# Patient Record
Sex: MALE | Race: OTHER | NOT HISPANIC OR LATINO | Employment: OTHER | URBAN - METROPOLITAN AREA
[De-identification: names, ages, dates, MRNs, and addresses within clinical notes are randomized per-mention and may not be internally consistent; named-entity substitution may affect disease eponyms.]

---

## 2017-02-02 ENCOUNTER — TRANSCRIBE ORDERS (OUTPATIENT)
Dept: ADMINISTRATIVE | Facility: HOSPITAL | Age: 72
End: 2017-02-02

## 2017-02-02 ENCOUNTER — ALLSCRIPTS OFFICE VISIT (OUTPATIENT)
Dept: OTHER | Facility: OTHER | Age: 72
End: 2017-02-02

## 2017-02-02 DIAGNOSIS — R19.34: Primary | ICD-10-CM

## 2017-02-02 DIAGNOSIS — R10.814 ABDOMINAL TENDERNESS OF LEFT LOWER QUADRANT, REBOUND TENDERNESS PRESENCE NOT SPECIFIED: ICD-10-CM

## 2017-02-02 DIAGNOSIS — R19.34: ICD-10-CM

## 2017-02-02 DIAGNOSIS — R10.814 LEFT LOWER QUADRANT ABDOMINAL TENDERNESS: ICD-10-CM

## 2017-02-03 ENCOUNTER — GENERIC CONVERSION - ENCOUNTER (OUTPATIENT)
Dept: OTHER | Facility: OTHER | Age: 72
End: 2017-02-03

## 2017-02-03 LAB
A/G RATIO (HISTORICAL): 1.8 (CALC) (ref 1–2.5)
ALBUMIN SERPL BCP-MCNC: 4.2 G/DL (ref 3.6–5.1)
ALP SERPL-CCNC: 58 U/L (ref 40–115)
ALT SERPL W P-5'-P-CCNC: 15 U/L (ref 9–46)
AST SERPL W P-5'-P-CCNC: 19 U/L (ref 10–35)
BASOPHILS # BLD AUTO: 0.4 %
BASOPHILS # BLD AUTO: 30 CELLS/UL (ref 0–200)
BILIRUB SERPL-MCNC: 1.2 MG/DL (ref 0.2–1.2)
BILIRUB UR QL STRIP: NEGATIVE
BUN SERPL-MCNC: 13 MG/DL (ref 7–25)
BUN/CREA RATIO (HISTORICAL): ABNORMAL (CALC) (ref 6–22)
CALCIUM SERPL-MCNC: 9.7 MG/DL (ref 8.6–10.3)
CHLORIDE SERPL-SCNC: 106 MMOL/L (ref 98–110)
CO2 SERPL-SCNC: 31 MMOL/L (ref 20–31)
COLOR UR: YELLOW
COMMENT (HISTORICAL): CLEAR
CREAT SERPL-MCNC: 1.16 MG/DL (ref 0.7–1.18)
DEPRECATED RDW RBC AUTO: 12.6 % (ref 11–15)
EOSINOPHIL # BLD AUTO: 10.9 %
EOSINOPHIL # BLD AUTO: 828 CELLS/UL (ref 15–500)
FECAL OCCULT BLOOD DIAGNOSTIC (HISTORICAL): NEGATIVE
GAMMA GLOBULIN (HISTORICAL): 2.3 G/DL (CALC) (ref 1.9–3.7)
GLUCOSE (HISTORICAL): 110 MG/DL (ref 65–99)
GLUCOSE (HISTORICAL): NEGATIVE
HCT VFR BLD AUTO: 46.6 % (ref 38.5–50)
HGB BLD-MCNC: 15.7 G/DL (ref 13.2–17.1)
KETONES UR STRIP-MCNC: NEGATIVE MG/DL
LEUKOCYTE ESTERASE UR QL STRIP: NEGATIVE
LYMPHOCYTES # BLD AUTO: 2044 CELLS/UL (ref 850–3900)
LYMPHOCYTES # BLD AUTO: 26.9 %
MCH RBC QN AUTO: 30 PG (ref 27–33)
MCHC RBC AUTO-ENTMCNC: 33.7 G/DL (ref 32–36)
MCV RBC AUTO: 89 FL (ref 80–100)
MONOCYTES # BLD AUTO: 608 CELLS/UL (ref 200–950)
MONOCYTES (HISTORICAL): 8 %
NEUTROPHILS # BLD AUTO: 4089 CELLS/UL (ref 1500–7800)
NEUTROPHILS # BLD AUTO: 53.8 %
NITRITE UR QL STRIP: NEGATIVE
PH UR STRIP.AUTO: 5.5 [PH] (ref 5–8)
PLATELET # BLD AUTO: 186 THOUSAND/UL (ref 140–400)
PMV BLD AUTO: 9.8 FL (ref 7.5–12.5)
POTASSIUM SERPL-SCNC: 4.6 MMOL/L (ref 3.5–5.3)
PROT UR STRIP-MCNC: NEGATIVE MG/DL
RBC # BLD AUTO: 5.24 MILLION/UL (ref 4.2–5.8)
SODIUM SERPL-SCNC: 144 MMOL/L (ref 135–146)
SP GR UR STRIP.AUTO: 1.02 (ref 1–1.03)
TOTAL PROTEIN (HISTORICAL): 6.5 G/DL (ref 6.1–8.1)
TSH SERPL DL<=0.05 MIU/L-ACNC: 1.17 MIU/L (ref 0.4–4.5)
WBC # BLD AUTO: 7.6 THOUSAND/UL (ref 3.8–10.8)

## 2017-02-08 ENCOUNTER — HOSPITAL ENCOUNTER (OUTPATIENT)
Dept: CT IMAGING | Facility: HOSPITAL | Age: 72
Discharge: HOME | End: 2017-02-08
Payer: COMMERCIAL

## 2017-02-08 DIAGNOSIS — R19.34: ICD-10-CM

## 2017-02-08 DIAGNOSIS — R10.814 LEFT LOWER QUADRANT ABDOMINAL TENDERNESS: ICD-10-CM

## 2017-02-08 PROCEDURE — 74176 CT ABD & PELVIS W/O CONTRAST: CPT

## 2017-02-09 ENCOUNTER — GENERIC CONVERSION - ENCOUNTER (OUTPATIENT)
Dept: OTHER | Facility: OTHER | Age: 72
End: 2017-02-09

## 2017-02-14 ENCOUNTER — GENERIC CONVERSION - ENCOUNTER (OUTPATIENT)
Dept: OTHER | Facility: OTHER | Age: 72
End: 2017-02-14

## 2017-02-20 ENCOUNTER — ALLSCRIPTS OFFICE VISIT (OUTPATIENT)
Dept: OTHER | Facility: OTHER | Age: 72
End: 2017-02-20

## 2017-02-27 ENCOUNTER — GENERIC CONVERSION - ENCOUNTER (OUTPATIENT)
Dept: OTHER | Facility: OTHER | Age: 72
End: 2017-02-27

## 2017-04-06 ENCOUNTER — GENERIC CONVERSION - ENCOUNTER (OUTPATIENT)
Dept: OTHER | Facility: OTHER | Age: 72
End: 2017-04-06

## 2017-04-28 ENCOUNTER — GENERIC CONVERSION - ENCOUNTER (OUTPATIENT)
Dept: OTHER | Facility: OTHER | Age: 72
End: 2017-04-28

## 2017-05-19 ENCOUNTER — GENERIC CONVERSION - ENCOUNTER (OUTPATIENT)
Dept: OTHER | Facility: OTHER | Age: 72
End: 2017-05-19

## 2017-06-15 ENCOUNTER — ALLSCRIPTS OFFICE VISIT (OUTPATIENT)
Dept: OTHER | Facility: OTHER | Age: 72
End: 2017-06-15

## 2017-06-16 ENCOUNTER — LAB CONVERSION - ENCOUNTER (OUTPATIENT)
Dept: OTHER | Facility: OTHER | Age: 72
End: 2017-06-16

## 2017-06-16 ENCOUNTER — GENERIC CONVERSION - ENCOUNTER (OUTPATIENT)
Dept: OTHER | Facility: OTHER | Age: 72
End: 2017-06-16

## 2017-06-16 LAB
C-REACTIVE PROTEIN (HISTORICAL): 0.34 MG/DL
ERYTHROCYTE SEDIMENTATION RATE (HISTORICAL): 2 MM/H

## 2017-06-23 ENCOUNTER — GENERIC CONVERSION - ENCOUNTER (OUTPATIENT)
Dept: OTHER | Facility: OTHER | Age: 72
End: 2017-06-23

## 2017-07-03 ENCOUNTER — GENERIC CONVERSION - ENCOUNTER (OUTPATIENT)
Dept: OTHER | Facility: OTHER | Age: 72
End: 2017-07-03

## 2017-07-14 ENCOUNTER — ALLSCRIPTS OFFICE VISIT (OUTPATIENT)
Dept: OTHER | Facility: OTHER | Age: 72
End: 2017-07-14

## 2017-07-27 ENCOUNTER — APPOINTMENT (EMERGENCY)
Dept: CT IMAGING | Facility: HOSPITAL | Age: 72
End: 2017-07-27
Payer: COMMERCIAL

## 2017-07-27 ENCOUNTER — HOSPITAL ENCOUNTER (EMERGENCY)
Facility: HOSPITAL | Age: 72
Discharge: HOME | End: 2017-07-27
Attending: EMERGENCY MEDICINE
Payer: COMMERCIAL

## 2017-07-27 VITALS
WEIGHT: 187 LBS | OXYGEN SATURATION: 95 % | BODY MASS INDEX: 31.92 KG/M2 | DIASTOLIC BLOOD PRESSURE: 100 MMHG | HEIGHT: 64 IN | HEART RATE: 60 BPM | RESPIRATION RATE: 16 BRPM | TEMPERATURE: 98.1 F | SYSTOLIC BLOOD PRESSURE: 214 MMHG

## 2017-07-27 DIAGNOSIS — R10.9 FLANK PAIN: ICD-10-CM

## 2017-07-27 DIAGNOSIS — R11.0 NAUSEA: ICD-10-CM

## 2017-07-27 DIAGNOSIS — R10.9 ABDOMINAL PAIN: Primary | ICD-10-CM

## 2017-07-27 LAB
ALBUMIN SERPL BCP-MCNC: 3.9 G/DL (ref 3.5–5)
ALP SERPL-CCNC: 59 U/L (ref 46–116)
ALT SERPL W P-5'-P-CCNC: 28 U/L (ref 12–78)
ANION GAP SERPL CALCULATED.3IONS-SCNC: 5 MMOL/L (ref 4–13)
AST SERPL W P-5'-P-CCNC: 23 U/L (ref 5–45)
BASOPHILS # BLD AUTO: 0.03 THOUSANDS/ΜL (ref 0–0.1)
BASOPHILS NFR BLD AUTO: 0 % (ref 0–1)
BILIRUB SERPL-MCNC: 0.71 MG/DL (ref 0.2–1)
BILIRUB UR QL STRIP: NEGATIVE
BUN SERPL-MCNC: 9 MG/DL (ref 5–25)
CALCIUM SERPL-MCNC: 8.7 MG/DL (ref 8.3–10.1)
CHLORIDE SERPL-SCNC: 101 MMOL/L (ref 100–108)
CLARITY UR: CLEAR
CO2 SERPL-SCNC: 33 MMOL/L (ref 21–32)
COLOR UR: YELLOW
CREAT SERPL-MCNC: 1.22 MG/DL (ref 0.6–1.3)
EOSINOPHIL # BLD AUTO: 0.8 THOUSAND/ΜL (ref 0–0.61)
EOSINOPHIL NFR BLD AUTO: 11 % (ref 0–6)
ERYTHROCYTE [DISTWIDTH] IN BLOOD BY AUTOMATED COUNT: 13.8 % (ref 11.6–15.1)
GFR SERPL CREATININE-BSD FRML MDRD: 58 ML/MIN/1.73SQ M
GLUCOSE SERPL-MCNC: 96 MG/DL (ref 65–140)
GLUCOSE UR STRIP-MCNC: NEGATIVE MG/DL
HCT VFR BLD AUTO: 42.1 % (ref 36.5–49.3)
HGB BLD-MCNC: 14.8 G/DL (ref 12–17)
HGB UR QL STRIP.AUTO: NEGATIVE
KETONES UR STRIP-MCNC: NEGATIVE MG/DL
LEUKOCYTE ESTERASE UR QL STRIP: NEGATIVE
LYMPHOCYTES # BLD AUTO: 2.65 THOUSANDS/ΜL (ref 0.6–4.47)
LYMPHOCYTES NFR BLD AUTO: 36 % (ref 14–44)
MCH RBC QN AUTO: 30.1 PG (ref 26.8–34.3)
MCHC RBC AUTO-ENTMCNC: 35.2 G/DL (ref 31.4–37.4)
MCV RBC AUTO: 86 FL (ref 82–98)
MONOCYTES # BLD AUTO: 0.54 THOUSAND/ΜL (ref 0.17–1.22)
MONOCYTES NFR BLD AUTO: 7 % (ref 4–12)
NEUTROPHILS # BLD AUTO: 3.27 THOUSANDS/ΜL (ref 1.85–7.62)
NEUTS SEG NFR BLD AUTO: 46 % (ref 43–75)
NITRITE UR QL STRIP: NEGATIVE
NRBC BLD AUTO-RTO: 0 /100 WBCS
PH UR STRIP.AUTO: 5.5 [PH] (ref 4.5–8)
PLATELET # BLD AUTO: 152 THOUSANDS/UL (ref 149–390)
PMV BLD AUTO: 11.1 FL (ref 8.9–12.7)
POTASSIUM SERPL-SCNC: 4.4 MMOL/L (ref 3.5–5.3)
PROT SERPL-MCNC: 6.9 G/DL (ref 6.4–8.2)
PROT UR STRIP-MCNC: NEGATIVE MG/DL
RBC # BLD AUTO: 4.92 MILLION/UL (ref 3.88–5.62)
SODIUM SERPL-SCNC: 139 MMOL/L (ref 136–145)
SP GR UR STRIP.AUTO: 1.01 (ref 1–1.03)
UROBILINOGEN UR QL STRIP.AUTO: 0.2 E.U./DL
WBC # BLD AUTO: 7.29 THOUSAND/UL (ref 4.31–10.16)

## 2017-07-27 PROCEDURE — 93005 ELECTROCARDIOGRAM TRACING: CPT | Performed by: EMERGENCY MEDICINE

## 2017-07-27 PROCEDURE — 80053 COMPREHEN METABOLIC PANEL: CPT | Performed by: EMERGENCY MEDICINE

## 2017-07-27 PROCEDURE — 81003 URINALYSIS AUTO W/O SCOPE: CPT

## 2017-07-27 PROCEDURE — 99284 EMERGENCY DEPT VISIT MOD MDM: CPT

## 2017-07-27 PROCEDURE — 96361 HYDRATE IV INFUSION ADD-ON: CPT

## 2017-07-27 PROCEDURE — 74177 CT ABD & PELVIS W/CONTRAST: CPT

## 2017-07-27 PROCEDURE — 85025 COMPLETE CBC W/AUTO DIFF WBC: CPT | Performed by: EMERGENCY MEDICINE

## 2017-07-27 PROCEDURE — 36415 COLL VENOUS BLD VENIPUNCTURE: CPT | Performed by: EMERGENCY MEDICINE

## 2017-07-27 PROCEDURE — 96375 TX/PRO/DX INJ NEW DRUG ADDON: CPT

## 2017-07-27 PROCEDURE — 74176 CT ABD & PELVIS W/O CONTRAST: CPT

## 2017-07-27 PROCEDURE — 81002 URINALYSIS NONAUTO W/O SCOPE: CPT | Performed by: EMERGENCY MEDICINE

## 2017-07-27 PROCEDURE — 96374 THER/PROPH/DIAG INJ IV PUSH: CPT

## 2017-07-27 RX ORDER — ACETAMINOPHEN 325 MG/1
TABLET ORAL
COMMUNITY
Start: 2016-04-12 | End: 2018-06-12 | Stop reason: SDUPTHER

## 2017-07-27 RX ORDER — CALCITRIOL 3 UG/G
OINTMENT TOPICAL
COMMUNITY
Start: 2011-10-13 | End: 2018-07-17

## 2017-07-27 RX ORDER — METOPROLOL SUCCINATE 25 MG/1
12.5 TABLET, EXTENDED RELEASE ORAL
COMMUNITY
End: 2019-09-09 | Stop reason: SDUPTHER

## 2017-07-27 RX ORDER — ALBUTEROL SULFATE 90 UG/1
AEROSOL, METERED RESPIRATORY (INHALATION)
COMMUNITY
Start: 2016-02-17 | End: 2018-06-12 | Stop reason: SDUPTHER

## 2017-07-27 RX ORDER — OMEPRAZOLE 20 MG/1
20 CAPSULE, DELAYED RELEASE ORAL DAILY
COMMUNITY
Start: 2015-07-08 | End: 2018-07-17 | Stop reason: SDUPTHER

## 2017-07-27 RX ORDER — GABAPENTIN 300 MG/1
600 CAPSULE ORAL
COMMUNITY
Start: 2016-09-20 | End: 2018-04-05 | Stop reason: SDUPTHER

## 2017-07-27 RX ORDER — ONDANSETRON 4 MG/1
4 TABLET, FILM COATED ORAL EVERY 6 HOURS
Qty: 12 TABLET | Refills: 0 | Status: SHIPPED | OUTPATIENT
Start: 2017-07-27 | End: 2018-06-12 | Stop reason: ALTCHOICE

## 2017-07-27 RX ORDER — VALSARTAN 160 MG/1
160 TABLET ORAL DAILY
COMMUNITY
Start: 2017-06-15 | End: 2018-08-16 | Stop reason: ALTCHOICE

## 2017-07-27 RX ORDER — KETOROLAC TROMETHAMINE 30 MG/ML
15 INJECTION, SOLUTION INTRAMUSCULAR; INTRAVENOUS ONCE
Status: COMPLETED | OUTPATIENT
Start: 2017-07-27 | End: 2017-07-27

## 2017-07-27 RX ORDER — ONDANSETRON 2 MG/ML
4 INJECTION INTRAMUSCULAR; INTRAVENOUS ONCE
Status: COMPLETED | OUTPATIENT
Start: 2017-07-27 | End: 2017-07-27

## 2017-07-27 RX ORDER — FLUTICASONE PROPIONATE 50 MCG
2 SPRAY, SUSPENSION (ML) NASAL DAILY
COMMUNITY
Start: 2016-10-27 | End: 2018-06-12 | Stop reason: SDUPTHER

## 2017-07-27 RX ORDER — CITALOPRAM 20 MG/1
TABLET ORAL
COMMUNITY
Start: 2011-11-09 | End: 2018-07-17 | Stop reason: SDUPTHER

## 2017-07-27 RX ORDER — CLOBETASOL PROPIONATE 0.46 MG/ML
SOLUTION TOPICAL
COMMUNITY
Start: 2015-11-12 | End: 2019-10-04 | Stop reason: ALTCHOICE

## 2017-07-27 RX ORDER — ACETAMINOPHEN 325 MG/1
650 TABLET ORAL EVERY 6 HOURS PRN
Qty: 30 TABLET | Refills: 0 | Status: SHIPPED | OUTPATIENT
Start: 2017-07-27 | End: 2019-01-09 | Stop reason: SDUPTHER

## 2017-07-27 RX ORDER — ALPRAZOLAM 0.5 MG/1
TABLET ORAL
COMMUNITY
End: 2018-08-16 | Stop reason: SDUPTHER

## 2017-07-27 RX ORDER — DIPHENHYDRAMINE HYDROCHLORIDE 50 MG/ML
50 INJECTION INTRAMUSCULAR; INTRAVENOUS ONCE
Status: COMPLETED | OUTPATIENT
Start: 2017-07-27 | End: 2017-07-27

## 2017-07-27 RX ORDER — ACETAMINOPHEN 325 MG/1
975 TABLET ORAL ONCE
Status: COMPLETED | OUTPATIENT
Start: 2017-07-27 | End: 2017-07-27

## 2017-07-27 RX ORDER — HYDROXYZINE HYDROCHLORIDE 10 MG/1
TABLET, FILM COATED ORAL
COMMUNITY
End: 2019-10-04 | Stop reason: ALTCHOICE

## 2017-07-27 RX ORDER — IBUPROFEN 400 MG/1
400 TABLET ORAL EVERY 6 HOURS PRN
Qty: 30 TABLET | Refills: 0 | Status: SHIPPED | OUTPATIENT
Start: 2017-07-27 | End: 2018-08-16 | Stop reason: SINTOL

## 2017-07-27 RX ADMIN — ACETAMINOPHEN 975 MG: 325 TABLET, FILM COATED ORAL at 23:32

## 2017-07-27 RX ADMIN — IOHEXOL 50 ML: 240 INJECTION, SOLUTION INTRATHECAL; INTRAVASCULAR; INTRAVENOUS; ORAL at 22:15

## 2017-07-27 RX ADMIN — IOHEXOL 100 ML: 350 INJECTION, SOLUTION INTRAVENOUS at 22:15

## 2017-07-27 RX ADMIN — KETOROLAC TROMETHAMINE 15 MG: 30 INJECTION, SOLUTION INTRAMUSCULAR at 17:25

## 2017-07-27 RX ADMIN — ONDANSETRON 4 MG: 2 INJECTION INTRAMUSCULAR; INTRAVENOUS at 17:25

## 2017-07-27 RX ADMIN — HYDROCORTISONE SODIUM SUCCINATE 200 MG: 250 INJECTION, POWDER, FOR SOLUTION INTRAMUSCULAR; INTRAVENOUS at 18:48

## 2017-07-27 RX ADMIN — DIPHENHYDRAMINE HYDROCHLORIDE 50 MG: 50 INJECTION, SOLUTION INTRAMUSCULAR; INTRAVENOUS at 21:01

## 2017-07-27 RX ADMIN — SODIUM CHLORIDE 1000 ML: 0.9 INJECTION, SOLUTION INTRAVENOUS at 17:25

## 2017-07-28 LAB
ATRIAL RATE: 56 BPM
P AXIS: 62 DEGREES
PR INTERVAL: 194 MS
QRS AXIS: 8 DEGREES
QRSD INTERVAL: 94 MS
QT INTERVAL: 474 MS
QTC INTERVAL: 457 MS
T WAVE AXIS: 29 DEGREES
VENTRICULAR RATE: 56 BPM

## 2017-07-31 ENCOUNTER — ALLSCRIPTS OFFICE VISIT (OUTPATIENT)
Dept: OTHER | Facility: OTHER | Age: 72
End: 2017-07-31

## 2017-08-08 ENCOUNTER — ALLSCRIPTS OFFICE VISIT (OUTPATIENT)
Dept: OTHER | Facility: OTHER | Age: 72
End: 2017-08-08

## 2017-08-08 LAB
BILIRUB UR QL STRIP: NEGATIVE
CLARITY UR: NORMAL
COLOR UR: YELLOW
GLUCOSE (HISTORICAL): NEGATIVE
HGB UR QL STRIP.AUTO: NEGATIVE
KETONES UR STRIP-MCNC: NORMAL MG/DL
LEUKOCYTE ESTERASE UR QL STRIP: NEGATIVE
NITRITE UR QL STRIP: NEGATIVE
PH UR STRIP.AUTO: 5 [PH]
PROT UR STRIP-MCNC: NORMAL MG/DL
SP GR UR STRIP.AUTO: 1.01
UROBILINOGEN UR QL STRIP.AUTO: 0.2

## 2017-08-11 ENCOUNTER — GENERIC CONVERSION - ENCOUNTER (OUTPATIENT)
Dept: OTHER | Facility: OTHER | Age: 72
End: 2017-08-11

## 2017-08-12 ENCOUNTER — LAB CONVERSION - ENCOUNTER (OUTPATIENT)
Dept: OTHER | Facility: OTHER | Age: 72
End: 2017-08-12

## 2017-08-12 LAB — CULTURE RESULT (HISTORICAL): NORMAL

## 2017-08-14 ENCOUNTER — GENERIC CONVERSION - ENCOUNTER (OUTPATIENT)
Dept: OTHER | Facility: OTHER | Age: 72
End: 2017-08-14

## 2017-10-02 ENCOUNTER — ALLSCRIPTS OFFICE VISIT (OUTPATIENT)
Dept: OTHER | Facility: OTHER | Age: 72
End: 2017-10-02

## 2017-10-02 LAB
BILIRUB UR QL STRIP: NORMAL
CLARITY UR: NORMAL
COLOR UR: YELLOW
GLUCOSE (HISTORICAL): NORMAL
HGB UR QL STRIP.AUTO: NORMAL
KETONES UR STRIP-MCNC: NORMAL MG/DL
LEUKOCYTE ESTERASE UR QL STRIP: NORMAL
NITRITE UR QL STRIP: NORMAL
PH UR STRIP.AUTO: 5 [PH]
PROT UR STRIP-MCNC: NORMAL MG/DL
SP GR UR STRIP.AUTO: 1.03

## 2017-10-27 NOTE — CONSULTS
Assessment  1  Organic impotence (607 84) (N52 9)   2  Incomplete emptying of bladder (788 21) (R33 9)    Plan  Incomplete emptying of bladder    · Uroflow w/ Post Void Residual - POC; Status:Active - Perform Order; Requested  PUK:[id number];    Perform: In Office; 20 444 83 42; Ordered; For:Incomplete emptying of bladder; Ordered By:Keo Willams;   · Follow-up visit in 2 months Evaluation and Treatment  Follow-up  Status: Hold For -  Scheduling  Requested for: 75VRS4270   Ordered; For: Incomplete emptying of bladder; Ordered By: Ruchi Cid Performed:  Due: 84UZZ2240  Organic impotence    · Sildenafil Citrate 20 MG Oral Tablet; Take up to 5 tablets one hour prior to  intercourse as needed   Rx By: Ruchi Cid; Dispense: 90 Days ; #:30 Tablet; Refill: 3;For: Organic impotence; SEFERINO = N; Print Rx  Testicular hypogonadism    · Urine Dip Non-Automated- POC; Status:Complete - Retrospective By Protocol  Authorization;   Done: 29FHB9404 10:04AM   Performed: In Office; 26 444 83 42; Last Updated By:Bennett Marr; 10/2/2017 10:09:24 AM;Ordered; For:Testicular hypogonadism; Ordered By:Keo Willams; Discussion/Summary  Discussion Summary:   My impression is mild BPH, incomplete bladder emptying him erectile dysfunction  patient was provided with a prescription for generic sildenafil  He was instructed to take up to 5 tablets one hour prior to intercourse as needed  The patient is relatively mild lower urinary tract symptoms at this time other than occasional incomplete bladder emptying  I will obtain a post void residual assessment and uroflow evaluation to better assess his lower urinary tract symptoms  Although the patient has a history of a low testosterone, this was greater than 5 years ago  At 74years of age the patient does not appear to be interested in pursuing treatment for testicular hypogonadism at this time  Follow-up in the next 2 months with a post void residual and uroflow evaluation        Chief Complaint  Chief Complaint Free Text Note Form: New patient consult for testicular hypogonadism      History of Present Illness  HPI: Guerita De Santiago is a 66-year-old male referred for mild BPH with incomplete bladder emptying  There is a question of testicular hypogonadism, however, does not appear to be an issue at this time  He did have a testosterone level drawn in 2012 which was quite low at 79  He does have mild erectile dysfunction and has requested PDE 5 inhibitors  There is a recent report of prostatitis treated with antibiotics  He states he is virtually no lower urinary tract symptoms at this time other than occasional incomplete bladder emptying  medical and surgical history is remarkable for asthma, GERD, hypertension, hyperlipidemia  Medical, surgical, family, and social histories were reviewed in [employer]  Review of Systems  Complete-Male Urology:   Constitutional: No fever or chills, feels well, no tiredness, no recent weight gain or weight loss  Respiratory: No complaints of shortness of breath, no wheezing, no cough, no SOB on exertion, no orthopnea or PND  Cardiovascular: No complaints of slow heart rate, no fast heart rate, no chest pain, no palpitations, no leg claudication, no lower extremity  Gastrointestinal: No complaints of abdominal pain, no constipation, no nausea or vomiting, no diarrhea or bloody stools  Genitourinary: feelings of urinary urgency-- and-- stream quality fair, but-- as noted in HPI,-- no dysuria,-- no hematuria,-- no empty sensation-- and-- no incontinence--    The patient presents with complaints of occasional episodes of urinary hesitancy  The patient presents with complaints of 4 episodes of nocturia  Musculoskeletal: No complaints of arthralgia, no myalgias, no joint swelling or stiffness, no limb pain or swelling  Integumentary: No complaints of skin rash or skin lesions, no itching, no skin wound, no dry skin     Hematologic/Lymphatic: No complaints of swollen glands, no swollen glands in the neck, does not bleed easily, no easy bruising  Neurological: No compliants of headache, no confusion, no convulsions, no numbness or tingling, no dizziness or fainting, no limb weakness, no difficulty walking  ROS Reviewed:   ROS reviewed  Active Problems  1  Acid reflux (530 81) (K21 9)   2  Acute prostatitis (601 0) (N41 0)   3  Asthma (493 90) (J45 909)   4  Bilateral Carotid Bruits (785 9)   5  Bilateral hearing loss (389 9) (H91 93)   6  Cervical spinal stenosis (723 0) (M48 02)   7  Chronic pain syndrome (338 4) (G89 4)   8  Chronic sinusitis (473 9) (J32 9)   9  Depression with anxiety (300 4) (F41 8)   10  Hip pain, left (719 45) (M25 552)   11  History of allergy (V15 09) (Z88 9)   12  History of vertigo (V12 49) (Z87 898)   13  Hyperglycemia (790 29) (R73 9)   14  Hyperlipidemia (272 4) (E78 5)   15  Hypertension (401 9) (I10)   16  Itching (698 9) (L29 9)   17  Lumbar radiculopathy (724 4) (M54 16)   18  Lumbar spinal stenosis (724 02) (M48 061)   19  Medicare annual wellness visit, initial (V70 0) (Z00 00)   20  Migraine (346 90) (G43 909)   21  Peripheral neuropathy (356 9) (G62 9)   22  Psoriasis (696 1) (L40 9)   23  Rotator cuff tendinitis, unspecified laterality (726 10) (M75 80)   24  Seborrheic dermatitis (690 10) (L21 9)   25  TB lung, latent (795 51) (R76 11)   26  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)   27  Thoracic degenerative disc disease (722 51) (M51 34)   28  Tinnitus, unspecified laterality (388 30) (H93 19)   29  History of Vasovagal syncope (780 2) (R55)   30  Venous insufficiency (459 81) (I87 2)    Past Medical History  1  History of Abdominal left lower quadrant tenderness (789 64) (R10 814)   2  History of Acute kidney injury (584 9) (N17 9)   3  Acute otitis media (382 9) (H66 90)   4  History of Anxiety and depression (300 00,311) (F41 8)   5  History of Aseptic Meningitis (047 9)   6   History of Atypical chest pain (786 59) (R07 89)   7  History of Back pain, chronic (724 5,338 29) (M54 9,G89 29)   8  History of Elevated liver function tests (790 6) (R79 89)   9  History of Erectile dysfunction of non-organic origin (302 72) (F52 21)   10  History of arthritis (V13 4) (Z87 39)   11  History of bacterial meningitis (V12 42) (Z86 61)   12  History of Bell's palsy (V12 49) (Z86 69)   13  History of cataract (V12 49) (Z86 69)   14  History of duodenal ulcer (V12 79) (Z87 19)   15  History of gastrointestinal hemorrhage (V12 79) (Z87 19)   16  History of headache (V13 89) (Z87 898)   17  History of insect bite (V15 59) (Z87 828)   18  History of osteoporosis (V13 59) (Z87 39)   19  History of seasonal allergies (V15 09) (Z88 9)   20  History of toe fracture (V15 51) (Z87 81)   21  History of vertigo (V12 49) (Z87 898)   22  History of viral meningitis (V12 42) (Z86 61)   23  History of viral warts (V12 09) (Z86 19)   24  History of Neuropathy (355 9) (G62 9)   25  History of Panic disorder (300 01) (F41 0)   26  History of Screening for colorectal cancer (V76 51) (Z12 11,Z12 12)   27  History of Sinus pain (478 19) (J34 89)   28  History of Tenosynovitis Of The Left Ring Finger (727 05)   29  History of Trochanteric bursitis (726 5) (M70 60)   30  History of Trochanteric bursitis, unspecified laterality (726 5) (M70 60)   31  History of Vasovagal syncope (780 2) (R55)   32  History of Vertigo (780 4) (R42)  Active Problems And Past Medical History Reviewed: The active problems and past medical history were reviewed and updated today  Surgical History  1  History of Cataract Surgery   2  History of Complete Colonoscopy   3  History of Diagnostic Esophagogastroduodenoscopy   4  History of Ear Surgery   5  History of Foot Surgery   6  History Of Prior Surgery   7  History of Neuroplasty Decompression Median Nerve At Carpal Tunnel   8  History of Spinal Anesthesia Epidural   9  History of Tympanoplasty  Surgical History Reviewed:    The surgical history was reviewed and updated today  Family History  Mother    1  Family history of    2  Family history of cataracts (V19 19) (Z83 518)   3  Family history of hyperlipidemia (V18 19) (Z83 49)  Father    4  Family history of   Maternal Grandmother    5  Family history of   Paternal Grandmother    10  Family history of   Maternal Grandfather    7  Family history of   Paternal Grandfather    6  Family history of   Family History Reviewed: The family history was reviewed and updated today  Social History   · Former cigar smoker (F46 38) (Q47 446)   · Former cigarette smoker (V1 82) (Z87 891)   · Former pipe smoker (L72 98) (I92 669)   · Former smoker () (Z87 891)   · Functioning activity level   · High school or GED   · Lives independently   ·    · No drug use   · Occupation   · Past history of chewing tobacco use () (Z87 891)   · Stopped Drinking Alcohol   · Three children  Social History Reviewed: The social history was reviewed and updated today  The social history was reviewed and is unchanged  Current Meds   1  ALPRAZolam 0 5 MG Oral Tablet; TAKE 1 tab twice a day only as needed for anxiety; Therapy: 44SUP0615 to (Evaluate:2017)  Requested for: 80QPO1610; Last   Rx:93Udp7094 Ordered   2  AmLODIPine Besylate 10 MG Oral Tablet; TAKE 1 TABLET DAILY; Therapy: 49NYO5832 to (21 [phone])  Requested for: 43Pfr3718; Last   Rx:62Qmu8198 Ordered   3  Citalopram Hydrobromide 20 MG Oral Tablet; Take 1 tablet daily; Therapy: 89CHX2271 to (Evaluate:2018)  Requested for: 67RSH7119; Last   Rx:2017 Ordered   4  Clobetasol Propionate 0 05 % External Solution; APPLY AND GENTLY MASSAGE INTO   AFFECTED AREA SCALP TWICE DAILY x 2 week course; Therapy: 64LQM8475 to (Evaluate:18Iji9295)  Requested for: 67PVT3491; Last   Rx:2015 Ordered   5   Fluticasone Propionate 50 MCG/ACT Nasal Suspension; USE 2 SPRAYS IN EACH   NOSTRIL ONCE DAILY; Therapy: 25EXO0512 to (Last Marrianne Douse)  Requested for: 08Iux7698 Ordered   6  Gabapentin 300 MG Oral Capsule; TAKE 2 CAPSULE at night; Therapy: 07POV9634 to (Evaluate:14Mar2018)  Requested for: 44Ovu8557; Last   Rx:51Fsr9283; Status: ACTIVE - Transmit to Pharmacy - Awaiting Verification Ordered   7  Humira Pen KIT; USE AS DIRECTED via Derm; Therapy: (Recorded:15Jun2017) to Recorded   8  HydrOXYzine HCl - 10 MG Oral Tablet; uses 2 at bedtime for itching  Requested for:   85TYU4207; Last Rx:15Jun2017 Ordered   9  Meloxicam 15 MG Oral Tablet; TAKE 1 TABLET Daily PRN pain; Therapy: 27ESE3252 to (Evaluate:14Nov2017)  Requested for: 66Efa9835; Last   Rx:23Sqv7635; Status: ACTIVE - Transmit to [facility] Verification Ordered   10  Metoprolol Succinate ER 25 MG Oral Tablet Extended Release 24 Hour; TAKE 1 TABLET    TWICE DAILY; Therapy: (Recorded:64Mbd7090) to  Requested for: 44TDM2478 Recorded   11  Omeprazole 20 MG Oral Capsule Delayed Release; TAKE 1 CAPSULE DAILY AS    NEEDED FOR HEARTBURN;    Therapy: 82XSB0291 to (Evaluate:28Jan2018)  Requested for: 79CZF7897; Last    Rx:31Klv3008 Ordered   12  Tylenol 325 MG Oral Tablet; TAKE 1 TO 2 TABLETS EVERY 6 HOURS AS NEEDED; Therapy: 12Apr2016 to Recorded   13  Valsartan 160 MG Oral Tablet; Take 1 tablet daily; Therapy: 51SYF1640 to (Evaluate:10Jun2018); Last Rx:15Jun2017 Ordered   14  Vectical 3 MCG/GM External Ointment; Therapy: 91OEW8673 to (Last Rx:13Oct2011)  Requested for: 13Oct2011 Ordered   15  Ventolin  (90 Base) MCG/ACT Inhalation Aerosol Solution; INHALE 1 TO 2 PUFFS    EVERY 4 TO 6 HOURS AS NEEDED; Therapy: 13BIH1550 to (Leonora Amaya)  Requested for: 88UMV5100; Last    Rx:38Wsh7616 Ordered    Allergies  1  Amoxicillin-Pot Clavulanate ER TB12   2  Bextra TABS   3  Codeine Derivatives   4  Lisinopril TABS   5  Compazine TABS   6   Latex Gloves MISC  7  IV Dye    Vitals  Vital Signs    Recorded: 95EGX4958 09:59AM   Heart Rate 80   Systolic 439   Diastolic 80   Height 5 ft 4 in   Weight 186 lb    BMI Calculated 31 93   BSA Calculated 1 9     Physical Exam    Additional Exam:  On examination he is in no acute distress  His abdomen is soft nontender nondistended   examination reveals normal phallus, scrotum and scrotal contents  Digital rectal examination reveals a 40-45 g prostate which is smooth, firm, without nodularity  The prostate is nontender  Extremities are warm  Without edema  Neurologic is grossly intact and nonfocal  Gait normal  Affect normal       Results/Data  AUA Symptom Score 02Oct2017 10:11AM User, s     Test Name Result Flag Reference   AUA Symptom Score (for prostate disease) 13     Incomplete emptying: Less than half the time (2)  Frequency: Less than 1 time in 5 (1)  Intermittency: Not at all (0)  Urgency: Less than 1 time in 5 (1)  Weak-stream: More than half the time (4)  Straining: Less than 1 time in 5 (1)  Nocturia: More than half the time (4)   AUA Symptom Score (for prostate disease) - Quality of Life Due to Urinary Symptoms Terrible     AUA Symptom Score (for prostate disease) - Score Category Moderate       Urine Dip Non-Automated- POC 02Oct2017 10:04AM Rolando Harrison     Test Name Result Flag Reference   Color Yellow     Clarity Transparent     Leukocytes -     Nitrite -     Blood -     Bilirubin -     Protein -     Ph 5     Specific Gravity 1 030     Ketone -     Glucose -       (1) PSA (SCREEN) (Dx V76 44 Screen for Prostate Cancer) 15Oct2016 09:05AM Sonia Taco     Test Name Result Flag Reference   PSA, TOTAL 2 2 ng/mL  < OR = 4 0   This test was performed using the Siemens  chemiluminescent method  Values obtained from  different assay methods cannot be used  interchangeably  PSA levels, regardless of  value, should not be interpreted as absolute  evidence of the presence or absence of disease       Signatures   Electronically signed by : Yuly Hameed MD; Oct  2 2017 10:54AM EST                       (Author)

## 2017-11-16 ENCOUNTER — GENERIC CONVERSION - ENCOUNTER (OUTPATIENT)
Dept: FAMILY MEDICINE CLINIC | Facility: CLINIC | Age: 72
End: 2017-11-16

## 2017-11-16 ENCOUNTER — GENERIC CONVERSION - ENCOUNTER (OUTPATIENT)
Dept: OTHER | Facility: OTHER | Age: 72
End: 2017-11-16

## 2017-11-28 ENCOUNTER — ALLSCRIPTS OFFICE VISIT (OUTPATIENT)
Dept: OTHER | Facility: OTHER | Age: 72
End: 2017-11-28

## 2017-11-28 LAB
BILIRUB UR QL STRIP: NORMAL
CLARITY UR: NORMAL
COLOR UR: YELLOW
GLUCOSE (HISTORICAL): NORMAL
HGB UR QL STRIP.AUTO: NORMAL
KETONES UR STRIP-MCNC: NORMAL MG/DL
LEUKOCYTE ESTERASE UR QL STRIP: NORMAL
NITRITE UR QL STRIP: NORMAL
PH UR STRIP.AUTO: 5 [PH]
PROT UR STRIP-MCNC: NORMAL MG/DL
SP GR UR STRIP.AUTO: 1.02
UROBILINOGEN UR QL STRIP.AUTO: NORMAL

## 2017-11-29 NOTE — PROGRESS NOTES
Assessment  1  BPH associated with nocturia (600 01,788 43) (N40 1,R35 1)   2  Incomplete emptying of bladder (788 21) (R33 9)    Plan  BPH associated with nocturia    · Tamsulosin HCl - 0 4 MG Oral Capsule; TAKE 1 CAPSULE BY MOUTH BEDTIME   Rx By: Delroy Zheng; Dispense: 0 Days ; #:30 Capsule; Refill: 1;BPH associated with nocturia; SEFERINO = N; Rx auto-faxed to [facility]; Last Updated By: System, [employer]; 11/28/2017 10:54:22 AM  Incomplete emptying of bladder, Organic impotence    · Urine Dip Non-Automated- POC; Status:Complete - Retrospective By ProtocolAuthorization;   Done: 28YNA1971 10:42AM   Performed: In Office; [phone]; Last Updated [facility]; 11/28/2017 10:42:43 AM;Ordered;emptying of bladder, Organic impotence; Ordered By:Gaby Greene;    Discussion/Summary  Discussion Summary:   49-year-old male managed by Dr Suresh Baer  Mild BPH with nocturia and incomplete bladder emptyingHistory of prostatitis in the setting of boggy DREErectile dysfunction  patient has bothersome nocturia 4-5 times per night as well as a fair stream and incomplete bladder emptying (PVR today was 86 38mL)  Because of this we will start him on tamsulosin nightly  Discussed that he should not take this in conjunction with PDE 5 inhibitor  Unfortunately, his sildenafil 20mg 5 tablets did not work for him  He comes asking about a medication called biogenic XR available in the [facility]  Discussed with the patient that I do not believe that is a common medication [place] in the [place]  but I will look into it  He will follow up in 6-8 weeks to see if he is doing better on the tamsulosin  I will notify him about the medication he inquires about at that time  The patient understands and agrees to this treatment plan  All questions and concerns have been addressed and answered    biogenic XR is a male enhancement supplement containing L-Arginine Hydrochloride, Nora Root, [place], and Yohimbe Extract none of which are proven to improve male ED and therefore I do not recommend  Goals and Barriers: The patient has the current Goals: To obtain satisfactory erections and decrease nocturia  The patent has the current Barriers: None  Patient's Capacity to Self-Care: Patient is able to Self-Care  Chief Complaint  Chief Complaint Free Text Note Form: Pt is here for teresita of ED and incomplete emptying of bladder  He states meds for ED are not working      History of Present Illness  HPI: Yolanda Vasquez is a 59-year-old male here for follow-up evaluation of mild BPH with nocturia and incomplete bladder emptying, a history of prostatitis, and erectile dysfunction  He presents today with complaints of nocturia 4-5 times per night, a fair stream quality, and the sensation he does not was empty his bladder to completion  Unfortunately uroflowmetry was unable to be obtained as he did not have a full bladder  Postvoid residual after a small voided volume was 86 38 mL  His urine in the office today reveals no leukocytes, nitrites, or blood  He states that he tried the sildenafil 20 mg 5 tablets but this did not work to obtain satisfactory erections  Review of Systems  Complete-Male Urology:  Constitutional: No fever or chills, feels well, no tiredness, no recent weight gain or weight loss  Respiratory: No complaints of shortness of breath, no wheezing, no cough, no SOB on exertion, no orthopnea or PND  Cardiovascular: No complaints of slow heart rate, no fast heart rate, no chest pain, no palpitations, no leg claudication, no lower extremity  Gastrointestinal: No complaints of abdominal pain, no constipation, no nausea or vomiting, no diarrhea or bloody stools  Genitourinary: stream quality fair, but-- no dysuria,-- no urinary hesitancy,-- no hematuria,-- no empty sensation,-- no incontinence-- and-- no feelings of urinary urgency--   The patient presents with complaints of nocturia (4-5 x's)    Musculoskeletal: No complaints of arthralgia, no myalgias, no joint swelling or stiffness, no limb pain or swelling  Integumentary: No complaints of skin rash or skin lesions, no itching, no skin wound, no dry skin  Hematologic/Lymphatic: No complaints of swollen glands, no swollen glands in the neck, does not bleed easily, no easy bruising  Neurological: No compliants of headache, no confusion, no convulsions, no numbness or tingling, no dizziness or fainting, no limb weakness, no difficulty walking  ROS Reviewed:   ROS reviewed  Active Problems  1  Acid reflux (530 81) (K21 9)   2  Acute prostatitis (601 0) (N41 0)   3  Asthma (493 90) (J45 909)   4  Bilateral Carotid Bruits (785 9)   5  Bilateral hearing loss (389 9) (H91 93)   6  Cervical spinal stenosis (723 0) (M48 02)   7  Chronic pain syndrome (338 4) (G89 4)   8  Chronic sinusitis (473 9) (J32 9)   9  Depression with anxiety (300 4) (F41 8)   10  Hip pain, left (719 45) (M25 552)   11  History of allergy (V15 09) (Z88 9)   12  History of vertigo (V12 49) (Z87 898)   13  Hyperglycemia (790 29) (R73 9)   14  Hyperlipidemia (272 4) (E78 5)   15  Hypertension (401 9) (I10)   16  Incomplete emptying of bladder (788 21) (R33 9)   17  Itching (698 9) (L29 9)   18  Lumbar radiculopathy (724 4) (M54 16)   19  Lumbar spinal stenosis (724 02) (M48 061)   20  Medicare annual wellness visit, initial (V70 0) (Z00 00)   21  Migraine (346 90) (G43 909)   22  Need for influenza vaccination (V04 81) (Z23)   23  Organic impotence (607 84) (N52 9)   24  Peripheral neuropathy (356 9) (G62 9)   25  Psoriasis (696 1) (L40 9)   26  Rotator cuff tendinitis, unspecified laterality (726 10) (M75 80)   27  Seborrheic dermatitis (690 10) (L21 9)   28  TB lung, latent (795 51) (R76 11)   29  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)   30  Thoracic degenerative disc disease (722 51) (M51 34)   31  Tinnitus, unspecified laterality (388 30) (H93 19)   32  History of Vasovagal syncope (780 2) (R55)   33   Venous insufficiency (459 81) (I87 2)    Past Medical History  1  History of Abdominal left lower quadrant tenderness (789 64) (R10 814)   2  History of Acute kidney injury (584 9) (N17 9)   3  Acute otitis media (382 9) (H66 90)   4  History of Anxiety and depression (300 00,311) (F41 8)   5  History of Aseptic Meningitis (047 9)   6  History of Atypical chest pain (786 59) (R07 89)   7  History of Back pain, chronic (724 5,338 29) (M54 9,G89 29)   8  History of Elevated liver function tests (790 6) (R79 89)   9  History of Erectile dysfunction of non-organic origin (302 72) (F52 21)   10  History of arthritis (V13 4) (Z87 39)   11  History of bacterial meningitis (V12 42) (Z86 61)   12  History of Bell's palsy (V12 49) (Z86 69)   13  History of cataract (V12 49) (Z86 69)   14  History of duodenal ulcer (V12 79) (Z87 19)   15  History of gastrointestinal hemorrhage (V12 79) (Z87 19)   16  History of headache (V13 89) (Z87 898)   17  History of insect bite (V15 59) (Z87 828)   18  History of osteoporosis (V13 59) (Z87 39)   19  History of seasonal allergies (V15 09) (Z88 9)   20  History of toe fracture (V15 51) (Z87 81)   21  History of vertigo (V12 49) (Z87 898)   22  History of viral meningitis (V12 42) (Z86 61)   23  History of viral warts (V12 09) (Z86 19)   24  History of Neuropathy (355 9) (G62 9)   25  History of Panic disorder (300 01) (F41 0)   26  History of Screening for colorectal cancer (V76 51) (Z12 11,Z12 12)   27  History of Sinus pain (478 19) (J34 89)   28  History of Tenosynovitis Of The Left Ring Finger (727 05)   29  History of Trochanteric bursitis (726 5) (M70 60)   30  History of Trochanteric bursitis, unspecified laterality (726 5) (M70 60)   31  History of Vasovagal syncope (780 2) (R55)   32  History of Vertigo (780 4) (R42)  Active Problems And Past Medical History Reviewed: The active problems and past medical history were reviewed and updated today  Surgical History  1   History of Cataract Surgery   2  History of Complete Colonoscopy   3  History of Diagnostic Esophagogastroduodenoscopy   4  History of Ear Surgery   5  History of Foot Surgery   6  History Of Prior Surgery   7  History of Neuroplasty Decompression Median Nerve At Carpal Tunnel   8  History of Spinal Anesthesia Epidural   9  History of Tympanoplasty  Surgical History Reviewed: The surgical history was reviewed and updated today  Family History  Mother    1  Family history of    2  Family history of cataracts (V19 19) (Z83 518)   3  Family history of hyperlipidemia (V18 19) (Z83 49)  Father    4  Family history of   Maternal Grandmother    5  Family history of   Paternal Grandmother    10  Family history of   Maternal Grandfather    7  Family history of   Paternal Grandfather    6  Family history of   Family History Reviewed: The family history was reviewed and updated today  Social History     · Former cigar smoker (Q08 00) (Z56 603)   · Former cigarette smoker (V1 82) (Z87 891)   · Former pipe smoker (E27 64) (Q59 475)   · Former smoker () (Z87 891)   · Functioning activity level   · High school or GED   · Lives independently   ·    · No drug use   · Occupation   · Past history of chewing tobacco use () (Z87 891)   · Stopped Drinking Alcohol   · Three children  Social History Reviewed: The social history was reviewed and updated today  The social history was reviewed and is unchanged  Current Meds   1  ALPRAZolam 0 5 MG Oral Tablet; TAKE 1 tab twice a day only as needed for anxiety; Therapy: 20RHM1322 to (Evaluate:2017)  Requested for: 76HUG0260; Last Rx:49Xxz3722 Ordered   2  AmLODIPine Besylate 10 MG Oral Tablet; TAKE 1 TABLET DAILY; Therapy: 90SLT2517 to ([phone])  Requested for: 82Eoh4167; Last Rx:11Zoy2789 Ordered   3  Citalopram Hydrobromide 20 MG Oral Tablet; Take 1 tablet daily;  Therapy: 41YOI9309 to (Evaluate:10Jun2018)  Requested for: 32HKW4641; Last Rx:15Jun2017 Ordered   4  Clobetasol Propionate 0 05 % External Solution; APPLY AND GENTLY MASSAGE INTO AFFECTED AREA SCALP TWICE DAILY x 2 week course; Therapy: 67XXP4939 to (Evaluate:98Ivg1071)  Requested for: 55OGQ5855; Last Rx:12Nov2015 Ordered   5  Fluticasone Propionate 50 MCG/ACT Nasal Suspension; USE 2 SPRAYS IN EACH NOSTRIL ONCE DAILY; Therapy: 06BSK1379 to (Last Alicia Killings)  Requested for: 27Oct2016 Ordered   6  Gabapentin 300 MG Oral Capsule; TAKE 2 CAPSULE at night; Therapy: 24PKV6630 to (Evaluate:14Mar2018)  Requested for: 75Pqt4429; Last Rx:62Pln9446; Status: ACTIVE - Transmit to Pharmacy - Awaiting Verification Ordered   7  Humira Pen KIT; USE AS DIRECTED via Derm; Therapy: (Recorded:15Jun2017) to Recorded   8  HydrOXYzine HCl - 10 MG Oral Tablet; uses 2 at bedtime for itching  Requested for: 70SGV4519; Last Rx:15Jun2017 Ordered   9  Meloxicam 15 MG Oral Tablet; TAKE 1 TABLET Daily PRN pain; Therapy: 04GLR8236 to (Evaluate:14Nov2017)  Requested for: 54Hpd2001; Last Rx:35Qxe1188; Status: ACTIVE - Transmit to [facility] Verification Ordered   10  Metoprolol Succinate ER 25 MG Oral Tablet Extended Release 24 Hour; TAKE 1 TABLET  TWICE DAILY; Therapy: (Recorded:88Ibq3067) to  Requested for: 97FQP6015 Recorded   11  Omeprazole 20 MG Oral Capsule Delayed Release; TAKE 1 CAPSULE DAILY AS  NEEDED FOR HEARTBURN;  Therapy: 36FLV7594 to (Evaluate:28Jan2018)  Requested for: 26JIX8084; Last  Rx:12Iex7856 Ordered   12  Sildenafil Citrate 20 MG Oral Tablet; Take up to 5 tablets one hour prior to intercourse as  needed; Therapy: 29YYD3651 to (Evaluate:38Wbd0919); Last Rx:02Oct2017 Ordered   13  Tylenol 325 MG Oral Tablet; TAKE 1 TO 2 TABLETS EVERY 6 HOURS AS NEEDED; Therapy: 82Mmq7783 to Recorded   14  Valsartan 160 MG Oral Tablet; Take 1 tablet daily; Therapy: 92KXV2142 to (Evaluate:10Jun2018); Last Rx:15Jun2017 Ordered   15   Vectical 3 MCG/GM External Ointment; Therapy: 38DTN9092 to (Last Rx:31Lko7110)  Requested for: 13Oct2011 Ordered   16  Ventolin  (90 Base) MCG/ACT Inhalation Aerosol Solution; INHALE 1 TO 2 PUFFS  EVERY 4 TO 6 HOURS AS NEEDED; Therapy: 45YTB9148 to (Mahesh Job)  Requested for: 45SPZ5720; Last  Rx:75Kvr0169 Ordered  Medication List Reviewed: The medication list was reviewed and updated today  Allergies  1  Amoxicillin-Pot Clavulanate ER TB12   2  Bextra TABS   3  Codeine Derivatives   4  Lisinopril TABS   5  Compazine TABS   6  Latex Gloves MISC  7  IV Dye    Vitals  Vital Signs    Recorded: 02NFD0160 10:41AM   Heart Rate 76   Systolic 954   Diastolic 80   Weight 365 lb    BMI Calculated 32 96   BSA Calculated 1 92       Physical Exam   Constitutional  General appearance: No acute distress, well appearing and well nourished  Eyes  Conjunctiva and lids: No erythema, swelling or discharge  Ears, Nose, Mouth, and Throat  Hearing: Normal    Pulmonary  Respiratory effort: No increased work of breathing or signs of respiratory distress  Abdomen  Abdomen: Non-tender, no masses  Musculoskeletal  Gait and station: Normal    Psychiatric  Mood and affect: Normal        Results/Data  Urine Dip Non-Automated- POC 11MYB4512 10:42AM Priya Mckay     Test Name Result Flag Reference   Color Yellow       Clarity Transparent     Leukocytes -     Nitrite -     Blood -     Bilirubin -     Urobilinogen -     Protein -     Ph 5 0     Specific Gravity 1 020     Ketone -     Glucose -           Procedure    Procedure:   Equipment And Procedure: The patient unknown  U/S Findings: 86 38 ml with US        Future Appointments    Date/Time Provider Specialty Site   01/17/2018 11:30 AM Alfred Bhakta, [address]  [facility]  [facility]       Signatures   Electronically signed by : Chrissy Petersen, ; Nov 28 2017 11:05AM EST                       (Author)    Electronically signed by : Chrissy Petersen, ; Nov 28 2017 11:12AM EST                       (Author)    Electronically signed by : Angle Zacarias MD; Nov 28 2017  4:24PM EST

## 2018-01-10 NOTE — RESULT NOTES
Verified Results  (1) COMPREHENSIVE METABOLIC PANEL 88IZO2530 79:24DC Jamila Deutsch     Test Name Result Flag Reference   GLUCOSE 98 mg/dL  65-99   Fasting reference interval   UREA NITROGEN (BUN) 12 mg/dL  7-25   CREATININE 1 01 mg/dL  0 70-1 18   For patients >52years of age, the reference limit  for Creatinine is approximately 13% higher for people  identified as -American  eGFR NON-AFR  AMERICAN 73 mL/min/1 73m2  > OR = 60   eGFR AFRICAN AMERICAN 85 mL/min/1 73m2  > OR = 60   BUN/CREATININE RATIO   4-19   NOT APPLICABLE (calc)   SODIUM 135 mmol/L  135-146   POTASSIUM 4 0 mmol/L  3 5-5 3   CHLORIDE 98 mmol/L     CARBON DIOXIDE 28 mmol/L  20-31   CALCIUM 9 0 mg/dL  8 6-10 3   PROTEIN, TOTAL 5 6 g/dL L 6 1-8 1   ALBUMIN 3 5 g/dL L 3 6-5 1   GLOBULIN 2 1 g/dL (calc)  1 9-3 7   ALBUMIN/GLOBULIN RATIO 1 7 (calc)  1 0-2 5   BILIRUBIN, TOTAL 6 7 mg/dL H 0 2-1 2   ALKALINE PHOSPHATASE 300 U/L H     U/L H 10-35   Verified by repeat analysis   U/L H 9-46   Verified by repeat analysis       (1) CBC/PLT/DIFF 92AUI2143 09:05AM Jamila Deutsch     Test Name Result Flag Reference   WHITE BLOOD CELL COUNT 5 0 Thousand/uL  3 8-10 8   RED BLOOD CELL COUNT 4 86 Million/uL  4 20-5 80   HEMOGLOBIN 13 9 g/dL  13 2-17 1   HEMATOCRIT 41 5 %  38 5-50 0   MCV 85 4 fL  80 0-100 0   MCH 28 6 pg  27 0-33 0   MCHC 33 4 g/dL  32 0-36 0   RDW 17 2 % H 11 0-15 0   PLATELET COUNT 279 Thousand/uL  140-400   MPV 10 1 fL  7 5-11 5   ABSOLUTE NEUTROPHILS 2250 cells/uL  6176-5033   ABSOLUTE LYMPHOCYTES 2050 cells/uL  850-3900   ABSOLUTE MONOCYTES 400 cells/uL  200-950   ABSOLUTE EOSINOPHILS 250 cells/uL     ABSOLUTE BASOPHILS 50 cells/uL  0-200   NEUTROPHILS 45 %     LYMPHOCYTES 41 %     MONOCYTES 8 %     EOSINOPHILS 5 %     BASOPHILS 1 %     CBC MORPHOLOGY   NORMAL   Red cell morphology appears unremarkable    See Below     Although an "automated CBC" was ordered, our  instrumentation detected an abnormality on  your patient's specimen requiring us to perform  a manual review  (1) PSA (SCREEN) (Dx V76 44 Screen for Prostate Cancer) [id number] 09:05AM Adriano Cuello     Test Name Result Flag Reference   PSA, TOTAL 2 2 ng/mL  < OR = 4 0   This test was performed using the Siemens  chemiluminescent method  Values obtained from  different assay methods cannot be used  interchangeably  PSA levels, regardless of  value, should not be interpreted as absolute  evidence of the presence or absence of disease  (Q) LIPID PANEL WITH REFLEX TO DIRECT LDL 47FUJ2675 09:05AM Adriano Cuello     Test Name Result Flag Reference   CHOLESTEROL, TOTAL 165 mg/dL  125-200   Results slightly decreased due to icteric nature of the specimen  HDL CHOLESTEROL 5 mg/dL L > OR = 40   Verified by repeat analysis  TRIGLICERIDES 120 mg/dL H <150   LDL-CHOLESTEROL 126 mg/dL (calc)  <130   Desirable range <100 mg/dL for patients with CHD or  diabetes and <70 mg/dL for diabetic patients with  known heart disease  CHOL/HDLC RATIO 33 0 (calc) H < OR = 5 0   NON HDL CHOLESTEROL 160 mg/dL (calc) H    Target for non-HDL cholesterol is 30 mg/dL higher than   LDL cholesterol target  (Q) TSH, 3RD GENERATION W/REFLEX TO FT4 15Oct2016 09:05AM Adriano Cuello     Test Name Result Flag Reference   TSH W/REFLEX TO FT4 1 29 mIU/L  0 40-4 50     (Q) HEMOGLOBIN A1c 15Oct2016 09:05AM Wynasratrang Cuello   REPORT COMMENT:  FASTING:YES     Test Name Result Flag Reference   HEMOGLOBIN A1c 5 9 % of total Hgb H <5 7   According to ADA guidelines, hemoglobin A1c <7 0%  represents optimal control in non-pregnant diabetic  patients  Different metrics may apply to specific  patient populations  Standards of Medical Care in  [phone]  Diabetes Care   2013;36:s11-s66     For the purpose of screening for the presence of  diabetes  <5 7%       Consistent with the absence of diabetes  5 7-6 4%    Consistent with increased risk for diabetes (prediabetes)  >or=6 5%    Consistent with diabetes     This assay result is consistent with an increased risk  of diabetes  Currently, no consensus exists for use of hemoglobin  A1c for diagnosis of diabetes for children

## 2018-01-11 NOTE — MISCELLANEOUS
Message  phone call from [facility], bp was 192/131 on arrival After sitting bp was 160/110      The patient was instructed to come by our office today or tomorrow for repeat blood pressure check  Active Problems    1  Acute bronchitis (466 0) (J20 9)   2  Asthma (493 90) (J45 909)   3  Bilateral Carotid Bruits (785 9)   4  Bilateral hearing loss (389 9) (H91 93)   5  Bulging lumbar disc (722 10) (M51 26)   6  Cataract (366 9) (H26 9)   7  Cervical spinal stenosis (723 0) (M48 02)   8  Cervicalgia (723 1) (M54 2)   9  Chronic low back pain (724 2,338 29) (M54 5,G89 29)   10  Chronic pain syndrome (338 4) (G89 4)   11  Cough (786 2) (R05)   12  Depression with anxiety (300 4) (F41 8)   13  Erectile dysfunction of non-organic origin (302 72) (F52 21)   14  Fatigue (780 79) (R53 83)   15  GERD (gastroesophageal reflux disease) (530 81) (K21 9)   16  History of allergy (V15 09) (Z88 9)   17  History of vertigo (V12 49) (Z87 898)   18  Hyperglycemia (790 29) (R73 9)   19  Hyperlipidemia (272 4) (E78 5)   20  Hypertension (401 9) (I10)   21  Left leg weakness (729 89) (M62 81)   22  Lumbar radiculopathy (724 4) (M54 16)   23  Lumbar spinal stenosis (724 02) (M48 06)   24  Lumbar spondylosis (721 3) (M47 816)   25  Myalgia And Myositis (729 1)   26  Other intervertebral disc degeneration, lumbar region (722 52) (M51 36)   27  Peripheral neuropathy (356 9) (G62 9)   28  Psoriasis (696 1) (L40 9)   29  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)   30  Rotator cuff tendinitis, unspecified laterality (726 10) (M75 80)   31  Seborrheic dermatitis (690 10) (L21 9)   32  Skin neoplasm (239 2) (D49 2)   33  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)   34  Thoracic degenerative disc disease (722 51) (M51 34)   35  Tinnitus, unspecified laterality (388 30) (H93 19)   36  History of Vasovagal syncope (780 2) (R55)   37  Venous insufficiency (459 81) (I87 2)    Current Meds   1   ALPRAZolam 0 5 MG Oral Tablet; TAKE 1 TABLET Every 8 hours; Therapy: 02KNH9463 to (Evaluate:12Tkr0789)  Requested for: 84UKL9609; Last   Rx:78Zvq9483 Ordered   2  AmLODIPine Besylate 5 MG Oral Tablet; Take 1 tablet po BID; Therapy: 49CCK1139 to (Last QJ:41CHF3630)  Requested for: 56NIP1022 Ordered   3  Atorvastatin Calcium 20 MG Oral Tablet; TAKE 1 TABLET DAILY; Therapy: 18NVL6705 to (Cheryl Kemp)  Requested for: 88AUC0992; Last   Rx:35Abe1595 Ordered   4  Citalopram Hydrobromide 20 MG Oral Tablet; TAKE 1 TABLET DAILY AS DIRECTED; Therapy: 07Bhn4830 to Recorded   5  Citalopram Hydrobromide 20 MG Oral Tablet; Take 1 tablet daily; Therapy: 11QAP0948 to (Evaluate:94Iww9979)  Requested for: 22YHK2033; Last   Rx:56Iuo1184 Ordered   6  Clobetasol Propionate 0 05 % External Solution; APPLY AND GENTLY MASSAGE INTO   AFFECTED AREA SCALP TWICE DAILY x 2 week course; Therapy: 39ZLM5739 to (Evaluate:34Gnv2456)  Requested for: 13VVG0812; Last   Rx:12Nov2015 Ordered   7  Fluticasone Propionate 50 MCG/ACT Nasal Suspension; USE 2 SPRAYS IN EACH   NOSTRIL ONCE DAILY; Therapy: 38VCZ4640 to (Evaluate:88Lqt1563)  Requested for: 54PLS1083; Last   Rx:03Mar2014 Ordered   8  Gabapentin 300 MG Oral Capsule; TAKE 1 CAPSULE THREE TIMES DAILY AS   NEEDED; Therapy: 26IAS9187 to (Tari Barnes)  Requested for: 06Muy3689; Last   Rx:60Uja9965 Ordered   9  HydrOXYzine HCl - 10 MG Oral Tablet; TAKE 1 TABLET AT BEDTIME; Therapy: (Recorded:12Dka0984) to Recorded   10  Levocetirizine Dihydrochloride 5 MG Oral Tablet; Take 1 tablet daily; Therapy: 91EGV1800 to (Evaluate:70Jfb3811)  Requested for: 66IHX3976; Last    Rx:34Erb3615 Ordered   11  Lisinopril 40 MG Oral Tablet; TAKE 1 TABLET DAILY; Therapy: 20GIQ5638 to (Evaluate:27Tfh9847)  Requested for: 00KGQ7921; Last    Rx:32Gbg1252 Ordered   12  Metoprolol Succinate ER 25 MG Oral Tablet Extended Release 24 Hour; 1/2 tab daily; Therapy: (Recorded:08Jun2015) to Recorded   13   Omeprazole 20 MG Oral Capsule Delayed Release; TAKE 1 CAPSULE DAILY AS    NEEDED FOR HEARTBURN;    Therapy: 82MVC8001 to (Evaluate:50Xaa3837)  Requested for: 62WMW8079; Last    Rx:06Ceb2049 Ordered   14  Tylenol 325 MG Oral Tablet; TAKE 1 TO 2 TABLETS EVERY 6 HOURS AS NEEDED; Therapy: 43Pzn0405 to Recorded   15  Vectical 3 MCG/GM External Ointment; Therapy: 78JJW4790 to (Last Rx:38Ibg7588)  Requested for: 13Oct2011 Ordered   16  Ventolin  (90 Base) MCG/ACT Inhalation Aerosol Solution; INHALE 1 TO 2    PUFFS EVERY 4 TO 6 HOURS AS NEEDED; Therapy: 09ULX6171 to (Evaluate:18Mar2016); Last Rx:83Mbv5858 Ordered    Allergies    1  Amoxicillin TABS   2  Amoxicillin-Pot Clavulanate ER TB12   3  Bextra TABS   4  Codeine Derivatives   5  Compazine TABS   6  Latex Gloves MISC    7  IV Dye   8   IVP Dye    Signatures   Electronically signed by : CANDACE Christianson ; Apr 27 2016 10:54PM EST                       (Author)

## 2018-01-11 NOTE — RESULT NOTES
Verified Results  (1) URINE CULTURE [id number] 01:09PM Luis Breed     Test Name Result Flag Reference   CULTURE, URINE, ROUTINE      CULTURE, URINE, ROUTINE         MICRO NUMBER:      [id number]    TEST STATUS:       FINAL    SPECIMEN SOURCE:   URINE, CLEAN CATCH    SPECIMEN QUALITY:  ADEQUATE    RESULT:            No Growth

## 2018-01-11 NOTE — MISCELLANEOUS
Message   Recorded as Task   Date: 05/12/2016 12:44 PM, Created By: Cassandra Marie   Task Name: Care Coordination   Assigned To: [id number] [address] clinical,Team   Regarding Patient: Charles Chong, Status: In Progress   Comment:    Kristin Ramirez - 12 May 2016 12:44 PM     TASK CREATED  Caller: Self; Care Coordination; [phone]  Pt lmom stating that he went to [facility] today and thet can't find anything so he wants to the office to call and cancel his appt  Called pt back for clarification from the message left  Lmom for pt to return call  Cassandra Marie - 12 May 2016 12:44 PM     TASK EDITED   Cassandra Marie - 12 May 2016 1:17 PM     TASK EDITED  Spoke to pt, he states that he saw Candice Pleasure from [address] office today and he is not having pain so he would like to discuss with Dr Askew and not proceed with SCS  Appt made for 5/20 to discuss  Skyler Mckeon - 91 May 2016 9:19 AM     TASK REPLIED TO: Previously Assigned To Skyler Mckeon  aware and agree thanks   Emiliana Camacho - 16 May 2016 9:26 AM     TASK IN PROGRESS        Active Problems    1  Asthma (493 90) (J45 909)   2  Bilateral Carotid Bruits (785 9)   3  Bilateral hearing loss (389 9) (H91 93)   4  Bulging lumbar disc (722 10) (M51 26)   5  Bulging of cervical intervertebral disc without myelopathy (722 0) (M50 20)   6  Cataract (366 9) (H26 9)   7  Cervical spinal stenosis (723 0) (M48 02)   8  Cervical spondylolysis (756 19) (M43 02)   9  Cervicalgia (723 1) (M54 2)   10  Chronic low back pain (724 2,338 29) (M54 5,G89 29)   11  Chronic pain syndrome (338 4) (G89 4)   12  Cough (786 2) (R05)   13  Depression with anxiety (300 4) (F41 8)   14  Fatigue (780 79) (R53 83)   15  GERD (gastroesophageal reflux disease) (530 81) (K21 9)   16  History of allergy (V15 09) (Z88 9)   17  History of vertigo (V12 49) (Z87 898)   18  Hyperglycemia (790 29) (R73 9)   19  Hyperlipidemia (272 4) (E78 5)   20   Hypertension (401 9) (I10)   21  Lumbar radiculopathy (724 4) (M54 16)   22  Lumbar spinal stenosis (724 02) (M48 06)   23  Other cervical disc degeneration, unspecified cervical region (722 4) (M50 30)   24  Other intervertebral disc degeneration, lumbar region (722 52) (M51 36)   25  Peripheral neuropathy (356 9) (G62 9)   26  Psoriasis (696 1) (L40 9)   27  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)   28  Rotator cuff tendinitis, unspecified laterality (726 10) (M75 80)   29  Seborrheic dermatitis (690 10) (L21 9)   30  Skin neoplasm (239 2) (D49 2)   31  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)   32  Thoracic degenerative disc disease (722 51) (M51 34)   33  Tinnitus, unspecified laterality (388 30) (H93 19)   34  History of Vasovagal syncope (780 2) (R55)   35  Venous insufficiency (459 81) (I87 2)    Current Meds   1  ALPRAZolam 0 5 MG Oral Tablet; TAKE 1 TABLET Every 8 hours; Therapy: 82HQP9045 to (Evaluate:24Pbo4366)  Requested for: 10BYD2367; Last   Rx:51Vtf6522 Ordered   2  AmLODIPine Besylate 5 MG Oral Tablet; Take 1 tablet po BID; Therapy: 73YIO2972 to (Last LF:87CQZ6275)  Requested for: 44WDH9629 Ordered   3  Atorvastatin Calcium 20 MG Oral Tablet; TAKE 1 TABLET DAILY; Therapy: 46UEM1853 to (Alyson Kuhn)  Requested for: 25USK3709; Last   Rx:10Mkb1861 Ordered   4  Citalopram Hydrobromide 20 MG Oral Tablet; Take 1 tablet daily; Therapy: 76AWV6681 to (Evaluate:49Hgj9203)  Requested for: 74CBZ2634; Last   Rx:74Jyv9429 Ordered   5  Clobetasol Propionate 0 05 % External Solution; APPLY AND GENTLY MASSAGE INTO   AFFECTED AREA SCALP TWICE DAILY x 2 week course; Therapy: 68FZU9734 to (Evaluate:16Pzb7308)  Requested for: 92ARX2231; Last   Rx:12Nov2015 Ordered   6  Fluticasone Propionate 50 MCG/ACT Nasal Suspension; USE 2 SPRAYS IN EACH   NOSTRIL ONCE DAILY; Therapy: 20COU3733 to (Evaluate:00Ppf1243)  Requested for: 40DOS7578; Last   Rx:03Mar2014 Ordered   7   HydrOXYzine HCl - 10 MG Oral Tablet; TAKE 1 TABLET AT BEDTIME; Therapy: (Recorded:23Gij4879) to Recorded   8  Levocetirizine Dihydrochloride 5 MG Oral Tablet; Take 1 tablet daily; Therapy: 26CEF5765 to (Evaluate:11Jld8184)  Requested for: 07AYQ5226; Last   Rx:95Gjd0574 Ordered   9  Lisinopril-Hydrochlorothiazide 20-12 5 MG Oral Tablet; TAKE 1 TABLET TWICE DAILY   AS DIRECTED; Therapy: 40KRP2467 to (Evaluate:45Xvb3517)  Requested for: 32WAB2381; Last   Rx:93Qdn0093 Ordered   10  MethylPREDNISolone 4 MG Oral Tablet Therapy Pack; Take as directed on pack    instructions; Therapy: 43TKJ2370 to (Last XC:98XMY6794)  Requested for: 43OHU2839 Ordered   11  Metoprolol Succinate ER 25 MG Oral Tablet Extended Release 24 Hour; Take 1 tablet    twice daily; Last Rx:24Pse6083 Ordered   12  Omeprazole 20 MG Oral Capsule Delayed Release; TAKE 1 CAPSULE DAILY AS    NEEDED FOR HEARTBURN;    Therapy: 79MOV5154 to (Evaluate:00Fts8738)  Requested for: 66BIC8384; Last    Rx:01Umi5879 Ordered   13  Tylenol 325 MG Oral Tablet; TAKE 1 TO 2 TABLETS EVERY 6 HOURS AS NEEDED; Therapy: 95Mdv5488 to Recorded   14  Vectical 3 MCG/GM External Ointment; Therapy: 06HBI6037 to (Last Rx:72Ynb2062)  Requested for: 97Xiv4741 Ordered   15  Ventolin  (90 Base) MCG/ACT Inhalation Aerosol Solution; INHALE 1 TO 2    PUFFS EVERY 4 TO 6 HOURS AS NEEDED; Therapy: 55SID6631 to (Evaluate:18Mar2016); Last Rx:36Oqu7799 Ordered    Allergies    1  Amoxicillin TABS   2  Amoxicillin-Pot Clavulanate ER TB12   3  Bextra TABS   4  Codeine Derivatives   5  Compazine TABS   6  Latex Gloves MISC    7  IV Dye   8  IVP Dye    Signatures   Electronically signed by :  Teri Navarro, ; May 16 2016  9:26AM EST                       (Author)

## 2018-01-11 NOTE — MISCELLANEOUS
Message   Recorded as Task   Date: 02/12/2016 11:40 AM, Created By: Rick Palmer   Task Name: Med Renewal Request   Assigned To: Rick Palmer   Regarding Patient: Aleshia Wesley, Status: Active   Comment:    Rick Palmer - 12 Feb 2016 11:40 AM     TASK CREATED  Caller: Self; Renew Medication; [phone] (Home)  Pt called and l/m stating that he left a message 2 weeks ago questioning if he could take otezla for his plaque psorisis  The dermatologist wants the PCP to let the pt know if this is compatible with pt's other meds before she issues med  Please advise  Also he wants 90 day rxs to take with him to [place] for 3 meds, omeprazole, alprazolam, and allergy med  Helder Jarquin - 14 Feb 2016 7:42 PM     TASK REPLIED TO: Previously Assigned To Helder Jarquin  rxs for Omeprazole and allergy med sent and Johanna Nowata was printed  (cannot be sent via computer)  See no contrindication to using the med Karen Redhead) from Dermatology -  he would need to watch for stomach upset   Catia Chacko - 16 Feb 2016 8:45 AM     TASK EDITED  Pt notified  Active Problems    1  Asthma (493 90) (J45 909)   2  Bilateral Carotid Bruits (785 9)   3  Cataract (366 9) (H26 9)   4  Depression with anxiety (300 4) (F41 8)   5  Erectile dysfunction of non-organic origin (302 72) (F52 21)   6  Fatigue (780 79) (R53 83)   7  GERD (gastroesophageal reflux disease) (530 81) (K21 9)   8  History of allergy (V15 09) (Z88 9)   9  History of vertigo (V12 49) (Z87 898)   10  Hyperglycemia (790 29) (R73 9)   11  Hyperlipidemia (272 4) (E78 5)   12  Hypertension (401 9) (I10)   13  Lower Back Pain Chronic   14  Myalgia And Myositis (729 1)   15  Peripheral neuropathy (356 9) (G62 9)   16  Psoriasis (696 1) (L40 9)   17  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)   18  Rotator cuff tendinitis, unspecified laterality (726 10) (M75 80)   19  Seborrheic dermatitis (690 10) (L21 9)   20  Skin neoplasm (239 2) (D49 2)   21   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)   22  Tinnitus, unspecified laterality (388 30) (H93 19)   23  History of Vasovagal syncope (780 2) (R55)   24  Venous insufficiency (459 81) (I87 2)    Current Meds   1  ALPRAZolam 0 5 MG Oral Tablet; TAKE 1 TABLET Every 8 hours; Therapy: 22RPJ3801 to (Evaluate:55Nme2569)  Requested for: 77XSV2206; Last   Rx:67Eyk8425 Ordered   2  AmLODIPine Besylate 5 MG Oral Tablet; Take 1 tablet po BID; Therapy: 46QCI4679 to (Last WS:39TPS0441)  Requested for: 10LGS7270 Ordered   3  Atorvastatin Calcium 20 MG Oral Tablet; TAKE 1 TABLET DAILY; Therapy: 57UJS6049 to (Kathya Yu)  Requested for: 85QEK6687; Last   Rx:03Feb2015 Ordered   4  Citalopram Hydrobromide 20 MG Oral Tablet; Take 1 tablet daily; Therapy: 62TVB7215 to (Dom Last)  Requested for: 47OSE1118; Last   Rx:30Nov2015 Ordered   5  Clobetasol Propionate 0 05 % External Solution; APPLY AND GENTLY MASSAGE INTO   AFFECTED AREA SCALP TWICE DAILY x 2 week course; Therapy: 41YZX0684 to (Evaluate:94Dbr9328)  Requested for: 97OOS6057; Last   Rx:12Nov2015 Ordered   6  Fluticasone Propionate 50 MCG/ACT Nasal Suspension; USE 2 SPRAYS IN EACH   NOSTRIL ONCE DAILY; Therapy: 15PIB4070 to (Evaluate:69Ylv9058)  Requested for: 55BVH3651; Last   Rx:03Mar2014 Ordered   7  Gabapentin 300 MG Oral Capsule; TAKE 1 CAPSULE THREE TIMES DAILY AS   NEEDED; Therapy: 60KOU2561 to (Lauren Kelley)  Requested for: 18Bsj8189; Last   Rx:23Wvg5725 Ordered   8  Levocetirizine Dihydrochloride 5 MG Oral Tablet; Take 1 tablet daily; Therapy: 01YOD6120 to (Evaluate:57Ytl1926)  Requested for: 29QRS0776; Last   Rx:12Vua8855 Ordered   9  Lisinopril 40 MG Oral Tablet; TAKE 1 TABLET DAILY; Therapy: 71CCL3013 to (Molly Acevedo)  Requested for: 88JYY4142; Last   Rx:30Nov2015 Ordered   10  Metoprolol Succinate ER 25 MG Oral Tablet Extended Release 24 Hour; 1/2 tab daily; Therapy: (Recorded:08Jun2015) to Recorded   11   Omeprazole 20 MG Oral Capsule Delayed Release; TAKE 1 CAPSULE DAILY AS    NEEDED FOR HEARTBURN;    Therapy: 30SHP6072 to (Evaluate:90Inl4183)  Requested for: 88FZF3118; Last    Rx:87Wtb5593 Ordered   12  Vectical 3 MCG/GM External Ointment; Therapy: 41VOA6218 to (Last Rx:13Oct2011)  Requested for: 06Hod1675 Ordered    Allergies    1  Amoxicillin TABS   2  Amoxicillin-Pot Clavulanate ER TB12   3  Bextra TABS   4  Codeine Derivatives   5  Compazine TABS   6  Latex Gloves MISC    7  IV Dye   8   IVP Dye    Signatures   Electronically signed by : Neda Lau, ; Feb 16 2016  8:45AM EST                       (Author)

## 2018-01-11 NOTE — RESULT NOTES
Verified Results  (Q) TSH, 3RD GENERATION W/REFLEX TO FT4 06DLW9267 09:58AM nany Grissom     Test Name Result Flag Reference   TSH W/REFLEX TO FT4 1 17 mIU/L  0 40-4 50     (Q) COMPREHENSIVE METABOLIC PANEL W/O eGFR 40TUQ6784 09:58AM  [facility]ra[facility]     Test Name Result Flag Reference   GLUCOSE 110 mg/dL H 65-99   Fasting reference interval   UREA NITROGEN (BUN) 13 mg/dL  7-25   CREATININE 1 16 mg/dL  0 70-1 18   For patients >52years of age, the reference limit  for Creatinine is approximately 13% higher for people  identified as -American     BUN/CREATININE RATIO   5-17   NOT APPLICABLE (calc)   SODIUM 144 mmol/L  135-146   POTASSIUM 4 6 mmol/L  3 5-5 3   CHLORIDE 106 mmol/L     CARBON DIOXIDE 31 mmol/L  20-31   CALCIUM 9 7 mg/dL  8 6-10 3   PROTEIN, TOTAL 6 5 g/dL  6 1-8 1   ALBUMIN 4 2 g/dL  3 6-5 1   GLOBULIN 2 3 g/dL (calc)  1 9-3 7   ALBUMIN/GLOBULIN RATIO 1 8 (calc)  1 0-2 5   BILIRUBIN, TOTAL 1 2 mg/dL  0 2-1 2   ALKALINE PHOSPHATASE 58 U/L     AST 19 U/L  10-35   ALT 15 U/L  9-46     (Q) CBC (INCLUDES DIFF/PLT) (REFL) 78JJC1561 09:58AM  [facility]     Test Name Result Flag Reference   WHITE BLOOD CELL COUNT 7 6 Thousand/uL  3 8-10 8   RED BLOOD CELL COUNT 5 24 Million/uL  4 20-5 80   HEMOGLOBIN 15 7 g/dL  13 2-17 1   HEMATOCRIT 46 6 %  38 5-50 0   MCV 89 0 fL  80 0-100 0   MCH 30 0 pg  27 0-33 0   MCHC 33 7 g/dL  32 0-36 0   RDW 12 6 %  11 0-15 0   PLATELET COUNT 761 Thousand/uL  140-400   MPV 9 8 fL  7 5-12 5   ABSOLUTE NEUTROPHILS 4089 cells/uL  1231-9250   ABSOLUTE LYMPHOCYTES 2044 cells/uL  850-3900   ABSOLUTE MONOCYTES 608 cells/uL  200-950   ABSOLUTE EOSINOPHILS 828 cells/uL H    ABSOLUTE BASOPHILS 30 cells/uL  0-200   NEUTROPHILS 53 8 %     LYMPHOCYTES 26 9 %     MONOCYTES 8 0 %     EOSINOPHILS 10 9 %     BASOPHILS 0 4 %       (Q) URINALYSIS REFLEX 78LKP5966 09:58AM nany Grissom   REPORT COMMENT:  FASTING:NO     Test Name Result Flag Reference   COLOR YELLOW  YELLOW APPEARANCE CLEAR  CLEAR   SPECIFIC GRAVITY 1 018  1 001-1 035   PH 5 5  5 0-8 0   GLUCOSE NEGATIVE  NEGATIVE   BILIRUBIN NEGATIVE  NEGATIVE   KETONES NEGATIVE  NEGATIVE   OCCULT BLOOD NEGATIVE  NEGATIVE   PROTEIN NEGATIVE  NEGATIVE   NITRITE NEGATIVE  NEGATIVE   LEUKOCYTE ESTERASE NEGATIVE  NEGATIVE

## 2018-01-12 VITALS
HEART RATE: 76 BPM | WEIGHT: 192 LBS | SYSTOLIC BLOOD PRESSURE: 120 MMHG | BODY MASS INDEX: 32.96 KG/M2 | DIASTOLIC BLOOD PRESSURE: 80 MMHG

## 2018-01-12 VITALS
HEART RATE: 72 BPM | SYSTOLIC BLOOD PRESSURE: 122 MMHG | BODY MASS INDEX: 31.19 KG/M2 | WEIGHT: 187.19 LBS | HEIGHT: 65 IN | DIASTOLIC BLOOD PRESSURE: 76 MMHG

## 2018-01-12 VITALS
SYSTOLIC BLOOD PRESSURE: 150 MMHG | DIASTOLIC BLOOD PRESSURE: 78 MMHG | WEIGHT: 187.25 LBS | BODY MASS INDEX: 31.2 KG/M2 | HEART RATE: 60 BPM | HEIGHT: 65 IN

## 2018-01-13 VITALS
DIASTOLIC BLOOD PRESSURE: 90 MMHG | RESPIRATION RATE: 16 BRPM | BODY MASS INDEX: 30.82 KG/M2 | HEART RATE: 60 BPM | SYSTOLIC BLOOD PRESSURE: 190 MMHG | HEIGHT: 65 IN | WEIGHT: 185 LBS

## 2018-01-13 VITALS
WEIGHT: 187.25 LBS | HEIGHT: 65 IN | TEMPERATURE: 97.4 F | BODY MASS INDEX: 31.2 KG/M2 | DIASTOLIC BLOOD PRESSURE: 80 MMHG | HEART RATE: 88 BPM | SYSTOLIC BLOOD PRESSURE: 144 MMHG | OXYGEN SATURATION: 95 %

## 2018-01-13 NOTE — MISCELLANEOUS
Message   Recorded as Task   Date: 04/18/2016 03:34 PM, Created By: Romina Murray   Task Name: Call Back   Assigned To: Efrain Olivares   Regarding Patient: Serene Oakes, Status: Active   CommentAvelino Fallen - 18 Apr 2016 3:34 PM     TASK CREATED  Patient contacted [place] office has questions re: SCS and referral for psysch eval  Would like a call back [phone]   María Elena Benson - 19 Apr 2016 8:25 AM     TASK EDITED  Spoke with pt who is requesting script for pscyh eval be sent to [facility]  Adivsed pt I would fax info as requested  Clinical info and script have been faxed  Active Problems    1  Acute bronchitis (466 0) (J20 9)   2  Asthma (493 90) (J45 909)   3  Bilateral Carotid Bruits (785 9)   4  Bilateral hearing loss (389 9) (H91 93)   5  Bulging lumbar disc (722 10) (M51 26)   6  Cataract (366 9) (H26 9)   7  Chronic low back pain (724 2,338 29) (M54 5,G89 29)   8  Chronic pain syndrome (338 4) (G89 4)   9  Cough (786 2) (R05)   10  Depression with anxiety (300 4) (F41 8)   11  Erectile dysfunction of non-organic origin (302 72) (F52 21)   12  Fatigue (780 79) (R53 83)   13  GERD (gastroesophageal reflux disease) (530 81) (K21 9)   14  History of allergy (V15 09) (Z88 9)   15  History of vertigo (V12 49) (Z87 898)   16  Hyperglycemia (790 29) (R73 9)   17  Hyperlipidemia (272 4) (E78 5)   18  Hypertension (401 9) (I10)   19  Left leg weakness (729 89) (M62 81)   20  Lumbar radiculopathy (724 4) (M54 16)   21  Lumbar spondylosis (721 3) (M47 816)   22  Myalgia And Myositis (729 1)   23  Peripheral neuropathy (356 9) (G62 9)   24  Psoriasis (696 1) (L40 9)   25  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)   26  Rotator cuff tendinitis, unspecified laterality (726 10) (M75 80)   27  Seborrheic dermatitis (690 10) (L21 9)   28  Skin neoplasm (239 2) (D49 2)   29  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)   30  Tinnitus, unspecified laterality (388 30) (H93 19)   31   History of Vasovagal syncope (780  2) (R55)   32  Venous insufficiency (459 81) (I87 2)    Current Meds   1  ALPRAZolam 0 5 MG Oral Tablet; TAKE 1 TABLET Every 8 hours; Therapy: 57ESA5354 to (Evaluate:02Ufy5727)  Requested for: 13UUH5866; Last   Rx:83Ows4118 Ordered   2  AmLODIPine Besylate 5 MG Oral Tablet; Take 1 tablet po BID; Therapy: 93MWQ0788 to (Last JH:31ILN5343)  Requested for: 67BDI1883 Ordered   3  Atorvastatin Calcium 20 MG Oral Tablet; TAKE 1 TABLET DAILY; Therapy: 37OTQ5346 to (Keyona Mercedes)  Requested for: 33RUR4954; Last   Rx:11Ubg3891 Ordered   4  Citalopram Hydrobromide 20 MG Oral Tablet; TAKE 1 TABLET DAILY AS DIRECTED; Therapy: 74Pav5254 to Recorded   5  Citalopram Hydrobromide 20 MG Oral Tablet; Take 1 tablet daily; Therapy: 57AOZ4862 to (Evaluate:22Lej4709)  Requested for: 23ZDX6809; Last   Rx:27Ofc7364 Ordered   6  Clobetasol Propionate 0 05 % External Solution; APPLY AND GENTLY MASSAGE INTO   AFFECTED AREA SCALP TWICE DAILY x 2 week course; Therapy: 28JKH6992 to (Evaluate:64Cwj0718)  Requested for: 08VQF4574; Last   Rx:12Nov2015 Ordered   7  Fluticasone Propionate 50 MCG/ACT Nasal Suspension; USE 2 SPRAYS IN EACH   NOSTRIL ONCE DAILY; Therapy: 48SGA5553 to (Evaluate:31Hkm3035)  Requested for: 35UKP8047; Last   Rx:03Mar2014 Ordered   8  Gabapentin 300 MG Oral Capsule; TAKE 1 CAPSULE THREE TIMES DAILY AS   NEEDED; Therapy: 31ZUP3720 to (Ross Powell)  Requested for: 03Ahm6030; Last   Rx:13Rml0768 Ordered   9  HydrOXYzine HCl - 10 MG Oral Tablet; TAKE 1 TABLET AT BEDTIME; Therapy: (Recorded:14Jqw4494) to Recorded   10  Levocetirizine Dihydrochloride 5 MG Oral Tablet; Take 1 tablet daily; Therapy: 38NGE0639 to (Evaluate:85Ymf9858)  Requested for: 61DZZ7031; Last    Rx:81Bkf0042 Ordered   11  Lisinopril 40 MG Oral Tablet; TAKE 1 TABLET DAILY; Therapy: 52XUY0551 to (Evaluate:37Dze6550)  Requested for: 28UQI1153; Last    Rx:84Ysl1862 Ordered   12   Metoprolol Succinate ER 25 MG Oral Tablet Extended Release 24 Hour; 1/2 tab daily; Therapy: (Recorded:08Jun2015) to Recorded   13  Omeprazole 20 MG Oral Capsule Delayed Release; TAKE 1 CAPSULE DAILY AS    NEEDED FOR HEARTBURN;    Therapy: 12YMS7437 to (Evaluate:10Jtm8823)  Requested for: 05NRW4387; Last    Rx:43Bwo4615 Ordered   14  Tylenol 325 MG Oral Tablet; TAKE 1 TO 2 TABLETS EVERY 6 HOURS AS NEEDED; Therapy: 70Hkv7749 to Recorded   15  Vectical 3 MCG/GM External Ointment; Therapy: 73AXS0091 to (Last Rx:13Oct2011)  Requested for: 13Oct2011 Ordered   16  Ventolin  (90 Base) MCG/ACT Inhalation Aerosol Solution; INHALE 1 TO 2    PUFFS EVERY 4 TO 6 HOURS AS NEEDED; Therapy: 63HLG2325 to (Evaluate:18Mar2016); Last Rx:06Ljl4662 Ordered    Allergies    1  Amoxicillin TABS   2  Amoxicillin-Pot Clavulanate ER TB12   3  Bextra TABS   4  Codeine Derivatives   5  Compazine TABS   6  Latex Gloves MISC    7  IV Dye   8   IVP Dye    Signatures   Electronically signed by : Jordan Norman, ; Apr 19 2016  8:25AM EST                       (Author)

## 2018-01-13 NOTE — RESULT NOTES
Verified Results  * CT ABDOMEN PELVIS WO CONTRAST 17LRB8652 06:44AM Dipti Greene Order Number: [id number]   Performing Comments: has 1 week pain lower abd raeann LLQ    do CT -    has Allergy to IV dye   - Patient Instructions: To schedule this appointment, please contact Central Scheduling at [phone]  Test Name Result Flag Reference   CT ABDOMEN PELVIS WO CONTRAST (Report)     CT ABDOMEN AND PELVIS WITHOUT IV CONTRAST     INDICATION: R19 34: Left lower quadrant abdominal rigidity   R10 814: Left lower quadrant abdominal tenderness  History taken directly from the electronic ordering system  COMPARISON: CT abdomen/pelvis dated May 8, 2015  TECHNIQUE: CT examination of the abdomen and pelvis was performed without intravenous contrast  This examination, like all CT scans performed in the [facility], was performed utilizing techniques to minimize radiation dose exposure,    including the use of iterative reconstruction and automated exposure control  Axial, sagittal, and coronal reformatted images were submitted for interpretation  Intravenous contrast was not administered as part of this examination  Enteric contrast    was administered  FINDINGS:     ABDOMEN     LOWER CHEST: No significant abnormalities identified in the lower chest      LIVER/BILIARY TREE: There is a stable subcentimeter low-density focus within the left hepatic lobe compatible with a cyst      GALLBLADDER: There is a punctate calcified calculus within the dependent portion of the gallbladder  No pericholecystic inflammatory change  SPLEEN: Unremarkable  PANCREAS: Unremarkable  ADRENAL GLANDS: Unremarkable  KIDNEYS/URETERS: Unremarkable  No hydronephrosis  STOMACH AND BOWEL: Unremarkable  APPENDIX: No findings to suggest appendicitis  ABDOMINOPELVIC CAVITY: No ascites or free intraperitoneal air  No lymphadenopathy   There is a stable focal calcification within the mesenteric root fat (series 2, image 47)  VESSELS: Unremarkable for patient's age  PELVIS     REPRODUCTIVE ORGANS: The prostate gland is mildly enlarged measuring 4 8 x 4 6 cm  URINARY BLADDER: Unremarkable  ABDOMINAL WALL/INGUINAL REGIONS: Unremarkable  OSSEOUS STRUCTURES: There is a stable 8 mm lucency at the left aspect of the L5 vertebral body  This lytic focus demonstrated T1 hyperintensity on a prior MRI of the lumbar spine dated October 31, 2011 and is compatible with a hemangioma  IMPRESSION:     No evidence of acute intra-abdominal abnormality  No free air or free fluid         Workstation performed: [id number]     Signed by:   Margo Colmenares MD   2/9/17

## 2018-01-14 VITALS
RESPIRATION RATE: 16 BRPM | DIASTOLIC BLOOD PRESSURE: 70 MMHG | HEIGHT: 65 IN | HEART RATE: 63 BPM | SYSTOLIC BLOOD PRESSURE: 130 MMHG

## 2018-01-14 NOTE — PROGRESS NOTES
Chief Complaint  pt came in for BP check  Was 136/100  Retake 10 minutes later resulting 140/100      Active Problems    1  Asthma (493 90) (J45 909)   2  Bilateral Carotid Bruits (785 9)   3  Bilateral hearing loss (389 9) (H91 93)   4  Bulging lumbar disc (722 10) (M51 26)   5  Cataract (366 9) (H26 9)   6  Cervical spinal stenosis (723 0) (M48 02)   7  Cervicalgia (723 1) (M54 2)   8  Chronic low back pain (724 2,338 29) (M54 5,G89 29)   9  Chronic pain syndrome (338 4) (G89 4)   10  Cough (786 2) (R05)   11  Depression with anxiety (300 4) (F41 8)   12  Fatigue (780 79) (R53 83)   13  GERD (gastroesophageal reflux disease) (530 81) (K21 9)   14  History of allergy (V15 09) (Z88 9)   15  History of vertigo (V12 49) (Z87 898)   16  Hyperglycemia (790 29) (R73 9)   17  Hyperlipidemia (272 4) (E78 5)   18  Hypertension (401 9) (I10)   19  Lumbar radiculopathy (724 4) (M54 16)   20  Lumbar spinal stenosis (724 02) (M48 06)   21  Other intervertebral disc degeneration, lumbar region (722 52) (M51 36)   22  Peripheral neuropathy (356 9) (G62 9)   23  Psoriasis (696 1) (L40 9)   24  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)   25  Rotator cuff tendinitis, unspecified laterality (726 10) (M75 80)   26  Seborrheic dermatitis (690 10) (L21 9)   27  Skin neoplasm (239 2) (D49 2)   28  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)   29  Thoracic degenerative disc disease (722 51) (M51 34)   30  Tinnitus, unspecified laterality (388 30) (H93 19)   31  History of Vasovagal syncope (780 2) (R55)   32  Venous insufficiency (459 81) (I87 2)    Current Meds   1  ALPRAZolam 0 5 MG Oral Tablet; TAKE 1 TABLET Every 8 hours; Therapy: 66NXN7642 to (Evaluate:69Dnr6987)  Requested for: 67JNR0317; Last   Rx:13Gdy5465 Ordered   2  AmLODIPine Besylate 5 MG Oral Tablet; Take 1 tablet po BID; Therapy: 42SGG3195 to (Last TP:60KMK2195)  Requested for: 89UNZ9728 Ordered   3  Atorvastatin Calcium 20 MG Oral Tablet; TAKE 1 TABLET DAILY;    Therapy: 78FKZ4962 to ([facility])  Requested for: 05JWI7941; Last   Rx:03Feb2015 Ordered   4  Citalopram Hydrobromide 20 MG Oral Tablet; Take 1 tablet daily; Therapy: 43LMW3874 to (Evaluate:56Jdg4740)  Requested for: 60PXY9060; Last   Rx:86Hcp3448 Ordered   5  Clobetasol Propionate 0 05 % External Solution; APPLY AND GENTLY MASSAGE INTO   AFFECTED AREA SCALP TWICE DAILY x 2 week course; Therapy: 99VLF0113 to (Evaluate:82Vus7582)  Requested for: 78OFN7593; Last   Rx:12Nov2015 Ordered   6  Fluticasone Propionate 50 MCG/ACT Nasal Suspension; USE 2 SPRAYS IN EACH   NOSTRIL ONCE DAILY; Therapy: 38FEV3477 to (Evaluate:26Pqa8821)  Requested for: 37GDB7603; Last   Rx:03Mar2014 Ordered   7  Gabapentin 300 MG Oral Capsule; TAKE 1 CAPSULE THREE TIMES DAILY AS   NEEDED; Therapy: 52AMK6279 to (Gracie Prince)  Requested for: 05Jxq1615; Last   Rx:36Evk3785 Ordered   8  HydrOXYzine HCl - 10 MG Oral Tablet; TAKE 1 TABLET AT BEDTIME; Therapy: (Recorded:04Apr2016) to Recorded   9  Levocetirizine Dihydrochloride 5 MG Oral Tablet; Take 1 tablet daily; Therapy: 88ADF0299 to (Evaluate:90Oxm0272)  Requested for: 56XYR6834; Last   Rx:32Pvj3346 Ordered   10  Lisinopril 40 MG Oral Tablet; TAKE 1 TABLET DAILY; Therapy: 66KXM4237 to (Evaluate:42Hrb7205)  Requested for: 28LUP2175; Last    Rx:55Pif3231 Ordered   11  Metoprolol Succinate ER 25 MG Oral Tablet Extended Release 24 Hour; 1/2 tab daily; Therapy: (Recorded:08Jun2015) to Recorded   12  Omeprazole 20 MG Oral Capsule Delayed Release; TAKE 1 CAPSULE DAILY AS    NEEDED FOR HEARTBURN;    Therapy: 23OKW4973 to (Evaluate:26Vcc9170)  Requested for: 12DMQ7353; Last    Rx:28Wlc5276 Ordered   13  Tylenol 325 MG Oral Tablet; TAKE 1 TO 2 TABLETS EVERY 6 HOURS AS NEEDED; Therapy: 59Hnw4797 to Recorded   14  Vectical 3 MCG/GM External Ointment; Therapy: 60MMS3349 to (Last Rx:13Oct2011)  Requested for: 13Oct2011 Ordered   15   Ventolin  (90 Base) MCG/ACT Inhalation Aerosol Solution; INHALE 1 TO 2    PUFFS EVERY 4 TO 6 HOURS AS NEEDED; Therapy: 37BPQ6178 to (Evaluate:18Mar2016); Last Rx:36Xtk3497 Ordered    Allergies    1  Amoxicillin TABS   2  Amoxicillin-Pot Clavulanate ER TB12   3  Bextra TABS   4  Codeine Derivatives   5  Compazine TABS   6  Latex Gloves MISC    7  IV Dye   8  IVP Dye    Vitals  Signs [Data Includes: Current Encounter]    Systolic: 620  Diastolic: 691    Plan  Hypertension    · From  Metoprolol Succinate ER 25 MG Oral Tablet Extended Release 24 Hour  1/2 tab daily To Metoprolol Succinate ER 25 MG Oral Tablet Extended Release 24 Hour  Take 1 tablet twice daily    Discussion/Summary    BP running much too high past month- increase Metoprolol to 25 mg one whole pill twice a day and visit here 5 days  Use other meds as is  Future Appointments    Date/Time Provider Specialty Site   05/12/2016 10:00 AM Bhargav Rees, [facility] Neurosurgery [facility] [place] [facility]"" NEUROSURGICAL   05/03/2016 05:00 PM CANDACE Valentine  [address] [facility]     Signatures   Electronically signed by : Luz Cordoba DO;  Apr 28 2016  3:20PM EST                       (Author)

## 2018-01-14 NOTE — RESULT NOTES
Verified Results  * XR CHEST PA & LATERAL [id number] 10:42AM Nicholas Lion Order Number: [id number]     Test Name Result Flag Reference   XR CHEST PA & LATERAL (Report)     CHEST      INDICATION: Cough     COMPARISON: 05/03/2015     VIEWS: Frontal and lateral projections; 2 images     FINDINGS:        Cardiomediastinal silhouette appears unremarkable  No infiltrates  Minimal scarring at the left lung base  No evidence of heart failure  Visualized osseous structures appear within normal limits for the patient's age  IMPRESSION:     No active pulmonary disease         Workstation performed: [id number]     Signed by:   Malini Elena MD   3/27/16

## 2018-01-15 VITALS
SYSTOLIC BLOOD PRESSURE: 128 MMHG | BODY MASS INDEX: 30.22 KG/M2 | HEART RATE: 96 BPM | HEIGHT: 65 IN | DIASTOLIC BLOOD PRESSURE: 70 MMHG | TEMPERATURE: 98.5 F | WEIGHT: 181.38 LBS

## 2018-01-15 VITALS
SYSTOLIC BLOOD PRESSURE: 142 MMHG | WEIGHT: 186 LBS | HEART RATE: 80 BPM | HEIGHT: 64 IN | BODY MASS INDEX: 31.76 KG/M2 | DIASTOLIC BLOOD PRESSURE: 80 MMHG

## 2018-01-15 NOTE — MISCELLANEOUS
Message   Recorded as Task   Date: 11/11/2016 02:14 PM, Created By: Ana Nicole   Task Name: Medical Complaint Callback   Assigned To: Alan Torres   Regarding Patient: Sammy Correia, Status: Active   CommentHaylee Johnson - 11 Nov 2016 2:14 PM     TASK CREATED  Caller: Self; Medical Complaint; [phone] (Home)  Patient called and said that the pain after that was okay for a couple hours and now he hurts 3 times worse from toe to hip  What should he do now? Kathy Mercado - 11 Nov 2016 4:27 PM     TASK REPLIED TO: [facility] [address] [facility] [facility]                      He may be having a flare reaction  Try Medrol Dosepak  Darius,Palak - 14 Nov 2016 3:19 PM     TASK REASSIGNED: Previously Assigned To [address] - 14 Nov 2016 3:26 PM     TASK EDITED                 pt notified, rx called in  Active Problems    1  Asthma (493 90) (J45 909)   2  Bilateral Carotid Bruits (785 9)   3  Bilateral hearing loss (389 9) (H91 93)   4  Bulging lumbar disc (722 10) (M51 26)   5  Bulging of cervical intervertebral disc without myelopathy (722 0) (M50 20)   6  Cataract (366 9) (H26 9)   7  Cervical spinal stenosis (723 0) (M48 02)   8  Cervical spondylolysis (756 19) (M43 02)   9  Cervicalgia (723 1) (M54 2)   10  Chronic low back pain (724 2,338 29) (M54 5,G89 29)   11  Chronic pain syndrome (338 4) (G89 4)   12  Depression with anxiety (300 4) (F41 8)   13  Elevated liver function tests (790 6) (R79 89)   14  GERD (gastroesophageal reflux disease) (530 81) (K21 9)   15  Hip pain, left (719 45) (M25 552)   16  History of allergy (V15 09) (Z88 9)   17  History of vertigo (V12 49) (Z87 898)   18  Hyperglycemia (790 29) (R73 9)   19  Hyperlipidemia (272 4) (E78 5)   20  Hypertension (401 9) (I10)   21  Intractable headache (784 0) (R51)   22  Lumbar radiculopathy (724 4) (M54 16)   23  Lumbar spinal stenosis (724 02) (M48 06)   24  Migraine (346 90) (G43 909)   25  Other cervical disc degeneration, unspecified cervical region (722 4) (M50 30)   26  Peripheral neuropathy (356 9) (G62 9)   27  Psoriasis (696 1) (L40 9)   28  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)   29  Rotator cuff tendinitis, unspecified laterality (726 10) (M75 80)   30  Seborrheic dermatitis (690 10) (L21 9)   31  Skin neoplasm (239 2) (D49 2)   32  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)   33  Thoracic degenerative disc disease (722 51) (M51 34)   34  Tinnitus, unspecified laterality (388 30) (H93 19)   35  Trochanteric bursitis (726 5) (M70 60)   36  History of Vasovagal syncope (780 2) (R55)   37  Venous insufficiency (459 81) (I87 2)    Current Meds   1  ALPRAZolam 0 5 MG Oral Tablet; TAKE 1 TABLET Every 8 hours; Therapy: 20HWA1751 to (Evaluate:99Tbw9455)  Requested for: 11OZP5487; Last   Rx:69Uhn1452 Ordered   2  AmLODIPine Besylate 5 MG Oral Tablet; Take 1 tablet po BID; Therapy: 62UVO1463 to (Last VU:94COY2915)  Requested for: 01FCG4964 Ordered   3  Citalopram Hydrobromide 20 MG Oral Tablet; Take 1 tablet daily; Therapy: 96XQQ0867 to (Evaluate:11Vsm9227)  Requested for: 40WXL7554; Last   Rx:65Gfp3583 Ordered   4  Clobetasol Propionate 0 05 % External Solution; APPLY AND GENTLY MASSAGE INTO   AFFECTED AREA SCALP TWICE DAILY x 2 week course; Therapy: 74KVL7185 to (Evaluate:48Ahw3325)  Requested for: 12HNT7301; Last   Rx:02Hfq5694 Ordered   5  Fluticasone Propionate 50 MCG/ACT Nasal Suspension; USE 2 SPRAYS IN EACH   NOSTRIL ONCE DAILY; Therapy: 98TKM6607 to (Last Lawence Haider)  Requested for: 47Vka1932 Ordered   6  Gabapentin 300 MG Oral Capsule; TAKE 1 CAPSULE 3 TIMES DAILY; Therapy: 19IXH0724 to (Evaluate:19Mar2017); Last Rx:92Vsv3504 Ordered   7  HydrOXYzine HCl - 10 MG Oral Tablet; TAKE 1 TABLET AT BEDTIME; Last   Rx:09Nov2016 Ordered   8  Lisinopril-Hydrochlorothiazide 20-12 5 MG Oral Tablet; TAKE 1 TABLET TWICE DAILY   AS DIRECTED;    Therapy: 06CIU3485 to (Evaluate:64Xts9473)  Requested for: 26NXW9039; Last   Rx:06Bne7487 Ordered   9  Meloxicam 7 5 MG Oral Tablet; TAKE 1 TABLET TWICE DAILY; Therapy: 27AXA0492 to (Evaluate:08Bry7491)  Requested for: 07WPI0162; Last   Rx:08Nov2016 Ordered   10  Omeprazole 20 MG Oral Capsule Delayed Release; TAKE 1 CAPSULE DAILY AS    NEEDED FOR HEARTBURN;    Therapy: 88AUG3409 to (Evaluate:58Ner1506)  Requested for: 75Zwf3707; Last    Rx:56Iyc3717 Ordered   11  Tylenol 325 MG Oral Tablet; TAKE 1 TO 2 TABLETS EVERY 6 HOURS AS NEEDED; Therapy: 11Pms1508 to Recorded   12  Vectical 3 MCG/GM External Ointment; Therapy: 33GWD1650 to (Last Rx:13Oct2011)  Requested for: 13Oct2011 Ordered   13  Ventolin  (90 Base) MCG/ACT Inhalation Aerosol Solution; INHALE 1 TO 2    PUFFS EVERY 4 TO 6 HOURS AS NEEDED; Therapy: 10FWN8201 to (Evaluate:26Nov2016)  Requested for: 27Oct2016; Last    Rx:27Oct2016 Ordered    Allergies    1  Amoxicillin TABS   2  Amoxicillin-Pot Clavulanate ER TB12   3  Bextra TABS   4  Codeine Derivatives   5  Compazine TABS   6  Latex Gloves MISC    7  IV Dye   8   IVP Dye    Plan  Chronic low back pain    · MethylPREDNISolone 4 MG Oral Tablet Therapy Pack; per package instructions    Signatures   Electronically signed by : Ulysses Hotter, ; Nov 14 2016  3:26PM EST                       (Author)

## 2018-01-17 NOTE — RESULT NOTES
Verified Results  (1) C-REACTIVE PROTEIN [id number] 10:41AM [facility]   REPORT COMMENT:  FASTING:YES     Test Name Result Flag Reference   C-REACTIVE PROTEIN 0 34 mg/dL  <0 80   Please be advised that patients taking Carboxypenicillins  may exhibit falsely decreased C-Reactive Protein levels  due to an analytical interference in this assay       (Q) SED RATE BY TYSON Gimenez [id number] 10:41AM [facility]     Test Name Result Flag Reference   SED RATE BY MODIFIEDPHYLLIS 2 mm/h  < OR = 20

## 2018-01-19 ENCOUNTER — ALLSCRIPTS OFFICE VISIT (OUTPATIENT)
Dept: OTHER | Facility: OTHER | Age: 73
End: 2018-01-19

## 2018-01-20 NOTE — PROGRESS NOTES
Assessment   1  Influenza (487 1) (J11 1)    Plan   Depression with anxiety    · ALPRAZolam 0 5 MG Oral Tablet; TAKE 1 tab twice a day only as needed for    anxiety  Influenza    · Benzonatate 200 MG Oral Capsule; TAKE 1 CAPSULE 3 TIMES DAILY AS    NEEDED    Discussion/Summary      The patient likely has the flu based on his story  It is too late to treat him with antiviral medications as he is on day 5  I placed him on Tessalon Perles  He may utilize Tylenol as needed for the fever and myalgias  He has typically avoid ibuprofen, but I do not see any reason that he could not take it as needed for his current symptoms  Contact me if he is not improving  He does have some chronic anxiety  I refilled his alprazolam  I encouraged him to take this just as needed, as he has been taking a pretty typically twice daily  Chief Complaint   c/o sinus congestion, cough, body aches, fatigue x4days with Flu shot      History of Present Illness   HPI: Patient presents today with 5 day history of severe cough, diffuse aches as well as intermittent fevers and chills  He has also had some drenching night sweats  several of his grandchildren have similar symptoms  He is not having any excessive shortness of breath  He does have a bit of a sore throat and hoarseness from all the coughing  has a history of some anxiety and requests a refill on his Xanax  He does note some chronic intermittent anxiety  Review of Systems        Constitutional: fever,-- feeling poorly-- and-- feeling tired  Respiratory: shortness of breath,-- cough-- and-- shortness of breath during exertion  Active Problems   1  Acid reflux (530 81) (K21 9)   2  Asthma (493 90) (J45 909)   3  Bilateral Carotid Bruits (785 9)   4  Bilateral hearing loss (389 9) (H91 93)   5  BPH associated with nocturia (600 01,788 43) (N40 1,R35 1)   6  Cervical spinal stenosis (723 0) (M48 02)   7  Chronic pain syndrome (338 4) (G89 4)   8   Depression with anxiety (300 4) (F41 8)   9  Hip pain, left (719 45) (M25 552)   10  History of allergy (V15 09) (Z88 9)   11  History of vertigo (V12 49) (Z87 898)   12  Hyperglycemia (790 29) (R73 9)   13  Hyperlipidemia (272 4) (E78 5)   14  Hypertension (401 9) (I10)   15  Incomplete emptying of bladder (788 21) (R33 9)   16  Itching (698 9) (L29 9)   17  Lumbar radiculopathy (724 4) (M54 16)   18  Lumbar spinal stenosis (724 02) (M48 061)   19  Medicare annual wellness visit, initial (V70 0) (Z00 00)   20  Migraine (346 90) (G43 909)   21  Need for influenza vaccination (V04 81) (Z23)   22  Organic impotence (607 84) (N52 9)   23  Peripheral neuropathy (356 9) (G62 9)   24  Psoriasis (696 1) (L40 9)   25  Rotator cuff tendinitis, unspecified laterality (726 10) (M75 80)   26  Seborrheic dermatitis (690 10) (L21 9)   27  TB lung, latent (795 51) (R76 11)   28  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)   29  Thoracic degenerative disc disease (722 51) (M51 34)   30  Tinnitus, unspecified laterality (388 30) (H93 19)   31  History of Vasovagal syncope (780 2) (R55)   32  Venous insufficiency (459 81) (I87 2)    Past Medical History   1  History of Abdominal left lower quadrant tenderness (789 64) (R10 814)   2  History of Acute kidney injury (584 9) (N17 9)   3  Acute otitis media (382 9) (H66 90)   4  History of Anxiety and depression (300 00,311) (F41 8)   5  History of Aseptic Meningitis (047 9)   6  History of Atypical chest pain (786 59) (R07 89)   7  History of Back pain, chronic (724 5,338 29) (M54 9,G89 29)   8  History of Elevated liver function tests (790 6) (R79 89)   9  History of Erectile dysfunction of non-organic origin (302 72) (F52 21)   10  History of arthritis (V13 4) (Z87 39)   11  History of bacterial meningitis (V12 42) (Z86 61)   12  History of Bell's palsy (V12 49) (Z86 69)   13  History of cataract (V12 49) (Z86 69)   14  History of duodenal ulcer (V12 79) (Z87 19)   15   History of gastrointestinal hemorrhage (V12 79) (Z87 19)   16  History of headache (V13 89) (Z87 898)   17  History of insect bite (V15 59) (Z87 828)   18  History of osteoporosis (V13 59) (Z87 39)   19  History of seasonal allergies (V15 09) (Z88 9)   20  History of toe fracture (V15 51) (Z87 81)   21  History of vertigo (V12 49) (Z87 898)   22  History of viral meningitis (V12 42) (Z86 61)   23  History of viral warts (V12 09) (Z86 19)   24  History of Neuropathy (355 9) (G62 9)   25  History of Panic disorder (300 01) (F41 0)   26  History of Screening for colorectal cancer (V76 51) (Z12 11,Z12 12)   27  History of Sinus pain (478 19) (J34 89)   28  History of Tenosynovitis Of The Left Ring Finger (727 05)   29  History of Trochanteric bursitis (726 5) (M70 60)   30  History of Trochanteric bursitis, unspecified laterality (726 5) (M70 60)   31  History of Vasovagal syncope (780 2) (R55)   32  History of Vertigo (780 4) (R42)    Family History   Mother    1  Family history of    2  Family history of cataracts (V19 19) (Z83 518)   3  Family history of hyperlipidemia (V18 19) (Z83 49)  Father    4  Family history of   Maternal Grandmother    5  Family history of   Paternal Grandmother    10  Family history of   Maternal Grandfather    7  Family history of   Paternal Grandfather    6  Family history of     Social History    · Former cigar smoker (D91 52) (C00 097)   · Former cigarette smoker ( 82) (Q67 221)   · Former pipe smoker (T97 23) (W13 160)   · Former smoker (V1 82) (B53 284)   · Functioning activity level   · participates in light activities both inside and outside of the home (gardening, walking,        cycling, cooking)   · High school or GED   · Lives independently   ·    · No drug use   · Occupation   ·    · Past history of chewing tobacco use (V1 82) (Z87 891)   · Stopped Drinking Alcohol   · Three children    Surgical History   1  History of Cataract Surgery   2   History of Complete Colonoscopy   3  History of Diagnostic Esophagogastroduodenoscopy   4  History of Ear Surgery   5  History of Foot Surgery   6  History Of Prior Surgery   7  History of Neuroplasty Decompression Median Nerve At Carpal Tunnel   8  History of Spinal Anesthesia Epidural   9  History of Tympanoplasty    Current Meds    1  ALPRAZolam 0 5 MG Oral Tablet; TAKE 1 tab twice a day only as needed for anxiety; Therapy: 11VPD1569 to (Evaluate:12Oct2017)  Requested for: 80HPC9748; Last     Rx:17Pat4787 Ordered   2  AmLODIPine Besylate 10 MG Oral Tablet; TAKE 1 TABLET DAILY; Therapy: 86BLH6784 to ([phone])  Requested for: 10Dok5082; Last     Rx:86Fus2656 Ordered   3  Citalopram Hydrobromide 20 MG Oral Tablet; Take 1 tablet daily; Therapy: 59BAE1512 to (Evaluate:10Jun2018)  Requested for: 99PJG0376; Last     Rx:15Jun2017 Ordered   4  Clobetasol Propionate 0 05 % External Solution; APPLY AND GENTLY MASSAGE INTO     AFFECTED AREA SCALP TWICE DAILY x 2 week course; Therapy: 24EHP9033 to (Evaluate:63Pzj3712)  Requested for: 26LWR0810; Last     Rx:12Nov2015 Ordered   5  Fluticasone Propionate 50 MCG/ACT Nasal Suspension; USE 2 SPRAYS IN EACH     NOSTRIL ONCE DAILY; Therapy: 62COQ3590 to (Last Yair Sanabria)  Requested for: 27Oct2016 Ordered   6  Gabapentin 300 MG Oral Capsule; TAKE 2 CAPSULE at night; Therapy: 52DYF6896 to (Evaluate:14Mar2018)  Requested for: 75The0377; Last     Rx:72Tkk4784; Status: ACTIVE - Transmit to Pharmacy - Awaiting Verification Ordered   7  Humira Pen KIT; USE AS DIRECTED via Derm; Therapy: (Recorded:15Jun2017) to Recorded   8  HydrOXYzine HCl - 10 MG Oral Tablet; uses 2 at bedtime for itching  Requested for:     12EMI5574; Last Rx:15Jun2017 Ordered   9  Meloxicam 15 MG Oral Tablet; TAKE 1 TABLET Daily PRN pain; Therapy: 20TCL8256 to (Evaluate:75Khl1503)  Requested for: 07TDI5680; Last     Rx:57Urf5880 Ordered   10   Metoprolol Succinate ER 25 MG Oral Tablet Extended Release 24 Hour; TAKE 1 TABLET      TWICE DAILY; Therapy: (Recorded:56Pms6328) to  Requested for: 57GWN1248 Recorded   11  Omeprazole 20 MG Oral Capsule Delayed Release; TAKE 1 CAPSULE DAILY AS      NEEDED FOR HEARTBURN;      Therapy: 36BIN8202 to (Evaluate:28Jan2018)  Requested for: 74UCS1851; Last      Rx:14Fvb6962 Ordered   12  Sildenafil Citrate 20 MG Oral Tablet; Take up to 5 tablets one hour prior to intercourse as      needed; Therapy: 02SKJ4054 to (Evaluate:93Idq9871); Last Rx:02Oct2017 Ordered   13  Tamsulosin HCl - 0 4 MG Oral Capsule; TAKE 1 CAPSULE BY MOUTH BEDTIME; Therapy: 03EDB6474 to (Last Rx:28Nov2017)  Requested for: 42XLF4766 Ordered   14  Tylenol 325 MG Oral Tablet; TAKE 1 TO 2 TABLETS EVERY 6 HOURS AS NEEDED; Therapy: 44Tbq6605 to Recorded   15  Valsartan 160 MG Oral Tablet; Take 1 tablet daily; Therapy: 32SGU2111 to (Evaluate:10Jun2018); Last Rx:15Jun2017 Ordered   16  Vectical 3 MCG/GM External Ointment; Therapy: 98QBL3858 to (Last Rx:13Oct2011)  Requested for: 14Teh2563 Ordered   17  Ventolin  (90 Base) MCG/ACT Inhalation Aerosol Solution; INHALE 1 TO 2      PUFFS EVERY 4 TO 6 HOURS AS NEEDED; Therapy: 68YTJ0951 to (Meri Burkitt)  Requested for: 49HHZ2496; Last      Rx:03Adq4757 Ordered    Allergies   1  Amoxicillin-Pot Clavulanate ER TB12   2  Bextra TABS   3  Codeine Derivatives   4  Lisinopril TABS   5  Compazine TABS   6  Latex Gloves MISC  7  IV Dye    Vitals    Recorded: 25VPQ7441 02:14PM   Temperature 98 2 F   Heart Rate 84   Respiration 16   Systolic 464   Diastolic 80   Height 5 ft 4 in   Weight 190 lb    BMI Calculated 32 61   BSA Calculated 1 91   O2 Saturation 97, RA     Physical Exam        Constitutional      General appearance: Abnormal  -- mildly ill, nad  Pulmonary      Respiratory effort: No increased work of breathing or signs of respiratory distress         Auscultation of lungs: Clear to auscultation, equal breath sounds bilaterally, no wheezes, no rales, no rhonci  Cardiovascular      Auscultation of heart: Normal rate and rhythm, normal S1 and S2, without murmurs            Signatures    Electronically signed by : CANDACE Delgado ; Jan 19 2018  2:49PM EST                       (Author)

## 2018-01-22 VITALS
BODY MASS INDEX: 32.44 KG/M2 | WEIGHT: 190 LBS | DIASTOLIC BLOOD PRESSURE: 80 MMHG | OXYGEN SATURATION: 97 % | SYSTOLIC BLOOD PRESSURE: 130 MMHG | HEART RATE: 84 BPM | HEIGHT: 64 IN | TEMPERATURE: 98.2 F | RESPIRATION RATE: 16 BRPM

## 2018-03-05 DIAGNOSIS — I10 HYPERTENSION, UNSPECIFIED TYPE: Primary | ICD-10-CM

## 2018-03-05 RX ORDER — AMLODIPINE BESYLATE 10 MG/1
10 TABLET ORAL DAILY
Qty: 90 TABLET | Refills: 3 | Status: SHIPPED | OUTPATIENT
Start: 2018-03-05 | End: 2018-09-06 | Stop reason: SDUPTHER

## 2018-03-05 RX ORDER — AMLODIPINE BESYLATE 10 MG/1
1 TABLET ORAL DAILY
COMMUNITY
Start: 2017-07-31 | End: 2018-03-05 | Stop reason: SDUPTHER

## 2018-04-05 DIAGNOSIS — M48.02 CERVICAL SPINAL STENOSIS: Primary | ICD-10-CM

## 2018-04-05 RX ORDER — GABAPENTIN 300 MG/1
600 CAPSULE ORAL
Qty: 180 CAPSULE | Refills: 1 | Status: SHIPPED | OUTPATIENT
Start: 2018-04-05 | End: 2018-11-05 | Stop reason: SDUPTHER

## 2018-06-04 PROBLEM — N40.1 BPH ASSOCIATED WITH NOCTURIA: Status: ACTIVE | Noted: 2017-11-28

## 2018-06-04 PROBLEM — R33.9 INCOMPLETE EMPTYING OF BLADDER: Status: ACTIVE | Noted: 2017-10-02

## 2018-06-04 PROBLEM — R35.1 BPH ASSOCIATED WITH NOCTURIA: Status: ACTIVE | Noted: 2017-11-28

## 2018-06-04 PROBLEM — Z22.7 TB LUNG, LATENT: Status: ACTIVE | Noted: 2017-06-15

## 2018-06-04 PROBLEM — J32.9 CHRONIC SINUSITIS: Status: ACTIVE | Noted: 2017-02-02

## 2018-06-04 PROBLEM — N41.0 ACUTE PROSTATITIS: Status: ACTIVE | Noted: 2017-07-31

## 2018-06-04 PROBLEM — N52.9 ORGANIC IMPOTENCE: Status: ACTIVE | Noted: 2017-10-02

## 2018-06-12 ENCOUNTER — OFFICE VISIT (OUTPATIENT)
Dept: FAMILY MEDICINE CLINIC | Facility: CLINIC | Age: 73
End: 2018-06-12
Payer: COMMERCIAL

## 2018-06-12 VITALS
RESPIRATION RATE: 16 BRPM | WEIGHT: 191.2 LBS | SYSTOLIC BLOOD PRESSURE: 134 MMHG | HEART RATE: 74 BPM | HEIGHT: 64 IN | DIASTOLIC BLOOD PRESSURE: 76 MMHG | TEMPERATURE: 98.6 F | BODY MASS INDEX: 32.64 KG/M2

## 2018-06-12 DIAGNOSIS — J01.00 ACUTE MAXILLARY SINUSITIS, RECURRENCE NOT SPECIFIED: Primary | ICD-10-CM

## 2018-06-12 DIAGNOSIS — J30.9 ALLERGIC RHINITIS, UNSPECIFIED SEASONALITY, UNSPECIFIED TRIGGER: ICD-10-CM

## 2018-06-12 PROCEDURE — 99213 OFFICE O/P EST LOW 20 MIN: CPT | Performed by: FAMILY MEDICINE

## 2018-06-12 RX ORDER — LISINOPRIL AND HYDROCHLOROTHIAZIDE 20; 12.5 MG/1; MG/1
1 TABLET ORAL 2 TIMES DAILY
COMMUNITY
End: 2019-09-09 | Stop reason: SDUPTHER

## 2018-06-12 RX ORDER — FLUTICASONE PROPIONATE 50 MCG
2 SPRAY, SUSPENSION (ML) NASAL DAILY
Qty: 16 G | Refills: 2 | Status: SHIPPED | OUTPATIENT
Start: 2018-06-12 | End: 2019-01-09

## 2018-06-12 RX ORDER — DOXYCYCLINE HYCLATE 100 MG/1
100 CAPSULE ORAL EVERY 12 HOURS SCHEDULED
Qty: 20 CAPSULE | Refills: 0 | Status: SHIPPED | OUTPATIENT
Start: 2018-06-12 | End: 2018-06-22

## 2018-06-12 NOTE — PATIENT INSTRUCTIONS
Will treat with doxycycline 100 mg twice a day for a 10 day course  Recommended increased fluid and use of plain Mucinex during the day as well  May also continue Tylenol as needed for headaches and facial pressure  Should call or recheck if symptoms are not improving over the next several days

## 2018-06-12 NOTE — PROGRESS NOTES
Assessment/Plan:  Will treat with doxycycline 100 mg twice a day for a 10 day course  Recommended increased fluid and use of plain Mucinex during the day as well  May also continue Tylenol as needed for headaches and facial pressure  Should call or recheck if symptoms are not improving over the next several days  Diagnoses and all orders for this visit:    Acute maxillary sinusitis, recurrence not specified  -     doxycycline hyclate (VIBRAMYCIN) 100 mg capsule; Take 1 capsule (100 mg total) by mouth every 12 (twelve) hours for 10 days    Other orders  -     lisinopril-hydrochlorothiazide (PRINZIDE,ZESTORETIC) 20-12 5 MG per tablet; Take 1 tablet by mouth 2 (two) times a day          Subjective:      Patient ID: Emily Lynne is a 74 y o  male  He developed a headache about 1 week ago  At 1st he thought the headache was from elevated blood pressure, but when he took the blood pressure it was normal   The headache is located behind the eyes and around the temples  He also has ringing in his ears and feeling of facial pressure  He is noted lots of allergy symptoms in the recent weeks  He has had sneezing and itchy eyes and runny nose  He denies asthma symptoms  He uses Flonase on a daily basis  He denies fevers or chills  Sometimes he has had some headache relief from Tylenol, but it usually recurs  He also notes fatigue  Migraine    Associated symptoms include hearing loss  Pertinent negatives include no coughing, dizziness, fever, nausea or vomiting  The following portions of the patient's history were reviewed and updated as appropriate: allergies, current medications, past family history, past medical history, past social history, past surgical history and problem list     Review of Systems   Constitutional: Positive for fatigue  Negative for chills and fever  HENT: Positive for congestion and hearing loss  Respiratory: Negative for cough and shortness of breath  Cardiovascular: Negative for chest pain  Gastrointestinal: Negative for diarrhea, nausea and vomiting  Genitourinary: Negative for difficulty urinating  Skin: Negative for rash  Neurological: Positive for headaches  Negative for dizziness  Psychiatric/Behavioral: The patient is not nervous/anxious  Objective:      /76 (BP Location: Left arm, Patient Position: Sitting, Cuff Size: Large)   Pulse 74   Temp 98 6 °F (37 °C) (Tympanic)   Resp 16   Ht 5' 4" (1 626 m)   Wt 86 7 kg (191 lb 3 2 oz)   BMI 32 82 kg/m²          Physical Exam   Constitutional: He is oriented to person, place, and time  He appears well-developed and well-nourished  HENT:   Head: Normocephalic and atraumatic  Right Ear: External ear normal    Left Ear: External ear normal    Nose is mildly congested  There is tenderness over the maxillary sinuses bilaterally  Neck: Normal range of motion  Neck supple  No thyromegaly present  Cardiovascular: Normal rate and regular rhythm  Pulmonary/Chest: Effort normal and breath sounds normal    Musculoskeletal: Normal range of motion  Lymphadenopathy:     He has no cervical adenopathy  Neurological: He is alert and oriented to person, place, and time  Nursing note and vitals reviewed

## 2018-06-14 ENCOUNTER — APPOINTMENT (OUTPATIENT)
Dept: RADIOLOGY | Facility: MEDICAL CENTER | Age: 73
End: 2018-06-14
Payer: COMMERCIAL

## 2018-06-14 ENCOUNTER — TRANSCRIBE ORDERS (OUTPATIENT)
Dept: ADMINISTRATIVE | Facility: HOSPITAL | Age: 73
End: 2018-06-14

## 2018-06-14 DIAGNOSIS — Z11.1 SCREENING EXAMINATION FOR PULMONARY TUBERCULOSIS: ICD-10-CM

## 2018-06-14 DIAGNOSIS — Z11.1 SCREENING EXAMINATION FOR PULMONARY TUBERCULOSIS: Primary | ICD-10-CM

## 2018-06-14 PROCEDURE — 71046 X-RAY EXAM CHEST 2 VIEWS: CPT

## 2018-07-17 ENCOUNTER — OFFICE VISIT (OUTPATIENT)
Dept: FAMILY MEDICINE CLINIC | Facility: CLINIC | Age: 73
End: 2018-07-17
Payer: COMMERCIAL

## 2018-07-17 VITALS
OXYGEN SATURATION: 96 % | SYSTOLIC BLOOD PRESSURE: 138 MMHG | HEIGHT: 64 IN | DIASTOLIC BLOOD PRESSURE: 78 MMHG | BODY MASS INDEX: 32.71 KG/M2 | HEART RATE: 86 BPM | WEIGHT: 191.6 LBS

## 2018-07-17 DIAGNOSIS — K21.9 GASTROESOPHAGEAL REFLUX DISEASE, ESOPHAGITIS PRESENCE NOT SPECIFIED: ICD-10-CM

## 2018-07-17 DIAGNOSIS — G47.00 INSOMNIA, UNSPECIFIED TYPE: ICD-10-CM

## 2018-07-17 DIAGNOSIS — I10 ESSENTIAL HYPERTENSION: ICD-10-CM

## 2018-07-17 DIAGNOSIS — F41.9 ANXIETY: ICD-10-CM

## 2018-07-17 DIAGNOSIS — G44.229 CHRONIC TENSION-TYPE HEADACHE, NOT INTRACTABLE: Primary | ICD-10-CM

## 2018-07-17 PROBLEM — M25.569 KNEE PAIN: Status: ACTIVE | Noted: 2018-07-17

## 2018-07-17 PROBLEM — M19.079 PRIMARY LOCALIZED OSTEOARTHROSIS OF ANKLE AND FOOT: Status: ACTIVE | Noted: 2018-07-17

## 2018-07-17 PROBLEM — G57.50 TARSAL TUNNEL SYNDROME: Status: ACTIVE | Noted: 2018-07-17

## 2018-07-17 PROBLEM — M54.17 LUMBOSACRAL RADICULITIS: Status: ACTIVE | Noted: 2018-07-17

## 2018-07-17 PROBLEM — L60.0 INGROWN NAIL: Status: ACTIVE | Noted: 2018-07-17

## 2018-07-17 PROBLEM — L02.619 CELLULITIS AND ABSCESS OF TOE: Status: ACTIVE | Noted: 2018-07-17

## 2018-07-17 PROBLEM — M54.17 LUMBOSACRAL NEURITIS: Status: ACTIVE | Noted: 2018-07-17

## 2018-07-17 PROBLEM — L03.039 CELLULITIS AND ABSCESS OF TOE: Status: ACTIVE | Noted: 2018-07-17

## 2018-07-17 PROBLEM — M72.2 PLANTAR FASCIAL FIBROMATOSIS: Status: ACTIVE | Noted: 2018-07-17

## 2018-07-17 PROBLEM — M47.817 LUMBOSACRAL SPONDYLOSIS WITHOUT MYELOPATHY: Status: ACTIVE | Noted: 2018-07-17

## 2018-07-17 PROBLEM — M51.26 DISPLACEMENT OF LUMBAR INTERVERTEBRAL DISC WITHOUT MYELOPATHY: Status: ACTIVE | Noted: 2018-07-17

## 2018-07-17 PROCEDURE — 99214 OFFICE O/P EST MOD 30 MIN: CPT | Performed by: FAMILY MEDICINE

## 2018-07-17 PROCEDURE — 3078F DIAST BP <80 MM HG: CPT | Performed by: FAMILY MEDICINE

## 2018-07-17 PROCEDURE — 3075F SYST BP GE 130 - 139MM HG: CPT | Performed by: FAMILY MEDICINE

## 2018-07-17 RX ORDER — OMEPRAZOLE 20 MG/1
20 CAPSULE, DELAYED RELEASE ORAL DAILY
Qty: 30 CAPSULE | Refills: 2 | Status: SHIPPED | OUTPATIENT
Start: 2018-07-17 | End: 2021-04-13 | Stop reason: SDUPTHER

## 2018-07-17 RX ORDER — CITALOPRAM 20 MG/1
TABLET ORAL
Qty: 30 TABLET | Refills: 2 | Status: SHIPPED | OUTPATIENT
Start: 2018-07-17 | End: 2019-05-07 | Stop reason: SDUPTHER

## 2018-07-17 RX ORDER — ETODOLAC 400 MG/1
TABLET, FILM COATED ORAL EVERY 12 HOURS
COMMUNITY
End: 2018-07-17 | Stop reason: ALTCHOICE

## 2018-07-17 RX ORDER — FLUTICASONE PROPIONATE 0.05 %
CREAM (GRAM) TOPICAL
COMMUNITY
Start: 2018-06-14 | End: 2019-10-04 | Stop reason: ALTCHOICE

## 2018-07-17 RX ORDER — HYDROCHLOROTHIAZIDE 12.5 MG/1
CAPSULE, GELATIN COATED ORAL
COMMUNITY
End: 2019-01-09

## 2018-07-17 RX ORDER — ESCITALOPRAM OXALATE 10 MG/1
TABLET ORAL DAILY
COMMUNITY
End: 2018-07-17 | Stop reason: ALTCHOICE

## 2018-07-17 RX ORDER — NAPROXEN 500 MG/1
TABLET ORAL
COMMUNITY
End: 2018-07-17 | Stop reason: ALTCHOICE

## 2018-07-17 RX ORDER — CLINDAMYCIN HYDROCHLORIDE 150 MG/1
CAPSULE ORAL
COMMUNITY
End: 2018-08-16 | Stop reason: ALTCHOICE

## 2018-07-17 RX ORDER — CLINDAMYCIN HYDROCHLORIDE 300 MG/1
CAPSULE ORAL
COMMUNITY
End: 2018-08-16 | Stop reason: ALTCHOICE

## 2018-07-17 RX ORDER — NAPROXEN AND ESOMEPRAZOLE MAGNESIUM 375; 20 MG/1; MG/1
TABLET, DELAYED RELEASE ORAL
COMMUNITY
End: 2018-07-17 | Stop reason: ALTCHOICE

## 2018-07-17 RX ORDER — PREDNISONE 1 MG/1
TABLET ORAL
COMMUNITY
End: 2018-08-16 | Stop reason: ALTCHOICE

## 2018-07-17 RX ORDER — MOMETASONE FUROATE 1 MG/ML
SOLUTION TOPICAL
COMMUNITY
End: 2019-10-04 | Stop reason: ALTCHOICE

## 2018-07-17 RX ORDER — ATORVASTATIN CALCIUM 10 MG/1
TABLET, FILM COATED ORAL
COMMUNITY
End: 2018-07-17

## 2018-07-17 RX ORDER — LEVOFLOXACIN 500 MG/1
TABLET, FILM COATED ORAL
COMMUNITY
End: 2018-07-17 | Stop reason: ALTCHOICE

## 2018-07-17 RX ORDER — DICLOFENAC SODIUM 75 MG/1
TABLET, DELAYED RELEASE ORAL
COMMUNITY
End: 2018-07-17 | Stop reason: ALTCHOICE

## 2018-07-17 RX ORDER — TAPENTADOL HYDROCHLORIDE 75 MG/1
TABLET, FILM COATED ORAL EVERY 6 HOURS
COMMUNITY
End: 2019-01-09

## 2018-07-17 RX ORDER — PREDNISONE 20 MG/1
TABLET ORAL
COMMUNITY
End: 2018-08-16 | Stop reason: ALTCHOICE

## 2018-07-17 NOTE — ASSESSMENT & PLAN NOTE
His blood pressure was normal today, but he is very confused about his medications  He brought all of his bottles today, and there were duplicates of his lisinopril and metoprolol  I recommended he bring them back again next visit and will ask one of our nurses to spend time with him to organize the medicines

## 2018-07-17 NOTE — ASSESSMENT & PLAN NOTE
He continues to have daily anxiety  He uses Xanax on a daily basis  He used to take citalopram but for some reason it was not renewed recently  I am going to have him restart citalopram 10 mg for the 1st week then 20 mg daily

## 2018-07-17 NOTE — PROGRESS NOTES
Assessment/Plan:    It appears that he has muscle contraction headaches which are related to fatigue and anxiety symptoms  I have given him a prescription to restart citalopram  and I recommended use of melatonin at bedtime  Anxiety    He continues to have daily anxiety  He uses Xanax on a daily basis  He used to take citalopram but for some reason it was not renewed recently  I am going to have him restart citalopram 10 mg for the 1st week then 20 mg daily  Insomnia    Recommended melatonin 3 mg about a 0 5 hr before bedtime  Hypertension  His blood pressure was normal today, but he is very confused about his medications  He brought all of his bottles today, and there were duplicates of his lisinopril and metoprolol  I recommended he bring them back again next visit and will ask one of our nurses to spend time with him to organize the medicines  Diagnoses and all orders for this visit:    Chronic tension-type headache, not intractable    Gastroesophageal reflux disease, esophagitis presence not specified  -     omeprazole (PriLOSEC) 20 mg delayed release capsule; Take 1 capsule (20 mg total) by mouth daily    Anxiety  -     citalopram (CeleXA) 20 mg tablet; Take half tablet p o  Daily for 1 week then take 1 p o  Daily  Insomnia, unspecified type    Essential hypertension    Other orders  -     Skin Protectants, Misc  (AMERIGEL BARRIER EX);  Use as directed  -     Discontinue: Naproxen-Esomeprazole (VIMOVO) 375-20 MG TBEC; Vimovo 375 mg-20 mg tablet,immediate and delay release  -     rifampin (RIFADIN) 300 mg capsule; rifampin 300 mg capsule  -     predniSONE 5 mg tablet; prednisone 5 mg tablet  -     predniSONE 20 mg tablet; prednisone 20 mg tablet  -     tapentadol (NUCYNTA) 75 mg tablet; every 6 (six) hours  -     Discontinue: naproxen (NAPROSYN) 500 mg tablet; naproxen 500 mg tablet  -     mometasone (ELOCON) 0 1 % lotion; mometasone 0 1 % topical solution  -     Discontinue: escitalopram (LEXAPRO) 10 mg tablet; Daily  -     Discontinue: levofloxacin (LEVAQUIN) 500 mg tablet; levofloxacin 500 mg tablet  -     hydrochlorothiazide (MICROZIDE) 12 5 mg capsule; hydrochlorothiazide 12 5 mg capsule  -     HYDROCODONE-ACETAMINOPHEN PO; hydrocodone 7 5 mg-acetaminophen 500 mg tablet  -     Adalimumab (HUMIRA PEN SC); Humira Pen  -     fluticasone (CUTIVATE) 0 05 % cream;   -     Discontinue: etodolac (LODINE) 400 MG tablet; Every 12 hours  -     Discontinue: atorvastatin (LIPITOR) 10 mg tablet; atorvastatin 10 mg tablet  -     clindamycin (CLEOCIN) 150 mg capsule; clindamycin HCl 150 mg capsule  -     clindamycin (CLEOCIN) 300 MG capsule; clindamycin HCl 300 mg capsule  -     Discontinue: diclofenac (VOLTAREN) 75 mg EC tablet; diclofenac sodium 75 mg tablet,delayed release          Subjective:      Patient ID: Miranda Archibald is a 74 y o  male  He had been in here June 12th with complaint of congestion and headaches  I felt he had a sinus infection so I treated him with a course of doxycycline  The symptoms improved but he continues to get headaches on a daily basis  The headaches are located across his forehead and he has been getting them for a couple hours on a daily basis  He takes Tylenol and ibuprofen and usually this works for the headache  He has chronic neck pain issues and pain down the spine  He used to go to pain management but he a gave up on that after receiving many injections  He does not sleep well at night, only about 3 hours at a time  He has lots of anxiety during day, worries about his family    He was taking omeprazole for reflux, but he cannot find the bottle of pills  He gets a lot of daily burping and reflux symptoms          The following portions of the patient's history were reviewed and updated as appropriate: allergies, current medications, past family history, past medical history, past social history, past surgical history and problem list     Review of Systems Constitutional: Positive for fatigue  Negative for activity change, chills and fever  HENT: Negative for congestion, ear pain, sinus pressure and sore throat  Eyes: Negative for pain and visual disturbance  Respiratory: Negative for cough, chest tightness, shortness of breath and wheezing  Cardiovascular: Negative for chest pain, palpitations and leg swelling  Gastrointestinal: Negative for abdominal pain, blood in stool, constipation, diarrhea, nausea and vomiting  Endocrine: Negative for polydipsia and polyuria  Genitourinary: Negative for difficulty urinating, dysuria, frequency and urgency  Musculoskeletal: Positive for back pain  Negative for arthralgias, joint swelling and myalgias  Skin: Negative for rash  Neurological: Negative for dizziness, weakness and numbness  Hematological: Negative for adenopathy  Does not bruise/bleed easily  Psychiatric/Behavioral: Negative for dysphoric mood  The patient is nervous/anxious  Objective:      /78 (BP Location: Right arm, Patient Position: Sitting, Cuff Size: Large)   Pulse 86   Ht 5' 4" (1 626 m)   Wt 86 9 kg (191 lb 9 6 oz)   SpO2 96%   BMI 32 89 kg/m²          Physical Exam   Constitutional: He appears well-developed and well-nourished  HENT:   Head: Normocephalic and atraumatic  Right Ear: External ear normal    Left Ear: External ear normal    Mouth/Throat: Oropharynx is clear and moist    Mild nasal congestion, no polyps or lesions   Neck: Normal range of motion  Neck supple  No thyromegaly present  Carotid pulses 2+ bilaterally without bruit   Cardiovascular: Normal rate, regular rhythm and normal heart sounds  Pulmonary/Chest: Effort normal and breath sounds normal    Musculoskeletal: He exhibits no edema  C-spine with mild decreased ROM  There is pain with extension and turning to the sides  There is no tenderness over the cervical spine or paraspinal muscles     Lymphadenopathy:     He has no cervical adenopathy  Skin: Skin is warm and dry  Psychiatric: He has a normal mood and affect  His behavior is normal    Nursing note and vitals reviewed

## 2018-08-16 ENCOUNTER — OFFICE VISIT (OUTPATIENT)
Dept: FAMILY MEDICINE CLINIC | Facility: CLINIC | Age: 73
End: 2018-08-16
Payer: COMMERCIAL

## 2018-08-16 VITALS
DIASTOLIC BLOOD PRESSURE: 70 MMHG | HEIGHT: 64 IN | BODY MASS INDEX: 33.12 KG/M2 | SYSTOLIC BLOOD PRESSURE: 140 MMHG | WEIGHT: 194 LBS

## 2018-08-16 DIAGNOSIS — I10 ESSENTIAL HYPERTENSION: ICD-10-CM

## 2018-08-16 DIAGNOSIS — H53.8 BLURRING OF VISUAL IMAGE OF BOTH EYES: ICD-10-CM

## 2018-08-16 DIAGNOSIS — Z00.00 MEDICARE ANNUAL WELLNESS VISIT, SUBSEQUENT: Primary | ICD-10-CM

## 2018-08-16 DIAGNOSIS — M15.9 OSTEOARTHRITIS OF MULTIPLE JOINTS, UNSPECIFIED OSTEOARTHRITIS TYPE: ICD-10-CM

## 2018-08-16 DIAGNOSIS — F41.8 DEPRESSION WITH ANXIETY: ICD-10-CM

## 2018-08-16 DIAGNOSIS — Z13.220 SCREENING FOR HYPERLIPIDEMIA: ICD-10-CM

## 2018-08-16 DIAGNOSIS — H91.93 BILATERAL HEARING LOSS, UNSPECIFIED HEARING LOSS TYPE: ICD-10-CM

## 2018-08-16 DIAGNOSIS — K21.9 GASTROESOPHAGEAL REFLUX DISEASE, ESOPHAGITIS PRESENCE NOT SPECIFIED: ICD-10-CM

## 2018-08-16 PROBLEM — M25.50 ARTHRALGIA: Status: ACTIVE | Noted: 2018-07-17

## 2018-08-16 PROBLEM — N18.30 CKD (CHRONIC KIDNEY DISEASE) STAGE 3, GFR 30-59 ML/MIN (HCC): Status: ACTIVE | Noted: 2018-08-16

## 2018-08-16 PROCEDURE — 1101F PT FALLS ASSESS-DOCD LE1/YR: CPT | Performed by: FAMILY MEDICINE

## 2018-08-16 PROCEDURE — 1170F FXNL STATUS ASSESSED: CPT | Performed by: FAMILY MEDICINE

## 2018-08-16 PROCEDURE — G0439 PPPS, SUBSEQ VISIT: HCPCS | Performed by: FAMILY MEDICINE

## 2018-08-16 PROCEDURE — 99214 OFFICE O/P EST MOD 30 MIN: CPT | Performed by: FAMILY MEDICINE

## 2018-08-16 PROCEDURE — 1125F AMNT PAIN NOTED PAIN PRSNT: CPT | Performed by: FAMILY MEDICINE

## 2018-08-16 PROCEDURE — 3008F BODY MASS INDEX DOCD: CPT | Performed by: FAMILY MEDICINE

## 2018-08-16 PROCEDURE — 3725F SCREEN DEPRESSION PERFORMED: CPT | Performed by: FAMILY MEDICINE

## 2018-08-16 RX ORDER — ALPRAZOLAM 0.5 MG/1
TABLET ORAL
Qty: 180 TABLET | Refills: 0 | Status: SHIPPED | OUTPATIENT
Start: 2018-08-16 | End: 2018-11-05 | Stop reason: SDUPTHER

## 2018-08-16 RX ORDER — ALBUTEROL SULFATE 90 UG/1
AEROSOL, METERED RESPIRATORY (INHALATION)
COMMUNITY
End: 2018-08-16

## 2018-08-16 NOTE — ASSESSMENT & PLAN NOTE
He will continue follow-up with Rheumatology  They have discontinued his anti-inflammatories because of kidney disease  He has a new prescription for Voltaren gel to apply to painful joints  He will also have x-rays and lab work ordered by Rheumatology

## 2018-08-16 NOTE — ASSESSMENT & PLAN NOTE
Symptoms appear to be stable on citalopram 20 mg daily and Xanax 0 5 mg once or twice daily   was checked and Xanax was refilled today

## 2018-08-16 NOTE — PROGRESS NOTES
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       He had good results after cataract surgery, but now he notes increased blurring  Referred to Dr Dwight Razo for eye exam          Relevant Orders    Ambulatory referral to Ophthalmology        Health Maintenance Due   Topic Date Due    ABDOMINAL AORTIC ANEURYSM (AAA) SCREEN  07/26/2008    Pneumococcal PPSV23/PCV13 65+ Years / Low and Medium Risk (2 of 2 - PPSV23) 07/24/2016         HPI:  Andria Zacarias is a 74 y o  male here for his Subsequent Wellness Visit      Patient Active Problem List   Diagnosis    Abnormal liver enzymes    Acid reflux    Acute prostatitis    Asthma    Other symptoms involving cardiovascular system    Bilateral hearing loss    BPH associated with nocturia    Cervical spinal stenosis    Chronic pain disorder    Chronic sinusitis    Depression with anxiety    Hip pain, left    Hyperglycemia    Hyperlipidemia    Hypertension    Incomplete emptying of bladder    Lumbar radiculopathy    Lumbar spinal stenosis    Low back pain    Migraine    Organic impotence    Peripheral neuropathy    Psoriasis    Rotator cuff tendinitis    Seborrheic dermatitis    TB lung, latent    Testicular hypogonadism    Thoracic degenerative disc disease    Tinnitus    Venous insufficiency    Displacement of lumbar intervertebral disc without myelopathy    Primary localized osteoarthrosis of ankle and foot    Arthralgia    Cellulitis and abscess of toe    Ingrown nail    Tarsal tunnel syndrome    Lumbosacral neuritis    Lumbosacral radiculitis    Lumbosacral spondylosis without myelopathy    Plantar fascial fibromatosis    Chronic tension-type headache, not intractable    Anxiety    Insomnia    CKD (chronic kidney disease) stage 3, GFR 30-59 ml/min    Osteoarthritis of multiple joints    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     Past Medical History:   Diagnosis Date    Anxiety and depression     Arthritis     Asthma     Back pain, chronic     Last assessed: 3/11/15    Cataract     Last assessed: 7/16/14    GERD (gastroesophageal reflux disease)     Hypertension     Neuropathy     Osteoporosis     Panic disorder     Right-sided Bell's palsy     Last assessed: 3/10/14    Seasonal allergies      Past Surgical History:   Procedure Laterality Date    CARPAL TUNNEL RELEASE Right 1999    Neuroplasty Decompression Median Nerve at Carpal Tunnel    CATARACT EXTRACTION  2015    COLONOSCOPY  02/2015    polyp, complete    EAR SURGERY      1982 and 1990, ear drum,  per [employer]    ESOPHAGOGASTRODUODENOSCOPY  02/2015    Diagnostic    FOOT SURGERY Left 1997    Toe    OTHER SURGICAL HISTORY      Spinal Anesthesia Epidural, x3 2004, per [employer]    POLYPECTOMY  2015    TYMPANOPLASTY Bilateral 2000    Dr Ashlie Shipman     Family History   Problem Relation Age of Onset    Cataracts Mother     Hyperlipidemia Mother      History   Smoking Status    Former Smoker    Types: Cigars   Smokeless Tobacco    Former User     Comment: quit in 2000, former cigar and cigarette, pipe smoker, per [employer], Past history of chewing tobacco use, active, per [employer]     History   Alcohol Use No     Comment: former drinker      History   Drug Use No       Current Outpatient Prescriptions   Medication Sig Dispense Refill    acetaminophen (TYLENOL) 325 mg tablet Take 2 tablets by mouth every 6 (six) hours as needed for mild pain or fever 30 tablet 0    Adalimumab (HUMIRA PEN SC) Humira Pen      albuterol (VENTOLIN HFA) 90 mcg/act inhaler Inhale 1-2 puffs      ALPRAZolam (XANAX) 0 5 mg tablet Take by mouth      amLODIPine (NORVASC) 10 mg tablet Take 1 tablet (10 mg total) by mouth daily 90 tablet 3    citalopram (CeleXA) 20 mg tablet Take half tablet p o  Daily for 1 week then take 1 p o  Daily  30 tablet 2    clindamycin (CLEOCIN) 150 mg capsule clindamycin HCl 150 mg capsule      clindamycin (CLEOCIN) 300 MG capsule clindamycin HCl 300 mg capsule      fluticasone (CUTIVATE) 0 05 % cream       fluticasone (FLONASE) 50 mcg/act nasal spray 2 sprays into each nostril daily 16 g 2    gabapentin (NEURONTIN) 300 mg capsule Take 2 capsules (600 mg total) by mouth daily at bedtime 180 capsule 1    hydrochlorothiazide (MICROZIDE) 12 5 mg capsule hydrochlorothiazide 12 5 mg capsule      HYDROCODONE-ACETAMINOPHEN PO hydrocodone 7 5 mg-acetaminophen 500 mg tablet      hydrocortisone valerate (WEST-VITO) 0 2 % ointment Reported on 3/9/2017       hydrOXYzine HCL (ATARAX) 10 mg tablet Take two to four tablets by mouth every night before bedtime for itching   levocetirizine (XYZAL) 5 MG tablet Take 5 mg by mouth      lisinopril-hydrochlorothiazide (PRINZIDE,ZESTORETIC) 20-12 5 MG per tablet Take 1 tablet by mouth 2 (two) times a day      metoprolol succinate (TOPROL-XL) 25 mg 24 hr tablet Take 12 5 mg by mouth daily at bedtime        mometasone (ELOCON) 0 1 % lotion mometasone 0 1 % topical solution      omeprazole (PriLOSEC) 20 mg delayed release capsule Take 1 capsule (20 mg total) by mouth daily 30 capsule 2    predniSONE 20 mg tablet prednisone 20 mg tablet      predniSONE 5 mg tablet prednisone 5 mg tablet      sildenafil (REVATIO) 20 mg tablet Take by mouth      Skin Protectants, Misc   (AMERIGEL BARRIER EX) Use as directed      tamsulosin (FLOMAX) 0 4 mg Take by mouth      tapentadol (NUCYNTA) 75 mg tablet every 6 (six) hours      valsartan (DIOVAN) 160 mg tablet Take 160 mg by mouth daily        clobetasol (TEMOVATE) 0 05 % external solution Clobetasol Propionate 0 05 % External Solution  APPLY AND GENTLY MASSAGE INTO AFFECTED AREA SCALP TWICE DAILY x 2 week course   Quantity: 1;  Refills: 2      Helder Jarquin DO ; Start 12-Nov-2015  Active  50 ML Bottle      ibuprofen (MOTRIN) 400 mg tablet Take 1 tablet by mouth every 6 (six) hours as needed for mild pain for up to 7 days 30 tablet 0    rifampin (RIFADIN) 300 mg capsule rifampin 300 mg capsule       No current facility-administered medications for this visit  Allergies   Allergen Reactions    Amoxicillin Rash    Amoxicillin-Pot Clavulanate Er  [Amoxicillin-Pot Clavulanate] Hives and Rash    Codeine Rash and Shortness Of Breath    Penicillin G Rash    Albumen, Egg     Lisinopril Other (See Comments)    Other      pork    Pork-Derived Products     Prochlorperazine Other (See Comments) and Itching     rash all over the body    Compazine  [Prochlorperazine Edisylate] Rash    Iodinated Diagnostic Agents Rash    Latex Rash and Itching    Valdecoxib Itching, Rash and Headache     Category: HEADACHES;      Immunization History   Administered Date(s) Administered    Hep A, adult 05/12/2009    Influenza Split High Dose Preservative Free IM 11/12/2012, 10/01/2015, 09/20/2016, 10/05/2017    Influenza TIV (IM) 10/05/2010    Pneumococcal Conjugate 13-Valent 07/24/2015    Pneumococcal Polysaccharide PPV23 1945    Tdap 05/12/2009       Patient Care Team:  Kelly Monreal MD as PCP - General  Nowell Crigler, MD Theotis Covert, DO Ana Benoit PA-C    Medicare Screening Tests and Risk Assessments:  Klever Yadav is here for his Subsequent Wellness visit  Health Risk Assessment:  Patient rates overall health as poor  Patient feels that their physical health rating is Slightly better  Eyesight was rated as Slightly worse  Hearing was rated as Much worse  Patient feels that their emotional and mental health rating is Same  Pain experienced by patient in the last 7 days has been A lot  Patient's pain rating has been 8/10  Patient states that he has experienced no weight loss or gain in last 6 months       Emotional/Mental Health:  Patient has been feeling nervous/anxious  PHQ-9 Depression Screening:    Frequency of the following problems over the past two weeks:      1  Little interest or pleasure in doing things: 0 - not at all      2  Feeling down, depressed, or hopeless: 0 - not at all  PHQ-2 Score: 0          Broken Bones/Falls: Fall Risk Assessment:    In the past year, patient has experienced: No history of falling in past year          Bladder/Bowel:  Patient has not leaked urine accidently in the last six months  Patient reports no loss of bowel control  Immunizations:  Patient has had a flu vaccination within the last year  Patient has received a pneumonia shot  Patient has not received a shingles shot  Patient has not received tetanus/diphtheria shot  Home Safety:  Patient does not have trouble with stairs inside or outside of their home  Patient currently reports that there are no safety hazards present in home, working smoke alarms, no working carbon monoxide detectors  Preventative Screenings:   prostate cancer screen performed, no colon cancer screen completed, no cholesterol screen completed, no glaucoma eye exam completed    Nutrition:  Current diet: Regular and Limited junk food with servings of the following:    Medications:  Patient is currently taking over-the-counter supplements  Patient is able to manage medications  Lifestyle Choices:  Patient reports no tobacco use  Patient has not smoked or used tobacco in the past   Patient reports no alcohol use  Patient drives a vehicle  Patient wears seat belt  Activities of Daily Living:  Can get out of bed by his or her self, able to dress self, able to make own meals, able to do own shopping, able to bathe self, can do own laundry/housekeeping, can manage own money, pay bills and track expenses    Previous Hospitalizations:  No hospitalization or ED visit in past 12 months        Advanced Directives:  Patient has not decided on power of     Patient has not completed advanced directive

## 2018-08-16 NOTE — ASSESSMENT & PLAN NOTE
He had good results after cataract surgery, but now he notes increased blurring   Referred to Dr Kimberli Garcia for eye exam

## 2018-08-16 NOTE — PROGRESS NOTES
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    He had good results after cataract surgery, but now he notes increased blurring  Referred to Dr Bonnie Hui for eye exam     Hypertension    Blood pressure has been stable  He will continue metoprolol and lisinopril/HCTZ  Osteoarthritis of multiple joints    He will continue follow-up with Rheumatology  They have discontinued his anti-inflammatories because of kidney disease  He has a new prescription for Voltaren gel to apply to painful joints  He will also have x-rays and lab work ordered by Rheumatology  Depression with anxiety    Symptoms appear to be stable on citalopram 20 mg daily and Xanax 0 5 mg once or twice daily   was checked and Xanax was refilled today  Acid reflux    He is getting relief with omeprazole 20 mg daily  Will continue current regimen  Diagnoses and all orders for this visit:    Medicare annual wellness visit, subsequent    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  -     Ambulatory referral to Ophthalmology; Future    Bilateral hearing loss, unspecified hearing loss type  -     Ambulatory Referral to Otolaryngology; Future    Osteoarthritis of multiple joints, unspecified osteoarthritis type    Depression with anxiety  -     ALPRAZolam (XANAX) 0 5 mg tablet; Take 1 p o  1 or 2 times daily p r n  Anxiety  Gastroesophageal reflux disease, esophagitis presence not specified    Essential hypertension  -     Comprehensive metabolic panel; Future    Screening for hyperlipidemia  -     Lipid panel; Future    Other orders  -     Discontinue: albuterol (VENTOLIN HFA) 90 mcg/act inhaler; Ventolin HFA 90 mcg/actuation aerosol inhaler          Subjective:      Patient ID: August Thakkar is a 74 y o  male      HPI    The following portions of the patient's history were reviewed and updated as appropriate: allergies, current medications, past family history, past medical history, past social history, past surgical history and problem list     Review of Systems   Constitutional: Negative for activity change, chills, fatigue and fever  HENT: Positive for hearing loss  Negative for congestion, ear pain, sinus pressure and sore throat  Eyes: Positive for visual disturbance  Negative for pain  Respiratory: Negative for cough, chest tightness, shortness of breath and wheezing  Cardiovascular: Negative for chest pain, palpitations and leg swelling  Gastrointestinal: Negative for abdominal pain, blood in stool, constipation, diarrhea, nausea and vomiting  Endocrine: Negative for polydipsia and polyuria  Genitourinary: Negative for difficulty urinating, dysuria, frequency and urgency  Musculoskeletal: Positive for arthralgias and back pain  Negative for joint swelling and myalgias  Skin: Negative for rash  Neurological: Positive for headaches  Negative for dizziness, weakness and numbness  Hematological: Negative for adenopathy  Does not bruise/bleed easily  Psychiatric/Behavioral: Negative for dysphoric mood  The patient is nervous/anxious  Objective:      /70 (BP Location: Left arm, Patient Position: Sitting, Cuff Size: Standard)   Ht 5' 4" (1 626 m)   Wt 88 kg (194 lb)   BMI 33 30 kg/m²          Physical Exam   Constitutional: He is oriented to person, place, and time  He appears well-developed and well-nourished  No distress  HENT:   Head: Normocephalic and atraumatic  Right Ear: External ear normal    Left Ear: External ear normal    Nose: Nose normal    Mouth/Throat: Oropharynx is clear and moist    Eyes: Conjunctivae and EOM are normal  Pupils are equal, round, and reactive to light  No scleral icterus  Neck: Normal range of motion  Neck supple  No thyromegaly present  Carotid pulses 2+ bilaterally without bruit   Cardiovascular: Normal rate, regular rhythm and normal heart sounds      No murmur heard   Pulmonary/Chest: Effort normal and breath sounds normal  He has no wheezes  He has no rales  Abdominal: Soft  Bowel sounds are normal  He exhibits no mass  There is no tenderness  obese   Musculoskeletal: He exhibits edema  He exhibits no tenderness or deformity  There is decreased range of motion of the hands  Right side is worse than left  There is triggering of the ring finger of the right hand  Lymphadenopathy:     He has no cervical adenopathy  Neurological: He is alert and oriented to person, place, and time  He has normal reflexes  No cranial nerve deficit  Skin: Skin is warm and dry  No rash noted  No erythema  Psychiatric: He has a normal mood and affect  His behavior is normal    Nursing note and vitals reviewed

## 2018-08-17 LAB
ALBUMIN SERPL-MCNC: 4.6 G/DL (ref 3.6–5.1)
ALBUMIN/GLOB SERPL: 1.7 (CALC) (ref 1–2.5)
ALP SERPL-CCNC: 72 U/L (ref 40–115)
ALT SERPL-CCNC: 23 U/L (ref 9–46)
AST SERPL-CCNC: 22 U/L (ref 10–35)
BILIRUB SERPL-MCNC: 1 MG/DL (ref 0.2–1.2)
BUN SERPL-MCNC: 14 MG/DL (ref 7–25)
BUN/CREAT SERPL: NORMAL (CALC) (ref 6–22)
CALCIUM SERPL-MCNC: 10.1 MG/DL (ref 8.6–10.3)
CCP IGG SERPL-ACNC: <16 UNITS
CHLORIDE SERPL-SCNC: 103 MMOL/L (ref 98–110)
CO2 SERPL-SCNC: 30 MMOL/L (ref 20–32)
CREAT SERPL-MCNC: 1.18 MG/DL (ref 0.7–1.18)
CRP SERPL-MCNC: 2.5 MG/L
ERYTHROCYTE [SEDIMENTATION RATE] IN BLOOD BY WESTERGREN METHOD: 2 MM/H
GLOBULIN SER CALC-MCNC: 2.7 G/DL (CALC) (ref 1.9–3.7)
GLUCOSE SERPL-MCNC: 98 MG/DL (ref 65–99)
POTASSIUM SERPL-SCNC: 5.1 MMOL/L (ref 3.5–5.3)
PROT SERPL-MCNC: 7.3 G/DL (ref 6.1–8.1)
RHEUMATOID FACT SERPL-ACNC: <14 IU/ML
SL AMB EGFR AFRICAN AMERICAN: 70 ML/MIN/1.73M2
SL AMB EGFR NON AFRICAN AMERICAN: 60 ML/MIN/1.73M2
SODIUM SERPL-SCNC: 141 MMOL/L (ref 135–146)

## 2018-08-20 ENCOUNTER — TRANSCRIBE ORDERS (OUTPATIENT)
Dept: ADMINISTRATIVE | Facility: HOSPITAL | Age: 73
End: 2018-08-20

## 2018-08-20 ENCOUNTER — APPOINTMENT (OUTPATIENT)
Dept: RADIOLOGY | Facility: MEDICAL CENTER | Age: 73
End: 2018-08-20
Payer: COMMERCIAL

## 2018-08-20 DIAGNOSIS — M25.562 LEFT KNEE PAIN, UNSPECIFIED CHRONICITY: ICD-10-CM

## 2018-08-20 DIAGNOSIS — M25.561 RIGHT KNEE PAIN, UNSPECIFIED CHRONICITY: Primary | ICD-10-CM

## 2018-08-20 DIAGNOSIS — M25.561 RIGHT KNEE PAIN, UNSPECIFIED CHRONICITY: ICD-10-CM

## 2018-08-20 DIAGNOSIS — M15.9 PRIMARY OSTEOARTHRITIS INVOLVING MULTIPLE JOINTS: ICD-10-CM

## 2018-08-20 LAB
ALBUMIN SERPL-MCNC: 4.3 G/DL (ref 3.6–5.1)
ALBUMIN/GLOB SERPL: 1.8 (CALC) (ref 1–2.5)
ALP SERPL-CCNC: 69 U/L (ref 40–115)
ALT SERPL-CCNC: 24 U/L (ref 9–46)
AST SERPL-CCNC: 24 U/L (ref 10–35)
BILIRUB SERPL-MCNC: 1.7 MG/DL (ref 0.2–1.2)
BUN SERPL-MCNC: 11 MG/DL (ref 7–25)
BUN/CREAT SERPL: 9 (CALC) (ref 6–22)
CALCIUM SERPL-MCNC: 9.2 MG/DL (ref 8.6–10.3)
CHLORIDE SERPL-SCNC: 101 MMOL/L (ref 98–110)
CHOLEST SERPL-MCNC: 213 MG/DL
CHOLEST/HDLC SERPL: 4.7 (CALC)
CO2 SERPL-SCNC: 29 MMOL/L (ref 20–32)
CREAT SERPL-MCNC: 1.19 MG/DL (ref 0.7–1.18)
GLOBULIN SER CALC-MCNC: 2.4 G/DL (CALC) (ref 1.9–3.7)
GLUCOSE SERPL-MCNC: 102 MG/DL (ref 65–99)
HDLC SERPL-MCNC: 45 MG/DL
LDLC SERPL CALC-MCNC: 147 MG/DL (CALC)
NONHDLC SERPL-MCNC: 168 MG/DL (CALC)
POTASSIUM SERPL-SCNC: 4.3 MMOL/L (ref 3.5–5.3)
PROT SERPL-MCNC: 6.7 G/DL (ref 6.1–8.1)
SL AMB EGFR AFRICAN AMERICAN: 69 ML/MIN/1.73M2
SL AMB EGFR NON AFRICAN AMERICAN: 59 ML/MIN/1.73M2
SODIUM SERPL-SCNC: 139 MMOL/L (ref 135–146)
TRIGL SERPL-MCNC: 100 MG/DL

## 2018-08-20 PROCEDURE — 73562 X-RAY EXAM OF KNEE 3: CPT

## 2018-08-20 PROCEDURE — 73630 X-RAY EXAM OF FOOT: CPT

## 2018-08-20 PROCEDURE — 73130 X-RAY EXAM OF HAND: CPT

## 2018-08-22 DIAGNOSIS — E78.5 HYPERLIPIDEMIA, UNSPECIFIED HYPERLIPIDEMIA TYPE: Primary | ICD-10-CM

## 2018-08-22 RX ORDER — ATORVASTATIN CALCIUM 10 MG/1
10 TABLET, FILM COATED ORAL DAILY
Qty: 30 TABLET | Refills: 5 | Status: SHIPPED | OUTPATIENT
Start: 2018-08-22 | End: 2018-09-27 | Stop reason: SDUPTHER

## 2018-08-22 NOTE — TELEPHONE ENCOUNTER
----- Message from Randy Posada MD sent at 8/21/2018  7:10 PM EDT -----  Please tell patient his cholesterol is elevated and the rest of the blood work is okay  I recommend starting cholesterol medication and if this is okay with him would give him a script for Lipitor 10 mg daily

## 2018-09-06 DIAGNOSIS — I10 HYPERTENSION, UNSPECIFIED TYPE: ICD-10-CM

## 2018-09-08 RX ORDER — AMLODIPINE BESYLATE 10 MG/1
10 TABLET ORAL DAILY
Qty: 90 TABLET | Refills: 0 | Status: SHIPPED | OUTPATIENT
Start: 2018-09-08 | End: 2018-12-27 | Stop reason: SDUPTHER

## 2018-09-27 DIAGNOSIS — E78.5 HYPERLIPIDEMIA, UNSPECIFIED HYPERLIPIDEMIA TYPE: ICD-10-CM

## 2018-09-27 RX ORDER — ATORVASTATIN CALCIUM 10 MG/1
10 TABLET, FILM COATED ORAL DAILY
Qty: 90 TABLET | Refills: 3 | Status: SHIPPED | OUTPATIENT
Start: 2018-09-27 | End: 2019-03-19 | Stop reason: SDUPTHER

## 2018-11-05 ENCOUNTER — OFFICE VISIT (OUTPATIENT)
Dept: FAMILY MEDICINE CLINIC | Facility: CLINIC | Age: 73
End: 2018-11-05
Payer: COMMERCIAL

## 2018-11-05 VITALS
TEMPERATURE: 98.5 F | DIASTOLIC BLOOD PRESSURE: 74 MMHG | WEIGHT: 198.4 LBS | BODY MASS INDEX: 33.87 KG/M2 | HEART RATE: 70 BPM | RESPIRATION RATE: 20 BRPM | HEIGHT: 64 IN | SYSTOLIC BLOOD PRESSURE: 136 MMHG

## 2018-11-05 DIAGNOSIS — J20.8 ACUTE BACTERIAL BRONCHITIS: Primary | ICD-10-CM

## 2018-11-05 DIAGNOSIS — B96.89 ACUTE BACTERIAL BRONCHITIS: Primary | ICD-10-CM

## 2018-11-05 DIAGNOSIS — F41.8 DEPRESSION WITH ANXIETY: ICD-10-CM

## 2018-11-05 DIAGNOSIS — M48.02 CERVICAL SPINAL STENOSIS: ICD-10-CM

## 2018-11-05 PROCEDURE — 99213 OFFICE O/P EST LOW 20 MIN: CPT | Performed by: INTERNAL MEDICINE

## 2018-11-05 PROCEDURE — 1036F TOBACCO NON-USER: CPT | Performed by: INTERNAL MEDICINE

## 2018-11-05 PROCEDURE — 1160F RVW MEDS BY RX/DR IN RCRD: CPT | Performed by: INTERNAL MEDICINE

## 2018-11-05 PROCEDURE — 3008F BODY MASS INDEX DOCD: CPT | Performed by: INTERNAL MEDICINE

## 2018-11-05 RX ORDER — ALBUTEROL SULFATE 90 UG/1
1 AEROSOL, METERED RESPIRATORY (INHALATION) EVERY 6 HOURS PRN
COMMUNITY
End: 2019-01-09 | Stop reason: SDUPTHER

## 2018-11-05 RX ORDER — AZITHROMYCIN 250 MG/1
TABLET, FILM COATED ORAL
Qty: 6 TABLET | Refills: 0 | Status: SHIPPED | OUTPATIENT
Start: 2018-11-05 | End: 2018-11-09

## 2018-11-05 RX ORDER — BENZONATATE 100 MG/1
100 CAPSULE ORAL 3 TIMES DAILY PRN
Qty: 20 CAPSULE | Refills: 0 | Status: SHIPPED | OUTPATIENT
Start: 2018-11-05 | End: 2019-01-09

## 2018-11-05 RX ORDER — GABAPENTIN 300 MG/1
600 CAPSULE ORAL
Qty: 180 CAPSULE | Refills: 0 | Status: SHIPPED | OUTPATIENT
Start: 2018-11-05 | End: 2019-03-27 | Stop reason: SDUPTHER

## 2018-11-05 RX ORDER — ALPRAZOLAM 0.5 MG/1
TABLET ORAL
Qty: 60 TABLET | Refills: 0 | Status: SHIPPED | OUTPATIENT
Start: 2018-11-05 | End: 2019-03-27 | Stop reason: SDUPTHER

## 2018-11-05 NOTE — PROGRESS NOTES
Assessment/Plan:     Diagnoses and all orders for this visit:    Acute bacterial bronchitis  -     azithromycin (ZITHROMAX) 250 mg tablet; Take 2 tablets today then 1 tablet daily x 4 days  -     benzonatate (TESSALON PERLES) 100 mg capsule; Take 1 capsule (100 mg total) by mouth 3 (three) times a day as needed for cough    Cervical spinal stenosis  -     gabapentin (NEURONTIN) 300 mg capsule; Take 2 capsules (600 mg total) by mouth daily at bedtime    Other orders  -     albuterol (PROVENTIL HFA,VENTOLIN HFA) 90 mcg/act inhaler; Inhale 1 puff every 6 (six) hours as needed      Matty was seen and examined in the office today  We discussed symptoms may be secondary to bronchitis and given his contacts at home, he was started on treatment  He was also given his refill on Gabapentin  He also requested a refill on Xanax, stating he uses this 2-3 times a day  However, I looked at the PMD after and he had the script filled in September  I will not fill this as of yet  Subjective:      Patient ID: Frank Crane is a 74 y o  male  Bernadine Terrell is here today for a sick visit  He reports symptoms started last Friday with cough/congestion  He reports sick contacts at home including his wife who was seen in the office  See below  URI    This is a new problem  The current episode started in the past 7 days  The problem has been gradually worsening  There has been no fever (Felt feverish )  Associated symptoms include congestion, coughing, headaches, a plugged ear sensation, sinus pain, a sore throat and swollen glands  Pertinent negatives include no abdominal pain, nausea, rhinorrhea or vomiting  Associated symptoms comments: Laryngitis  He has tried acetaminophen and antihistamine for the symptoms  The treatment provided no relief         The following portions of the patient's history were reviewed and updated as appropriate: allergies, current medications, past family history, past medical history, past social history, past surgical history and problem list     Review of Systems   HENT: Positive for congestion, sinus pain and sore throat  Negative for rhinorrhea  Respiratory: Positive for cough  Gastrointestinal: Negative for abdominal pain, nausea and vomiting  Neurological: Positive for headaches  Objective:      /74   Pulse 70   Temp 98 5 °F (36 9 °C)   Resp 20   Ht 5' 4" (1 626 m)   Wt 90 kg (198 lb 6 4 oz)   BMI 34 06 kg/m²          Physical Exam   Constitutional: He is oriented to person, place, and time  He appears well-developed and well-nourished  No distress  HENT:   Head: Normocephalic and atraumatic  Right Ear: External ear normal    Left Ear: External ear normal    Mouth/Throat: Oropharynx is clear and moist  No oropharyngeal exudate  Eyes: Conjunctivae and EOM are normal  Right eye exhibits no discharge  Left eye exhibits no discharge  No scleral icterus  Neck: Normal range of motion  Cardiovascular: Normal rate, regular rhythm and normal heart sounds  No murmur heard  Pulmonary/Chest: Effort normal and breath sounds normal    Mildly coarse breath sounds heard   Musculoskeletal: Normal range of motion  He exhibits no edema  Lymphadenopathy:     He has cervical adenopathy  Neurological: He is alert and oriented to person, place, and time  Skin: Skin is warm and dry  He is not diaphoretic  No erythema  Psychiatric: He has a normal mood and affect  His speech is normal and behavior is normal  Judgment and thought content normal    Vitals reviewed

## 2018-11-27 ENCOUNTER — IMMUNIZATION (OUTPATIENT)
Dept: FAMILY MEDICINE CLINIC | Facility: CLINIC | Age: 73
End: 2018-11-27
Payer: COMMERCIAL

## 2018-11-27 DIAGNOSIS — Z23 NEED FOR INFLUENZA VACCINATION: Primary | ICD-10-CM

## 2018-11-27 PROCEDURE — G0008 ADMIN INFLUENZA VIRUS VAC: HCPCS

## 2018-11-27 PROCEDURE — 4040F PNEUMOC VAC/ADMIN/RCVD: CPT

## 2018-11-27 PROCEDURE — 90662 IIV NO PRSV INCREASED AG IM: CPT

## 2018-12-27 DIAGNOSIS — I10 HYPERTENSION, UNSPECIFIED TYPE: ICD-10-CM

## 2018-12-27 RX ORDER — AMLODIPINE BESYLATE 10 MG/1
10 TABLET ORAL DAILY
Qty: 90 TABLET | Refills: 1 | Status: SHIPPED | OUTPATIENT
Start: 2018-12-27 | End: 2019-06-26 | Stop reason: SDUPTHER

## 2019-01-09 ENCOUNTER — OFFICE VISIT (OUTPATIENT)
Dept: FAMILY MEDICINE CLINIC | Facility: CLINIC | Age: 74
End: 2019-01-09
Payer: COMMERCIAL

## 2019-01-09 VITALS
TEMPERATURE: 98.1 F | SYSTOLIC BLOOD PRESSURE: 150 MMHG | OXYGEN SATURATION: 95 % | WEIGHT: 193 LBS | HEIGHT: 64 IN | BODY MASS INDEX: 32.95 KG/M2 | HEART RATE: 92 BPM | RESPIRATION RATE: 26 BRPM | DIASTOLIC BLOOD PRESSURE: 88 MMHG

## 2019-01-09 DIAGNOSIS — J45.909 ASTHMA, UNSPECIFIED ASTHMA SEVERITY, UNSPECIFIED WHETHER COMPLICATED, UNSPECIFIED WHETHER PERSISTENT: ICD-10-CM

## 2019-01-09 DIAGNOSIS — G43.909 MIGRAINE WITHOUT STATUS MIGRAINOSUS, NOT INTRACTABLE, UNSPECIFIED MIGRAINE TYPE: ICD-10-CM

## 2019-01-09 DIAGNOSIS — J06.9 VIRAL UPPER RESPIRATORY TRACT INFECTION: Primary | ICD-10-CM

## 2019-01-09 PROCEDURE — 1160F RVW MEDS BY RX/DR IN RCRD: CPT | Performed by: FAMILY MEDICINE

## 2019-01-09 PROCEDURE — 99213 OFFICE O/P EST LOW 20 MIN: CPT | Performed by: FAMILY MEDICINE

## 2019-01-09 PROCEDURE — 1036F TOBACCO NON-USER: CPT | Performed by: FAMILY MEDICINE

## 2019-01-09 RX ORDER — ALBUTEROL SULFATE 90 UG/1
1 AEROSOL, METERED RESPIRATORY (INHALATION) EVERY 6 HOURS PRN
Qty: 1 INHALER | Refills: 5 | Status: SHIPPED | OUTPATIENT
Start: 2019-01-09 | End: 2020-11-30 | Stop reason: SDUPTHER

## 2019-01-09 RX ORDER — ACETAMINOPHEN 325 MG/1
650 TABLET ORAL EVERY 6 HOURS PRN
Qty: 30 TABLET | Refills: 3 | Status: SHIPPED | OUTPATIENT
Start: 2019-01-09

## 2019-01-09 RX ORDER — BENZONATATE 200 MG/1
200 CAPSULE ORAL 3 TIMES DAILY PRN
Qty: 20 CAPSULE | Refills: 0 | Status: SHIPPED | OUTPATIENT
Start: 2019-01-09 | End: 2019-03-27

## 2019-01-09 NOTE — PROGRESS NOTES
Assessment/Plan:    He has viral upper respiratory infection  Will treat symptomatically with increased hydration with water along with use of Tylenol and I gave him a prescription for Tessalon Perles to take up to 3 times per day as needed for coughing  He should contact us if symptoms are not improving over the next several days  Diagnoses and all orders for this visit:    Viral upper respiratory tract infection  -     benzonatate (TESSALON) 200 MG capsule; Take 1 capsule (200 mg total) by mouth 3 (three) times a day as needed for cough    Asthma, unspecified asthma severity, unspecified whether complicated, unspecified whether persistent  -     albuterol (PROVENTIL HFA,VENTOLIN HFA) 90 mcg/act inhaler; Inhale 1 puff every 6 (six) hours as needed (Q6H PRN)    Migraine without status migrainosus, not intractable, unspecified migraine type  -     acetaminophen (TYLENOL) 325 mg tablet; Take 2 tablets (650 mg total) by mouth every 6 (six) hours as needed for mild pain or fever          Subjective:      Patient ID: Tiburcio Murillo is a 74 y o  male  He developed 3 days ago  He has runny nose, headache, coughing and fever and chills  He also has body aches  He has not been able to sleep because of coughing fits at night  He has been using Tylenol and drinking tea with lemon and honey to try to soothe his symptoms, but he is not getting relief  The following portions of the patient's history were reviewed and updated as appropriate: allergies, current medications, past family history, past medical history, past social history, past surgical history and problem list     Review of Systems   Constitutional: Positive for chills, fatigue and fever  HENT: Positive for congestion, rhinorrhea and sore throat  Respiratory: Positive for cough  Cardiovascular: Positive for chest pain  Gastrointestinal: Negative for abdominal pain, diarrhea and nausea     Neurological: Positive for dizziness and headaches  Objective:      /88   Pulse 92   Temp 98 1 °F (36 7 °C) (Tympanic)   Resp (!) 26   Ht 5' 4" (1 626 m)   Wt 87 5 kg (193 lb)   SpO2 95%   BMI 33 13 kg/m²          Physical Exam   Constitutional: He is oriented to person, place, and time  He appears well-developed and well-nourished  HENT:   Head: Normocephalic and atraumatic  Right Ear: External ear normal    Left Ear: External ear normal    Mouth/Throat: Oropharynx is clear and moist    Nose with mucosal edema and clear rhinorrhea  Eyes: Pupils are equal, round, and reactive to light  Conjunctival injection bilaterally   Neck: Normal range of motion  Neck supple  No thyromegaly present  Cardiovascular: Normal rate, regular rhythm and normal heart sounds  Pulmonary/Chest: Effort normal and breath sounds normal    Musculoskeletal: Normal range of motion  Lymphadenopathy:     He has no cervical adenopathy  Neurological: He is alert and oriented to person, place, and time  Skin: Skin is warm and dry  Nursing note and vitals reviewed

## 2019-02-25 ENCOUNTER — TELEPHONE (OUTPATIENT)
Dept: FAMILY MEDICINE CLINIC | Facility: CLINIC | Age: 74
End: 2019-02-25

## 2019-03-19 DIAGNOSIS — E78.5 HYPERLIPIDEMIA, UNSPECIFIED HYPERLIPIDEMIA TYPE: ICD-10-CM

## 2019-03-19 RX ORDER — ATORVASTATIN CALCIUM 10 MG/1
10 TABLET, FILM COATED ORAL DAILY
Qty: 90 TABLET | Refills: 3 | Status: SHIPPED | OUTPATIENT
Start: 2019-03-19 | End: 2020-06-16 | Stop reason: SDUPTHER

## 2019-03-27 ENCOUNTER — OFFICE VISIT (OUTPATIENT)
Dept: FAMILY MEDICINE CLINIC | Facility: CLINIC | Age: 74
End: 2019-03-27
Payer: COMMERCIAL

## 2019-03-27 VITALS
HEART RATE: 98 BPM | HEIGHT: 64 IN | OXYGEN SATURATION: 97 % | RESPIRATION RATE: 18 BRPM | BODY MASS INDEX: 32.78 KG/M2 | WEIGHT: 192 LBS | DIASTOLIC BLOOD PRESSURE: 90 MMHG | SYSTOLIC BLOOD PRESSURE: 144 MMHG | TEMPERATURE: 98 F

## 2019-03-27 DIAGNOSIS — M19.072 PRIMARY LOCALIZED OSTEOARTHROSIS OF LEFT ANKLE AND FOOT: Primary | ICD-10-CM

## 2019-03-27 DIAGNOSIS — F41.8 DEPRESSION WITH ANXIETY: ICD-10-CM

## 2019-03-27 DIAGNOSIS — M48.02 CERVICAL SPINAL STENOSIS: ICD-10-CM

## 2019-03-27 PROCEDURE — 99213 OFFICE O/P EST LOW 20 MIN: CPT | Performed by: INTERNAL MEDICINE

## 2019-03-27 PROCEDURE — 1036F TOBACCO NON-USER: CPT | Performed by: INTERNAL MEDICINE

## 2019-03-27 PROCEDURE — 1160F RVW MEDS BY RX/DR IN RCRD: CPT | Performed by: INTERNAL MEDICINE

## 2019-03-27 RX ORDER — PREDNISONE 20 MG/1
20 TABLET ORAL 2 TIMES DAILY WITH MEALS
Qty: 10 TABLET | Refills: 0 | Status: SHIPPED | OUTPATIENT
Start: 2019-03-27 | End: 2019-09-09

## 2019-03-27 RX ORDER — GABAPENTIN 300 MG/1
600 CAPSULE ORAL
Qty: 180 CAPSULE | Refills: 0 | Status: SHIPPED | OUTPATIENT
Start: 2019-03-27 | End: 2019-09-09 | Stop reason: SDUPTHER

## 2019-03-27 RX ORDER — ALPRAZOLAM 0.5 MG/1
TABLET ORAL
Qty: 60 TABLET | Refills: 0 | Status: SHIPPED | OUTPATIENT
Start: 2019-03-27 | End: 2019-09-09 | Stop reason: SDUPTHER

## 2019-03-27 NOTE — PROGRESS NOTES
Assessment/Plan:       Diagnoses and all orders for this visit:    Primary localized osteoarthrosis of left ankle and foot  -     XR foot 3+ vw left; Future  -     predniSONE 20 mg tablet; Take 1 tablet (20 mg total) by mouth 2 (two) times a day with meals    Cervical spinal stenosis  -     gabapentin (NEURONTIN) 300 mg capsule; Take 2 capsules (600 mg total) by mouth daily at bedtime      Matty was seen and examined in the office today  HE has some tenderness on palpation over the achilles tendon region with discomfort on eversion of the foot  I do not see damian redness  Mild swelling noted  Previous xray imaging noted heel spurs  I am going to try a course of prednisone and see if this helps  Requested refills for medications for chronic use  Advised to try and limit the Xanax  Subjective:      Patient ID: Kleber Mcdowell is a 74 y o  male  Matty is here today for evaluation of the left foot  He reports that he was walking at the mall on Sunday  When he got home, he started noticing pain in the back of the ankle/achilles region  Denies any known injury  No previous similar events  Denies known history of gout  The following portions of the patient's history were reviewed and updated as appropriate: allergies, current medications, past family history, past medical history, past social history, past surgical history and problem list     Review of Systems   Constitutional: Negative for chills, fatigue and fever  Cardiovascular: Negative for leg swelling  Musculoskeletal: Positive for gait problem, joint swelling and neck pain  Skin: Positive for rash  Neurological: Negative for dizziness, light-headedness and headaches  Objective:      /90   Pulse 98   Temp 98 °F (36 7 °C)   Resp 18   Ht 5' 4" (1 626 m)   Wt 87 1 kg (192 lb)   SpO2 97%   BMI 32 96 kg/m²          Physical Exam   Constitutional: He is oriented to person, place, and time   He appears well-developed and well-nourished  No distress  HENT:   Head: Normocephalic and atraumatic  Eyes: Conjunctivae and EOM are normal  Right eye exhibits no discharge  Left eye exhibits no discharge  No scleral icterus  Cardiovascular: Normal rate  Pulmonary/Chest: Effort normal  No respiratory distress  Musculoskeletal: He exhibits tenderness  Left ankle: He exhibits swelling  Achilles tendon exhibits pain  Neurological: He is alert and oriented to person, place, and time  Skin: Skin is warm and dry  He is not diaphoretic  Psychiatric: He has a normal mood and affect  His speech is normal and behavior is normal  Judgment and thought content normal    Vitals reviewed

## 2019-05-07 DIAGNOSIS — F41.9 ANXIETY: ICD-10-CM

## 2019-05-07 RX ORDER — CITALOPRAM 20 MG/1
TABLET ORAL
Qty: 30 TABLET | Refills: 5 | Status: SHIPPED | OUTPATIENT
Start: 2019-05-07 | End: 2019-06-26 | Stop reason: SDUPTHER

## 2019-06-26 DIAGNOSIS — I10 HYPERTENSION, UNSPECIFIED TYPE: ICD-10-CM

## 2019-06-26 DIAGNOSIS — F41.9 ANXIETY: ICD-10-CM

## 2019-06-26 RX ORDER — AMLODIPINE BESYLATE 10 MG/1
10 TABLET ORAL DAILY
Qty: 90 TABLET | Refills: 3 | Status: SHIPPED | OUTPATIENT
Start: 2019-06-26 | End: 2020-06-16 | Stop reason: SDUPTHER

## 2019-06-26 RX ORDER — CITALOPRAM 20 MG/1
TABLET ORAL
Qty: 90 TABLET | Refills: 3 | Status: SHIPPED | OUTPATIENT
Start: 2019-06-26 | End: 2020-01-27 | Stop reason: SDUPTHER

## 2019-09-09 ENCOUNTER — OFFICE VISIT (OUTPATIENT)
Dept: FAMILY MEDICINE CLINIC | Facility: CLINIC | Age: 74
End: 2019-09-09
Payer: COMMERCIAL

## 2019-09-09 VITALS
DIASTOLIC BLOOD PRESSURE: 78 MMHG | HEIGHT: 64 IN | BODY MASS INDEX: 32.44 KG/M2 | OXYGEN SATURATION: 95 % | SYSTOLIC BLOOD PRESSURE: 122 MMHG | HEART RATE: 81 BPM | TEMPERATURE: 98.5 F | WEIGHT: 190 LBS

## 2019-09-09 DIAGNOSIS — I10 ESSENTIAL HYPERTENSION: Primary | ICD-10-CM

## 2019-09-09 DIAGNOSIS — M48.02 CERVICAL SPINAL STENOSIS: ICD-10-CM

## 2019-09-09 DIAGNOSIS — J06.9 ACUTE URI: Primary | ICD-10-CM

## 2019-09-09 DIAGNOSIS — F41.8 DEPRESSION WITH ANXIETY: ICD-10-CM

## 2019-09-09 DIAGNOSIS — I10 HYPERTENSION, UNSPECIFIED TYPE: ICD-10-CM

## 2019-09-09 PROCEDURE — 1101F PT FALLS ASSESS-DOCD LE1/YR: CPT | Performed by: NURSE PRACTITIONER

## 2019-09-09 PROCEDURE — 99213 OFFICE O/P EST LOW 20 MIN: CPT | Performed by: NURSE PRACTITIONER

## 2019-09-09 RX ORDER — PREDNISONE 20 MG/1
40 TABLET ORAL DAILY
Qty: 6 TABLET | Refills: 0 | Status: SHIPPED | OUTPATIENT
Start: 2019-09-09 | End: 2019-09-12

## 2019-09-09 RX ORDER — BENZONATATE 100 MG/1
100 CAPSULE ORAL 3 TIMES DAILY PRN
Qty: 20 CAPSULE | Refills: 0 | Status: SHIPPED | OUTPATIENT
Start: 2019-09-09 | End: 2019-10-04 | Stop reason: ALTCHOICE

## 2019-09-09 RX ORDER — METOPROLOL SUCCINATE 25 MG/1
12.5 TABLET, EXTENDED RELEASE ORAL
Qty: 45 TABLET | Refills: 0 | Status: SHIPPED | OUTPATIENT
Start: 2019-09-09 | End: 2021-11-22 | Stop reason: ALTCHOICE

## 2019-09-09 RX ORDER — METOPROLOL SUCCINATE 25 MG/1
12.5 TABLET, EXTENDED RELEASE ORAL
Qty: 90 TABLET | Refills: 0 | Status: SHIPPED | OUTPATIENT
Start: 2019-09-09 | End: 2019-09-09 | Stop reason: SDUPTHER

## 2019-09-09 RX ORDER — DOXYCYCLINE 100 MG/1
100 TABLET ORAL 2 TIMES DAILY
Qty: 14 TABLET | Refills: 0 | Status: SHIPPED | OUTPATIENT
Start: 2019-09-09 | End: 2019-09-16

## 2019-09-09 NOTE — PATIENT INSTRUCTIONS
Start cough medication, this is the Tessalon perles  One pill every 8 hours as needed for cough  Start prednisone, this is the steroid  40mg daily for 3 days  Take with food  It's better to take it earlier in the day than later as it could keep you up at night  Do not mix with NSAIDs such as aleve, ibuprofen, advil, motrin  If no improvement or any worsening symptoms over next few days then start antibiotic  Start antibiotic, this is doxycycline 100 mg twice daily for 7 days  Take antibiotic with food  If you take any vitamin supplements, try to separate dose by 4-6 hours as this may impair absorption of the antibiotic  Drink lots of water  Please call the office if you are experiencing any worsening of symptoms or no symptom improvement

## 2019-09-09 NOTE — TELEPHONE ENCOUNTER
Pt was here today for an appt, states he needs some refills  Please authorize       PMED:    Xanax: 03/27/2019 #60, 30 day

## 2019-09-09 NOTE — PROGRESS NOTES
Assessment/Plan:    Acute URI  Patient educated that this is still likely viral  Start with Start cough medication, this is the Tessalon perles  One pill every 8 hours as needed for cough  Start prednisone, this is the steroid  40mg daily for 3 days  Take with food  It's better to take it earlier in the day than later as it could keep you up at night  Do not mix with NSAIDs such as aleve, ibuprofen, advil, motrin  Advised to increase fluids  If any worsening symptoms or no improvement over next few days Start antibiotic, this is doxycycline 100 mg twice daily for 7 days  Take antibiotic with food  If you take any vitamin supplements, try to separate dose by 4-6 hours as this may impair absorption of the antibiotic  Please call the office if you are experiencing any worsening of symptoms or no symptom improvement  Diagnoses and all orders for this visit:    Acute URI  -     doxycycline (ADOXA) 100 MG tablet; Take 1 tablet (100 mg total) by mouth 2 (two) times a day for 7 days  -     predniSONE 20 mg tablet; Take 2 tablets (40 mg total) by mouth daily for 3 days  -     benzonatate (TESSALON PERLES) 100 mg capsule; Take 1 capsule (100 mg total) by mouth 3 (three) times a day as needed for cough    Hypertension, unspecified type  -     Discontinue: metoprolol succinate (TOPROL-XL) 25 mg 24 hr tablet; Take 0 5 tablets (12 5 mg total) by mouth daily at bedtime for 90 days  -     metoprolol succinate (TOPROL-XL) 25 mg 24 hr tablet; Take 0 5 tablets (12 5 mg total) by mouth daily at bedtime for 90 days      Patient verbalizes understand and agrees with treatment plan  Subjective:        Patient ID: Sole Wasserman is a 66 y o  male  Chief Complaint   Patient presents with    Cough     with ST, HA, sinus pressure, chills since Saturday  no f/n/v/d indicated  taking Tylenol and Motrin PRN       Patient presents to office today for evaluation of cold symptoms  They started on Friday    He has complaints of productive cough sore throat headache sinus pressure and chills  Patient denies any fevers  Patient denies any nausea vomiting diarrhea  He has been taking Tylenol Motrin as needed without much help  Coughing up sputum, denies seeing any blood in sputum  The following portions of the patient's history were reviewed and updated as appropriate: allergies, current medications, past family history, past social history and problem list     Review of Systems   Constitutional: Positive for chills  Negative for fever  HENT: Positive for congestion, ear pain, postnasal drip, rhinorrhea, sinus pressure, sinus pain and sore throat  Eyes: Negative for pain and visual disturbance  Respiratory: Positive for cough (productive) and wheezing  Negative for shortness of breath  Cardiovascular: Negative for chest pain, palpitations and leg swelling  Gastrointestinal: Negative for abdominal pain, diarrhea, nausea and vomiting  Genitourinary: Negative for difficulty urinating and dysuria  Musculoskeletal: Negative for arthralgias and myalgias  Skin: Negative for color change and rash  Neurological: Negative for dizziness, syncope, numbness and headaches  Hematological: Negative for adenopathy  Psychiatric/Behavioral: Negative for agitation and behavioral problems  The patient is not nervous/anxious  Objective:  /78 (BP Location: Left arm, Patient Position: Sitting, Cuff Size: Standard)   Pulse 81   Temp 98 5 °F (36 9 °C) (Temporal)   Ht 5' 4" (1 626 m)   Wt 86 2 kg (190 lb)   SpO2 95%   BMI 32 61 kg/m²      Physical Exam   Constitutional: He is oriented to person, place, and time  He appears well-developed  No distress  HENT:   Head: Normocephalic and atraumatic  Right Ear: External ear normal  No lacerations  No foreign bodies  Tympanic membrane is retracted  Tympanic membrane is not perforated and not erythematous  A middle ear effusion is present     Left Ear: External ear normal  No lacerations  No foreign bodies  Tympanic membrane is retracted  Tympanic membrane is not perforated and not erythematous  A middle ear effusion is present  Nose: Mucosal edema and rhinorrhea present  Right sinus exhibits frontal sinus tenderness  Right sinus exhibits no maxillary sinus tenderness  Left sinus exhibits frontal sinus tenderness  Left sinus exhibits no maxillary sinus tenderness  Mouth/Throat: Uvula is midline and mucous membranes are normal  Posterior oropharyngeal erythema present  No oropharyngeal exudate or posterior oropharyngeal edema  Eyes: Conjunctivae and lids are normal  Right eye exhibits no discharge  Left eye exhibits no discharge  Neck: Normal range of motion  Neck supple  No tracheal deviation present  Cardiovascular: Normal rate and regular rhythm  No murmur heard  Pulmonary/Chest: Effort normal and breath sounds normal  No respiratory distress  He has no wheezes  Abdominal: Soft  Bowel sounds are normal  He exhibits no distension  There is no tenderness  There is no guarding  Musculoskeletal: Normal range of motion  He exhibits no edema or deformity  Lymphadenopathy:     He has no cervical adenopathy  Neurological: He is alert and oriented to person, place, and time  Coordination normal    Skin: Skin is warm and dry  No rash noted  He is not diaphoretic  No erythema  Psychiatric: He has a normal mood and affect  His speech is normal and behavior is normal  Judgment and thought content normal  Cognition and memory are normal    Nursing note and vitals reviewed

## 2019-09-10 RX ORDER — GABAPENTIN 300 MG/1
600 CAPSULE ORAL
Qty: 180 CAPSULE | Refills: 3 | Status: SHIPPED | OUTPATIENT
Start: 2019-09-10 | End: 2019-10-04 | Stop reason: DRUGHIGH

## 2019-09-10 RX ORDER — LISINOPRIL AND HYDROCHLOROTHIAZIDE 20; 12.5 MG/1; MG/1
1 TABLET ORAL 2 TIMES DAILY
Qty: 180 TABLET | Refills: 3 | Status: SHIPPED | OUTPATIENT
Start: 2019-09-10 | End: 2019-10-04 | Stop reason: ALTCHOICE

## 2019-09-10 RX ORDER — ALPRAZOLAM 0.5 MG/1
TABLET ORAL
Qty: 60 TABLET | Refills: 1 | Status: SHIPPED | OUTPATIENT
Start: 2019-09-10 | End: 2020-03-16 | Stop reason: SDUPTHER

## 2019-10-04 ENCOUNTER — OFFICE VISIT (OUTPATIENT)
Dept: FAMILY MEDICINE CLINIC | Facility: CLINIC | Age: 74
End: 2019-10-04
Payer: COMMERCIAL

## 2019-10-04 VITALS
RESPIRATION RATE: 16 BRPM | OXYGEN SATURATION: 96 % | WEIGHT: 188 LBS | HEART RATE: 68 BPM | HEIGHT: 64 IN | SYSTOLIC BLOOD PRESSURE: 112 MMHG | DIASTOLIC BLOOD PRESSURE: 70 MMHG | BODY MASS INDEX: 32.1 KG/M2

## 2019-10-04 DIAGNOSIS — Z23 NEED FOR TDAP VACCINATION: ICD-10-CM

## 2019-10-04 DIAGNOSIS — N18.30 CKD (CHRONIC KIDNEY DISEASE) STAGE 3, GFR 30-59 ML/MIN (HCC): ICD-10-CM

## 2019-10-04 DIAGNOSIS — E29.1 TESTICULAR HYPOGONADISM: ICD-10-CM

## 2019-10-04 DIAGNOSIS — Z22.7 TB LUNG, LATENT: ICD-10-CM

## 2019-10-04 DIAGNOSIS — Z11.59 NEED FOR HEPATITIS C SCREENING TEST: ICD-10-CM

## 2019-10-04 DIAGNOSIS — E66.9 OBESITY, CLASS I, BMI 30-34.9: ICD-10-CM

## 2019-10-04 DIAGNOSIS — Z11.59 ENCOUNTER FOR HEPATITIS C SCREENING TEST FOR LOW RISK PATIENT: ICD-10-CM

## 2019-10-04 DIAGNOSIS — E78.2 MIXED HYPERLIPIDEMIA: ICD-10-CM

## 2019-10-04 DIAGNOSIS — I10 ESSENTIAL HYPERTENSION: ICD-10-CM

## 2019-10-04 DIAGNOSIS — J45.20 MILD INTERMITTENT ASTHMA WITHOUT COMPLICATION: ICD-10-CM

## 2019-10-04 DIAGNOSIS — F32.A MILD DEPRESSION: ICD-10-CM

## 2019-10-04 DIAGNOSIS — M79.10 MYALGIA: ICD-10-CM

## 2019-10-04 DIAGNOSIS — R73.9 HYPERGLYCEMIA: ICD-10-CM

## 2019-10-04 DIAGNOSIS — M54.17 LUMBOSACRAL RADICULITIS: ICD-10-CM

## 2019-10-04 DIAGNOSIS — Z00.00 MEDICARE ANNUAL WELLNESS VISIT, SUBSEQUENT: Primary | ICD-10-CM

## 2019-10-04 DIAGNOSIS — Z51.89 WOUND CHECK, ABSCESS: ICD-10-CM

## 2019-10-04 DIAGNOSIS — Z23 FLU VACCINE NEED: ICD-10-CM

## 2019-10-04 DIAGNOSIS — G44.229 CHRONIC TENSION-TYPE HEADACHE, NOT INTRACTABLE: ICD-10-CM

## 2019-10-04 DIAGNOSIS — G43.009 MIGRAINE WITHOUT AURA AND WITHOUT STATUS MIGRAINOSUS, NOT INTRACTABLE: ICD-10-CM

## 2019-10-04 DIAGNOSIS — K21.9 GASTROESOPHAGEAL REFLUX DISEASE WITHOUT ESOPHAGITIS: ICD-10-CM

## 2019-10-04 PROBLEM — J20.8 ACUTE BACTERIAL BRONCHITIS: Status: RESOLVED | Noted: 2018-11-05 | Resolved: 2019-10-04

## 2019-10-04 PROBLEM — L03.039 CELLULITIS AND ABSCESS OF TOE: Status: RESOLVED | Noted: 2018-07-17 | Resolved: 2019-10-04

## 2019-10-04 PROBLEM — L60.0 INGROWN NAIL: Status: RESOLVED | Noted: 2018-07-17 | Resolved: 2019-10-04

## 2019-10-04 PROBLEM — B96.89 ACUTE BACTERIAL BRONCHITIS: Status: RESOLVED | Noted: 2018-11-05 | Resolved: 2019-10-04

## 2019-10-04 PROBLEM — L02.619 CELLULITIS AND ABSCESS OF TOE: Status: RESOLVED | Noted: 2018-07-17 | Resolved: 2019-10-04

## 2019-10-04 PROBLEM — N41.0 ACUTE PROSTATITIS: Status: RESOLVED | Noted: 2017-07-31 | Resolved: 2019-10-04

## 2019-10-04 PROCEDURE — 90715 TDAP VACCINE 7 YRS/> IM: CPT | Performed by: FAMILY MEDICINE

## 2019-10-04 PROCEDURE — G0439 PPPS, SUBSEQ VISIT: HCPCS | Performed by: FAMILY MEDICINE

## 2019-10-04 PROCEDURE — 90471 IMMUNIZATION ADMIN: CPT | Performed by: FAMILY MEDICINE

## 2019-10-04 PROCEDURE — 1170F FXNL STATUS ASSESSED: CPT | Performed by: FAMILY MEDICINE

## 2019-10-04 PROCEDURE — G0008 ADMIN INFLUENZA VIRUS VAC: HCPCS | Performed by: FAMILY MEDICINE

## 2019-10-04 PROCEDURE — 90662 IIV NO PRSV INCREASED AG IM: CPT | Performed by: FAMILY MEDICINE

## 2019-10-04 PROCEDURE — 1125F AMNT PAIN NOTED PAIN PRSNT: CPT | Performed by: FAMILY MEDICINE

## 2019-10-04 PROCEDURE — 3074F SYST BP LT 130 MM HG: CPT | Performed by: FAMILY MEDICINE

## 2019-10-04 PROCEDURE — 3078F DIAST BP <80 MM HG: CPT | Performed by: FAMILY MEDICINE

## 2019-10-04 PROCEDURE — 99214 OFFICE O/P EST MOD 30 MIN: CPT | Performed by: FAMILY MEDICINE

## 2019-10-04 RX ORDER — LANOLIN ALCOHOL/MO/W.PET/CERES
1000 CREAM (GRAM) TOPICAL DAILY
COMMUNITY

## 2019-10-04 RX ORDER — GABAPENTIN 300 MG/1
300 CAPSULE ORAL 3 TIMES DAILY
Qty: 270 CAPSULE | Refills: 3 | Status: SHIPPED | OUTPATIENT
Start: 2019-10-04 | End: 2020-11-18

## 2019-10-04 RX ORDER — IBUPROFEN 200 MG
TABLET ORAL EVERY 6 HOURS PRN
COMMUNITY
End: 2021-06-02 | Stop reason: ALTCHOICE

## 2019-10-04 RX ORDER — PROPRANOLOL/HYDROCHLOROTHIAZID 40 MG-25MG
TABLET ORAL DAILY
COMMUNITY
End: 2022-01-07 | Stop reason: ALTCHOICE

## 2019-10-04 RX ORDER — GABAPENTIN 100 MG/1
100 CAPSULE ORAL 2 TIMES DAILY
COMMUNITY
End: 2019-10-04 | Stop reason: SDUPTHER

## 2019-10-04 RX ORDER — CHOLECALCIFEROL (VITAMIN D3) 125 MCG
500 CAPSULE ORAL DAILY
COMMUNITY
End: 2021-08-16 | Stop reason: ALTCHOICE

## 2019-10-04 NOTE — PATIENT INSTRUCTIONS
Acid reflux  Stable with low-dose omeprazole  Testicular hypogonadism  Check testosterone  Asthma  Currently stable  He has not used albuterol in over a year  TB lung, latent  He was treated with rifampin  He has no clinical signs concerning for recurrent tuberculosis  Essential hypertension  Blood pressure is well controlled at this time  Continue current regimen  Migraine  He does have a chronic headache syndrome  I am bumping up his gabapentin for his chronic low back pain which will hopefully help with his chronic headaches  Chronic tension-type headache, not intractable  Trial of bumping up his gabapentin  Lumbar radiculopathy  Increase gabapentin to 300 mg up to 3 times daily  Monitor closely for fatigue  CKD (chronic kidney disease) stage 3, GFR 30-59 ml/min  Check labs  Abnormal liver enzymes  Repeat CMP  Check hep C for screening purposes  Anxiety  He still takes alprazolam up to twice daily  Hyperglycemia  Check A1c    Hyperlipidemia  Check fasting lipid panel    Insomnia  Hopefully, this will improve with increasing his gabapentin  Mild depression (HCC)  Continue on citalopram at 20 mg daily  Continue routine follow-up  Check TSH with depressed mood and fatigue

## 2019-10-04 NOTE — PROGRESS NOTES
Assessment and Plan:     Problem List Items Addressed This Visit     None      Visit Diagnoses     Medicare annual wellness visit, subsequent    -  Primary    Obesity, Class I, BMI 30-34 9               Preventive health issues were discussed with patient, and age appropriate screening tests were ordered as noted in patient's After Visit Summary  Personalized health advice and appropriate referrals for health education or preventive services given if needed, as noted in patient's After Visit Summary       History of Present Illness:     Patient presents for Medicare Annual Wellness visit    Patient Care Team:  Mikie Galindo MD as PCP - General  MD Ga Yip DO Johnna Bowman, PA-C     Problem List:     Patient Active Problem List   Diagnosis    Abnormal liver enzymes    Acid reflux    Asthma    Bilateral hearing loss    BPH associated with nocturia    Cervical spinal stenosis    Chronic pain disorder    Chronic sinusitis    Depression with anxiety    Hyperglycemia    Hyperlipidemia    Hypertension    Incomplete emptying of bladder    Lumbar radiculopathy    Lumbar spinal stenosis    Low back pain    Migraine    Organic impotence    Peripheral neuropathy    Psoriasis    Rotator cuff tendinitis    Seborrheic dermatitis    TB lung, latent    Testicular hypogonadism    Thoracic degenerative disc disease    Tinnitus    Venous insufficiency    Displacement of lumbar intervertebral disc without myelopathy    Primary localized osteoarthrosis of ankle and foot    Arthralgia    Ingrown nail    Tarsal tunnel syndrome    Lumbosacral neuritis    Lumbosacral radiculitis    Lumbosacral spondylosis without myelopathy    Plantar fascial fibromatosis    Chronic tension-type headache, not intractable    Anxiety    Insomnia    CKD (chronic kidney disease) stage 3, GFR 30-59 ml/min ([facility])    Osteoarthritis of multiple joints    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      Past Medical and Surgical History:     Past Medical History:   Diagnosis Date    Anxiety and depression     Arthritis     Asthma     Back pain, chronic     Last assessed: 3/11/15    BPH (benign prostatic hyperplasia)     Cataract     Last assessed: 7/16/14    GERD (gastroesophageal reflux disease)     Hyperlipidemia     Hypertension     Neuropathy     Osteoporosis     Panic disorder     Right-sided Bell's palsy     Last assessed: 3/10/14    Seasonal allergies      Past Surgical History:   Procedure Laterality Date    CARPAL TUNNEL RELEASE Right 1999    Neuroplasty Decompression Median Nerve at Carpal Tunnel    CATARACT EXTRACTION  2015    COLONOSCOPY  02/2015    polyp, complete    EAR SURGERY      1982 and 1990, ear drum,  per [employer]    ESOPHAGOGASTRODUODENOSCOPY  02/2015    Diagnostic    FOOT SURGERY Left 1997    Toe    MASTOIDECTOMY Left     OTHER SURGICAL HISTORY      Spinal Anesthesia Epidural, x3 2004, per [employer]    POLYPECTOMY  2015    TYMPANOPLASTY Bilateral 2000    Dr Kelsey French      Family History:     Family History   Problem Relation Age of Onset    Cataracts Mother     Hyperlipidemia Mother       Social History:     Social History     Socioeconomic History    Marital status: /Civil Union     Spouse name: None    Number of children: 3    Years of education: None    Highest education level: None   Occupational History    Occupation:    Social Needs    Financial resource strain: None    Food insecurity:     Worry: None     Inability: None    Transportation needs:     Medical: None     Non-medical: None   Tobacco Use    Smoking status: Former Smoker     Types: Cigars    Smokeless tobacco: Former User    Tobacco comment: quit in 2000, former cigar and cigarette, pipe smoker, per [employer], Past history of chewing tobacco use, active, per [employer]   Substance and Sexual Activity    Alcohol use: No     Comment: former drinker    Drug use:  No  Sexual activity: None   Lifestyle    Physical activity:     Days per week: None     Minutes per session: None    Stress: None   Relationships    Social connections:     Talks on phone: None     Gets together: None     Attends Christianity service: None     Active member of club or organization: None     Attends meetings of clubs or organizations: None     Relationship status: None    Intimate partner violence:     Fear of current or ex partner: None     Emotionally abused: None     Physically abused: None     Forced sexual activity: None   Other Topics Concern    None   Social History Narrative    Functioning activity level, participates in light activities both inside and outside of the home (gardening, walking,  cycling, cooking)    High school or GED    Lives independently       Medications and Allergies:     Current Outpatient Medications   Medication Sig Dispense Refill    acetaminophen (TYLENOL) 325 mg tablet Take 2 tablets (650 mg total) by mouth every 6 (six) hours as needed for mild pain or fever (Patient taking differently: Take 325 mg by mouth every 6 (six) hours as needed for mild pain or fever ) 30 tablet 3    albuterol (PROVENTIL HFA,VENTOLIN HFA) 90 mcg/act inhaler Inhale 1 puff every 6 (six) hours as needed (Q6H PRN) 1 Inhaler 5    ALPRAZolam (XANAX) 0 5 mg tablet Take 1 p o  1 or 2 times daily p r n  Anxiety  60 tablet 1    amLODIPine (NORVASC) 10 mg tablet Take 1 tablet (10 mg total) by mouth daily 90 tablet 3    atorvastatin (LIPITOR) 10 mg tablet Take 1 tablet (10 mg total) by mouth daily 90 tablet 3    citalopram (CeleXA) 20 mg tablet Take half tablet p o  Daily for 1 week then take 1 p o  Daily   90 tablet 3    cyanocobalamin (VITAMIN B-12) 500 mcg tablet Take 500 mcg by mouth daily      gabapentin (NEURONTIN) 100 mg capsule Take 100 mg by mouth 2 (two) times a day      hydrocortisone valerate (WEST-VITO) 0 2 % ointment Reported on 3/9/2017       ibuprofen (MOTRIN) 200 mg tablet Take by mouth every 6 (six) hours as needed for mild pain      metoprolol succinate (TOPROL-XL) 25 mg 24 hr tablet Take 0 5 tablets (12 5 mg total) by mouth daily at bedtime for 90 days 45 tablet 0    omeprazole (PriLOSEC) 20 mg delayed release capsule Take 1 capsule (20 mg total) by mouth daily 30 capsule 2    Turmeric 500 MG CAPS Take by mouth daily      vitamin B-12 (CYANOCOBALAMIN) 250 MCG tablet Take 250 mcg by mouth daily       No current facility-administered medications for this visit        Allergies   Allergen Reactions    Amoxicillin Rash    Amoxicillin-Pot Clavulanate Er  [Amoxicillin-Pot Clavulanate] Hives and Rash    Codeine Rash and Shortness Of Breath    Penicillin G Rash    Albumen, Egg     Lisinopril Other (See Comments)    Other      pork    Pork-Derived Products     Prochlorperazine Other (See Comments) and Itching     rash all over the body    Compazine  [Prochlorperazine Edisylate] Rash    Iodinated Diagnostic Agents Rash    Latex Rash and Itching    Valdecoxib Itching, Rash and Headache     Category: HEADACHES;       Immunizations:     Immunization History   Administered Date(s) Administered    Hep A, adult 05/12/2009    INFLUENZA 10/01/2015, 09/20/2016    Influenza Split High Dose Preservative Free IM 11/12/2012, 10/01/2015, 09/20/2016, 10/05/2017    Influenza TIV (IM) 10/05/2010    Influenza, high dose seasonal 0 5 mL 11/27/2018    Pneumococcal Conjugate 13-Valent 07/24/2015    Pneumococcal Polysaccharide PPV23 1945    Tdap 05/12/2009      Health Maintenance:         Topic Date Due    DXA SCAN  1945    CRC Screening: Colonoscopy  02/19/2020         Topic Date Due    Pneumococcal Vaccine: 65+ Years (2 of 2 - PPSV23) 07/24/2016    INFLUENZA VACCINE  07/01/2019      Medicare Health Risk Assessment:     /70   Pulse 68   Resp 16   Ht 5' 4" (1 626 m)   Wt 85 3 kg (188 lb)   SpO2 96%   BMI 32 27 kg/m²          Health Risk Assessment:   Patient rates overall health as poor  Patient feels that their physical health rating is slightly worse  Eyesight was rated as same  Hearing was rated as slightly worse  Patient feels that their emotional and mental health rating is same  Pain experienced in the last 7 days has been a lot  Patient's pain rating has been 6/10  Feels fatigued    Depression Screening:   PHQ-2 Score: 0      Fall Risk Screening: In the past year, patient has experienced: no history of falling in past year      Home Safety:  Patient has trouble with stairs inside or outside of their home  Patient has working smoke alarms and has working carbon monoxide detector  Home safety hazards include: none  Nutrition:   Current diet is Regular  Medications:   Patient is currently taking over-the-counter supplements  OTC medications include: see medication list      Activities of Daily Living (ADLs)/Instrumental Activities of Daily Living (IADLs):   Walk and transfer into and out of bed and chair?: Yes  Dress and groom yourself?: Yes    Bathe or shower yourself?: Yes    Feed yourself?  Yes  Do your laundry/housekeeping?: Yes  Manage your money, pay your bills and track your expenses?: Yes  Make your own meals?: Yes    Do your own shopping?: Yes    Previous Hospitalizations:   Any hospitalizations or ED visits within the last 12 months?: No      Advance Care Planning:   Living will: No    Durable POA for healthcare: No    Advanced directive: No    Five wishes given: Yes    End of Life Decisions reviewed with patient: Yes      Cognitive Screening:   Provider or family/friend/caregiver concerned regarding cognition?: No    PREVENTIVE SCREENINGS      Cardiovascular Screening:    General: Screening Not Indicated and History Lipid Disorder      Diabetes Screening:     General: Screening Not Indicated      Colorectal Cancer Screening:     General: Screening Current      Prostate Cancer Screening:    General: Screening Not Indicated      Osteoporosis Screening:    General: Screening Not Indicated      Abdominal Aortic Aneurysm (AAA) Screening:    Risk factors include: tobacco use        General: Screening Current      Lung Cancer Screening:     General: Screening Not Indicated      Hepatitis C Screening:    General: Risks and Benefits Discussed    Hep C Screening Accepted: Yes        Preventive Screening Comments: He had an abdominal CT in 2017  Other Counseling Topics:   Patient presents today for Medicare wellness visit  I did give of 5 wishes as a living will  I asked him to complete this and get back to us when he has chance  He is up-to-date with pneumonia vaccines  He is due for Shingrix vaccine which I explained is available at the pharmacy  He will have a Boostrix vaccine today in light of the laceration on his left lower leg  He also have his flu shot today  Unfortunately, he continues to deal significant issues with chronic pain  He is intermittently following with pain management but not having much relief and is apparently a surgical candidate but he does not want to go through surgery if at all possible  He is up-to-date on colonoscopy screening  He no longer requires prostate cancer screening since he is above the age of 74               Morelia Delgado MD

## 2019-10-04 NOTE — ASSESSMENT & PLAN NOTE
Continue on citalopram at 20 mg daily  Continue routine follow-up  Check TSH with depressed mood and fatigue

## 2019-10-04 NOTE — PROGRESS NOTES
Assessment/Plan:       Problem List Items Addressed This Visit        Digestive    Acid reflux     Stable with low-dose omeprazole  Endocrine    Testicular hypogonadism     Check testosterone  Respiratory    Asthma     Currently stable  He has not used albuterol in over a year  Relevant Orders    Testosterone    TB lung, latent     He was treated with rifampin  He has no clinical signs concerning for recurrent tuberculosis  Relevant Orders    CBC and differential    Comprehensive metabolic panel    Lipid Panel with Direct LDL reflex       Cardiovascular and Mediastinum    Essential hypertension     Blood pressure is well controlled at this time  Continue current regimen  Migraine     He does have a chronic headache syndrome  I am bumping up his gabapentin for his chronic low back pain which will hopefully help with his chronic headaches  Relevant Medications    ibuprofen (MOTRIN) 200 mg tablet    gabapentin (NEURONTIN) 300 mg capsule       Nervous and Auditory    Lumbosacral radiculitis    Relevant Medications    gabapentin (NEURONTIN) 300 mg capsule    Chronic tension-type headache, not intractable     Trial of bumping up his gabapentin  Relevant Medications    ibuprofen (MOTRIN) 200 mg tablet    gabapentin (NEURONTIN) 300 mg capsule       Genitourinary    CKD (chronic kidney disease) stage 3, GFR 30-59 ml/min ([facility])     Check labs  Other    Mild depression ([address] )     Continue on citalopram at 20 mg daily  Continue routine follow-up  Check TSH with depressed mood and fatigue           Relevant Orders    CBC and differential    TSH, 3rd generation with Free T4 reflex    Hyperglycemia     Check A1c         Relevant Orders    Hemoglobin A1C    Hyperlipidemia     Check fasting lipid panel           Other Visit Diagnoses     Medicare annual wellness visit, subsequent    -  Primary    Obesity, Class I, BMI 30-34 9        Need for hepatitis C screening test        Relevant Orders    Hepatitis C antibody    Myalgia        Relevant Orders    Vitamin D 25 hydroxy    Encounter for hepatitis C screening test for low risk patient        Flu vaccine need        Relevant Orders    influenza vaccine, 0301-6333, high-dose, PF 0 5 mL (FLUZONE HIGH-DOSE) (Completed)    Need for Tdap vaccination        Relevant Orders    TDAP VACCINE GREATER THAN OR EQUAL TO 8YO IM (Completed)    Wound check, abscess        Relevant Orders    TDAP VACCINE GREATER THAN OR EQUAL TO 8YO IM (Completed)          BMI Counseling: Body mass index is 32 27 kg/m²  The BMI is above normal  Nutrition recommendations include reducing portion sizes and decreasing overall calorie intake  Exercise recommendations include moderate aerobic physical activity for 150 minutes/week  Subjective:      Patient ID: Lalo Mtz is a 66 y o  male  HPI   Patient presents today for follow-up for chronic health issues in addition to wellness visit  Overall, he feels he is doing relatively well  He is bothered by persistent chronic headaches but these do remained stable  He has been diagnosed in the past with intermittent migraines with tension-type headaches  He has chronic low back pain but avoids narcotic pain medication  He has been taking gabapentin with some relief  He has hyperlipidemia tolerate statin therapy without significant myalgias  Asthma remains pretty well controlled as he denies any problems with wheezing or coughing currently  He does get short of breath only with significant exertion  He has history of depression and remains on citalopram which has been helpful      The following portions of the patient's history were reviewed and updated as appropriate: allergies, current medications, past family history, past medical history, past social history, past surgical history and problem list       Current Outpatient Medications:     acetaminophen (TYLENOL) 325 mg tablet, Take 2 tablets (650 mg total) by mouth every 6 (six) hours as needed for mild pain or fever (Patient taking differently: Take 325 mg by mouth every 6 (six) hours as needed for mild pain or fever ), Disp: 30 tablet, Rfl: 3    albuterol (PROVENTIL HFA,VENTOLIN HFA) 90 mcg/act inhaler, Inhale 1 puff every 6 (six) hours as needed (Q6H PRN), Disp: 1 Inhaler, Rfl: 5    ALPRAZolam (XANAX) 0 5 mg tablet, Take 1 p o  1 or 2 times daily p r n  Anxiety  , Disp: 60 tablet, Rfl: 1    amLODIPine (NORVASC) 10 mg tablet, Take 1 tablet (10 mg total) by mouth daily, Disp: 90 tablet, Rfl: 3    atorvastatin (LIPITOR) 10 mg tablet, Take 1 tablet (10 mg total) by mouth daily, Disp: 90 tablet, Rfl: 3    citalopram (CeleXA) 20 mg tablet, Take half tablet p o  Daily for 1 week then take 1 p o  Daily  , Disp: 90 tablet, Rfl: 3    cyanocobalamin (VITAMIN B-12) 500 mcg tablet, Take 500 mcg by mouth daily, Disp: , Rfl:     gabapentin (NEURONTIN) 300 mg capsule, Take 1 capsule (300 mg total) by mouth 3 (three) times a day, Disp: 270 capsule, Rfl: 3    hydrocortisone valerate (WEST-VITO) 0 2 % ointment, Reported on 3/9/2017 , Disp: , Rfl:     ibuprofen (MOTRIN) 200 mg tablet, Take by mouth every 6 (six) hours as needed for mild pain, Disp: , Rfl:     metoprolol succinate (TOPROL-XL) 25 mg 24 hr tablet, Take 0 5 tablets (12 5 mg total) by mouth daily at bedtime for 90 days, Disp: 45 tablet, Rfl: 0    omeprazole (PriLOSEC) 20 mg delayed release capsule, Take 1 capsule (20 mg total) by mouth daily, Disp: 30 capsule, Rfl: 2    Turmeric 500 MG CAPS, Take by mouth daily, Disp: , Rfl:     vitamin B-12 (CYANOCOBALAMIN) 250 MCG tablet, Take 250 mcg by mouth daily, Disp: , Rfl:      Review of Systems   Constitutional: Positive for fatigue  Negative for appetite change, chills, fever and unexpected weight change  HENT: Negative for trouble swallowing  Eyes: Negative for visual disturbance     Respiratory: Negative for cough, chest tightness, shortness of breath and wheezing  Cardiovascular: Negative for chest pain  Gastrointestinal: Negative for abdominal distention, abdominal pain, blood in stool, constipation and diarrhea  Endocrine: Negative for polyuria  Genitourinary: Negative for difficulty urinating and flank pain  Musculoskeletal: Negative for arthralgias and myalgias  Skin: Negative for rash  Neurological: Negative for dizziness and light-headedness  Hematological: Negative for adenopathy  Does not bruise/bleed easily  Psychiatric/Behavioral: Negative for sleep disturbance  Objective:      /70   Pulse 68   Resp 16   Ht 5' 4" (1 626 m)   Wt 85 3 kg (188 lb)   SpO2 96%   BMI 32 27 kg/m²          Physical Exam   Constitutional: He is oriented to person, place, and time  He appears well-developed and well-nourished  No distress  HENT:   Head: Normocephalic  Eyes: Pupils are equal, round, and reactive to light  Right eye exhibits no discharge  Left eye exhibits no discharge  Neck: No tracheal deviation present  No thyromegaly present  Cardiovascular: Normal rate, regular rhythm and normal heart sounds  No murmur heard  Pulmonary/Chest: Effort normal  No respiratory distress  He has no wheezes  He has no rales  Abdominal: Soft  He exhibits no distension  There is no tenderness  Musculoskeletal: Normal range of motion  He exhibits no edema  Lymphadenopathy:     He has no cervical adenopathy  Neurological: He is alert and oriented to person, place, and time  No cranial nerve deficit  Skin: Skin is warm  He is not diaphoretic  No erythema  Psychiatric: He has a normal mood and affect   Judgment and thought content normal          Jamison Gama MD

## 2019-10-04 NOTE — ASSESSMENT & PLAN NOTE
He does have a chronic headache syndrome  I am bumping up his gabapentin for his chronic low back pain which will hopefully help with his chronic headaches

## 2019-10-09 LAB
25(OH)D3 SERPL-MCNC: 16 NG/ML (ref 30–100)
ALBUMIN SERPL-MCNC: 4.2 G/DL (ref 3.6–5.1)
ALBUMIN/GLOB SERPL: 1.8 (CALC) (ref 1–2.5)
ALP SERPL-CCNC: 78 U/L (ref 40–115)
ALT SERPL-CCNC: 17 U/L (ref 9–46)
AST SERPL-CCNC: 16 U/L (ref 10–35)
BASOPHILS # BLD AUTO: 37 CELLS/UL (ref 0–200)
BASOPHILS NFR BLD AUTO: 0.6 %
BILIRUB SERPL-MCNC: 1 MG/DL (ref 0.2–1.2)
BUN SERPL-MCNC: 12 MG/DL (ref 7–25)
BUN/CREAT SERPL: ABNORMAL (CALC) (ref 6–22)
CALCIUM SERPL-MCNC: 9.6 MG/DL (ref 8.6–10.3)
CHLORIDE SERPL-SCNC: 103 MMOL/L (ref 98–110)
CHOLEST SERPL-MCNC: 173 MG/DL
CHOLEST/HDLC SERPL: 4.2 (CALC)
CO2 SERPL-SCNC: 31 MMOL/L (ref 20–32)
CREAT SERPL-MCNC: 1.11 MG/DL (ref 0.7–1.18)
EOSINOPHIL # BLD AUTO: 1420 CELLS/UL (ref 15–500)
EOSINOPHIL NFR BLD AUTO: 22.9 %
ERYTHROCYTE [DISTWIDTH] IN BLOOD BY AUTOMATED COUNT: 13.3 % (ref 11–15)
GLOBULIN SER CALC-MCNC: 2.4 G/DL (CALC) (ref 1.9–3.7)
GLUCOSE SERPL-MCNC: 113 MG/DL (ref 65–99)
HBA1C MFR BLD: 6 % OF TOTAL HGB
HCT VFR BLD AUTO: 44.7 % (ref 38.5–50)
HCV AB S/CO SERPL IA: 0.01
HCV AB SERPL QL IA: NORMAL
HDLC SERPL-MCNC: 41 MG/DL
HGB BLD-MCNC: 14.8 G/DL (ref 13.2–17.1)
LDLC SERPL CALC-MCNC: 115 MG/DL (CALC)
LYMPHOCYTES # BLD AUTO: 1562 CELLS/UL (ref 850–3900)
LYMPHOCYTES NFR BLD AUTO: 25.2 %
MCH RBC QN AUTO: 28.4 PG (ref 27–33)
MCHC RBC AUTO-ENTMCNC: 33.1 G/DL (ref 32–36)
MCV RBC AUTO: 85.8 FL (ref 80–100)
MONOCYTES # BLD AUTO: 465 CELLS/UL (ref 200–950)
MONOCYTES NFR BLD AUTO: 7.5 %
NEUTROPHILS # BLD AUTO: 2716 CELLS/UL (ref 1500–7800)
NEUTROPHILS NFR BLD AUTO: 43.8 %
NONHDLC SERPL-MCNC: 132 MG/DL (CALC)
PLATELET # BLD AUTO: 220 THOUSAND/UL (ref 140–400)
PMV BLD REES-ECKER: 11 FL (ref 7.5–12.5)
POTASSIUM SERPL-SCNC: 4 MMOL/L (ref 3.5–5.3)
PROT SERPL-MCNC: 6.6 G/DL (ref 6.1–8.1)
RBC # BLD AUTO: 5.21 MILLION/UL (ref 4.2–5.8)
SL AMB EGFR AFRICAN AMERICAN: 74 ML/MIN/1.73M2
SL AMB EGFR NON AFRICAN AMERICAN: 64 ML/MIN/1.73M2
SODIUM SERPL-SCNC: 143 MMOL/L (ref 135–146)
TESTOST SERPL-MCNC: 316 NG/DL (ref 250–827)
TRIGL SERPL-MCNC: 78 MG/DL
TSH SERPL-ACNC: 1.49 MIU/L (ref 0.4–4.5)
WBC # BLD AUTO: 6.2 THOUSAND/UL (ref 3.8–10.8)

## 2019-10-23 DIAGNOSIS — E55.9 VITAMIN D DEFICIENCY: Primary | ICD-10-CM

## 2019-10-23 RX ORDER — ERGOCALCIFEROL (VITAMIN D2) 1250 MCG
50000 CAPSULE ORAL WEEKLY
Qty: 12 CAPSULE | Refills: 0 | Status: SHIPPED | OUTPATIENT
Start: 2019-10-23 | End: 2020-01-27

## 2019-10-23 NOTE — TELEPHONE ENCOUNTER
Notes recorded by Hever Romero MA on 10/11/2019 at 4:49 PM EDT  Called and l/m to callback Re: Labs to initiated Vitamin d prescription as noted  ------    Notes recorded by Mikie Galindo MD on 10/10/2019 at 4:26 PM EDT  Call patient regarding test   Labs mostly okay except vitamin-D is very low which may be contributing to some of his pain   Please initiate Drisdol 43056 units weekly times 12 doses and then make sure he stays on 2000 International Units of vitamin D3 daily

## 2019-12-06 ENCOUNTER — OFFICE VISIT (OUTPATIENT)
Dept: FAMILY MEDICINE CLINIC | Facility: CLINIC | Age: 74
End: 2019-12-06
Payer: COMMERCIAL

## 2019-12-06 VITALS
WEIGHT: 192.2 LBS | BODY MASS INDEX: 32.81 KG/M2 | DIASTOLIC BLOOD PRESSURE: 72 MMHG | TEMPERATURE: 98.5 F | HEART RATE: 80 BPM | SYSTOLIC BLOOD PRESSURE: 120 MMHG | RESPIRATION RATE: 18 BRPM | HEIGHT: 64 IN

## 2019-12-06 DIAGNOSIS — F32.A MILD DEPRESSION: Primary | ICD-10-CM

## 2019-12-06 DIAGNOSIS — I10 ESSENTIAL HYPERTENSION: ICD-10-CM

## 2019-12-06 DIAGNOSIS — M70.62 TROCHANTERIC BURSITIS OF LEFT HIP: ICD-10-CM

## 2019-12-06 DIAGNOSIS — R79.89 LOW VITAMIN D LEVEL: ICD-10-CM

## 2019-12-06 PROCEDURE — 3074F SYST BP LT 130 MM HG: CPT | Performed by: FAMILY MEDICINE

## 2019-12-06 PROCEDURE — 99213 OFFICE O/P EST LOW 20 MIN: CPT | Performed by: FAMILY MEDICINE

## 2019-12-06 PROCEDURE — 1160F RVW MEDS BY RX/DR IN RCRD: CPT | Performed by: FAMILY MEDICINE

## 2019-12-06 PROCEDURE — 3078F DIAST BP <80 MM HG: CPT | Performed by: FAMILY MEDICINE

## 2019-12-06 RX ORDER — LIDOCAINE HYDROCHLORIDE 10 MG/ML
1 INJECTION, SOLUTION INFILTRATION; PERINEURAL
Status: COMPLETED | OUTPATIENT
Start: 2019-12-06 | End: 2019-12-06

## 2019-12-06 RX ORDER — TRIAMCINOLONE ACETONIDE 40 MG/ML
80 INJECTION, SUSPENSION INTRA-ARTICULAR; INTRAMUSCULAR
Status: COMPLETED | OUTPATIENT
Start: 2019-12-06 | End: 2019-12-06

## 2019-12-06 RX ADMIN — LIDOCAINE HYDROCHLORIDE 1 ML: 10 INJECTION, SOLUTION INFILTRATION; PERINEURAL at 13:20

## 2019-12-06 RX ADMIN — TRIAMCINOLONE ACETONIDE 80 MG: 40 INJECTION, SUSPENSION INTRA-ARTICULAR; INTRAMUSCULAR at 13:20

## 2019-12-06 NOTE — PROGRESS NOTES
Assessment/Plan:       Problem List Items Addressed This Visit        Cardiovascular and Mediastinum    Essential hypertension       The patient notes that he had not been taking lisinopril hydrochlorothiazide  He has been taking amlodipine 10 mg daily as well as metoprolol 25 mg daily  He will continue with amlodipine and metoprolol for now  He has a follow-up visit with us in 1 month and will check blood pressure readings off of lisinopril/hydrochlorothiazide and we can decide at that time if he requires lisinopril/hydrochlorothiazide  Musculoskeletal and Integument    Trochanteric bursitis of left hip       This was injected today without incident  Other    Mild depression ([facility] [address] ) - Primary      Currently stable  Continue to monitor  Low vitamin D level      Vitamin-D was  16 on lab work in October 2019  Placed on Drisdol for 12 doses  Subjective:      Patient ID: Frederic Solano is a 66 y o  male  HPI the patient presents today with chief complaint of significant pain in his left lateral hip  This has been present for 2-3 weeks  I had given him an injection in the past in his trochanteric bursa any had significant relief  He would like that again today if possible  He has some confusion regarding his blood pressure medications  He notes he has not been taking his lisinopril hydrochlorothiazide for several months and blood pressure has remained excellent  He was told recent that he should be on this and took 1 dose yesterday  He denies any current problems with lightheadedness, headache, chest pain or shortness of breath  He has history of some mild depressed mood but notes his mood has been relatively stable  Large joint arthrocentesis  Date/Time: 12/6/2019 1:20 PM  Consent given by: patient  Supporting Documentation  Indications: pain   Procedure Details  Needle size: 22 G  Ultrasound guidance: no  Approach: lateral  Medications administered: 80 mg triamcinolone acetonide 40 mg/mL; 1 mL lidocaine 1 %              The following portions of the patient's history were reviewed and updated as appropriate: allergies, current medications, past family history, past medical history, past social history, past surgical history and problem list       Current Outpatient Medications:     acetaminophen (TYLENOL) 325 mg tablet, Take 2 tablets (650 mg total) by mouth every 6 (six) hours as needed for mild pain or fever (Patient taking differently: Take 325 mg by mouth every 6 (six) hours as needed for mild pain or fever ), Disp: 30 tablet, Rfl: 3    ALPRAZolam (XANAX) 0 5 mg tablet, Take 1 p o  1 or 2 times daily p r n  Anxiety  , Disp: 60 tablet, Rfl: 1    amLODIPine (NORVASC) 10 mg tablet, Take 1 tablet (10 mg total) by mouth daily, Disp: 90 tablet, Rfl: 3    atorvastatin (LIPITOR) 10 mg tablet, Take 1 tablet (10 mg total) by mouth daily, Disp: 90 tablet, Rfl: 3    citalopram (CeleXA) 20 mg tablet, Take half tablet p o  Daily for 1 week then take 1 p o  Daily   (Patient taking differently: 20 mg daily ), Disp: 90 tablet, Rfl: 3    cyanocobalamin (VITAMIN B-12) 500 mcg tablet, Take 500 mcg by mouth daily, Disp: , Rfl:     ergocalciferol (ERGOCALCIFEROL) 94844 units capsule, Take 1 capsule (50,000 Units total) by mouth once a week, Disp: 12 capsule, Rfl: 0    gabapentin (NEURONTIN) 300 mg capsule, Take 1 capsule (300 mg total) by mouth 3 (three) times a day, Disp: 270 capsule, Rfl: 3    hydrocortisone valerate (WEST-VITO) 0 2 % ointment, Reported on 3/9/2017 , Disp: , Rfl:     ibuprofen (MOTRIN) 200 mg tablet, Take by mouth every 6 (six) hours as needed for mild pain, Disp: , Rfl:     metoprolol succinate (TOPROL-XL) 25 mg 24 hr tablet, Take 0 5 tablets (12 5 mg total) by mouth daily at bedtime for 90 days, Disp: 45 tablet, Rfl: 0    omeprazole (PriLOSEC) 20 mg delayed release capsule, Take 1 capsule (20 mg total) by mouth daily, Disp: 30 capsule, Rfl: 2   Turmeric 500 MG CAPS, Take by mouth daily, Disp: , Rfl:     vitamin B-12 (CYANOCOBALAMIN) 250 MCG tablet, Take 250 mcg by mouth daily, Disp: , Rfl:     albuterol (PROVENTIL HFA,VENTOLIN HFA) 90 mcg/act inhaler, Inhale 1 puff every 6 (six) hours as needed (Q6H PRN) (Patient not taking: Reported on 12/6/2019), Disp: 1 Inhaler, Rfl: 5     Review of Systems      Objective:      /72   Pulse 80   Temp 98 5 °F (36 9 °C)   Resp 18   Ht 5' 4" (1 626 m)   Wt 87 2 kg (192 lb 3 2 oz)   BMI 32 99 kg/m²          Physical Exam   Constitutional: He is oriented to person, place, and time  He appears well-developed and well-nourished  No distress  HENT:   Head: Normocephalic  Eyes: Pupils are equal, round, and reactive to light  Right eye exhibits no discharge  Left eye exhibits no discharge  Neck: No tracheal deviation present  No thyromegaly present  Cardiovascular: Normal rate, regular rhythm and normal heart sounds  No murmur heard  Pulmonary/Chest: Effort normal  No respiratory distress  He has no wheezes  He has no rales  Abdominal: Soft  He exhibits no distension  There is no tenderness  Musculoskeletal: Normal range of motion  He exhibits tenderness (  He has significant tenderness over the left trochanteric bursa  )  He exhibits no edema  Lymphadenopathy:     He has no cervical adenopathy  Neurological: He is alert and oriented to person, place, and time  No cranial nerve deficit  Skin: Skin is warm  He is not diaphoretic  No erythema  Psychiatric: He has a normal mood and affect   Judgment and thought content normal          Wanda Jones MD

## 2019-12-06 NOTE — ASSESSMENT & PLAN NOTE
Currently stable  Continue to monitor 
Stable on recent labs 
The patient notes that he had not been taking lisinopril hydrochlorothiazide  He has been taking amlodipine 10 mg daily as well as metoprolol 25 mg daily  He will continue with amlodipine and metoprolol for now  He has a follow-up visit with us in 1 month and will check blood pressure readings off of lisinopril/hydrochlorothiazide and we can decide at that time if he requires lisinopril/hydrochlorothiazide 
This was injected today without incident 
Vitamin-D was  16 on lab work in October 2019  Placed on Drisdol for 12 doses 
Abdomen soft, non-tender, no guarding.

## 2020-01-10 ENCOUNTER — TELEPHONE (OUTPATIENT)
Dept: FAMILY MEDICINE CLINIC | Facility: CLINIC | Age: 75
End: 2020-01-10

## 2020-01-10 DIAGNOSIS — Z20.828 EXPOSURE TO THE FLU: Primary | ICD-10-CM

## 2020-01-10 RX ORDER — OSELTAMIVIR PHOSPHATE 75 MG/1
75 CAPSULE ORAL DAILY
Qty: 10 CAPSULE | Refills: 0 | Status: SHIPPED | OUTPATIENT
Start: 2020-01-10 | End: 2020-01-20

## 2020-01-10 NOTE — TELEPHONE ENCOUNTER
Pt called today both of his grandchildren were diagnosed with influenza B and he was around them and is starting to get sick and is asking to get Tamiflu sent to his local pharmacy asap

## 2020-01-27 ENCOUNTER — OFFICE VISIT (OUTPATIENT)
Dept: FAMILY MEDICINE CLINIC | Facility: CLINIC | Age: 75
End: 2020-01-27
Payer: COMMERCIAL

## 2020-01-27 VITALS
WEIGHT: 194 LBS | DIASTOLIC BLOOD PRESSURE: 76 MMHG | HEART RATE: 84 BPM | BODY MASS INDEX: 33.12 KG/M2 | HEIGHT: 64 IN | OXYGEN SATURATION: 95 % | TEMPERATURE: 98 F | SYSTOLIC BLOOD PRESSURE: 128 MMHG

## 2020-01-27 DIAGNOSIS — Z23 NEED FOR PNEUMOCOCCAL VACCINE: Primary | ICD-10-CM

## 2020-01-27 DIAGNOSIS — Z22.7 TB LUNG, LATENT: ICD-10-CM

## 2020-01-27 DIAGNOSIS — G44.229 CHRONIC TENSION-TYPE HEADACHE, NOT INTRACTABLE: ICD-10-CM

## 2020-01-27 DIAGNOSIS — F32.A MILD DEPRESSION: ICD-10-CM

## 2020-01-27 DIAGNOSIS — N18.30 CKD (CHRONIC KIDNEY DISEASE) STAGE 3, GFR 30-59 ML/MIN (HCC): ICD-10-CM

## 2020-01-27 DIAGNOSIS — L40.9 PSORIASIS: ICD-10-CM

## 2020-01-27 DIAGNOSIS — F41.9 ANXIETY: ICD-10-CM

## 2020-01-27 DIAGNOSIS — R07.89 CHEST WALL PAIN: ICD-10-CM

## 2020-01-27 DIAGNOSIS — R73.9 HYPERGLYCEMIA: ICD-10-CM

## 2020-01-27 DIAGNOSIS — E78.2 MIXED HYPERLIPIDEMIA: ICD-10-CM

## 2020-01-27 DIAGNOSIS — E29.1 TESTICULAR HYPOGONADISM: ICD-10-CM

## 2020-01-27 PROBLEM — H53.8 BLURRING OF VISUAL IMAGE OF BOTH EYES: Status: RESOLVED | Noted: 2018-08-16 | Resolved: 2020-01-27

## 2020-01-27 PROBLEM — Z20.828 EXPOSURE TO THE FLU: Status: RESOLVED | Noted: 2020-01-10 | Resolved: 2020-01-27

## 2020-01-27 PROCEDURE — G0009 ADMIN PNEUMOCOCCAL VACCINE: HCPCS

## 2020-01-27 PROCEDURE — 1036F TOBACCO NON-USER: CPT | Performed by: FAMILY MEDICINE

## 2020-01-27 PROCEDURE — 1160F RVW MEDS BY RX/DR IN RCRD: CPT | Performed by: FAMILY MEDICINE

## 2020-01-27 PROCEDURE — 99214 OFFICE O/P EST MOD 30 MIN: CPT | Performed by: FAMILY MEDICINE

## 2020-01-27 PROCEDURE — 90732 PPSV23 VACC 2 YRS+ SUBQ/IM: CPT

## 2020-01-27 PROCEDURE — 4040F PNEUMOC VAC/ADMIN/RCVD: CPT

## 2020-01-27 RX ORDER — LISINOPRIL AND HYDROCHLOROTHIAZIDE 20; 12.5 MG/1; MG/1
0.5 TABLET ORAL
COMMUNITY
End: 2021-08-16 | Stop reason: SDUPTHER

## 2020-01-27 RX ORDER — HYDROCORTISONE VALERATE 2 MG/G
OINTMENT TOPICAL DAILY
Qty: 45 G | Refills: 3 | Status: SHIPPED | OUTPATIENT
Start: 2020-01-27 | End: 2020-04-29 | Stop reason: SDUPTHER

## 2020-01-27 RX ORDER — CITALOPRAM 20 MG/1
20 TABLET ORAL DAILY
Qty: 90 TABLET | Refills: 3
Start: 2020-01-27 | End: 2020-06-16 | Stop reason: SDUPTHER

## 2020-01-27 NOTE — ASSESSMENT & PLAN NOTE
He is status post isoniazid treatment  Lungs are clear today    Check chest x-ray in light of chest wall pain

## 2020-01-27 NOTE — ASSESSMENT & PLAN NOTE
Last lab work showed significant improvement of his renal function  Continue to monitor closely  He did have some acute renal failure while on isoniazid  He takes a rare ibuprofen still so we should continue to monitor renal function closely

## 2020-01-27 NOTE — PROGRESS NOTES
Assessment/Plan:       Problem List Items Addressed This Visit        Endocrine    Testicular hypogonadism       Stable on last labs  Relevant Orders    CBC and differential    TSH, 3rd generation with Free T4 reflex       Respiratory    TB lung, latent       He is status post isoniazid treatment  Lungs are clear today  Check chest x-ray in light of chest wall pain         Relevant Medications    Fluticasone Furoate 50 MCG/ACT AEPB    Other Relevant Orders    XR ribs right w pa chest min 3 views       Nervous and Auditory    Chronic tension-type headache, not intractable - Primary       Currently stable  Relevant Medications    citalopram (CeleXA) 20 mg tablet       Musculoskeletal and Integument    Psoriasis    Relevant Medications    Liniments (ANALGESIC BALM EX)    hydrocortisone valerate (WEST-VITO) 0 2 % ointment       Genitourinary    CKD (chronic kidney disease) stage 3, GFR 30-59 ml/min ([facility])       Last lab work showed significant improvement of his renal function  Continue to monitor closely  He did have some acute renal failure while on isoniazid  He takes a rare ibuprofen still so we should continue to monitor renal function closely  Relevant Orders    Comprehensive metabolic panel       Other    Mild depression (HCC)      Stable with citalopram          Relevant Medications    citalopram (CeleXA) 20 mg tablet    Hyperglycemia    Relevant Orders    Hemoglobin A1C    Hyperlipidemia    Relevant Orders    Comprehensive metabolic panel    Anxiety    Relevant Medications    citalopram (CeleXA) 20 mg tablet    Other Relevant Orders    CBC and differential    TSH, 3rd generation with Free T4 reflex      Other Visit Diagnoses     Chest wall pain         check x-ray in light of previous history of tuberculosis  I believe he probably has costochondritis  Relevant Orders    XR ribs right w pa chest min 3 views            Subjective:      Patient ID: Michaelle Villa is a 66 y o  male     HPI patient presents today for follow-up for chronic health issues  He does continue with some chronic fatigue  He notes his headaches have been quite stable recently  His depression seems to be quite stable he denies excessive depressed mood  He denies severe anxiety  He has been spending a lot of time with his grandchildren, which she has found very satisfying  He has a history of hypertension and denies chest pain, shortness of breath, palpitations or lightheadedness  He notes some occasional minimal swelling around his ankles  He denies orthopnea  He has history of hyperglycemia and denies polyuria or polydipsia  He has hyperlipidemia and tolerates statin therapy without myalgias  He has chronic low back pain  He notes this is present but stable  He does remain on gabapentin  The following portions of the patient's history were reviewed and updated as appropriate: allergies, current medications, past family history, past medical history, past social history, past surgical history and problem list       Current Outpatient Medications:     acetaminophen (TYLENOL) 325 mg tablet, Take 2 tablets (650 mg total) by mouth every 6 (six) hours as needed for mild pain or fever (Patient taking differently: Take 325 mg by mouth every 6 (six) hours as needed for mild pain or fever ), Disp: 30 tablet, Rfl: 3    albuterol (PROVENTIL HFA,VENTOLIN HFA) 90 mcg/act inhaler, Inhale 1 puff every 6 (six) hours as needed (Q6H PRN), Disp: 1 Inhaler, Rfl: 5    ALPRAZolam (XANAX) 0 5 mg tablet, Take 1 p o  1 or 2 times daily p r n  Anxiety  , Disp: 60 tablet, Rfl: 1    amLODIPine (NORVASC) 10 mg tablet, Take 1 tablet (10 mg total) by mouth daily, Disp: 90 tablet, Rfl: 3    atorvastatin (LIPITOR) 10 mg tablet, Take 1 tablet (10 mg total) by mouth daily, Disp: 90 tablet, Rfl: 3    citalopram (CeleXA) 20 mg tablet, Take 1 tablet (20 mg total) by mouth daily, Disp: 90 tablet, Rfl: 3    cyanocobalamin (VITAMIN B-12) 500 mcg tablet, Take 500 mcg by mouth daily, Disp: , Rfl:     Fluticasone Furoate 50 MCG/ACT AEPB, Inhale 2 sprays daily, Disp: , Rfl:     gabapentin (NEURONTIN) 300 mg capsule, Take 1 capsule (300 mg total) by mouth 3 (three) times a day (Patient taking differently: Take 300 mg by mouth 2 (two) times a day ), Disp: 270 capsule, Rfl: 3    hydrocortisone valerate (WEST-VITO) 0 2 % ointment, Apply topically daily, Disp: 45 g, Rfl: 3    ibuprofen (MOTRIN) 200 mg tablet, Take by mouth every 6 (six) hours as needed for mild pain, Disp: , Rfl:     Liniments (ANALGESIC BALM EX), Apply topically, Disp: , Rfl:     lisinopril-hydrochlorothiazide (PRINZIDE,ZESTORETIC) 20-12 5 MG per tablet, Take 0 5 tablets by mouth daily at bedtime, Disp: , Rfl:     metoprolol succinate (TOPROL-XL) 25 mg 24 hr tablet, Take 0 5 tablets (12 5 mg total) by mouth daily at bedtime for 90 days, Disp: 45 tablet, Rfl: 0    omeprazole (PriLOSEC) 20 mg delayed release capsule, Take 1 capsule (20 mg total) by mouth daily, Disp: 30 capsule, Rfl: 2    Turmeric 500 MG CAPS, Take by mouth daily, Disp: , Rfl:     vitamin B-12 (CYANOCOBALAMIN) 250 MCG tablet, Take 250 mcg by mouth daily, Disp: , Rfl:     VITAMIN D, ERGOCALCIFEROL, PO, Take 2,000 Units by mouth, Disp: , Rfl:      Review of Systems   Constitutional: Negative for appetite change, chills, fatigue, fever and unexpected weight change  HENT: Negative for trouble swallowing  Eyes: Negative for visual disturbance  Respiratory: Negative for cough, chest tightness, shortness of breath and wheezing  Cardiovascular: Positive for chest pain  Negative for palpitations and leg swelling  Gastrointestinal: Negative for abdominal distention, abdominal pain, blood in stool, constipation and diarrhea  Endocrine: Negative for polyuria  Genitourinary: Negative for difficulty urinating and flank pain  Musculoskeletal: Negative for arthralgias and myalgias     Skin: Negative for rash    Neurological: Negative for dizziness and light-headedness  Hematological: Negative for adenopathy  Does not bruise/bleed easily  Psychiatric/Behavioral: Negative for sleep disturbance  Objective:      /76 (BP Location: Left arm, Patient Position: Sitting, Cuff Size: Standard)   Pulse 84   Temp 98 °F (36 7 °C) (Tympanic)   Ht 5' 4" (1 626 m)   Wt 88 kg (194 lb)   SpO2 95%   BMI 33 30 kg/m²          Physical Exam   Constitutional: He is oriented to person, place, and time  He appears well-developed and well-nourished  No distress  HENT:   Head: Normocephalic  Eyes: Pupils are equal, round, and reactive to light  Right eye exhibits no discharge  Left eye exhibits no discharge  Neck: No tracheal deviation present  No thyromegaly present  Cardiovascular: Normal rate, regular rhythm and normal heart sounds  No murmur heard  Pulmonary/Chest: Effort normal  No respiratory distress  He has no wheezes  He has no rales  Abdominal: Soft  He exhibits no distension  There is no tenderness  Musculoskeletal: Normal range of motion  He exhibits tenderness (  Significant pain to palpation over the right lower and midline ribcage  )  He exhibits no edema  Lymphadenopathy:     He has no cervical adenopathy  Neurological: He is alert and oriented to person, place, and time  No cranial nerve deficit  Skin: Skin is warm  He is not diaphoretic  No erythema  Psychiatric: He has a normal mood and affect   Judgment and thought content normal          Colonel Louie MD

## 2020-01-30 LAB
ALBUMIN SERPL-MCNC: 4.5 G/DL (ref 3.6–5.1)
ALBUMIN/GLOB SERPL: 2 (CALC) (ref 1–2.5)
ALP SERPL-CCNC: 66 U/L (ref 40–115)
ALT SERPL-CCNC: 18 U/L (ref 9–46)
AST SERPL-CCNC: 16 U/L (ref 10–35)
BASOPHILS # BLD AUTO: 63 CELLS/UL (ref 0–200)
BASOPHILS NFR BLD AUTO: 0.8 %
BILIRUB SERPL-MCNC: 1.7 MG/DL (ref 0.2–1.2)
BUN SERPL-MCNC: 14 MG/DL (ref 7–25)
BUN/CREAT SERPL: ABNORMAL (CALC) (ref 6–22)
CALCIUM SERPL-MCNC: 9.8 MG/DL (ref 8.6–10.3)
CHLORIDE SERPL-SCNC: 102 MMOL/L (ref 98–110)
CO2 SERPL-SCNC: 32 MMOL/L (ref 20–32)
CREAT SERPL-MCNC: 1.15 MG/DL (ref 0.7–1.18)
EOSINOPHIL # BLD AUTO: 893 CELLS/UL (ref 15–500)
EOSINOPHIL NFR BLD AUTO: 11.3 %
ERYTHROCYTE [DISTWIDTH] IN BLOOD BY AUTOMATED COUNT: 13.3 % (ref 11–15)
GLOBULIN SER CALC-MCNC: 2.3 G/DL (CALC) (ref 1.9–3.7)
GLUCOSE SERPL-MCNC: 104 MG/DL (ref 65–99)
HBA1C MFR BLD: 6.1 % OF TOTAL HGB
HCT VFR BLD AUTO: 46.3 % (ref 38.5–50)
HGB BLD-MCNC: 15.6 G/DL (ref 13.2–17.1)
LYMPHOCYTES # BLD AUTO: 1904 CELLS/UL (ref 850–3900)
LYMPHOCYTES NFR BLD AUTO: 24.1 %
MCH RBC QN AUTO: 28.7 PG (ref 27–33)
MCHC RBC AUTO-ENTMCNC: 33.7 G/DL (ref 32–36)
MCV RBC AUTO: 85.3 FL (ref 80–100)
MONOCYTES # BLD AUTO: 806 CELLS/UL (ref 200–950)
MONOCYTES NFR BLD AUTO: 10.2 %
NEUTROPHILS # BLD AUTO: 4234 CELLS/UL (ref 1500–7800)
NEUTROPHILS NFR BLD AUTO: 53.6 %
PLATELET # BLD AUTO: 209 THOUSAND/UL (ref 140–400)
PMV BLD REES-ECKER: 10.7 FL (ref 7.5–12.5)
POTASSIUM SERPL-SCNC: 4.9 MMOL/L (ref 3.5–5.3)
PROT SERPL-MCNC: 6.8 G/DL (ref 6.1–8.1)
RBC # BLD AUTO: 5.43 MILLION/UL (ref 4.2–5.8)
SL AMB EGFR AFRICAN AMERICAN: 71 ML/MIN/1.73M2
SL AMB EGFR NON AFRICAN AMERICAN: 61 ML/MIN/1.73M2
SODIUM SERPL-SCNC: 141 MMOL/L (ref 135–146)
TSH SERPL-ACNC: 1.41 MIU/L (ref 0.4–4.5)
WBC # BLD AUTO: 7.9 THOUSAND/UL (ref 3.8–10.8)

## 2020-02-05 ENCOUNTER — TRANSCRIBE ORDERS (OUTPATIENT)
Dept: ADMINISTRATIVE | Facility: HOSPITAL | Age: 75
End: 2020-02-05

## 2020-02-05 ENCOUNTER — APPOINTMENT (OUTPATIENT)
Dept: RADIOLOGY | Facility: MEDICAL CENTER | Age: 75
End: 2020-02-05
Payer: COMMERCIAL

## 2020-02-05 DIAGNOSIS — R07.89 CHEST WALL PAIN: ICD-10-CM

## 2020-02-05 DIAGNOSIS — Z22.7 TB LUNG, LATENT: ICD-10-CM

## 2020-02-05 PROCEDURE — 71101 X-RAY EXAM UNILAT RIBS/CHEST: CPT

## 2020-03-16 DIAGNOSIS — F41.8 DEPRESSION WITH ANXIETY: ICD-10-CM

## 2020-03-18 DIAGNOSIS — F41.8 DEPRESSION WITH ANXIETY: ICD-10-CM

## 2020-03-18 RX ORDER — ALPRAZOLAM 0.5 MG/1
TABLET ORAL
Qty: 60 TABLET | Refills: 0 | Status: SHIPPED | OUTPATIENT
Start: 2020-03-18 | End: 2020-05-18 | Stop reason: SDUPTHER

## 2020-03-18 RX ORDER — ALPRAZOLAM 0.5 MG/1
TABLET ORAL
Qty: 60 TABLET | Refills: 0 | OUTPATIENT
Start: 2020-03-18

## 2020-04-24 ENCOUNTER — TELEPHONE (OUTPATIENT)
Dept: FAMILY MEDICINE CLINIC | Facility: CLINIC | Age: 75
End: 2020-04-24

## 2020-04-24 DIAGNOSIS — L40.9 PSORIASIS: ICD-10-CM

## 2020-04-29 RX ORDER — HYDROCORTISONE VALERATE 2 MG/G
OINTMENT TOPICAL DAILY
Qty: 45 G | Refills: 1 | Status: SHIPPED | OUTPATIENT
Start: 2020-04-29 | End: 2021-07-20

## 2020-05-18 DIAGNOSIS — F41.8 DEPRESSION WITH ANXIETY: ICD-10-CM

## 2020-05-19 RX ORDER — ALPRAZOLAM 0.5 MG/1
TABLET ORAL
Qty: 60 TABLET | Refills: 0 | Status: SHIPPED | OUTPATIENT
Start: 2020-05-19 | End: 2020-06-23 | Stop reason: SDUPTHER

## 2020-06-16 DIAGNOSIS — E78.5 HYPERLIPIDEMIA, UNSPECIFIED HYPERLIPIDEMIA TYPE: ICD-10-CM

## 2020-06-16 DIAGNOSIS — F41.9 ANXIETY: ICD-10-CM

## 2020-06-16 DIAGNOSIS — I10 HYPERTENSION, UNSPECIFIED TYPE: ICD-10-CM

## 2020-06-16 RX ORDER — CITALOPRAM 20 MG/1
20 TABLET ORAL DAILY
Qty: 90 TABLET | Refills: 3 | Status: SHIPPED | OUTPATIENT
Start: 2020-06-16 | End: 2021-08-10

## 2020-06-16 RX ORDER — ATORVASTATIN CALCIUM 10 MG/1
10 TABLET, FILM COATED ORAL DAILY
Qty: 90 TABLET | Refills: 3 | Status: SHIPPED | OUTPATIENT
Start: 2020-06-16 | End: 2020-11-30 | Stop reason: SDUPTHER

## 2020-06-16 RX ORDER — AMLODIPINE BESYLATE 10 MG/1
10 TABLET ORAL DAILY
Qty: 90 TABLET | Refills: 3 | Status: SHIPPED | OUTPATIENT
Start: 2020-06-16 | End: 2021-08-16 | Stop reason: SDUPTHER

## 2020-06-22 PROBLEM — N52.9 ORGANIC IMPOTENCE: Status: RESOLVED | Noted: 2017-10-02 | Resolved: 2020-06-22

## 2020-06-22 PROBLEM — M54.17 LUMBOSACRAL NEURITIS: Status: RESOLVED | Noted: 2018-07-17 | Resolved: 2020-06-22

## 2020-06-22 PROBLEM — M51.26 DISPLACEMENT OF LUMBAR INTERVERTEBRAL DISC WITHOUT MYELOPATHY: Status: RESOLVED | Noted: 2018-07-17 | Resolved: 2020-06-22

## 2020-06-22 PROBLEM — M25.50 ARTHRALGIA: Status: RESOLVED | Noted: 2018-07-17 | Resolved: 2020-06-22

## 2020-06-23 ENCOUNTER — OFFICE VISIT (OUTPATIENT)
Dept: FAMILY MEDICINE CLINIC | Facility: CLINIC | Age: 75
End: 2020-06-23
Payer: COMMERCIAL

## 2020-06-23 VITALS
OXYGEN SATURATION: 99 % | HEART RATE: 81 BPM | HEIGHT: 64 IN | BODY MASS INDEX: 32.27 KG/M2 | SYSTOLIC BLOOD PRESSURE: 130 MMHG | WEIGHT: 189 LBS | TEMPERATURE: 98.2 F | DIASTOLIC BLOOD PRESSURE: 70 MMHG

## 2020-06-23 DIAGNOSIS — H93.13 TINNITUS OF BOTH EARS: ICD-10-CM

## 2020-06-23 DIAGNOSIS — I10 ESSENTIAL HYPERTENSION: ICD-10-CM

## 2020-06-23 DIAGNOSIS — F41.9 ANXIETY: ICD-10-CM

## 2020-06-23 DIAGNOSIS — H72.91 PERFORATION OF RIGHT TYMPANIC MEMBRANE: ICD-10-CM

## 2020-06-23 DIAGNOSIS — R42 VERTIGO: Primary | ICD-10-CM

## 2020-06-23 DIAGNOSIS — H91.93 BILATERAL HEARING LOSS, UNSPECIFIED HEARING LOSS TYPE: ICD-10-CM

## 2020-06-23 DIAGNOSIS — E55.9 VITAMIN D DEFICIENCY: ICD-10-CM

## 2020-06-23 PROCEDURE — 1036F TOBACCO NON-USER: CPT | Performed by: FAMILY MEDICINE

## 2020-06-23 PROCEDURE — 4040F PNEUMOC VAC/ADMIN/RCVD: CPT | Performed by: FAMILY MEDICINE

## 2020-06-23 PROCEDURE — 3075F SYST BP GE 130 - 139MM HG: CPT | Performed by: FAMILY MEDICINE

## 2020-06-23 PROCEDURE — 1160F RVW MEDS BY RX/DR IN RCRD: CPT | Performed by: FAMILY MEDICINE

## 2020-06-23 PROCEDURE — 3008F BODY MASS INDEX DOCD: CPT | Performed by: FAMILY MEDICINE

## 2020-06-23 PROCEDURE — 3078F DIAST BP <80 MM HG: CPT | Performed by: FAMILY MEDICINE

## 2020-06-23 PROCEDURE — 99214 OFFICE O/P EST MOD 30 MIN: CPT | Performed by: FAMILY MEDICINE

## 2020-06-23 RX ORDER — ALPRAZOLAM 0.5 MG/1
TABLET ORAL
Qty: 60 TABLET | Refills: 2 | Status: SHIPPED | OUTPATIENT
Start: 2020-06-23 | End: 2020-07-29 | Stop reason: SDUPTHER

## 2020-06-23 RX ORDER — ERGOCALCIFEROL (VITAMIN D2) 1250 MCG
50000 CAPSULE ORAL WEEKLY
Qty: 6 CAPSULE | Refills: 0 | Status: SHIPPED | OUTPATIENT
Start: 2020-06-23 | End: 2020-11-30 | Stop reason: ALTCHOICE

## 2020-07-09 ENCOUNTER — OFFICE VISIT (OUTPATIENT)
Dept: FAMILY MEDICINE CLINIC | Facility: CLINIC | Age: 75
End: 2020-07-09
Payer: COMMERCIAL

## 2020-07-09 VITALS
OXYGEN SATURATION: 97 % | BODY MASS INDEX: 32.44 KG/M2 | TEMPERATURE: 98.2 F | WEIGHT: 189 LBS | HEART RATE: 70 BPM | SYSTOLIC BLOOD PRESSURE: 130 MMHG | DIASTOLIC BLOOD PRESSURE: 72 MMHG

## 2020-07-09 DIAGNOSIS — R73.9 HYPERGLYCEMIA: ICD-10-CM

## 2020-07-09 DIAGNOSIS — K59.00 CONSTIPATION, UNSPECIFIED CONSTIPATION TYPE: ICD-10-CM

## 2020-07-09 DIAGNOSIS — M15.9 OSTEOARTHRITIS OF MULTIPLE JOINTS, UNSPECIFIED OSTEOARTHRITIS TYPE: ICD-10-CM

## 2020-07-09 DIAGNOSIS — Z22.7 TB LUNG, LATENT: ICD-10-CM

## 2020-07-09 DIAGNOSIS — M54.16 LUMBAR RADICULOPATHY: ICD-10-CM

## 2020-07-09 DIAGNOSIS — M79.7 FIBROMYALGIA: ICD-10-CM

## 2020-07-09 DIAGNOSIS — K63.5 POLYP OF COLON, UNSPECIFIED PART OF COLON, UNSPECIFIED TYPE: ICD-10-CM

## 2020-07-09 DIAGNOSIS — L40.9 PSORIASIS: ICD-10-CM

## 2020-07-09 DIAGNOSIS — M19.90 INFLAMMATORY ARTHROPATHY: Primary | ICD-10-CM

## 2020-07-09 PROBLEM — R79.89 LOW VITAMIN D LEVEL: Status: RESOLVED | Noted: 2019-12-06 | Resolved: 2020-07-09

## 2020-07-09 PROBLEM — M54.17 LUMBOSACRAL RADICULITIS: Status: RESOLVED | Noted: 2018-07-17 | Resolved: 2020-07-09

## 2020-07-09 PROCEDURE — 1160F RVW MEDS BY RX/DR IN RCRD: CPT | Performed by: FAMILY MEDICINE

## 2020-07-09 PROCEDURE — 3075F SYST BP GE 130 - 139MM HG: CPT | Performed by: FAMILY MEDICINE

## 2020-07-09 PROCEDURE — 1036F TOBACCO NON-USER: CPT | Performed by: FAMILY MEDICINE

## 2020-07-09 PROCEDURE — 4040F PNEUMOC VAC/ADMIN/RCVD: CPT | Performed by: FAMILY MEDICINE

## 2020-07-09 PROCEDURE — 3078F DIAST BP <80 MM HG: CPT | Performed by: FAMILY MEDICINE

## 2020-07-09 PROCEDURE — 99214 OFFICE O/P EST MOD 30 MIN: CPT | Performed by: FAMILY MEDICINE

## 2020-07-09 RX ORDER — PREDNISONE 10 MG/1
TABLET ORAL
Qty: 30 TABLET | Refills: 0 | Status: SHIPPED | OUTPATIENT
Start: 2020-07-09 | End: 2020-11-09 | Stop reason: ALTCHOICE

## 2020-07-09 NOTE — PATIENT INSTRUCTIONS
His dizziness is much improved, he does have appointment with ENT this morning to discuss perforated TM/tinnitus  Has history inflammatory arthritis with negative rheumatoid factor, history psoriasis, history osteoarthritis, history lumbar radiculitis  Had seen Rheumatology in the past, also had used Humira via Dermatology in the past    Referred back to [facility]  Can use course of Prednisone  Stay w Gabapentin  Try to avoid Ibuprofen if can  History latent TB with treatment Rifampin  Hx hyperglycemia, A1c back in January 6 1    He is using an additional 6 weeks high dose vitamin-D  He did have a colonoscopy in 2015 with Dr Dawson Rodriguez, he does plan to redo colonoscopy, has some recent abdominal bloating with constipation, should use fiber in diet, can use docusate/Colace 100 mg daily, make sure to keep hydrated  Last blood work in January showed CBC, CMP, TSH to be unremarkable  He does have a November visit with Dr Jose Branch

## 2020-07-09 NOTE — PROGRESS NOTES
FAMILY PRACTICE OFFICE VISIT  [facility]  [address]  [address], [address]      NAME: Virgilio Hopkins  AGE: 66 y o  SEX: male  : 1945   MRN: [id number]    DATE: 2020  TIME: 8:30 AM    Assessment and Plan     Problem List Items Addressed This Visit        Respiratory    TB lung, latent       Nervous and Auditory    Lumbar radiculopathy       Musculoskeletal and Integument    Psoriasis    Relevant Medications    predniSONE 10 mg tablet    Osteoarthritis of multiple joints    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 -  does have Psoriasis - Primary    Relevant Medications    predniSONE 10 mg tablet    Other Relevant Orders    Ambulatory referral to Rheumatology       Other    Fibromyalgia    Hyperglycemia      Other Visit Diagnoses     Constipation, unspecified constipation type        Relevant Orders    Ambulatory referral to Gastroenterology    Polyp of colon, unspecified part of colon, unspecified type        Relevant Orders    Ambulatory referral to Gastroenterology          Patient Instructions   His dizziness is much improved, he does have appointment with ENT this morning to discuss perforated TM/tinnitus  Has history inflammatory arthritis with negative rheumatoid factor, history psoriasis, history osteoarthritis, history lumbar radiculitis  Had seen Rheumatology in the past, also had used Humira via Dermatology in the past    Referred back to [facility]  Can use course of Prednisone  Stay w Gabapentin  Try to avoid Ibuprofen if can  History latent TB with treatment Rifampin  Hx hyperglycemia, A1c back in     He is using an additional 6 weeks high dose vitamin-D  He did have a colonoscopy in  with Dr Finesse Herrera, he does plan to redo colonoscopy, has some recent abdominal bloating with constipation, should use fiber in diet, can use docusate/Colace 100 mg daily, make sure to keep hydrated  Last blood work in January showed CBC, CMP, TSH to be unremarkable  He does have a November visit with Dr Wally Ann  Chief Complaint     Chief Complaint   Patient presents with    Pain     Left wrist pain (10), right wrist pain, bilateral foot pain , left hip pain  for 2 weeks    Abdominal Pain     starts in groin area go to abdominal pain for 1 week    Bloated     X 1 week       History of Present Illness   Matty Angeles is a 66y o -year-old male who is in to discuss his pain, has bilateral wrist pain especially on the left, bilateral ankle pain especially on the left, left-sided low back/hip pain  Was very active years ago, ex-marine, played soccer  Does have fibromyalgia, osteoarthritis along with inflammatory arthritis, had seen Rheumatology in the past     Has some abdominal bloating with constipation, had plan to redo colonoscopy earlier this year, was delayed due to coronavirus  No blood in stool, has not been using stool softener  Pain is nonlocalized  None  current    His dizziness is much improved, I had seen him June 23rd, he does have appointment with ENT this morning to discuss perforated TM/tenderness  He did get started on his high dose vitamin-D, I did add extra 6 weeks in recently  History psoriasis, remote use Humira, minimal skin issues recently  Review of Systems   Review of Systems   Constitutional: Positive for fatigue  Negative for appetite change, fever and unexpected weight change  HENT: Negative for sore throat and trouble swallowing  Respiratory: Negative for cough, chest tightness and shortness of breath  Cardiovascular: Negative for chest pain, palpitations and leg swelling  Gastrointestinal: Positive for abdominal distention and constipation  Negative for abdominal pain, blood in stool, nausea and vomiting  No acid reflux     No change in bowel   Genitourinary: Negative for dysuria and hematuria     Musculoskeletal: Positive for arthralgias, back pain and joint swelling  Neurological: Positive for dizziness (Much improved)  Negative for syncope, light-headedness and headaches  Psychiatric/Behavioral: Negative for behavioral problems and confusion  The patient is nervous/anxious  Active Problem List     Patient Active Problem List   Diagnosis    Acid reflux    Asthma    Bilateral hearing loss    BPH associated with nocturia    Cervical spinal stenosis    Fibromyalgia    Chronic sinusitis    Mild depression ([address] )    Hyperglycemia    Hyperlipidemia    Essential hypertension    Incomplete emptying of bladder    Lumbar radiculopathy    Lumbar spinal stenosis    Migraine    Peripheral neuropathy    Psoriasis    Rotator cuff tendinitis    Seborrheic dermatitis    TB lung, latent    Testicular hypogonadism    Thoracic degenerative disc disease    Tinnitus    Venous insufficiency    Primary localized osteoarthrosis of ankle and foot    Tarsal tunnel syndrome    Lumbosacral spondylosis without myelopathy    Plantar fascial fibromatosis    Chronic tension-type headache, not intractable    Anxiety    Insomnia    CKD (chronic kidney disease) stage 3, GFR 30-59 ml/min ([facility])    Osteoarthritis of multiple joints    Trochanteric bursitis of left hip    Perforation of right tympanic membrane w remote surgery both ears   Vitamin D deficiency    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 -  does have Psoriasis       Past Medical History:  Reviewed    Past Surgical History:  Reviewed    Family History:  Reviewed    Social History:  Reviewed    Objective     Vitals:    07/09/20 0742   BP: 130/72   BP Location: Left arm   Patient Position: Sitting   Cuff Size: Large   Pulse: 70   Temp: 98 2 °F (36 8 °C)   SpO2: 97%   Weight: 85 7 kg (189 lb)     Body mass index is 32 44 kg/m²      BP Readings from Last 3 Encounters:   07/09/20 130/72   06/23/20 130/70   01/27/20 128/76       Wt Readings from Last 3 Encounters:   07/09/20 85 7 kg (189 lb)   06/23/20 85 7 kg (189 lb)   01/27/20 88 kg (194 lb)       Physical Exam   Constitutional: He is oriented to person, place, and time  Pleasant 66year-old seated on table complaining of joint pains, otherwise looks well  Eyes: No scleral icterus  Cardiovascular: Normal rate, regular rhythm and normal heart sounds  Pulmonary/Chest: Effort normal and breath sounds normal  No respiratory distress  He has no wheezes  Abdominal: Soft  He exhibits no distension  There is no tenderness  There is no guarding  Musculoskeletal: He exhibits no edema  Multiple tender spots including left sacroiliac, left hip, left greater than right wrist, left greater than right ankle  Very minimal skin findings regarding psoriasis today   Neurological: He is alert and oriented to person, place, and time  Psychiatric: He has a normal mood and affect  ALLERGIES:  Allergies   Allergen Reactions    Amoxicillin Rash    Amoxicillin-Pot Clavulanate Er  [Amoxicillin-Pot Clavulanate] Hives and Rash    Codeine Rash and Shortness Of Breath    Penicillin G Rash    Albumen, Egg     Lisinopril Other (See Comments)    Other      pork    Pork-Derived Products     Prochlorperazine Other (See Comments) and Itching     rash all over the body    Compazine  [Prochlorperazine Edisylate] Rash    Iodinated Diagnostic Agents Rash    Latex Rash and Itching    Valdecoxib Itching, Rash and Headache     Category: HEADACHES;        Current Medications     Current Outpatient Medications   Medication Sig Dispense Refill    acetaminophen (TYLENOL) 325 mg tablet Take 2 tablets (650 mg total) by mouth every 6 (six) hours as needed for mild pain or fever 30 tablet 3    albuterol (PROVENTIL HFA,VENTOLIN HFA) 90 mcg/act inhaler Inhale 1 puff every 6 (six) hours as needed (Q6H PRN) 1 Inhaler 5    ALPRAZolam (XANAX) 0 5 mg tablet Take 1 p o  1 or 2 times daily p r n  Anxiety   60 tablet 2    amLODIPine (NORVASC) 10 mg tablet Take 1 tablet (10 mg total) by mouth daily 90 tablet 3    atorvastatin (LIPITOR) 10 mg tablet Take 1 tablet (10 mg total) by mouth daily 90 tablet 3    citalopram (CeleXA) 20 mg tablet Take 1 tablet (20 mg total) by mouth daily 90 tablet 3    cyanocobalamin (VITAMIN B-12) 500 mcg tablet Take 500 mcg by mouth daily      diclofenac sodium (VOLTAREN) 1 % Apply 2 g topically 2 (two) times a day      ergocalciferol (ERGOCALCIFEROL) 1 25 MG (45522 UT) capsule Take 1 capsule (50,000 Units total) by mouth once a week for 6 doses 6 capsule 0    Fluticasone Furoate 50 MCG/ACT AEPB Inhale 2 sprays daily      gabapentin (NEURONTIN) 300 mg capsule Take 1 capsule (300 mg total) by mouth 3 (three) times a day (Patient taking differently: Take 600 mg by mouth daily ) 270 capsule 3    hydrocortisone valerate (WEST-VITO) 0 2 % ointment Apply topically daily 45 g 1    Liniments (ANALGESIC BALM EX) Apply topically      lisinopril-hydrochlorothiazide (PRINZIDE,ZESTORETIC) 20-12 5 MG per tablet Take 0 5 tablets by mouth daily at bedtime      metoprolol succinate (TOPROL-XL) 25 mg 24 hr tablet Take 0 5 tablets (12 5 mg total) by mouth daily at bedtime for 90 days 45 tablet 0    omeprazole (PriLOSEC) 20 mg delayed release capsule Take 1 capsule (20 mg total) by mouth daily 30 capsule 2    Turmeric 500 MG CAPS Take by mouth daily      vitamin B-12 (CYANOCOBALAMIN) 250 MCG tablet Take 250 mcg by mouth daily      VITAMIN D, ERGOCALCIFEROL, PO Take 2,000 Units by mouth      ibuprofen (MOTRIN) 200 mg tablet Take by mouth every 6 (six) hours as needed for mild pain      predniSONE 10 mg tablet Use 40mg x 3 days, 30 mg x 3 days, 20 mg x 3 days, 10 mg x 3 days then stop 30 tablet 0     No current facility-administered medications for this visit               Orders Placed This Encounter   Procedures    Ambulatory referral to Rheumatology    Ambulatory referral to Gastroenterology         Emily Pandya DO

## 2020-07-29 DIAGNOSIS — F41.9 ANXIETY: ICD-10-CM

## 2020-07-29 RX ORDER — ALPRAZOLAM 0.5 MG/1
TABLET ORAL
Qty: 60 TABLET | Refills: 2 | Status: SHIPPED | OUTPATIENT
Start: 2020-07-29 | End: 2021-02-24

## 2020-08-05 ENCOUNTER — TELEPHONE (OUTPATIENT)
Dept: FAMILY MEDICINE CLINIC | Facility: CLINIC | Age: 75
End: 2020-08-05

## 2020-08-05 NOTE — TELEPHONE ENCOUNTER
Patient call today regarding his blood results ordered by orthopedic Dr Aaliyah Mulligan on 7/24/20  Patient have questions about some elevated CBC results  I call Orthopedic office to fax blood test results and informed that patient have questions regarding this results    I also encourage patient to call Dr Aaliyah Mulligan office to disclose test results  Once we will receive and revue results , we will call patient back if necessary

## 2020-10-07 ENCOUNTER — CLINICAL SUPPORT (OUTPATIENT)
Dept: FAMILY MEDICINE CLINIC | Facility: CLINIC | Age: 75
End: 2020-10-07
Payer: COMMERCIAL

## 2020-10-07 VITALS — TEMPERATURE: 98.2 F

## 2020-10-07 DIAGNOSIS — Z23 NEED FOR INFLUENZA VACCINATION: Primary | ICD-10-CM

## 2020-10-07 PROCEDURE — G0008 ADMIN INFLUENZA VIRUS VAC: HCPCS

## 2020-10-07 PROCEDURE — 90662 IIV NO PRSV INCREASED AG IM: CPT

## 2020-11-09 ENCOUNTER — OFFICE VISIT (OUTPATIENT)
Dept: FAMILY MEDICINE CLINIC | Facility: CLINIC | Age: 75
End: 2020-11-09
Payer: COMMERCIAL

## 2020-11-09 VITALS
BODY MASS INDEX: 33.46 KG/M2 | RESPIRATION RATE: 18 BRPM | HEART RATE: 96 BPM | WEIGHT: 196 LBS | TEMPERATURE: 98 F | OXYGEN SATURATION: 95 % | SYSTOLIC BLOOD PRESSURE: 130 MMHG | DIASTOLIC BLOOD PRESSURE: 76 MMHG | HEIGHT: 64 IN

## 2020-11-09 DIAGNOSIS — R05.9 COUGH: ICD-10-CM

## 2020-11-09 DIAGNOSIS — Z11.59 ENCOUNTER FOR SCREENING FOR OTHER VIRAL DISEASES: ICD-10-CM

## 2020-11-09 DIAGNOSIS — R73.9 HYPERGLYCEMIA: ICD-10-CM

## 2020-11-09 DIAGNOSIS — N18.31 STAGE 3A CHRONIC KIDNEY DISEASE (HCC): ICD-10-CM

## 2020-11-09 DIAGNOSIS — K21.9 GASTROESOPHAGEAL REFLUX DISEASE WITHOUT ESOPHAGITIS: ICD-10-CM

## 2020-11-09 DIAGNOSIS — E78.2 MIXED HYPERLIPIDEMIA: ICD-10-CM

## 2020-11-09 DIAGNOSIS — R35.1 BPH ASSOCIATED WITH NOCTURIA: ICD-10-CM

## 2020-11-09 DIAGNOSIS — I10 ESSENTIAL HYPERTENSION: ICD-10-CM

## 2020-11-09 DIAGNOSIS — M79.7 FIBROMYALGIA: ICD-10-CM

## 2020-11-09 DIAGNOSIS — M19.90 INFLAMMATORY ARTHROPATHY: Primary | ICD-10-CM

## 2020-11-09 DIAGNOSIS — H69.81 DYSFUNCTION OF RIGHT EUSTACHIAN TUBE: ICD-10-CM

## 2020-11-09 DIAGNOSIS — E29.1 TESTICULAR HYPOGONADISM: ICD-10-CM

## 2020-11-09 DIAGNOSIS — E55.9 VITAMIN D DEFICIENCY: ICD-10-CM

## 2020-11-09 DIAGNOSIS — G44.229 CHRONIC TENSION-TYPE HEADACHE, NOT INTRACTABLE: ICD-10-CM

## 2020-11-09 DIAGNOSIS — M48.02 CERVICAL SPINAL STENOSIS: ICD-10-CM

## 2020-11-09 DIAGNOSIS — Z12.5 PROSTATE CANCER SCREENING: ICD-10-CM

## 2020-11-09 DIAGNOSIS — N40.1 BPH ASSOCIATED WITH NOCTURIA: ICD-10-CM

## 2020-11-09 PROCEDURE — T1015 CLINIC SERVICE: HCPCS | Performed by: FAMILY MEDICINE

## 2020-11-09 PROCEDURE — U0003 INFECTIOUS AGENT DETECTION BY NUCLEIC ACID (DNA OR RNA); SEVERE ACUTE RESPIRATORY SYNDROME CORONAVIRUS 2 (SARS-COV-2) (CORONAVIRUS DISEASE [COVID-19]), AMPLIFIED PROBE TECHNIQUE, MAKING USE OF HIGH THROUGHPUT TECHNOLOGIES AS DESCRIBED BY CMS-2020-01-R: HCPCS | Performed by: FAMILY MEDICINE

## 2020-11-09 PROCEDURE — 1036F TOBACCO NON-USER: CPT | Performed by: FAMILY MEDICINE

## 2020-11-09 PROCEDURE — 1160F RVW MEDS BY RX/DR IN RCRD: CPT | Performed by: FAMILY MEDICINE

## 2020-11-09 PROCEDURE — 99214 OFFICE O/P EST MOD 30 MIN: CPT | Performed by: FAMILY MEDICINE

## 2020-11-09 RX ORDER — MELOXICAM 15 MG/1
15 TABLET ORAL DAILY
COMMUNITY
Start: 2020-08-26 | End: 2020-11-30 | Stop reason: ALTCHOICE

## 2020-11-09 RX ORDER — METHYLPREDNISOLONE 4 MG/1
TABLET ORAL
Qty: 21 TABLET | Refills: 0 | Status: SHIPPED | OUTPATIENT
Start: 2020-11-09 | End: 2020-11-30 | Stop reason: ALTCHOICE

## 2020-11-10 LAB — SARS-COV-2 RNA SPEC QL NAA+PROBE: NOT DETECTED

## 2020-11-13 LAB
25(OH)D3 SERPL-MCNC: 33 NG/ML (ref 30–100)
ALBUMIN SERPL-MCNC: 4.4 G/DL (ref 3.6–5.1)
ALBUMIN/GLOB SERPL: 1.6 (CALC) (ref 1–2.5)
ALP SERPL-CCNC: 47 U/L (ref 35–144)
ALT SERPL-CCNC: 25 U/L (ref 9–46)
AST SERPL-CCNC: 18 U/L (ref 10–35)
BILIRUB SERPL-MCNC: 1.1 MG/DL (ref 0.2–1.2)
BUN SERPL-MCNC: 18 MG/DL (ref 7–25)
BUN/CREAT SERPL: ABNORMAL (CALC) (ref 6–22)
CALCIUM SERPL-MCNC: 10.2 MG/DL (ref 8.6–10.3)
CHLORIDE SERPL-SCNC: 97 MMOL/L (ref 98–110)
CHOLEST SERPL-MCNC: 217 MG/DL
CHOLEST/HDLC SERPL: 3.7 (CALC)
CO2 SERPL-SCNC: 36 MMOL/L (ref 20–32)
CREAT SERPL-MCNC: 1.02 MG/DL (ref 0.7–1.18)
GLOBULIN SER CALC-MCNC: 2.7 G/DL (CALC) (ref 1.9–3.7)
GLUCOSE SERPL-MCNC: 94 MG/DL (ref 65–99)
HBA1C MFR BLD: 6 % OF TOTAL HGB
HDLC SERPL-MCNC: 58 MG/DL
LDLC SERPL CALC-MCNC: 139 MG/DL (CALC)
NONHDLC SERPL-MCNC: 159 MG/DL (CALC)
POTASSIUM SERPL-SCNC: 4 MMOL/L (ref 3.5–5.3)
PROT SERPL-MCNC: 7.1 G/DL (ref 6.1–8.1)
PSA SERPL-MCNC: 1 NG/ML
SL AMB EGFR AFRICAN AMERICAN: 82 ML/MIN/1.73M2
SL AMB EGFR NON AFRICAN AMERICAN: 71 ML/MIN/1.73M2
SODIUM SERPL-SCNC: 141 MMOL/L (ref 135–146)
TRIGL SERPL-MCNC: 97 MG/DL

## 2020-11-16 ENCOUNTER — APPOINTMENT (OUTPATIENT)
Dept: RADIOLOGY | Facility: MEDICAL CENTER | Age: 75
End: 2020-11-16
Payer: COMMERCIAL

## 2020-11-16 DIAGNOSIS — R05.9 COUGH: ICD-10-CM

## 2020-11-16 PROCEDURE — 71046 X-RAY EXAM CHEST 2 VIEWS: CPT

## 2020-11-18 DIAGNOSIS — M54.17 LUMBOSACRAL RADICULITIS: ICD-10-CM

## 2020-11-18 RX ORDER — GABAPENTIN 300 MG/1
600 CAPSULE ORAL DAILY
Qty: 270 CAPSULE | Refills: 3 | Status: SHIPPED | OUTPATIENT
Start: 2020-11-18 | End: 2021-08-16 | Stop reason: SDUPTHER

## 2020-11-30 ENCOUNTER — OFFICE VISIT (OUTPATIENT)
Dept: FAMILY MEDICINE CLINIC | Facility: CLINIC | Age: 75
End: 2020-11-30
Payer: COMMERCIAL

## 2020-11-30 VITALS
HEIGHT: 64 IN | SYSTOLIC BLOOD PRESSURE: 122 MMHG | OXYGEN SATURATION: 95 % | BODY MASS INDEX: 33.46 KG/M2 | DIASTOLIC BLOOD PRESSURE: 70 MMHG | HEART RATE: 82 BPM | WEIGHT: 196 LBS | TEMPERATURE: 97.6 F

## 2020-11-30 DIAGNOSIS — R73.9 HYPERGLYCEMIA: ICD-10-CM

## 2020-11-30 DIAGNOSIS — R05.3 CHRONIC COUGH: ICD-10-CM

## 2020-11-30 DIAGNOSIS — J45.909 ASTHMA, UNSPECIFIED ASTHMA SEVERITY, UNSPECIFIED WHETHER COMPLICATED, UNSPECIFIED WHETHER PERSISTENT: ICD-10-CM

## 2020-11-30 DIAGNOSIS — F32.A MILD DEPRESSION: ICD-10-CM

## 2020-11-30 DIAGNOSIS — I10 ESSENTIAL HYPERTENSION: Primary | ICD-10-CM

## 2020-11-30 DIAGNOSIS — M94.0 COSTOCHONDRITIS: ICD-10-CM

## 2020-11-30 DIAGNOSIS — E78.5 HYPERLIPIDEMIA, UNSPECIFIED HYPERLIPIDEMIA TYPE: ICD-10-CM

## 2020-11-30 DIAGNOSIS — M54.16 LUMBAR RADICULOPATHY: ICD-10-CM

## 2020-11-30 PROCEDURE — 99215 OFFICE O/P EST HI 40 MIN: CPT | Performed by: FAMILY MEDICINE

## 2020-11-30 RX ORDER — ATORVASTATIN CALCIUM 20 MG/1
20 TABLET, FILM COATED ORAL DAILY
Qty: 90 TABLET | Refills: 3 | Status: SHIPPED | OUTPATIENT
Start: 2020-11-30 | End: 2021-08-16 | Stop reason: SDUPTHER

## 2020-11-30 RX ORDER — ALBUTEROL SULFATE 90 UG/1
1 AEROSOL, METERED RESPIRATORY (INHALATION) EVERY 6 HOURS PRN
Qty: 1 INHALER | Refills: 5 | Status: SHIPPED | OUTPATIENT
Start: 2020-11-30 | End: 2021-12-20 | Stop reason: SDUPTHER

## 2020-12-02 ENCOUNTER — TELEPHONE (OUTPATIENT)
Dept: FAMILY MEDICINE CLINIC | Facility: CLINIC | Age: 75
End: 2020-12-02

## 2020-12-09 ENCOUNTER — TELEPHONE (OUTPATIENT)
Dept: FAMILY MEDICINE CLINIC | Facility: CLINIC | Age: 75
End: 2020-12-09

## 2020-12-09 NOTE — TELEPHONE ENCOUNTER
PT CALLED ABOUT HIS INHALER THAT DR Dwayne Vance PERSCRIBED THAT HE CANNOT AFFORD  EXPLAINED TO HIM THAT   [address] TO CALL HIS INSURANCE COMPANY AND FIND OUT THE NAMES OF INHALERS THAT THEY WOULD COVER AND LET US KNOW SO THE   CAN SEND IN A NEW RX FOR HIM

## 2021-02-24 DIAGNOSIS — F41.9 ANXIETY: ICD-10-CM

## 2021-02-24 RX ORDER — ALPRAZOLAM 0.5 MG/1
TABLET ORAL
Qty: 60 TABLET | Refills: 0 | Status: SHIPPED | OUTPATIENT
Start: 2021-02-24 | End: 2021-10-13

## 2021-03-04 ENCOUNTER — APPOINTMENT (OUTPATIENT)
Dept: RADIOLOGY | Facility: MEDICAL CENTER | Age: 76
End: 2021-03-04
Payer: COMMERCIAL

## 2021-03-04 ENCOUNTER — TRANSCRIBE ORDERS (OUTPATIENT)
Dept: ADMINISTRATIVE | Facility: HOSPITAL | Age: 76
End: 2021-03-04

## 2021-03-04 DIAGNOSIS — L40.0 PSORIASIS VULGARIS: ICD-10-CM

## 2021-03-04 DIAGNOSIS — L40.0 PSORIASIS VULGARIS: Primary | ICD-10-CM

## 2021-03-04 PROCEDURE — 71046 X-RAY EXAM CHEST 2 VIEWS: CPT

## 2021-04-13 ENCOUNTER — OFFICE VISIT (OUTPATIENT)
Dept: FAMILY MEDICINE CLINIC | Facility: CLINIC | Age: 76
End: 2021-04-13
Payer: COMMERCIAL

## 2021-04-13 VITALS
BODY MASS INDEX: 33.8 KG/M2 | OXYGEN SATURATION: 95 % | SYSTOLIC BLOOD PRESSURE: 130 MMHG | HEART RATE: 77 BPM | DIASTOLIC BLOOD PRESSURE: 70 MMHG | WEIGHT: 198 LBS | TEMPERATURE: 97.9 F | HEIGHT: 64 IN

## 2021-04-13 DIAGNOSIS — E78.2 MIXED HYPERLIPIDEMIA: ICD-10-CM

## 2021-04-13 DIAGNOSIS — R73.9 HYPERGLYCEMIA: ICD-10-CM

## 2021-04-13 DIAGNOSIS — M19.90 INFLAMMATORY ARTHROPATHY: ICD-10-CM

## 2021-04-13 DIAGNOSIS — G43.009 MIGRAINE WITHOUT AURA AND WITHOUT STATUS MIGRAINOSUS, NOT INTRACTABLE: ICD-10-CM

## 2021-04-13 DIAGNOSIS — Z00.00 MEDICARE ANNUAL WELLNESS VISIT, SUBSEQUENT: Primary | ICD-10-CM

## 2021-04-13 DIAGNOSIS — M79.7 FIBROMYALGIA: ICD-10-CM

## 2021-04-13 DIAGNOSIS — I10 ESSENTIAL HYPERTENSION: ICD-10-CM

## 2021-04-13 DIAGNOSIS — G47.00 INSOMNIA, UNSPECIFIED TYPE: ICD-10-CM

## 2021-04-13 DIAGNOSIS — D17.1 LIPOMA OF TORSO: ICD-10-CM

## 2021-04-13 DIAGNOSIS — K21.9 GASTROESOPHAGEAL REFLUX DISEASE WITHOUT ESOPHAGITIS: ICD-10-CM

## 2021-04-13 DIAGNOSIS — F32.A MILD DEPRESSION: ICD-10-CM

## 2021-04-13 DIAGNOSIS — N18.31 STAGE 3A CHRONIC KIDNEY DISEASE (HCC): ICD-10-CM

## 2021-04-13 DIAGNOSIS — G62.9 PERIPHERAL POLYNEUROPATHY: ICD-10-CM

## 2021-04-13 DIAGNOSIS — K21.9 GASTROESOPHAGEAL REFLUX DISEASE: ICD-10-CM

## 2021-04-13 PROBLEM — G44.229 CHRONIC TENSION-TYPE HEADACHE, NOT INTRACTABLE: Status: RESOLVED | Noted: 2018-07-17 | Resolved: 2021-04-13

## 2021-04-13 PROBLEM — M47.817 LUMBOSACRAL SPONDYLOSIS WITHOUT MYELOPATHY: Status: RESOLVED | Noted: 2018-07-17 | Resolved: 2021-04-13

## 2021-04-13 PROBLEM — M70.62 TROCHANTERIC BURSITIS OF LEFT HIP: Status: RESOLVED | Noted: 2019-12-06 | Resolved: 2021-04-13

## 2021-04-13 PROCEDURE — 1170F FXNL STATUS ASSESSED: CPT | Performed by: FAMILY MEDICINE

## 2021-04-13 PROCEDURE — G0439 PPPS, SUBSEQ VISIT: HCPCS | Performed by: FAMILY MEDICINE

## 2021-04-13 PROCEDURE — 1036F TOBACCO NON-USER: CPT | Performed by: FAMILY MEDICINE

## 2021-04-13 PROCEDURE — 1125F AMNT PAIN NOTED PAIN PRSNT: CPT | Performed by: FAMILY MEDICINE

## 2021-04-13 PROCEDURE — 1160F RVW MEDS BY RX/DR IN RCRD: CPT | Performed by: FAMILY MEDICINE

## 2021-04-13 PROCEDURE — 3078F DIAST BP <80 MM HG: CPT | Performed by: FAMILY MEDICINE

## 2021-04-13 PROCEDURE — 3288F FALL RISK ASSESSMENT DOCD: CPT | Performed by: FAMILY MEDICINE

## 2021-04-13 PROCEDURE — 3075F SYST BP GE 130 - 139MM HG: CPT | Performed by: FAMILY MEDICINE

## 2021-04-13 PROCEDURE — 99214 OFFICE O/P EST MOD 30 MIN: CPT | Performed by: FAMILY MEDICINE

## 2021-04-13 PROCEDURE — 3725F SCREEN DEPRESSION PERFORMED: CPT | Performed by: FAMILY MEDICINE

## 2021-04-13 RX ORDER — ADALIMUMAB 40MG/0.4ML
KIT SUBCUTANEOUS
COMMUNITY
Start: 2021-03-30

## 2021-04-13 RX ORDER — OMEPRAZOLE 20 MG/1
20 CAPSULE, DELAYED RELEASE ORAL DAILY PRN
Qty: 90 CAPSULE | Refills: 1 | Status: SHIPPED | OUTPATIENT
Start: 2021-04-13

## 2021-04-13 NOTE — PROGRESS NOTES
Assessment and Plan:     Problem List Items Addressed This Visit        Digestive    Acid reflux    Relevant Medications    omeprazole (PriLOSEC) 20 mg delayed release capsule       Cardiovascular and Mediastinum    Essential hypertension    Migraine    Relevant Medications    Humira Pen 40 MG/0 4ML PNKT       Nervous and Auditory    Peripheral neuropathy       Musculoskeletal and Integument    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 -  does have Psoriasis       Genitourinary    CKD (chronic kidney disease) stage 3, GFR 30-59 ml/min       Other    Fibromyalgia    Mild depression (HCC)    Hyperglycemia    Hyperlipidemia    Insomnia    Lipoma of torso/right lateral flank      Other Visit Diagnoses     Medicare annual wellness visit, subsequent    -  Primary    Gastroesophageal reflux disease        Relevant Medications    omeprazole (PriLOSEC) 20 mg delayed release capsule           Preventive health issues were discussed with patient, and age appropriate screening tests were ordered as noted in patient's After Visit Summary  Personalized health advice and appropriate referrals for health education or preventive services given if needed, as noted in patient's After Visit Summary      See other note today regarding provider information       History of Present Illness:     Patient presents for Medicare Annual Wellness visit    Patient Care Team:  Daphne Winters MD as PCP - General  Daphne Winters MD as PCP - PCP-[facility]  MD Rosalie Paulino DO Malcolm Sarks, PA-C     Problem List:     Patient Active Problem List   Diagnosis    Acid reflux    Asthma    Bilateral hearing loss    BPH associated with nocturia    Cervical spinal stenosis    Fibromyalgia    Chronic sinusitis    Mild depression ([address] )    Hyperglycemia    Hyperlipidemia    Essential hypertension    Incomplete emptying of bladder    Lumbar radiculopathy    Lumbar spinal stenosis    Migraine    Peripheral neuropathy    Psoriasis    Rotator cuff tendinitis    Seborrheic dermatitis    TB lung, latent    Testicular hypogonadism    Thoracic degenerative disc disease    Tinnitus    Venous insufficiency    Primary localized osteoarthrosis of ankle and foot    Tarsal tunnel syndrome    Plantar fascial fibromatosis    Anxiety    Insomnia    CKD (chronic kidney disease) stage 3, GFR 30-59 ml/min    Osteoarthritis of multiple joints    Perforation of right tympanic membrane w remote surgery both ears      Vitamin D deficiency    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 -  does have Psoriasis    Costochondritis    Chronic cough    Lipoma of torso/right lateral flank      Past Medical and Surgical History:     Past Medical History:   Diagnosis Date    Anxiety and depression     Arthritis     Asthma     Back pain, chronic     Last assessed: 3/11/15    BPH (benign prostatic hyperplasia)     Cataract     Last assessed: 7/16/14    GERD (gastroesophageal reflux disease)     Hyperlipidemia     Hypertension     Neuropathy     Osteoporosis     Panic disorder     Right-sided Bell's palsy     Last assessed: 3/10/14    Seasonal allergies      Past Surgical History:   Procedure Laterality Date    CARPAL TUNNEL RELEASE Right 1999    Neuroplasty Decompression Median Nerve at Carpal Tunnel    CATARACT EXTRACTION  2015    COLONOSCOPY  02/2015    polyp, complete    EAR SURGERY      1982 and 1990, ear drum,  per [employer]    ESOPHAGOGASTRODUODENOSCOPY  02/2015    Diagnostic    FOOT SURGERY Left 1997    Toe    MASTOIDECTOMY Left     OTHER SURGICAL HISTORY      Spinal Anesthesia Epidural, x3 2004, per [employer]    POLYPECTOMY  2015    TYMPANOPLASTY Bilateral 2000    Dr Michelle Prakash      Family History:     Family History   Problem Relation Age of Onset    Cataracts Mother     Hyperlipidemia Mother       Social History:        Social History     Socioeconomic History    Marital status: /Civil [employer]     Spouse name: None    Number of children: 3    Years of education: None    Highest education level: None   Occupational History    Occupation:    Social Needs    Financial resource strain: None    Food insecurity     Worry: None     Inability: None    Transportation needs     Medical: None     Non-medical: None   Tobacco Use    Smoking status: Former Smoker     Types: Cigars     Quit date:      Years since quittin 2    Smokeless tobacco: Former User    Tobacco comment: quit in , former cigar and cigarette, pipe smoker, per [employer]script, Past history of chewing tobacco use, active, per [employer]   Substance and Sexual Activity    Alcohol use: No     Comment: former drinker    Drug use: No    Sexual activity: Not Currently   Lifestyle    Physical activity     Days per week: None     Minutes per session: None    Stress: None   Relationships    Social connections     Talks on phone: None     Gets together: None     Attends Islam service: None     Active member of club or organization: None     Attends meetings of clubs or organizations: None     Relationship status: None    Intimate partner violence     Fear of current or ex partner: None     Emotionally abused: None     Physically abused: None     Forced sexual activity: None   Other Topics Concern    None   Social History Narrative    Functioning activity level, participates in light activities both inside and outside of the home (gardening, walking,  cycling, cooking)    High school or GED    Lives independently      Medications and Allergies:     Current Outpatient Medications   Medication Sig Dispense Refill    acetaminophen (TYLENOL) 325 mg tablet Take 2 tablets (650 mg total) by mouth every 6 (six) hours as needed for mild pain or fever 30 tablet 3    ALPRAZolam (XANAX) 0 5 mg tablet TAKE 1 TABLET BY MOUTH 1 OR 2 TIMES DAILY AS NEEDED FOR ANXIETY 60 tablet 0    amLODIPine (NORVASC) 10 mg tablet Take 1 tablet (10 mg total) by mouth daily 90 tablet 3    atorvastatin (LIPITOR) 20 mg tablet Take 1 tablet (20 mg total) by mouth daily 90 tablet 3    azelastine (ASTELIN) 0 1 % nasal spray 2 sprays into each nostril daily 2 Bottle 6    citalopram (CeleXA) 20 mg tablet Take 1 tablet (20 mg total) by mouth daily 90 tablet 3    diclofenac sodium (VOLTAREN) 1 % Apply 2 g topically 2 (two) times a day      Fluticasone Furoate 50 MCG/ACT AEPB Inhale 2 sprays daily      fluticasone-salmeterol (Advair Diskus) 250-50 mcg/dose inhaler Inhale 1 puff 2 (two) times a day Rinse mouth after use   3 Inhaler 3    gabapentin (NEURONTIN) 300 mg capsule Take 2 capsules (600 mg total) by mouth daily 270 capsule 3    hydrocortisone valerate (WEST-VITO) 0 2 % ointment Apply topically daily 45 g 1    Liniments (ANALGESIC BALM EX) Apply topically      lisinopril-hydrochlorothiazide (PRINZIDE,ZESTORETIC) 20-12 5 MG per tablet Take 0 5 tablets by mouth daily at bedtime      omeprazole (PriLOSEC) 20 mg delayed release capsule Take 1 capsule (20 mg total) by mouth daily as needed (heartburn) 90 capsule 1    Turmeric 500 MG CAPS Take by mouth daily      VITAMIN D, ERGOCALCIFEROL, PO Take 2,000 Units by mouth      Vitamins-Lipotropics (LIPO FLAVONOID PLUS PO) Take by mouth      albuterol (PROVENTIL HFA,VENTOLIN HFA) 90 mcg/act inhaler Inhale 1 puff every 6 (six) hours as needed (Q6H PRN) 1 Inhaler 5    cyanocobalamin (VITAMIN B-12) 500 mcg tablet Take 500 mcg by mouth daily      fluticasone (FLONASE) 50 mcg/act nasal spray 2 sprays into each nostril daily (Patient not taking: Reported on 4/13/2021) 1 Bottle 11    Humira Pen 40 MG/0 4ML PNKT As directed by Dermatology      ibuprofen (MOTRIN) 200 mg tablet Take by mouth every 6 (six) hours as needed for mild pain      metoprolol succinate (TOPROL-XL) 25 mg 24 hr tablet Take 0 5 tablets (12 5 mg total) by mouth daily at bedtime for 90 days 45 tablet 0    vitamin B-12 (CYANOCOBALAMIN) 250 MCG tablet Take 250 mcg by mouth daily       No current facility-administered medications for this visit  Allergies   Allergen Reactions    Amoxicillin Rash    Amoxicillin-Pot Clavulanate Er  [Amoxicillin-Pot Clavulanate] Hives and Rash    Codeine Rash and Shortness Of Breath    Penicillin G Rash    Albumen, Egg - Food Allergy     Lisinopril Other (See Comments)    Other      pork    Pork-Derived Products - Food Allergy     Prochlorperazine Other (See Comments) and Itching     rash all over the body    Compazine  [Prochlorperazine Edisylate] Rash    Iodinated Diagnostic Agents Rash    Latex Rash and Itching    Valdecoxib Itching, Rash and Headache     Category: HEADACHES;       Immunizations:     Immunization History   Administered Date(s) Administered    Hep A, adult 05/12/2009    INFLUENZA 10/01/2015, 09/20/2016    Influenza Split High Dose Preservative Free IM 11/12/2012, 10/01/2015, 09/20/2016, 10/05/2017    Influenza, high dose seasonal 0 7 mL 11/27/2018, 10/04/2019, 10/07/2020    Influenza, seasonal, injectable 10/05/2010    Pneumococcal Conjugate 13-Valent 07/24/2015    Pneumococcal Polysaccharide PPV23 1945, 01/27/2020    SARS-CoV-2 / COVID-19 mRNA IM (Pfizer-[employer]) 02/22/2021, 03/15/2021    Tdap 05/12/2009, 10/04/2019      Health Maintenance:         Topic Date Due    DXA SCAN  Never done    Colonoscopy Surveillance  02/19/2020    Hepatitis C Screening  Completed     There are no preventive care reminders to display for this patient  Medicare Health Risk Assessment:     /70 (BP Location: Left arm, Patient Position: Sitting, Cuff Size: Standard)   Pulse 77   Temp 97 9 °F (36 6 °C)   Ht 5' 4" (1 626 m)   Wt 89 8 kg (198 lb)   SpO2 95%   BMI 33 99 kg/m²      Matyt is here for his Subsequent Wellness visit  Health Risk Assessment:   Patient rates overall health as fair  Patient feels that their physical health rating is same  Patient is satisfied with their life  Eyesight was rated as same  Hearing was rated as much worse  Patient feels that their emotional and mental health rating is same  Patients states they are often angry  Patient states they are often unusually tired/fatigued  Pain experienced in the last 7 days has been some  Patient's pain rating has been 8/10  Patient states that he has experienced no weight loss or gain in last 6 months  Depression Screening:   PHQ-2 Score: 5  PHQ-9 Score: 13      Fall Risk Screening: In the past year, patient has experienced: no history of falling in past year      Home Safety:  Patient has trouble with stairs inside or outside of their home  Patient has working smoke alarms and has working carbon monoxide detector  Home safety hazards include: none  Nutrition:   Current diet is Other (please comment) and Limited junk food  Medications:   Patient is currently taking over-the-counter supplements  OTC medications include: see medication list  Patient is able to manage medications  Activities of Daily Living (ADLs)/Instrumental Activities of Daily Living (IADLs):   Walk and transfer into and out of bed and chair?: Yes  Dress and groom yourself?: Yes    Bathe or shower yourself?: Yes    Feed yourself?  Yes  Do your laundry/housekeeping?: Yes  Manage your money, pay your bills and track your expenses?: Yes  Make your own meals?: Yes    Do your own shopping?: Yes    Previous Hospitalizations:   Any hospitalizations or ED visits within the last 12 months?: No      Advance Care Planning:   Living will: No    Durable POA for healthcare: No    Advanced directive: No    Five wishes given: Yes      PREVENTIVE SCREENINGS      Cardiovascular Screening:    General: Screening Not Indicated and History Lipid Disorder      Diabetes Screening:     General: Screening Current      Prostate Cancer Screening:    General: Screening Not Indicated      Abdominal Aortic Aneurysm (AAA) Screening:    Risk factors include: tobacco use        Lung Cancer Screening:     General: Screening Not Indicated      Hepatitis C Screening:    General: Screening Current    Screening, Brief Intervention, and Referral to Treatment (SBIRT)    Screening  Typical number of drinks in a day: 0  Typical number of drinks in a week: 0  Interpretation: Low risk drinking behavior      Single Item Drug Screening:  How often have you used an illegal drug (including marijuana) or a prescription medication for non-medical reasons in the past year? never    Single Item Drug Screen Score: 0  Interpretation: Negative screen for possible drug use disorder      Yovany Aguero, DO

## 2021-04-13 NOTE — PROGRESS NOTES
BMI Counseling: Body mass index is 33 99 kg/m²  The BMI is above normal  Nutrition recommendations include decreasing portion sizes, encouraging healthy choices of fruits and vegetables and moderation in carbohydrate intake  Depression Screening and Follow-up Plan: Patient's depression screening was positive with a PHQ-2 score of 5  Their PHQ-9 score was 13  Patient advised to follow-up with PCP for further management  [facility]  [address], [place], [address] MEDICARE SUBSEQUENT VISIT NOTE ( part 1 )      NAME: Matty Mcintyre  AGE: 66 y o  SEX: male  : 1945   MRN: [id number]    DATE: 2021  TIME: 12:11 PM    Assessment and Plan     Problem List Items Addressed This Visit        Digestive    Acid reflux    Relevant Medications    omeprazole (PriLOSEC) 20 mg delayed release capsule       Cardiovascular and Mediastinum    Essential hypertension    Migraine    Relevant Medications    Humira Pen 40 MG/0 4ML PNKT       Nervous and Auditory    Peripheral neuropathy       Musculoskeletal and Integument    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 -  does have Psoriasis       Genitourinary    CKD (chronic kidney disease) stage 3, GFR 30-59 ml/min       Other    Fibromyalgia    Mild depression (HCC)    Hyperglycemia    Hyperlipidemia    Insomnia    Lipoma of torso/right lateral flank      Other Visit Diagnoses     Medicare annual wellness visit, subsequent    -  Primary    Gastroesophageal reflux disease        Relevant Medications    omeprazole (PriLOSEC) 20 mg delayed release capsule          Medicare Wellness Counseling/ Discussion    Patient Instructions     Reviewed health history along with medications  Overall medically stable  We did review previous blood work, he does have a slip from Dr Kristie Murray to redo CBC, CMP, lipids along with A1c  Back in November A1c 6 0    Continue to try to work at healthy diet, limit portions, limit carbohydrates  Glucose back in November 94  Await redo lipids, continue atorvastatin 10 mg daily, consider increasing dose if tolerated/not at goal   TSH 1 year ago 1 4  Last vitamin-D level in November 2033, he has used high dose vitamin-D at times, continue vitamin-D as is  Blood pressure today is fine, he does have stage 3 chronic kidney disease, continue to monitor renal function, stay on amlodipine 10 mg daily, lisinopril HCT 20/12 5 1/2 tablet daily  He does not believe he is on metoprolol succinate 25 mg 1/2 tablet daily any longer, he should let us know  ( still on med list)    Regarding asthma, he can use rescue inhaler as needed ( about once a week)  Call us if increased shortness of breath or other issues  Does have remote history latent TB, previously treated with Rifampin  Recent chest x-ray was clear  Does have chronic pain, back pain, left hip pain, should continue to see orthopedist, he plans to get another opinion w/ Dr Charla Avendano Grp -   has seen Rheumatologist in the past ( [facility] ) -   Tried injections, has wanted to hold off on surgery  Can continue Gabapentin 600 mg daily regarding neuropathy  Try to avoid ibuprofen if can, uses Tylenol  He also should continue to see Dermatology, history Psoriasis  They have discussed possibly re-starting Humira with him    Regarding depressive symptoms he should continue citalopram 20 mg daily, also has used alprazolam 0 5 mg twice daily long term ( previously more often)      Immunization History   Administered Date(s) Administered    Hep A, adult 05/12/2009    INFLUENZA 10/01/2015, 09/20/2016    Influenza Split High Dose Preservative Free IM 11/12/2012, 10/01/2015, 09/20/2016, 10/05/2017    Influenza, high dose seasonal 0 7 mL 11/27/2018, 10/04/2019, 10/07/2020    Influenza, seasonal, injectable 10/05/2010    Pneumococcal Conjugate 13-Valent 07/24/2015    Pneumococcal Polysaccharide PPV23 1945, 01/27/2020    SARS-CoV-2 / COVID-19 mRNA IM (Pf[employer]) 02/22/2021, 03/15/2021    Tdap 05/12/2009, 10/04/2019     Discussed Vaccines,    Can  look into coverage for new shingles shot, Shingrix  Can do that at pharmacy  Is a former smoker, quit 25 years ago  ( smoked for 30 yrs )     Regarding Colon Cancer screening, he had a colonoscopy in 2015 with Dr Tasia Sun, he did have re-evaluation to do another, was cancelled - he should follow-up with this  His last EGD was back in 2015, call if increased acid reflux, stay on Omeprazole as needed  He does have right lateral flank lipoma, we discussed this is benign     Discussed pros and cons regarding Prostate Cancer screening, routine screening usually not performed after age 79, he did have PSA back in November, 1 0    We discussed end of life planning, does not have a  "LIVING WILL"   "FIVE WISHES" handout was discussed and given to fill out at home    Glaucoma screening is up-to-date, he should continue to see his eye doctor  Had seen ENT back in November  Discussed importance of routine exercise as arthritis allows  We will see him again in 4 months, sooner as needed  Chief Complaint     Chief Complaint   Patient presents with    Medicare Wellness Visit       History of Present Illness     Renetta Sahni is a 66y o -year-old male who is in today for a routine follow-up/AWV -  I had seen him back in July, he did see Dr Kaya Brown back in November  His main complaint continues to be pain in his left hip, he is planning on getting another opinion, has wanted to hold off on surgery  He does continue to see Dermatology, appears he will be restarting Humira, has psoriasis  He has no chest pain or increased shortness of breath, he uses rescue inhaler roughly once a week  Chest x-ray was clear back in March      Denies increased depressive symptoms, does use long-term Xanax along with citalopram   Has had no increased dizziness  Is in the process of setting up another colonoscopy  Did receive COVID vaccines  Review of Systems     Review of Systems   Constitutional: Positive for fatigue (Chronic baseline)  Negative for appetite change, fever and unexpected weight change  HENT: Positive for hearing loss (He saw ENT, hearing aids were too expensive)  Negative for sore throat and trouble swallowing  Eyes: Negative for visual disturbance  Respiratory: Negative for cough, chest tightness and shortness of breath  Cardiovascular: Positive for leg swelling (Chronic mild)  Negative for chest pain and palpitations  Chronic tenderness lower sternal superficially, no other chest discomfort   Gastrointestinal: Negative for abdominal pain, blood in stool, nausea and vomiting  No increased acid reflux, uses omeprazole as needed     No change in bowel   Genitourinary: Negative for dysuria and hematuria  Musculoskeletal:        He relates he is currently off NSAIDs   Skin:        See HPI   Neurological: Positive for dizziness (History of)  Negative for syncope, light-headedness and headaches (None recent)  Hematological: Does not bruise/bleed easily  Psychiatric/Behavioral: Negative for behavioral problems and confusion  The patient is nervous/anxious (Same)          Active Problem List     Patient Active Problem List   Diagnosis    Acid reflux    Asthma    Bilateral hearing loss    BPH associated with nocturia    Cervical spinal stenosis    Fibromyalgia    Chronic sinusitis    Mild depression ([address] )    Hyperglycemia    Hyperlipidemia    Essential hypertension    Incomplete emptying of bladder    Lumbar radiculopathy    Lumbar spinal stenosis    Migraine    Peripheral neuropathy    Psoriasis    Rotator cuff tendinitis    Seborrheic dermatitis    TB lung, latent    Testicular hypogonadism    Thoracic degenerative disc disease    Tinnitus    Venous insufficiency    Primary localized osteoarthrosis of ankle and foot    Tarsal tunnel syndrome    Plantar fascial fibromatosis    Anxiety    Insomnia    CKD (chronic kidney disease) stage 3, GFR 30-59 ml/min    Osteoarthritis of multiple joints    Perforation of right tympanic membrane w remote surgery both ears      Vitamin D deficiency    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 -  does have Psoriasis    Costochondritis    Chronic cough    Lipoma of torso/right lateral flank       Past Medical History:  Past Medical History:   Diagnosis Date    Anxiety and depression     Arthritis     Asthma     Back pain, chronic     Last assessed: 3/11/15    BPH (benign prostatic hyperplasia)     Cataract     Last assessed: 14    GERD (gastroesophageal reflux disease)     Hyperlipidemia     Hypertension     Neuropathy     Osteoporosis     Panic disorder     Right-sided Bell's palsy     Last assessed: 3/10/14    Seasonal allergies        Past Surgical History:  Past Surgical History:   Procedure Laterality Date    CARPAL TUNNEL RELEASE Right     Neuroplasty Decompression Median Nerve at Carpal Tunnel    CATARACT EXTRACTION      COLONOSCOPY  2015    polyp, complete    EAR SURGERY       and , ear drum,  per [employer]    ESOPHAGOGASTRODUODENOSCOPY  2015    Diagnostic    FOOT SURGERY Left     Toe    MASTOIDECTOMY Left     OTHER SURGICAL HISTORY      Spinal Anesthesia Epidural, x3 , per [employer]    POLYPECTOMY  2015    TYMPANOPLASTY Bilateral     Dr Lalo Alcaraz       Family History:  Family History   Problem Relation Age of Onset    Cataracts Mother     Hyperlipidemia Mother        Social History:  Social History     Tobacco Use    Smoking status: Former Smoker     Types: Cigars     Quit date:      Years since quittin 2    Smokeless tobacco: Former User    Tobacco comment: quit in , former cigar and cigarette, pipe smoker, per [employer]script, Past history of chewing tobacco use, active, per [employer]   Substance Use Topics    Alcohol use: No     Comment: former drinker       Objective     Vitals:    04/13/21 1115   BP: 130/70   BP Location: Left arm   Patient Position: Sitting   Cuff Size: Standard   Pulse: 77   Temp: 97 9 °F (36 6 °C)   SpO2: 95%   Weight: 89 8 kg (198 lb)   Height: 5' 4" (1 626 m)     Body mass index is 33 99 kg/m²  BP Readings from Last 3 Encounters:   04/13/21 130/70   11/30/20 122/70   11/24/20 120/76       Wt Readings from Last 3 Encounters:   04/13/21 89 8 kg (198 lb)   11/30/20 88 9 kg (196 lb)   11/24/20 89 4 kg (197 lb)       Physical Exam  Constitutional:       General: He is not in acute distress  Appearance: Normal appearance  He is well-developed  He is not ill-appearing  HENT:      Right Ear: Ear canal normal       Left Ear: Ear canal normal    Eyes:      General: No scleral icterus  Cardiovascular:      Rate and Rhythm: Normal rate and regular rhythm  Heart sounds: Normal heart sounds  No murmur  Pulmonary:      Effort: Pulmonary effort is normal  No respiratory distress  Breath sounds: Normal breath sounds  Abdominal:      Palpations: Abdomen is soft  Tenderness: There is no abdominal tenderness  There is no guarding  Musculoskeletal:      Comments: Very slight trace pitting bilateral ankle  Ovoid lipoma right flank   Lymphadenopathy:      Cervical: No cervical adenopathy  Skin:     Coloration: Skin is not jaundiced  Neurological:      Mental Status: He is alert and oriented to person, place, and time     Psychiatric:         Mood and Affect: Mood normal          Behavior: Behavior normal          ALLERGIES:  Allergies   Allergen Reactions    Amoxicillin Rash    Amoxicillin-Pot Clavulanate Er  [Amoxicillin-Pot Clavulanate] Hives and Rash    Codeine Rash and Shortness Of Breath    Penicillin G Rash    Albumen, Egg - Food Allergy     Lisinopril Other (See Comments)    Other      pork    Pork-Derived Products - Food Allergy     Prochlorperazine Other (See Comments) and Itching     rash all over the body    Compazine  [Prochlorperazine Edisylate] Rash    Iodinated Diagnostic Agents Rash    Latex Rash and Itching    Valdecoxib Itching, Rash and Headache     Category: HEADACHES;        Current Medications     Current Outpatient Medications   Medication Sig Dispense Refill    acetaminophen (TYLENOL) 325 mg tablet Take 2 tablets (650 mg total) by mouth every 6 (six) hours as needed for mild pain or fever 30 tablet 3    ALPRAZolam (XANAX) 0 5 mg tablet TAKE 1 TABLET BY MOUTH 1 OR 2 TIMES DAILY AS NEEDED FOR ANXIETY 60 tablet 0    amLODIPine (NORVASC) 10 mg tablet Take 1 tablet (10 mg total) by mouth daily 90 tablet 3    atorvastatin (LIPITOR) 20 mg tablet Take 1 tablet (20 mg total) by mouth daily 90 tablet 3    azelastine (ASTELIN) 0 1 % nasal spray 2 sprays into each nostril daily 2 Bottle 6    citalopram (CeleXA) 20 mg tablet Take 1 tablet (20 mg total) by mouth daily 90 tablet 3    diclofenac sodium (VOLTAREN) 1 % Apply 2 g topically 2 (two) times a day      Fluticasone Furoate 50 MCG/ACT AEPB Inhale 2 sprays daily      fluticasone-salmeterol (Advair Diskus) 250-50 mcg/dose inhaler Inhale 1 puff 2 (two) times a day Rinse mouth after use   3 Inhaler 3    gabapentin (NEURONTIN) 300 mg capsule Take 2 capsules (600 mg total) by mouth daily 270 capsule 3    hydrocortisone valerate (WEST-VITO) 0 2 % ointment Apply topically daily 45 g 1    Liniments (ANALGESIC BALM EX) Apply topically      lisinopril-hydrochlorothiazide (PRINZIDE,ZESTORETIC) 20-12 5 MG per tablet Take 0 5 tablets by mouth daily at bedtime      omeprazole (PriLOSEC) 20 mg delayed release capsule Take 1 capsule (20 mg total) by mouth daily as needed (heartburn) 90 capsule 1    Turmeric 500 MG CAPS Take by mouth daily      VITAMIN D, ERGOCALCIFEROL, PO Take 2,000 Units by mouth      Vitamins-Lipotropics (LIPO FLAVONOID PLUS PO) Take by mouth      albuterol (PROVENTIL HFA,VENTOLIN HFA) 90 mcg/act inhaler Inhale 1 puff every 6 (six) hours as needed (Q6H PRN) 1 Inhaler 5    cyanocobalamin (VITAMIN B-12) 500 mcg tablet Take 500 mcg by mouth daily      fluticasone (FLONASE) 50 mcg/act nasal spray 2 sprays into each nostril daily (Patient not taking: Reported on 4/13/2021) 1 Bottle 11    Humira Pen 40 MG/0 4ML PNKT As directed by Dermatology      ibuprofen (MOTRIN) 200 mg tablet Take by mouth every 6 (six) hours as needed for mild pain      metoprolol succinate (TOPROL-XL) 25 mg 24 hr tablet Take 0 5 tablets (12 5 mg total) by mouth daily at bedtime for 90 days 45 tablet 0    vitamin B-12 (CYANOCOBALAMIN) 250 MCG tablet Take 250 mcg by mouth daily       No current facility-administered medications for this visit  Health Maintenance     See other note today re clinical AWV info             Most recent labs available from [address]   ( others may be available in Care Everywhere / Media sections)  Lab Results   Component Value Date    WBC 7 9 01/29/2020    HGB 15 6 01/29/2020    HCT 46 3 01/29/2020     01/29/2020    CHOL 165 10/15/2016    TRIG 97 11/12/2020    HDL 58 11/12/2020    ALT 25 11/12/2020    AST 18 11/12/2020     02/02/2017    K 4 0 11/12/2020    CL 97 (L) 11/12/2020    CREATININE 1 02 11/12/2020    BUN 18 11/12/2020    CO2 36 (H) 11/12/2020    PSA 1 0 11/12/2020    INR 1 13 05/05/2015    HGBA1C 6 0 (H) 11/12/2020       No orders of the defined types were placed in this encounter              Javon Morrison DO

## 2021-04-13 NOTE — PATIENT INSTRUCTIONS
Reviewed health history along with medications  Overall medically stable  We did review previous blood work, he does have a slip from Dr [facility] to redo CBC, CMP, lipids along with A1c  Back in November A1c 6 0  Continue to try to work at [employer], limit portions, limit carbohydrates  Glucose back in November 94  Await redo lipids, continue atorvastatin 10 mg daily, consider increasing dose if tolerated/not at goal   TSH 1 year ago 1 4  Last vitamin-D level in November 2033, he has used high dose vitamin-D at times, continue vitamin-D as is  Blood pressure today is fine, he does have stage 3 chronic kidney disease, continue to monitor renal function, stay on amlodipine 10 mg daily, lisinopril HCT 20/12 5 1/2 tablet daily  He does not believe he is on metoprolol succinate 25 mg 1/2 tablet daily any longer, he should let us know  ( still on med list)    Regarding asthma, he can use rescue inhaler as needed ( about once a week)  Call us if increased shortness of breath or other issues  Does have remote history latent TB, previously treated with Rifampin  Recent chest x-ray was clear  Does have chronic pain, back pain, left hip pain, should continue to see orthopedist, he plans to get another opinion w/ Dr María Hoyt Grp -   has seen Rheumatologist in the past ( [facility] ) -   Tried injections, has wanted to hold off on surgery  Can continue Gabapentin 600 mg daily regarding neuropathy  Try to avoid ibuprofen if can, uses Tylenol  He also should continue to see Dermatology, history Psoriasis  They have discussed possibly re-starting Humira with him    Regarding depressive symptoms he should continue citalopram 20 mg daily, also has used alprazolam 0 5 mg twice daily long term ( previously more often)      Immunization History   Administered Date(s) Administered    Hep A, adult 05/12/2009    INFLUENZA 10/01/2015, 09/20/2016    Influenza Split High Dose Preservative Free IM 11/12/2012, 10/01/2015, 09/20/2016, 10/05/2017    Influenza, high dose seasonal 0 7 mL 11/27/2018, 10/04/2019, 10/07/2020    Influenza, seasonal, injectable 10/05/2010    Pneumococcal Conjugate 13-Valent 07/24/2015    Pneumococcal Polysaccharide PPV23 1945, 01/27/2020    SARS-CoV-2 / COVID-19 mRNA IM ([employer]) 02/22/2021, 03/15/2021    Tdap 05/12/2009, 10/04/2019     Discussed Vaccines,    Can  look into coverage for new shingles shot, Shingrix  Can do that at pharmacy  Is a former smoker, quit 25 years ago  ( smoked for 30 yrs )     Regarding Colon Cancer screening, he had a colonoscopy in 2015 with Dr Micah Srivastava, he did have re-evaluation to do another, was cancelled - he should follow-up with this  His last EGD was back in 2015, call if increased acid reflux, stay on Omeprazole as needed  He does have right lateral flank lipoma, we discussed this is benign     Discussed pros and cons regarding Prostate Cancer screening, routine screening usually not performed after age 79, he did have PSA back in November, 1 0    We discussed end of life planning, does not have a  "LIVING WILL"   "FIVE WISHES" handout was discussed and given to fill out at home    Glaucoma screening is up-to-date, he should continue to see his eye doctor  Had seen ENT back in November  Discussed importance of routine exercise as arthritis allows  We will see him again in 4 months, sooner as needed

## 2021-06-02 ENCOUNTER — HOSPITAL ENCOUNTER (OUTPATIENT)
Dept: RADIOLOGY | Facility: HOSPITAL | Age: 76
Discharge: HOME | End: 2021-06-02
Payer: COMMERCIAL

## 2021-06-02 ENCOUNTER — OFFICE VISIT (OUTPATIENT)
Dept: FAMILY MEDICINE CLINIC | Facility: CLINIC | Age: 76
End: 2021-06-02
Payer: COMMERCIAL

## 2021-06-02 ENCOUNTER — HOSPITAL ENCOUNTER (OUTPATIENT)
Dept: CT IMAGING | Facility: HOSPITAL | Age: 76
Discharge: HOME | End: 2021-06-02
Payer: COMMERCIAL

## 2021-06-02 ENCOUNTER — APPOINTMENT (OUTPATIENT)
Dept: LAB | Facility: HOSPITAL | Age: 76
End: 2021-06-02
Payer: COMMERCIAL

## 2021-06-02 VITALS
SYSTOLIC BLOOD PRESSURE: 106 MMHG | HEIGHT: 64 IN | OXYGEN SATURATION: 97 % | WEIGHT: 194 LBS | DIASTOLIC BLOOD PRESSURE: 70 MMHG | RESPIRATION RATE: 12 BRPM | HEART RATE: 74 BPM | BODY MASS INDEX: 33.12 KG/M2

## 2021-06-02 DIAGNOSIS — Z12.5 PROSTATE CANCER SCREENING: ICD-10-CM

## 2021-06-02 DIAGNOSIS — E78.5 HYPERLIPIDEMIA, UNSPECIFIED HYPERLIPIDEMIA TYPE: ICD-10-CM

## 2021-06-02 DIAGNOSIS — M54.41 ACUTE RIGHT-SIDED LOW BACK PAIN WITH RIGHT-SIDED SCIATICA: ICD-10-CM

## 2021-06-02 DIAGNOSIS — R35.0 URINARY FREQUENCY: ICD-10-CM

## 2021-06-02 DIAGNOSIS — E55.9 VITAMIN D DEFICIENCY: ICD-10-CM

## 2021-06-02 DIAGNOSIS — R10.84 GENERALIZED ABDOMINAL PAIN: Primary | ICD-10-CM

## 2021-06-02 DIAGNOSIS — R10.84 GENERALIZED ABDOMINAL PAIN: ICD-10-CM

## 2021-06-02 DIAGNOSIS — I10 ESSENTIAL HYPERTENSION: ICD-10-CM

## 2021-06-02 DIAGNOSIS — R73.9 HYPERGLYCEMIA: ICD-10-CM

## 2021-06-02 DIAGNOSIS — F32.A MILD DEPRESSION: ICD-10-CM

## 2021-06-02 LAB
25(OH)D3 SERPL-MCNC: 44.9 NG/ML (ref 30–100)
ALBUMIN SERPL BCP-MCNC: 4.3 G/DL (ref 3–5.2)
ALP SERPL-CCNC: 52 U/L (ref 43–122)
ALT SERPL W P-5'-P-CCNC: 28 U/L
ANION GAP SERPL CALCULATED.3IONS-SCNC: 6 MMOL/L (ref 5–14)
AST SERPL W P-5'-P-CCNC: 30 U/L (ref 17–59)
BASOPHILS # BLD AUTO: 0.08 THOUSAND/UL (ref 0–0.1)
BASOPHILS NFR MAR MANUAL: 1 % (ref 0–1)
BILIRUB SERPL-MCNC: 0.95 MG/DL
BILIRUB UR QL STRIP: NEGATIVE
BUN SERPL-MCNC: 14 MG/DL (ref 5–25)
CALCIUM SERPL-MCNC: 9.8 MG/DL (ref 8.4–10.2)
CHLORIDE SERPL-SCNC: 102 MMOL/L (ref 97–108)
CHOLEST SERPL-MCNC: 151 MG/DL
CLARITY UR: CLEAR
CO2 SERPL-SCNC: 32 MMOL/L (ref 22–30)
COLOR UR: NORMAL
CREAT SERPL-MCNC: 0.99 MG/DL (ref 0.7–1.5)
EOSINOPHIL # BLD AUTO: 0.95 THOUSAND/UL (ref 0–0.4)
EOSINOPHIL NFR BLD MANUAL: 12 % (ref 0–6)
ERYTHROCYTE [DISTWIDTH] IN BLOOD BY AUTOMATED COUNT: 14.1 %
EST. AVERAGE GLUCOSE BLD GHB EST-MCNC: 131 MG/DL
GFR SERPL CREATININE-BSD FRML MDRD: 73 ML/MIN/1.73SQ M
GLUCOSE P FAST SERPL-MCNC: 96 MG/DL (ref 70–99)
GLUCOSE UR STRIP-MCNC: NEGATIVE MG/DL
HBA1C MFR BLD: 6.2 %
HCT VFR BLD AUTO: 43.6 % (ref 41–53)
HDLC SERPL-MCNC: 37 MG/DL
HGB BLD-MCNC: 14.8 G/DL (ref 13.5–17.5)
HGB UR QL STRIP.AUTO: NEGATIVE
KETONES UR STRIP-MCNC: NEGATIVE MG/DL
LDLC SERPL CALC-MCNC: 94 MG/DL
LEUKOCYTE ESTERASE UR QL STRIP: NEGATIVE
LYMPHOCYTES # BLD AUTO: 1.98 THOUSAND/UL (ref 0.5–4)
LYMPHOCYTES # BLD AUTO: 25 % (ref 25–45)
MCH RBC QN AUTO: 29.1 PG (ref 26–34)
MCHC RBC AUTO-ENTMCNC: 33.8 G/DL (ref 31–36)
MCV RBC AUTO: 86 FL (ref 80–100)
MONOCYTES # BLD AUTO: 0.63 THOUSAND/UL (ref 0.2–0.9)
MONOCYTES NFR BLD AUTO: 8 % (ref 1–10)
NEUTS SEG # BLD: 4.19 THOUSAND/UL (ref 1.8–7.8)
NEUTS SEG NFR BLD AUTO: 53 %
NITRITE UR QL STRIP: NEGATIVE
PH UR STRIP.AUTO: 6 [PH]
PLATELET # BLD AUTO: 229 THOUSANDS/UL (ref 150–450)
PLATELET BLD QL SMEAR: ADEQUATE
PMV BLD AUTO: 9.1 FL (ref 8.9–12.7)
POTASSIUM SERPL-SCNC: 4.1 MMOL/L (ref 3.6–5)
PROT SERPL-MCNC: 7.4 G/DL (ref 5.9–8.4)
PROT UR STRIP-MCNC: NEGATIVE MG/DL
PSA SERPL-MCNC: 2.1 NG/ML (ref 0–4)
RBC # BLD AUTO: 5.08 MILLION/UL (ref 4.5–5.9)
RBC MORPH BLD: NORMAL
SL AMB  POCT GLUCOSE, UA: ABNORMAL
SL AMB LEUKOCYTE ESTERASE,UA: ABNORMAL
SL AMB POCT BILIRUBIN,UA: ABNORMAL
SL AMB POCT BLOOD,UA: ABNORMAL
SL AMB POCT CLARITY,UA: ABNORMAL
SL AMB POCT COLOR,UA: YELLOW
SL AMB POCT KETONES,UA: ABNORMAL
SL AMB POCT NITRITE,UA: ABNORMAL
SL AMB POCT PH,UA: 5
SL AMB POCT SPECIFIC GRAVITY,UA: 1.01
SL AMB POCT URINE PROTEIN: 0.2
SL AMB POCT UROBILINOGEN: ABNORMAL
SODIUM SERPL-SCNC: 140 MMOL/L (ref 137–147)
SP GR UR STRIP.AUTO: 1.02 (ref 1–1.03)
TOTAL CELLS COUNTED SPEC: 100
TRIGL SERPL-MCNC: 99 MG/DL
UROBILINOGEN UR QL STRIP.AUTO: 0.2 E.U./DL
VARIANT LYMPHS # BLD AUTO: 1 % (ref 0–0)
WBC # BLD AUTO: 7.9 THOUSAND/UL (ref 4.5–11)

## 2021-06-02 PROCEDURE — 80053 COMPREHEN METABOLIC PANEL: CPT

## 2021-06-02 PROCEDURE — 85007 BL SMEAR W/DIFF WBC COUNT: CPT

## 2021-06-02 PROCEDURE — G0103 PSA SCREENING: HCPCS

## 2021-06-02 PROCEDURE — 82306 VITAMIN D 25 HYDROXY: CPT

## 2021-06-02 PROCEDURE — 72110 X-RAY EXAM L-2 SPINE 4/>VWS: CPT

## 2021-06-02 PROCEDURE — 99213 OFFICE O/P EST LOW 20 MIN: CPT | Performed by: INTERNAL MEDICINE

## 2021-06-02 PROCEDURE — 81002 URINALYSIS NONAUTO W/O SCOPE: CPT | Performed by: INTERNAL MEDICINE

## 2021-06-02 PROCEDURE — 80061 LIPID PANEL: CPT

## 2021-06-02 PROCEDURE — 36415 COLL VENOUS BLD VENIPUNCTURE: CPT

## 2021-06-02 PROCEDURE — 74176 CT ABD & PELVIS W/O CONTRAST: CPT

## 2021-06-02 PROCEDURE — 83036 HEMOGLOBIN GLYCOSYLATED A1C: CPT

## 2021-06-02 PROCEDURE — 85027 COMPLETE CBC AUTOMATED: CPT

## 2021-06-02 PROCEDURE — 81003 URINALYSIS AUTO W/O SCOPE: CPT | Performed by: INTERNAL MEDICINE

## 2021-06-02 NOTE — PROGRESS NOTES
Assessment/Plan:    No problem-specific Assessment & Plan notes found for this encounter  Diagnoses and all orders for this visit:    Generalized abdominal pain  -     CT abdomen pelvis w contrast; Future  -     UA (URINE) with reflex to Scope    Urinary frequency  -     Cancel: POCT urine dip    Acute right-sided low back pain with right-sided sciatica  -     XR spine lumbar 2 or 3 views injury; Future  -     POCT urine dip  -     UA (URINE) with reflex to Scope  -     Basic metabolic panel; Future          Patient has acute abdominal pain, will send for CT scan of the abdomen and pelvis with contrast stat  Urine dipstick was negative, will send UA  His symptoms most likely because of lumbar radiculopathy, will do x-ray of the lumbar spine  Depending on the results of the CT scan will initiate the treatment  Subjective:      Patient ID: Tiburcio Murillo is a 66 y o  male  Patient came today with complaints of bilateral lower abdominal pain that started few weeks ago he also reports lower back pain which is sometimes 9/10 intensity  He says that radiates down to his left lower extremity  He does not report any urine frequency or dysuria  No chills or fevers  He has regular bowel movements, no diarrhea, he did not notice any blood or mucus in his stool  Does not report any nausea vomiting  He is very uncomfortable and he says that the pain is worse when he is moving around better with rest       The following portions of the patient's history were reviewed and updated as appropriate: allergies, current medications, past family history, past medical history, past social history, past surgical history, and problem list     Review of Systems   Constitutional: Negative for chills, fatigue and fever  Respiratory: Negative for cough, chest tightness and shortness of breath  Cardiovascular: Negative for chest pain, palpitations and leg swelling  Gastrointestinal: Positive for abdominal pain  Negative for abdominal distention, blood in stool, constipation, diarrhea and nausea  Genitourinary: Negative for difficulty urinating and dysuria  Musculoskeletal: Positive for back pain  Negative for arthralgias, gait problem, joint swelling, myalgias and neck pain  Skin: Negative for rash  Neurological: Positive for numbness  Negative for dizziness, weakness and headaches  Psychiatric/Behavioral: Negative for agitation and confusion  The patient is not nervous/anxious  Objective:      /70 (BP Location: Left arm, Patient Position: Sitting, Cuff Size: Standard)   Pulse 74   Resp 12   Ht 5' 4" (1 626 m)   Wt 88 kg (194 lb)   SpO2 97%   BMI 33 30 kg/m²          Physical Exam  Constitutional:       General: He is not in acute distress  Appearance: He is not toxic-appearing  Comments: In mild distress due to pain  HENT:      Head: Normocephalic and atraumatic  Nose: No congestion or rhinorrhea  Eyes:      Pupils: Pupils are equal, round, and reactive to light  Neck:      Musculoskeletal: No neck rigidity or muscular tenderness  Cardiovascular:      Rate and Rhythm: Normal rate and regular rhythm  Pulses: Normal pulses  Heart sounds: Normal heart sounds  No murmur  No friction rub  No gallop  Pulmonary:      Effort: Pulmonary effort is normal    Abdominal:      General: Bowel sounds are normal  There is no distension  Palpations: Abdomen is soft  There is no mass  Tenderness: There is abdominal tenderness in the right lower quadrant and left lower quadrant  There is no right CVA tenderness, left CVA tenderness or rebound  Hernia: No hernia is present  Musculoskeletal:         General: Tenderness present  No swelling or deformity  Skin:     Coloration: Skin is not pale  Findings: No erythema, lesion or rash  Neurological:      General: No focal deficit present        Mental Status: He is alert and oriented to person, place, and time       Cranial Nerves: No cranial nerve deficit  Sensory: No sensory deficit  Motor: No weakness        Deep Tendon Reflexes: Reflexes normal    Psychiatric:         Mood and Affect: Mood normal          Behavior: Behavior normal                Current Outpatient Medications:     acetaminophen (TYLENOL) 325 mg tablet, Take 2 tablets (650 mg total) by mouth every 6 (six) hours as needed for mild pain or fever, Disp: 30 tablet, Rfl: 3    albuterol (PROVENTIL HFA,VENTOLIN HFA) 90 mcg/act inhaler, Inhale 1 puff every 6 (six) hours as needed (Q6H PRN), Disp: 1 Inhaler, Rfl: 5    ALPRAZolam (XANAX) 0 5 mg tablet, TAKE 1 TABLET BY MOUTH 1 OR 2 TIMES DAILY AS NEEDED FOR ANXIETY, Disp: 60 tablet, Rfl: 0    amLODIPine (NORVASC) 10 mg tablet, Take 1 tablet (10 mg total) by mouth daily, Disp: 90 tablet, Rfl: 3    atorvastatin (LIPITOR) 20 mg tablet, Take 1 tablet (20 mg total) by mouth daily, Disp: 90 tablet, Rfl: 3    azelastine (ASTELIN) 0 1 % nasal spray, 2 sprays into each nostril daily, Disp: 2 Bottle, Rfl: 6    citalopram (CeleXA) 20 mg tablet, Take 1 tablet (20 mg total) by mouth daily, Disp: 90 tablet, Rfl: 3    cyanocobalamin (VITAMIN B-12) 500 mcg tablet, Take 500 mcg by mouth daily, Disp: , Rfl:     diclofenac sodium (VOLTAREN) 1 %, Apply 2 g topically 2 (two) times a day, Disp: , Rfl:     fluticasone (FLONASE) 50 mcg/act nasal spray, 2 sprays into each nostril daily, Disp: 1 Bottle, Rfl: 11    Fluticasone Furoate 50 MCG/ACT AEPB, Inhale 2 sprays daily, Disp: , Rfl:     fluticasone-salmeterol (Advair Diskus) 250-50 mcg/dose inhaler, Inhale 1 puff 2 (two) times a day Rinse mouth after use , Disp: 3 Inhaler, Rfl: 3    gabapentin (NEURONTIN) 300 mg capsule, Take 2 capsules (600 mg total) by mouth daily, Disp: 270 capsule, Rfl: 3    Humira Pen 40 MG/0 4ML PNKT, As directed by Dermatology, Disp: , Rfl:     hydrocortisone valerate (WEST-VITO) 0 2 % ointment, Apply topically daily, Disp: 45 g, Rfl: 1    Liniments (ANALGESIC BALM EX), Apply topically, Disp: , Rfl:     lisinopril-hydrochlorothiazide (PRINZIDE,ZESTORETIC) 20-12 5 MG per tablet, Take 0 5 tablets by mouth daily at bedtime, Disp: , Rfl:     metoprolol succinate (TOPROL-XL) 25 mg 24 hr tablet, Take 0 5 tablets (12 5 mg total) by mouth daily at bedtime for 90 days, Disp: 45 tablet, Rfl: 0    omeprazole (PriLOSEC) 20 mg delayed release capsule, Take 1 capsule (20 mg total) by mouth daily as needed (heartburn), Disp: 90 capsule, Rfl: 1    Turmeric 500 MG CAPS, Take by mouth daily, Disp: , Rfl:     vitamin B-12 (CYANOCOBALAMIN) 250 MCG tablet, Take 250 mcg by mouth daily, Disp: , Rfl:     VITAMIN D, ERGOCALCIFEROL, PO, Take 2,000 Units by mouth, Disp: , Rfl:     Vitamins-Lipotropics (LIPO FLAVONOID PLUS PO), Take by mouth, Disp: , Rfl:

## 2021-06-03 RX ORDER — PREDNISONE 10 MG/1
TABLET ORAL
Qty: 38 TABLET | Refills: 0 | Status: SHIPPED | OUTPATIENT
Start: 2021-06-03 | End: 2021-07-28

## 2021-06-10 ENCOUNTER — OFFICE VISIT (OUTPATIENT)
Dept: FAMILY MEDICINE CLINIC | Facility: CLINIC | Age: 76
End: 2021-06-10
Payer: COMMERCIAL

## 2021-06-10 VITALS
HEART RATE: 72 BPM | OXYGEN SATURATION: 95 % | BODY MASS INDEX: 33.12 KG/M2 | DIASTOLIC BLOOD PRESSURE: 60 MMHG | RESPIRATION RATE: 12 BRPM | WEIGHT: 194 LBS | HEIGHT: 64 IN | SYSTOLIC BLOOD PRESSURE: 100 MMHG

## 2021-06-10 DIAGNOSIS — R97.20 ELEVATED PSA: Primary | ICD-10-CM

## 2021-06-10 DIAGNOSIS — R39.14 BENIGN PROSTATIC HYPERPLASIA WITH INCOMPLETE BLADDER EMPTYING: ICD-10-CM

## 2021-06-10 DIAGNOSIS — K59.00 CONSTIPATION, UNSPECIFIED CONSTIPATION TYPE: Primary | ICD-10-CM

## 2021-06-10 DIAGNOSIS — N40.1 BENIGN PROSTATIC HYPERPLASIA WITH INCOMPLETE BLADDER EMPTYING: ICD-10-CM

## 2021-06-10 PROCEDURE — 99213 OFFICE O/P EST LOW 20 MIN: CPT | Performed by: INTERNAL MEDICINE

## 2021-06-10 PROCEDURE — 3078F DIAST BP <80 MM HG: CPT | Performed by: INTERNAL MEDICINE

## 2021-06-10 PROCEDURE — 1160F RVW MEDS BY RX/DR IN RCRD: CPT | Performed by: INTERNAL MEDICINE

## 2021-06-10 PROCEDURE — 3074F SYST BP LT 130 MM HG: CPT | Performed by: INTERNAL MEDICINE

## 2021-06-10 PROCEDURE — 1036F TOBACCO NON-USER: CPT | Performed by: INTERNAL MEDICINE

## 2021-06-10 RX ORDER — POLYETHYLENE GLYCOL 3350 17 G/17G
17 POWDER, FOR SOLUTION ORAL DAILY
Qty: 5 EACH | Refills: 0 | Status: SHIPPED | OUTPATIENT
Start: 2021-06-10 | End: 2022-04-15

## 2021-06-10 RX ORDER — TAMSULOSIN HYDROCHLORIDE 0.4 MG/1
0.4 CAPSULE ORAL
Qty: 30 CAPSULE | Refills: 0 | Status: SHIPPED | OUTPATIENT
Start: 2021-06-10 | End: 2021-07-20 | Stop reason: SDUPTHER

## 2021-06-10 NOTE — PATIENT INSTRUCTIONS
Please start Flomax 1 pill daily at night  Please take MiraLax every day  As needed for 5 days     Flomax can decrease your blood pressure so if he will feel any lightheadedness please let me know

## 2021-06-10 NOTE — PROGRESS NOTES
Problem List Items Addressed This Visit        Other    Elevated PSA - Primary     Recheck PSA diagnostic prior to follow-up         Relevant Orders    PSA Total, Diagnostic

## 2021-06-10 NOTE — PROGRESS NOTES
Assessment/Plan:    No problem-specific Assessment & Plan notes found for this encounter  Diagnoses and all orders for this visit:    Constipation, unspecified constipation type  -     polyethylene glycol (MIRALAX) 17 g packet; Take 17 g by mouth daily for 5 days    Benign prostatic hyperplasia with incomplete bladder emptying  -     tamsulosin (FLOMAX) 0 4 mg; Take 1 capsule (0 4 mg total) by mouth daily with dinner  -     Ambulatory referral to Urology; Future      he has frequency of urination and decreased urinary stream most likely because of progress in BPH, his PSH recently was normal   Will start him on tamsulosin  He will be referred to the Urology  Start MiraLax for constipation as it may be contributing to his urinary problems  He will let me know in few days of how he feels  Subjective:      Patient ID: Vicky Mcalin is a 66 y o  male  Patient came today again with complaints of difficulty with urination  He had episode of back pain few days ago he was put on prednisone right now he reports significant improvement  CT scan of the abdomen that was done because of the abdominal pain revealed enlarged prostate and significant degenerative changes in his lumbar spine  He said that he can not pee but the stream is very weak and he has to strain a lot  He does not report any saddle anesthesia, he is able to feel when his bladder is full  He also reports constipation  The following portions of the patient's history were reviewed and updated as appropriate: allergies, current medications, past family history, past medical history, past social history, past surgical history, and problem list     Review of Systems   Constitutional: Negative for chills, fatigue and fever  Respiratory: Negative for cough, chest tightness and shortness of breath  Cardiovascular: Negative for chest pain, palpitations and leg swelling  Gastrointestinal: Positive for abdominal distention   Negative for abdominal pain, blood in stool, constipation, diarrhea and nausea  Genitourinary: Positive for decreased urine volume and frequency  Negative for difficulty urinating and dysuria  Musculoskeletal: Positive for back pain (Improved)  Negative for arthralgias, gait problem, joint swelling, myalgias and neck pain  Skin: Negative for rash  Neurological: Negative for dizziness, weakness and headaches  Psychiatric/Behavioral: Negative for agitation and confusion  The patient is not nervous/anxious  Objective:      /60 (BP Location: Left arm, Patient Position: Sitting, Cuff Size: Standard)   Pulse 72   Resp 12   Ht 5' 4" (1 626 m)   Wt 88 kg (194 lb)   SpO2 95%   BMI 33 30 kg/m²          Physical Exam  Constitutional:       General: He is not in acute distress  Appearance: He is not toxic-appearing  Comments: In mild distress due to pain  HENT:      Head: Normocephalic and atraumatic  Nose: No congestion or rhinorrhea  Eyes:      Pupils: Pupils are equal, round, and reactive to light  Neck:      Musculoskeletal: No neck rigidity or muscular tenderness  Cardiovascular:      Rate and Rhythm: Normal rate and regular rhythm  Pulses: Normal pulses  Heart sounds: Normal heart sounds  No murmur  No friction rub  No gallop  Pulmonary:      Effort: Pulmonary effort is normal    Abdominal:      General: Bowel sounds are normal  There is no distension  Palpations: Abdomen is soft  There is no mass  Tenderness: There is no abdominal tenderness  There is no right CVA tenderness, left CVA tenderness or rebound  Hernia: No hernia is present  Musculoskeletal:         General: Tenderness present  No swelling or deformity  Skin:     Coloration: Skin is not pale  Findings: No erythema, lesion or rash  Neurological:      General: No focal deficit present  Mental Status: He is alert and oriented to person, place, and time  Cranial Nerves:  No cranial nerve deficit  Sensory: No sensory deficit  Motor: No weakness        Deep Tendon Reflexes: Reflexes normal    Psychiatric:         Mood and Affect: Mood normal          Behavior: Behavior normal                Current Outpatient Medications:     acetaminophen (TYLENOL) 325 mg tablet, Take 2 tablets (650 mg total) by mouth every 6 (six) hours as needed for mild pain or fever, Disp: 30 tablet, Rfl: 3    albuterol (PROVENTIL HFA,VENTOLIN HFA) 90 mcg/act inhaler, Inhale 1 puff every 6 (six) hours as needed (Q6H PRN), Disp: 1 Inhaler, Rfl: 5    ALPRAZolam (XANAX) 0 5 mg tablet, TAKE 1 TABLET BY MOUTH 1 OR 2 TIMES DAILY AS NEEDED FOR ANXIETY, Disp: 60 tablet, Rfl: 0    amLODIPine (NORVASC) 10 mg tablet, Take 1 tablet (10 mg total) by mouth daily, Disp: 90 tablet, Rfl: 3    atorvastatin (LIPITOR) 20 mg tablet, Take 1 tablet (20 mg total) by mouth daily, Disp: 90 tablet, Rfl: 3    azelastine (ASTELIN) 0 1 % nasal spray, 2 sprays into each nostril daily, Disp: 2 Bottle, Rfl: 6    citalopram (CeleXA) 20 mg tablet, Take 1 tablet (20 mg total) by mouth daily, Disp: 90 tablet, Rfl: 3    cyanocobalamin (VITAMIN B-12) 500 mcg tablet, Take 500 mcg by mouth daily, Disp: , Rfl:     diclofenac sodium (VOLTAREN) 1 %, Apply 2 g topically 2 (two) times a day, Disp: , Rfl:     fluticasone (FLONASE) 50 mcg/act nasal spray, 2 sprays into each nostril daily, Disp: 1 Bottle, Rfl: 11    Fluticasone Furoate 50 MCG/ACT AEPB, Inhale 2 sprays daily, Disp: , Rfl:     fluticasone-salmeterol (Advair Diskus) 250-50 mcg/dose inhaler, Inhale 1 puff 2 (two) times a day Rinse mouth after use , Disp: 3 Inhaler, Rfl: 3    gabapentin (NEURONTIN) 300 mg capsule, Take 2 capsules (600 mg total) by mouth daily, Disp: 270 capsule, Rfl: 3    Humira Pen 40 MG/0 4ML PNKT, As directed by Dermatology, Disp: , Rfl:     hydrocortisone valerate (WEST-VITO) 0 2 % ointment, Apply topically daily, Disp: 45 g, Rfl: 1    Liniments (ANALGESIC BALM EX), Apply topically, Disp: , Rfl:     lisinopril-hydrochlorothiazide (PRINZIDE,ZESTORETIC) 20-12 5 MG per tablet, Take 0 5 tablets by mouth daily at bedtime, Disp: , Rfl:     metoprolol succinate (TOPROL-XL) 25 mg 24 hr tablet, Take 0 5 tablets (12 5 mg total) by mouth daily at bedtime for 90 days, Disp: 45 tablet, Rfl: 0    omeprazole (PriLOSEC) 20 mg delayed release capsule, Take 1 capsule (20 mg total) by mouth daily as needed (heartburn), Disp: 90 capsule, Rfl: 1    predniSONE 10 mg tablet, Take 6 tablets on day 1 and 2, 5 tablets on day 3, 4 and 5, 4 tablets on day 6 and 7, 3 tablets on day 8 then stop, Disp: 38 tablet, Rfl: 0    Turmeric 500 MG CAPS, Take by mouth daily, Disp: , Rfl:     vitamin B-12 (CYANOCOBALAMIN) 250 MCG tablet, Take 250 mcg by mouth daily, Disp: , Rfl:     VITAMIN D, ERGOCALCIFEROL, PO, Take 2,000 Units by mouth, Disp: , Rfl:     Vitamins-Lipotropics (LIPO FLAVONOID PLUS PO), Take by mouth, Disp: , Rfl:     polyethylene glycol (MIRALAX) 17 g packet, Take 17 g by mouth daily for 5 days, Disp: 5 each, Rfl: 0    tamsulosin (FLOMAX) 0 4 mg, Take 1 capsule (0 4 mg total) by mouth daily with dinner, Disp: 30 capsule, Rfl: 0

## 2021-07-01 ENCOUNTER — OFFICE VISIT (OUTPATIENT)
Dept: UROLOGY | Facility: CLINIC | Age: 76
End: 2021-07-01
Payer: COMMERCIAL

## 2021-07-01 VITALS — BODY MASS INDEX: 33.12 KG/M2 | WEIGHT: 194 LBS | HEIGHT: 64 IN

## 2021-07-01 DIAGNOSIS — N40.1 BENIGN PROSTATIC HYPERPLASIA WITH INCOMPLETE BLADDER EMPTYING: ICD-10-CM

## 2021-07-01 DIAGNOSIS — R39.14 BENIGN PROSTATIC HYPERPLASIA WITH INCOMPLETE BLADDER EMPTYING: Primary | ICD-10-CM

## 2021-07-01 DIAGNOSIS — R39.14 BENIGN PROSTATIC HYPERPLASIA WITH INCOMPLETE BLADDER EMPTYING: ICD-10-CM

## 2021-07-01 DIAGNOSIS — N40.1 BENIGN PROSTATIC HYPERPLASIA WITH INCOMPLETE BLADDER EMPTYING: Primary | ICD-10-CM

## 2021-07-01 LAB
POST-VOID RESIDUAL VOLUME, ML POC: 81 ML
SL AMB  POCT GLUCOSE, UA: NORMAL
SL AMB LEUKOCYTE ESTERASE,UA: NORMAL
SL AMB POCT BILIRUBIN,UA: NORMAL
SL AMB POCT BLOOD,UA: NORMAL
SL AMB POCT CLARITY,UA: CLEAR
SL AMB POCT COLOR,UA: YELLOW
SL AMB POCT KETONES,UA: NORMAL
SL AMB POCT NITRITE,UA: NORMAL
SL AMB POCT PH,UA: 7.5
SL AMB POCT SPECIFIC GRAVITY,UA: 1
SL AMB POCT URINE PROTEIN: NORMAL
SL AMB POCT UROBILINOGEN: 0.2

## 2021-07-01 PROCEDURE — 51798 US URINE CAPACITY MEASURE: CPT | Performed by: PHYSICIAN ASSISTANT

## 2021-07-01 PROCEDURE — 1160F RVW MEDS BY RX/DR IN RCRD: CPT | Performed by: PHYSICIAN ASSISTANT

## 2021-07-01 PROCEDURE — 81002 URINALYSIS NONAUTO W/O SCOPE: CPT | Performed by: PHYSICIAN ASSISTANT

## 2021-07-01 PROCEDURE — 99204 OFFICE O/P NEW MOD 45 MIN: CPT | Performed by: PHYSICIAN ASSISTANT

## 2021-07-01 PROCEDURE — 1036F TOBACCO NON-USER: CPT | Performed by: PHYSICIAN ASSISTANT

## 2021-07-01 RX ORDER — FINASTERIDE 5 MG/1
5 TABLET, FILM COATED ORAL DAILY
Qty: 90 TABLET | Refills: 1 | Status: SHIPPED | OUTPATIENT
Start: 2021-07-01 | End: 2021-08-05 | Stop reason: SDUPTHER

## 2021-07-01 NOTE — PATIENT INSTRUCTIONS
Today we discussed the normal anatomy of the bladder, prostate, bladder neck, and urethra, as well physiology as it relates to storage and emptying of the bladder  We discussed the incidence of BPH as it relates to obstructive and irritative voiding symptoms as well as diagnosis, and treatment as below  We also discussed risks of delayed diagnosis and treatment including recurrent UTI, stone formation, hematuria, and renal dysfunction that may prompt specific type of treatment  Treatment options discussed include behavior modifications/avoidance of bladder irritants, medications such as Flomax, Proscar or Cialis, and surgery such as Urolift, TURP, prostatectomy  I recommend he continue the Flomax 0 4 mg every day and add in the Proscar 5 mg every day  This combination will help open the prostatic channel as well as shrink the prostate  This will still not probably be enough to completely solve year symptoms  I recommend he return to the office to see one of our surgeons for procedure called cystoscopy as well as an ultrasound of your prostate to help identify which surgical option will suit the the size of your prostate best   Based on your CT scan in an estimated measurement I suspect he will be looking at either a TURP or simple prostatectomy in the future  A digital rectal examination to feel your prostate will be performed at this upcoming appointment as well  Your PSA for prostate cancer screening is within normal range at  2 1 which is very good for your age  I am less concerned for prostate cancer and more concerned about your symptomatic enlarged prostate as the source  This will all be evaluated internally at a following appointment

## 2021-07-01 NOTE — PROGRESS NOTES
7/1/2021      No chief complaint on file  Assessment and Plan    66 y o  male NEW PATIENT    1  BPH with LUTS  2  PSA 2 1    Very symptomatic (aua score 31) BPH progressive for about 1-2 years, just started Flomax recommend the addition of Proscar today and return for Surgical outlet evaluation  He will return for cystoscopy with TRUS  Estimated prostate volume around 100 g on recent CT by measurement  He would be interested and motivated for surgical management if indicated/recommended  He defers /CABRERA exam until procedure visit  History of Present Illness  Josue Garcia is a 66 y o  male here for evaluation of BPH  He has had progression of his urinary symptoms over the past 2 years  Last seen by Dr Brandon Tirado for consultation about four years ago with complaint erectile dysfunction incomplete bladder emptying  At the time his urinary symptoms were not bothersome, and he elected sildenafil treatment for his ED  Over the past few years he has noted increased lower urinary tract bother, he was prescribed Flomax by his primary care physician this month  He presents today to discuss his options, has not noted any change yet with the flomax  He did have a recent CT scan performed for evaluation of lower abdominal pain showing normal upper urinary tracts no nephrolithiasis obstructive uropathy, normal bladder and progressive enlargement of the prostate new compared to 2017  Urine dip in the office today with trace blood, UA microscopic this month shows 0 rbcs  Screening PSA is 2 1  Defers CABRERA until cysto/trus appointment  Review of Systems   Constitutional: Negative for activity change, appetite change, chills, fever and unexpected weight change  HENT: Negative  Respiratory: Negative  Negative for shortness of breath  Cardiovascular: Negative  Negative for chest pain  Gastrointestinal: Negative for abdominal pain, diarrhea, nausea and vomiting  Endocrine: Negative  Genitourinary: Positive for difficulty urinating, frequency and urgency  Negative for decreased urine volume, dysuria, flank pain, hematuria, scrotal swelling and testicular pain  Musculoskeletal: Negative for back pain and gait problem  Skin: Negative  Allergic/Immunologic: Negative  Neurological: Negative  Hematological: Negative for adenopathy  Does not bruise/bleed easily  AUA SYMPTOM SCORE      Most Recent Value   AUA SYMPTOM SCORE   How often have you had a sensation of not emptying your bladder completely after you finished urinating? 5   How often have you had to urinate again less than two hours after you finished urinating? 4   How often have you found you stopped and started again several times when you urinate? 4   How often have you found it difficult to postpone urination? 4   How often have you had a weak urinary stream?  5   How often have you had to push or strain to begin urination? 5   How many times did you most typically get up to urinate from the time you went to bed at night until the time you got up in the morning? 4   Quality of Life: If you were to spend the rest of your life with your urinary condition just the way it is now, how would you feel about that?  5   AUA SYMPTOM SCORE  31           Vitals  Vitals:    07/01/21 1139   Weight: 88 kg (194 lb)   Height: 5' 4" (1 626 m)       Physical Exam  Vitals and nursing note reviewed  Constitutional:       General: He is not in acute distress  Appearance: Normal appearance  He is well-developed  He is not diaphoretic  HENT:      Head: Normocephalic and atraumatic  Pulmonary:      Effort: Pulmonary effort is normal       Comments: No cough or audible wheeze  Abdominal:      General: There is no distension  Palpations: Abdomen is soft  Tenderness: There is no abdominal tenderness  There is no right CVA tenderness or left CVA tenderness  Musculoskeletal:      Right lower leg: No edema        Left lower leg: No edema  Skin:     General: Skin is warm and dry  Neurological:      Mental Status: He is alert and oriented to person, place, and time        Gait: Gait normal    Psychiatric:         Speech: Speech normal          Behavior: Behavior normal            Past History  Past Medical History:   Diagnosis Date    Anxiety and depression     Arthritis     Asthma     Back pain, chronic     Last assessed: 3/11/15    BPH (benign prostatic hyperplasia)     Cataract     Last assessed: 14    GERD (gastroesophageal reflux disease)     Hyperlipidemia     Hypertension     Neuropathy     Osteoporosis     Panic disorder     Right-sided Bell's palsy     Last assessed: 3/10/14    Seasonal allergies      Social History     Socioeconomic History    Marital status: /Civil Union     Spouse name: None    Number of children: 3    Years of education: None    Highest education level: None   Occupational History    Occupation:    Tobacco Use    Smoking status: Former Smoker     Types: Cigars     Quit date:      Years since quittin 5    Smokeless tobacco: Former User    Tobacco comment: quit in , former cigar and cigarette, pipe smoker, per allscript, Past history of chewing tobacco use, active, per [employer]   Vaping Use    Vaping Use: Never used   Substance and Sexual Activity    Alcohol use: No     Comment: former drinker    Drug use: No    Sexual activity: Not Currently   Other Topics Concern    None   Social History Narrative    Functioning activity level, participates in light activities both inside and outside of the home (gardening, walking,  cycling, cooking)    High school or GED    Lives independently     Social Determinants of Health     Financial Resource Strain:     Difficulty of Paying Living Expenses:    Food Insecurity:     Worried About [address] in the Last Year:     [address] in the Last Year:    Transportation Needs:     Lack of Transportation (Medical):      Lack of Transportation (Non-Medical):    Physical Activity:     Days of Exercise per Week:     Minutes of Exercise per Session:    Stress:     Feeling of Stress :    Social Connections:     Frequency of Communication with Friends and Family:     Frequency of Social Gatherings with Friends and Family:     Attends Denominational Services:     Active Member of Clubs or Organizations:     Attends Club or Organization Meetings:     Marital Status:    Intimate Partner Violence:     Fear of Current or Ex-Partner:     Emotionally Abused:     Physically Abused:     Sexually Abused:      Social History     Tobacco Use   Smoking Status Former Smoker    Types: Cigars    Quit date:     Years since quittin 5   Smokeless Tobacco Former User   Tobacco Comment    quit in , former cigar and cigarette, pipe smoker, per allscript, Past history of chewing tobacco use, active, per [employer]     Family History   Problem Relation Age of Onset    Cataracts Mother     Hyperlipidemia Mother        The following portions of the patient's history were reviewed and updated as appropriate: allergies, current medications, past medical history, past social history, past surgical history and problem list     Results  No results found for this or any previous visit (from the past 1 hour(s)) ]  Lab Results   Component Value Date    PSA 2 1 2021    PSA 1 0 2020     Lab Results   Component Value Date    GLUCOSE 79 2015    CALCIUM 9 8 2021     2017    K 4 1 2021    CO2 32 (H) 2021     2021    BUN 14 2021    CREATININE 0 99 2021     Lab Results   Component Value Date    WBC 7 90 2021    HGB 14 8 2021    HCT 43 6 2021    MCV 86 2021     2021

## 2021-07-15 ENCOUNTER — TELEPHONE (OUTPATIENT)
Dept: FAMILY MEDICINE CLINIC | Facility: CLINIC | Age: 76
End: 2021-07-15

## 2021-07-15 NOTE — TELEPHONE ENCOUNTER
Patient called and stated that Dr Christine Pleitez only gave him one month of tamsulosin with no refills  He did follow up with urology; however, they do not do anything further with this  His procedure with them is not until 8/20  He would like you to advise if you could either give him a refill of the tamsulosin, or if you think we should try to get him an earlier appointment with urology, or both  Please advise and route to clinical pool for patient follow up

## 2021-07-19 NOTE — TELEPHONE ENCOUNTER
Pt called stating that he is still having really bad pain and he would like to talk with you regarding this I did explain to him that you are with patients I did tell him that he should contact urololgy for an ov in the morning he will do that but he wanted to talk with you first

## 2021-07-19 NOTE — TELEPHONE ENCOUNTER
Patient is calling to request a new prescription for tamsulosin (FLOMAX) 0 4 mg     , 90 day supply to [address]    [address] [address]   Phone:  [phone]

## 2021-07-20 ENCOUNTER — TELEPHONE (OUTPATIENT)
Dept: UROLOGY | Facility: MEDICAL CENTER | Age: 76
End: 2021-07-20

## 2021-07-20 DIAGNOSIS — L40.9 PSORIASIS: ICD-10-CM

## 2021-07-20 RX ORDER — HYDROCORTISONE VALERATE 2 MG/G
OINTMENT TOPICAL
Qty: 45 G | Refills: 0 | Status: SHIPPED | OUTPATIENT
Start: 2021-07-20

## 2021-07-20 RX ORDER — TAMSULOSIN HYDROCHLORIDE 0.4 MG/1
0.4 CAPSULE ORAL
Qty: 90 CAPSULE | Refills: 3 | Status: SHIPPED | OUTPATIENT
Start: 2021-07-20 | End: 2021-08-05 | Stop reason: SDUPTHER

## 2021-07-20 NOTE — TELEPHONE ENCOUNTER
Pt managed by Marnie Razo called stating he needed to be seen sooner than 8/20 cysto as his symptoms/pain are getting worse to the point he cannot sleep at night,he also stated he has been out of medication Tamsulosin which had been previously ordered by PCP

## 2021-07-20 NOTE — TELEPHONE ENCOUNTER
The patient was last seen on 7/1/21 by Rolando Woods PA-C in the [facility] location; continuation of the medication was authorized at that time    Request for same, 90 day supply with 3 refills was queued and forwarded to the Advanced Practitioner covering the [address] [address] location for approval

## 2021-07-21 NOTE — TELEPHONE ENCOUNTER
I spoke with pt who stated that he will  his script from [address]  he also made an ov with urology

## 2021-07-21 NOTE — TELEPHONE ENCOUNTER
Called and spoke to pt he is getting up every half an hour and the pain is gradually getting worse and it is getting hard to pee  PT explain he is having pain all over flank, stomach, and bladder  Expressed to pt he should go to ER  he stated only if it gets worse   Pt requesting sooner appointment

## 2021-07-26 ENCOUNTER — TELEMEDICINE (OUTPATIENT)
Dept: FAMILY MEDICINE CLINIC | Facility: CLINIC | Age: 76
End: 2021-07-26
Payer: COMMERCIAL

## 2021-07-26 VITALS — DIASTOLIC BLOOD PRESSURE: 75 MMHG | SYSTOLIC BLOOD PRESSURE: 155 MMHG | HEART RATE: 83 BPM | TEMPERATURE: 96.9 F

## 2021-07-26 DIAGNOSIS — R05.8 PRODUCTIVE COUGH: Primary | ICD-10-CM

## 2021-07-26 DIAGNOSIS — B34.9 VIRAL INFECTION, UNSPECIFIED: ICD-10-CM

## 2021-07-26 PROCEDURE — 3078F DIAST BP <80 MM HG: CPT | Performed by: PHYSICIAN ASSISTANT

## 2021-07-26 PROCEDURE — 3077F SYST BP >= 140 MM HG: CPT | Performed by: PHYSICIAN ASSISTANT

## 2021-07-26 PROCEDURE — U0003 INFECTIOUS AGENT DETECTION BY NUCLEIC ACID (DNA OR RNA); SEVERE ACUTE RESPIRATORY SYNDROME CORONAVIRUS 2 (SARS-COV-2) (CORONAVIRUS DISEASE [COVID-19]), AMPLIFIED PROBE TECHNIQUE, MAKING USE OF HIGH THROUGHPUT TECHNOLOGIES AS DESCRIBED BY CMS-2020-01-R: HCPCS | Performed by: PHYSICIAN ASSISTANT

## 2021-07-26 PROCEDURE — U0005 INFEC AGEN DETEC AMPLI PROBE: HCPCS | Performed by: PHYSICIAN ASSISTANT

## 2021-07-26 PROCEDURE — 99214 OFFICE O/P EST MOD 30 MIN: CPT | Performed by: PHYSICIAN ASSISTANT

## 2021-07-26 RX ORDER — BENZONATATE 100 MG/1
100-200 CAPSULE ORAL 3 TIMES DAILY PRN
Qty: 30 CAPSULE | Refills: 0 | Status: SHIPPED | OUTPATIENT
Start: 2021-07-26 | End: 2022-01-07 | Stop reason: ALTCHOICE

## 2021-07-26 RX ORDER — AZITHROMYCIN 250 MG/1
TABLET, FILM COATED ORAL
Qty: 6 TABLET | Refills: 0 | Status: SHIPPED | OUTPATIENT
Start: 2021-07-26 | End: 2021-07-30

## 2021-07-26 NOTE — PROGRESS NOTES
COVID-19 Outpatient Progress Note    Assessment/Plan:    Problem List Items Addressed This Visit     None      Visit Diagnoses     Productive cough    -  Primary    Relevant Medications    azithromycin (ZITHROMAX) 250 mg tablet    benzonatate (TESSALON PERLES) 100 mg capsule    Viral infection, unspecified        Relevant Orders    Novel Coronavirus (Covid-19),PCR [facility] - Col[facility]         Disposition:     We reviewed that he most likely has bronchitis which is bacterial; however, he was also asked to get COVID swab done  He will also start Zpak and start using albuterol as needed for wheezing  He was given Tessalon Pearles to use up to 3 times daily as needed for cough  He was encouraged to stay home other than to be tested  He should call with worsening or failing to improve  We reviewed that we can add on an inhaled or oral steroid if needed - if albuterol is not sufficiently relieving his wheezing  I have spent 13 minutes directly with the patient  Verification of patient location:    Patient is located in the [facility] in which I hold an active license [place]    Encounter provider Nathalie Martinez PA-C    Provider located at [address] [address]  [phone]    Recent Visits  Date Type Provider Dept   07/26/21 [facility], [address] [address] Primary Care   Showing recent visits within past 7 days and meeting all other requirements  Future Appointments  No visits were found meeting these conditions  Showing future appointments within next 150 days and meeting all other requirements       Patient agrees to participate in a virtual check in via telephone or video visit instead of presenting to the office to address urgent/immediate medical needs  Patient is aware this is a billable service      After connecting through Telephone, the patient was identified by name and date of birth  August Thakkar was informed that this was a telemedicine visit and that the exam was being conducted confidentially over secure lines  My office door was closed  No one else was in the room  August Thakkar acknowledged consent and understanding of privacy and security of the telemedicine visit  I informed the patient that I have reviewed his record in Epic and presented the opportunity for him to ask any questions regarding the visit today  The patient agreed to participate  Subjective:   August Thakkar is a 64 y o  male who is concerned about COVID-19  Patient's symptoms include fatigue, nasal congestion, sore throat, cough (productive for mucus - yellow), myalgias (back pain with coughing) and headache (better now)  Patient denies fever, chills, rhinorrhea, anosmia, loss of taste, shortness of breath, chest tightness, abdominal pain, nausea, vomiting and diarrhea       Date of symptom onset: 7/21/2021    Exposure:   Contact with a person who is under investigation (PUI) for or who is positive for COVID-19 within the last 14 days?: No    Hospitalized recently for fever and/or lower respiratory symptoms?: No      Currently a healthcare worker that is involved in direct patient care?: No      Works in a special setting where the risk of COVID-19 transmission may be high? (this may include long-term care, correctional and [facility] facilities; homeless shelters; assisted-living facilities and group homes ): No      Resident in a special setting where the risk of COVID-19 transmission may be high? (this may include long-term care, correctional and [facility] facilities; homeless shelters; assisted-living facilities and group homes ): No      Notes that he was around family and they all had colds last week - thinks they are all vaccinated - thinks they are all better this week - has been using cough syrup but has not helped much - has not tried albuterol yet  - took 2 Tylenol but has not helped much    Lab Results   Component Value Date    SARSCOV2 Not Detected 11/09/2020     Past Medical History:   Diagnosis Date    Anxiety and depression     Arthritis     Asthma     Back pain, chronic     Last assessed: 3/11/15    BPH (benign prostatic hyperplasia)     Cataract     Last assessed: 7/16/14    GERD (gastroesophageal reflux disease)     Hyperlipidemia     Hypertension     Neuropathy     Osteoporosis     Panic disorder     Right-sided Bell's palsy     Last assessed: 3/10/14    Seasonal allergies      Past Surgical History:   Procedure Laterality Date    CARPAL TUNNEL RELEASE Right 1999    Neuroplasty Decompression Median Nerve at Carpal Tunnel    CATARACT EXTRACTION  2015    COLONOSCOPY  02/2015    polyp, complete    EAR SURGERY      1982 and 1990, ear drum,  per [employer]    ESOPHAGOGASTRODUODENOSCOPY  02/2015    Diagnostic    FOOT SURGERY Left 1997    Toe    MASTOIDECTOMY Left     OTHER SURGICAL HISTORY      Spinal Anesthesia Epidural, x3 2004, per [employer]    POLYPECTOMY  2015    TYMPANOPLASTY Bilateral 2000    Dr Brit Mooney     Current Outpatient Medications   Medication Sig Dispense Refill    acetaminophen (TYLENOL) 325 mg tablet Take 2 tablets (650 mg total) by mouth every 6 (six) hours as needed for mild pain or fever 30 tablet 3    albuterol (PROVENTIL HFA,VENTOLIN HFA) 90 mcg/act inhaler Inhale 1 puff every 6 (six) hours as needed (Q6H PRN) 1 Inhaler 5    ALPRAZolam (XANAX) 0 5 mg tablet TAKE 1 TABLET BY MOUTH 1 OR 2 TIMES DAILY AS NEEDED FOR ANXIETY 60 tablet 0    amLODIPine (NORVASC) 10 mg tablet Take 1 tablet (10 mg total) by mouth daily 90 tablet 3    atorvastatin (LIPITOR) 20 mg tablet Take 1 tablet (20 mg total) by mouth daily 90 tablet 3    azelastine (ASTELIN) 0 1 % nasal spray 2 sprays into each nostril daily 2 Bottle 6    citalopram (CeleXA) 20 mg tablet Take 1 tablet (20 mg total) by mouth daily 90 tablet 3    cyanocobalamin (VITAMIN B-12) 500 mcg tablet Take 500 mcg by mouth daily      diclofenac sodium (VOLTAREN) 1 % Apply 2 g topically 2 (two) times a day      finasteride (PROSCAR) 5 mg tablet Take 1 tablet (5 mg total) by mouth daily 90 tablet 1    fluticasone (FLONASE) 50 mcg/act nasal spray 2 sprays into each nostril daily 1 Bottle 11    Fluticasone Furoate 50 MCG/ACT AEPB Inhale 2 sprays daily      fluticasone-salmeterol (Advair Diskus) 250-50 mcg/dose inhaler Inhale 1 puff 2 (two) times a day Rinse mouth after use   3 Inhaler 3    gabapentin (NEURONTIN) 300 mg capsule Take 2 capsules (600 mg total) by mouth daily 270 capsule 3    hydrocortisone valerate (WEST-VITO) 0 2 % ointment APPLY  OINTMENT EXTERNALLY DAILY 45 g 0    lisinopril-hydrochlorothiazide (PRINZIDE,ZESTORETIC) 20-12 5 MG per tablet Take 0 5 tablets by mouth daily at bedtime      omeprazole (PriLOSEC) 20 mg delayed release capsule Take 1 capsule (20 mg total) by mouth daily as needed (heartburn) 90 capsule 1    tamsulosin (FLOMAX) 0 4 mg Take 1 capsule (0 4 mg total) by mouth daily with dinner 90 capsule 3    Turmeric 500 MG CAPS Take by mouth daily      vitamin B-12 (CYANOCOBALAMIN) 250 MCG tablet Take 250 mcg by mouth daily      VITAMIN D, ERGOCALCIFEROL, PO Take 2,000 Units by mouth      Vitamins-Lipotropics (LIPO FLAVONOID PLUS PO) Take by mouth      azithromycin (ZITHROMAX) 250 mg tablet Take 2 tablets today then 1 tablet daily x 4 days 6 tablet 0    benzonatate (TESSALON PERLES) 100 mg capsule Take 1-2 capsules (100-200 mg total) by mouth 3 (three) times a day as needed for cough 30 capsule 0    Humira Pen 40 MG/0 4ML PNKT As directed by Dermatology (Patient not taking: Reported on 7/26/2021)      Liniments (ANALGESIC BALM EX) Apply topically (Patient not taking: Reported on 7/26/2021)      metoprolol succinate (TOPROL-XL) 25 mg 24 hr tablet Take 0 5 tablets (12 5 mg total) by mouth daily at bedtime for 90 days 45 tablet 0    polyethylene glycol (MIRALAX) 17 g packet Take 17 g by mouth daily for 5 days 5 each 0    predniSONE 10 mg tablet Take 6 tablets on day 1 and 2, 5 tablets on day 3, 4 and 5, 4 tablets on day 6 and 7, 3 tablets on day 8 then stop 38 tablet 0     No current facility-administered medications for this visit  Allergies   Allergen Reactions    Amoxicillin Rash    Amoxicillin-Pot Clavulanate Er  [Amoxicillin-Pot Clavulanate] Hives and Rash    Codeine Rash and Shortness Of Breath    Penicillin G Rash    Albumen, Egg - Food Allergy     Lisinopril Other (See Comments)    Other      pork    Pork-Derived Products - Food Allergy Hives    Prochlorperazine Other (See Comments) and Itching     rash all over the body    Compazine  [Prochlorperazine Edisylate] Rash    Iodinated Diagnostic Agents Rash    Latex Rash and Itching    Valdecoxib Itching, Rash and Headache     Category: HEADACHES;        Review of Systems   Constitutional: Positive for fatigue  Negative for chills and fever  HENT: Positive for congestion and sore throat  Negative for rhinorrhea  Respiratory: Positive for cough (productive for mucus - yellow) and wheezing  Negative for chest tightness and shortness of breath  Gastrointestinal: Negative for abdominal pain, diarrhea, nausea and vomiting  Musculoskeletal: Positive for myalgias (back pain with coughing)  Neurological: Positive for headaches (better now)  Objective:    Vitals:    07/26/21 0948 07/26/21 1150   BP: 161/87 155/75   BP Location: Left arm    Patient Position: Sitting    Cuff Size: Standard    Pulse: 83    Temp: (!) 96 9 °F (36 1 °C)        Physical Exam  Vitals (no exam acheivable since doing via telephone visit but patient had frequent cough and audible soft wheezing during coughing - no audible SOB/respiratory distress) reviewed  VIRTUAL VISIT DISCLAIMER    Matty Mcintyre verbally agrees to participate in [employer]   Pt is aware that Virtual Care Services could be limited without vital signs or the ability to perform a full hands-on physical exam  Matty Dawkins understands he or the provider may request at any time to terminate the video visit and request the patient to seek care or treatment in person

## 2021-08-04 NOTE — PROGRESS NOTES
UROLOGY PROCEDURE NOTE     CHIEF COMPLAINT   Matty Pelaez is a 64 y o  male with a complaint of   Chief Complaint   Patient presents with    Cystoscopy    Benign Prostatic Hypertrophy       History of Present Illness:     64 y o  male, patient of Dr Sanjay Serrano  Patient with progressive lower urinary tract symptoms being treated with dual therapy Flomax and Proscar  AUA symptom score is 31 with a quality of life of 5  Patient had recent CT scan with   Enlargement of the gland  CT measurements are certainly greater than 50 g  He returns today for cystoscopy and transrectal ultrasound measurement      Lab Results   Component Value Date    PSA 2 1 06/02/2021    PSA 1 0 11/12/2020     Past Medical History:     Past Medical History:   Diagnosis Date    Anxiety and depression     Arthritis     Asthma     Back pain, chronic     Last assessed: 3/11/15    BPH (benign prostatic hyperplasia)     Cataract     Last assessed: 7/16/14    GERD (gastroesophageal reflux disease)     Hyperlipidemia     Hypertension     Neuropathy     Osteoporosis     Panic disorder     Right-sided Bell's palsy     Last assessed: 3/10/14    Seasonal allergies        PAST SURGICAL HISTORY:     Past Surgical History:   Procedure Laterality Date    CARPAL TUNNEL RELEASE Right 1999    Neuroplasty Decompression Median Nerve at Carpal Tunnel    CATARACT EXTRACTION  2015    COLONOSCOPY  02/2015    polyp, complete    CYSTOSCOPY  08/05/2021    EAR SURGERY      1982 and 1990, ear drum,  per [employer]    ESOPHAGOGASTRODUODENOSCOPY  02/2015    Diagnostic    FOOT SURGERY Left 1997    Toe    MASTOIDECTOMY Left     OTHER SURGICAL HISTORY      Spinal Anesthesia Epidural, x3 2004, per [employer]    POLYPECTOMY  2015    TYMPANOPLASTY Bilateral 2000    Dr Elizalde Ou:     Current Outpatient Medications   Medication Sig Dispense Refill    acetaminophen (TYLENOL) 325 mg tablet Take 2 tablets (650 mg total) by mouth every 6 (six) hours as needed for mild pain or fever 30 tablet 3    albuterol (PROVENTIL HFA,VENTOLIN HFA) 90 mcg/act inhaler Inhale 1 puff every 6 (six) hours as needed (Q6H PRN) 1 Inhaler 5    ALPRAZolam (XANAX) 0 5 mg tablet TAKE 1 TABLET BY MOUTH 1 OR 2 TIMES DAILY AS NEEDED FOR ANXIETY 60 tablet 0    amLODIPine (NORVASC) 10 mg tablet Take 1 tablet (10 mg total) by mouth daily 90 tablet 3    atorvastatin (LIPITOR) 20 mg tablet Take 1 tablet (20 mg total) by mouth daily 90 tablet 3    azelastine (ASTELIN) 0 1 % nasal spray 2 sprays into each nostril daily 2 Bottle 6    benzonatate (TESSALON PERLES) 100 mg capsule Take 1-2 capsules (100-200 mg total) by mouth 3 (three) times a day as needed for cough 30 capsule 0    citalopram (CeleXA) 20 mg tablet Take 1 tablet (20 mg total) by mouth daily 90 tablet 3    cyanocobalamin (VITAMIN B-12) 500 mcg tablet Take 500 mcg by mouth daily      diclofenac sodium (VOLTAREN) 1 % Apply 2 g topically 2 (two) times a day      finasteride (PROSCAR) 5 mg tablet Take 1 tablet (5 mg total) by mouth daily 90 tablet 1    fluticasone (FLONASE) 50 mcg/act nasal spray 2 sprays into each nostril daily 1 Bottle 11    Fluticasone Furoate 50 MCG/ACT AEPB Inhale 2 sprays daily      fluticasone-salmeterol (Advair Diskus) 250-50 mcg/dose inhaler Inhale 1 puff 2 (two) times a day Rinse mouth after use   3 Inhaler 3    gabapentin (NEURONTIN) 300 mg capsule Take 2 capsules (600 mg total) by mouth daily 270 capsule 3    hydrocortisone valerate (WEST-VITO) 0 2 % ointment APPLY  OINTMENT EXTERNALLY DAILY 45 g 0    lisinopril-hydrochlorothiazide (PRINZIDE,ZESTORETIC) 20-12 5 MG per tablet Take 0 5 tablets by mouth daily at bedtime      metoprolol succinate (TOPROL-XL) 25 mg 24 hr tablet Take 0 5 tablets (12 5 mg total) by mouth daily at bedtime for 90 days 45 tablet 0    omeprazole (PriLOSEC) 20 mg delayed release capsule Take 1 capsule (20 mg total) by mouth daily as needed (heartburn) 90 capsule 1    polyethylene glycol (MIRALAX) 17 g packet Take 17 g by mouth daily for 5 days 5 each 0    predniSONE 5 mg tablet Take 6 pills by mouth on day 1, 5 pills on day 2, 4 pills on day 3, 3 pills on day 4, 2 pills on day 5, and 1 pill on day 6  21 tablet 0    tamsulosin (FLOMAX) 0 4 mg Take 1 capsule (0 4 mg total) by mouth daily with dinner 90 capsule 3    Turmeric 500 MG CAPS Take by mouth daily      vitamin B-12 (CYANOCOBALAMIN) 250 MCG tablet Take 250 mcg by mouth daily      VITAMIN D, ERGOCALCIFEROL, PO Take 2,000 Units by mouth      Vitamins-Lipotropics (LIPO FLAVONOID PLUS PO) Take by mouth      Humira Pen 40 MG/0 4ML PNKT As directed by Dermatology (Patient not taking: Reported on 2021)      Liniments (ANALGESIC BALM EX) Apply topically (Patient not taking: Reported on 2021)       No current facility-administered medications for this visit         ALLERGIES:     Allergies   Allergen Reactions    Amoxicillin Rash    Amoxicillin-Pot Clavulanate Er  [Amoxicillin-Pot Clavulanate] Hives and Rash    Codeine Rash and Shortness Of Breath    Penicillin G Rash    Albumen, Egg - Food Allergy     Lisinopril Other (See Comments)    Other      pork    Pork-Derived Products - Food Allergy Hives    Prochlorperazine Other (See Comments) and Itching     rash all over the body    Compazine  [Prochlorperazine Edisylate] Rash    Iodinated Diagnostic Agents Rash    Latex Rash and Itching    Valdecoxib Itching, Rash and Headache     Category: HEADACHES;        SOCIAL HISTORY:     Social History     Socioeconomic History    Marital status: /Civil Union     Spouse name: Not on file    Number of children: 3    Years of education: Not on file    Highest education level: Not on file   Occupational History    Occupation:    Tobacco Use    Smoking status: Former Smoker     Types: Cigars     Quit date:      Years since quittin 6    Smokeless tobacco: Former User    Ht 5' 4" (1 626 m)   Wt 88 9 kg (196 lb)   BMI 33 64 kg/m²     General:  Healthy appearing male in no acute distress  They have a normal affect  There is not appear to be any gross neurologic defects or abnormalities  HEENT:  Normocephalic, atraumatic  Neck is supple without any palpable lymphadenopathy  Cardiovascular:  Patient has normal palpable distal radial pulses  There is no significant peripheral edema  No JVD is noted  Respiratory:  Patient has unlabored respirations  There is no audible wheeze or rhonchi  Abdomen:  Abdomen is  without surgical scars  Abdomen is soft and nontender  There is no tympany  Inguinal and umbilical hernia are not appreciated  Genitourinary: no penile lesions or discharge, no testicular masses or tenderness, no hernias,  Rectal exam demonstrates a slightly enlarged prostate which is smooth    Musculoskeletal:  Patient  Does not have significant CVA tenderness in the  flank with palpation or percussion  They full range of motion in all 4 extremities  Strength in all 4 extremities appears congruent  Patient is able to ambulate without assistance or difficulty  Dermatologic:  Patient has no skin abnormalities or rashes        LABS:     CBC:   Lab Results   Component Value Date    WBC 7 90 2021    HGB 14 8 2021    HCT 43 6 2021    MCV 86 2021     2021       BMP:   Lab Results   Component Value Date    GLUCOSE 79 2015    CALCIUM 9 8 2021     2017    K 4 1 2021    CO2 32 (H) 2021     2021    BUN 14 2021    CREATININE 0 99 2021     Lab Results   Component Value Date    PSA 2 1 2021    PSA 1 0 2020     IMAGIN/2/21  CT ABDOMEN AND PELVIS WITHOUT IV CONTRAST     INDICATION:   Lower abdominal pain      COMPARISON:  2017      TECHNIQUE:  CT examination of the abdomen and pelvis was performed without intravenous contrast   Axial, sagittal, and coronal 2D reformatted images were created from the source data and submitted for interpretation       Radiation dose length product (DLP) for this visit:  674 mGy-cm   This examination, like all CT scans performed in the [facility], was performed utilizing techniques to minimize radiation dose exposure, including the use of iterative   reconstruction and automated exposure control       Enteric contrast was administered       FINDINGS:     ABDOMEN     LOWER CHEST:  Clear lung bases      LIVER/BILIARY TREE:  Unremarkable      GALLBLADDER:  No calcified gallstones  No pericholecystic inflammatory change      SPLEEN:  Unremarkable      PANCREAS:  Partial fatty replacement      ADRENAL GLANDS:  Unremarkable      KIDNEYS/URETERS:  No nephrolithiasis or obstructive uropathy      STOMACH AND BOWEL:  Small hiatal hernia  Small amount contrast in the stomach  Contrast is present in the small bowel and right colon  No evidence of bowel obstruction, colitis or diverticulitis      APPENDIX:  Normal appendix      ABDOMINOPELVIC CAVITY:  No free intraperitoneal air, fluid collection or lymphadenopathy      VESSELS:  Calcified plaque throughout the abdominal aorta  No aneurysm      PELVIS     REPRODUCTIVE ORGANS:  Progressive enlargement of the prostate, making a posterior impression on the bladder      URINARY BLADDER:  Unremarkable      ABDOMINAL WALL/INGUINAL REGIONS:  Unremarkable      OSSEOUS STRUCTURES: L4-5 posterior disc bulge, disc and facet osteophytosis  Moderate central canal stenosis  Encroachment of the traversing L5 nerve roots in the subarticular zones  Severe bilateral neural foraminal narrowing  L5-S1 posterior disc   osteophytes and facet osteophytosis  No central canal stenosis  Severe bilateral neural foraminal narrowing      IMPRESSION:        1  No evidence of bowel obstruction, colitis, diverticulitis or appendicitis  Small hiatal hernia    2   Progressive enlargement of the prostate making a posterior impression on the bladder  No obstructive uropathy  3   Chronic disc and facet degenerative change resulting in severe bilateral neural foraminal narrowing at L4-5 and L5-S1  PROCEDURE:     SEE NOTE    ASSESSMENT:     64 y o  male with enlarged prostate and lower urinary tract symptoms    PLAN:     The patient has obstruction of the prostatic urethra with a large his BPH nodule on the left prostate  This is visible on CT and transrectal ultrasound  Given the size of his prostate age, I would try to avoid aggressive surgical management with simple prostatectomy  I do not think the size of his prostate warrants this  Consideration of Transurethral resection is probably the best procedure for this patient  He is already managed on dual therapy and we discussed the Transurethral resection as a surgical option  This point time, I have recommended that we maximize the time the patient is on dual therapy as he has only been on Proscar for several months  He will return in within the next 6 months to reassess his symptoms  If he is well controlled on medication can avoid surgical intervention  If not, would consider scheduling transurethral procedure

## 2021-08-05 ENCOUNTER — PROCEDURE VISIT (OUTPATIENT)
Dept: UROLOGY | Facility: CLINIC | Age: 76
End: 2021-08-05
Payer: COMMERCIAL

## 2021-08-05 VITALS
SYSTOLIC BLOOD PRESSURE: 142 MMHG | HEIGHT: 64 IN | DIASTOLIC BLOOD PRESSURE: 88 MMHG | WEIGHT: 196 LBS | BODY MASS INDEX: 33.46 KG/M2 | HEART RATE: 64 BPM

## 2021-08-05 DIAGNOSIS — N40.1 BENIGN PROSTATIC HYPERPLASIA WITH INCOMPLETE BLADDER EMPTYING: Primary | ICD-10-CM

## 2021-08-05 DIAGNOSIS — R39.14 BENIGN PROSTATIC HYPERPLASIA WITH INCOMPLETE BLADDER EMPTYING: Primary | ICD-10-CM

## 2021-08-05 LAB — POST-VOID RESIDUAL VOLUME, ML POC: 125 ML

## 2021-08-05 PROCEDURE — 52000 CYSTOURETHROSCOPY: CPT | Performed by: UROLOGY

## 2021-08-05 PROCEDURE — 51736 URINE FLOW MEASUREMENT: CPT | Performed by: UROLOGY

## 2021-08-05 PROCEDURE — 1160F RVW MEDS BY RX/DR IN RCRD: CPT | Performed by: UROLOGY

## 2021-08-05 PROCEDURE — 1036F TOBACCO NON-USER: CPT | Performed by: UROLOGY

## 2021-08-05 PROCEDURE — 51798 US URINE CAPACITY MEASURE: CPT | Performed by: UROLOGY

## 2021-08-05 PROCEDURE — 99214 OFFICE O/P EST MOD 30 MIN: CPT | Performed by: UROLOGY

## 2021-08-05 PROCEDURE — 76872 US TRANSRECTAL: CPT | Performed by: UROLOGY

## 2021-08-05 RX ORDER — FINASTERIDE 5 MG/1
5 TABLET, FILM COATED ORAL DAILY
Qty: 90 TABLET | Refills: 3 | Status: SHIPPED | OUTPATIENT
Start: 2021-08-05 | End: 2021-12-27

## 2021-08-05 RX ORDER — TAMSULOSIN HYDROCHLORIDE 0.4 MG/1
0.4 CAPSULE ORAL
Qty: 90 CAPSULE | Refills: 3 | Status: SHIPPED | OUTPATIENT
Start: 2021-08-05 | End: 2022-07-27 | Stop reason: SDUPTHER

## 2021-08-05 NOTE — PATIENT INSTRUCTIONS
Transurethral Prostatectomy   WHAT YOU NEED TO KNOW:   A transurethral prostatectomy is surgery that is done to remove part or all of your prostate gland  This surgery is also called transurethral resection of the prostate (TURP)  HOW TO PREPARE:   Before your surgery:   · Bring your medicine bottles or a list of your medicines when you see your healthcare provider  Tell your provider if you are allergic to any medicine  Tell your provider if you use any herbs, food supplements, or over-the-counter medicine  · Ask your healthcare provider if you need to stop using aspirin or any other prescribed or over-the-counter medicine before your procedure or surgery  · Tell your healthcare provider if you have heart disease or blood clotting problems  · Your healthcare provider may give you medicine to shrink the size of your prostate  You may also be given medicine to help prevent infection  Ask your healthcare provider for more information about the medicine that you need to take before surgery  · You may need blood and urine tests  You may also need a rectal exam to check the size of your prostate  Ask your healthcare provider for more information about tests that you may need  Write down the date, time, and location of each test      · Write down the correct date, time, and location of your surgery  The day before your surgery:  Ask healthcare providers about directions for eating and drinking  The day of your surgery:   · You or a close family member will be asked to sign a legal document called a consent form  It gives healthcare providers permission to do the procedure or surgery  It also explains the problems that may happen, and your choices  Make sure all your questions are answered before you sign this form  · Healthcare providers may insert an intravenous tube (IV) into your vein  A vein in the arm is usually chosen  Through the IV tube, you may be given liquids and medicine      · An anesthesiologist will talk to you before your surgery  You may need medicine to keep you asleep or numb an area of your body during surgery  Tell healthcare providers if you or anyone in your family has had a problem with anesthesia in the past     WHAT WILL HAPPEN:   What will happen:   · You may be given anesthesia medicine to make you lose feeling from the waist down, or to keep you asleep during this surgery  Healthcare providers will insert a resectoscope through your urethra  A resectoscope is a tube with a small monitor on the end  The monitor shows your prostate on a screen for healthcare providers while they do surgery  Your bladder may be filled with fluid during surgery  · Heat that is produced by the resectoscope will be used to remove part, or all, of your prostate  Heat will also be used to stop bleeding in the surgery area  Fluid is used to wash away extra tissue and blood  The resectoscope will be removed from your urethra  A catheter (soft tube) will be put through your urethra and into your bladder  The catheter will be left in place to drain urine out of your body and into a bag  After your surgery: You are taken to a room to rest  Do not get out of bed until healthcare providers say it is okay  When healthcare providers see that you are okay, you will be taken to your room  The catheter may need to stay in for 2 to 3 days after surgery, or longer if needed  CONTACT YOUR HEALTHCARE PROVIDER IF:   · You cannot get to surgery on time  · You have a fever  · Your symptoms, such as trouble urinating, get worse  · Your urine has blood in it  SEEK CARE IMMEDIATELY IF:   · You cannot urinate, or you are urinating very little or less often than usual     · You have severe abdominal or back pain  RISKS:   · During surgery, you may bleed more than expected, and need a blood transfusion  Your prostate, bladder, or urethra may be damaged   After surgery, your urethra or part of your bladder may grow narrow  This can make it difficult or painful to urinate  You may feel like you need to urinate often, or have trouble controlling when you urinate  You may get blood clots in your urine that can block your urethra  · After surgery, you may get a urinary tract infection, or an infection in the surgery area  You may have trouble getting an erection or ejaculating  You may develop TURP syndrome, which can cause dizziness, fatigue, stomach pain, and vomiting  If you had a partial resection of the prostate, the part of your prostate that was not removed may grow too large  This can cause your signs and symptoms to return, and you may need to have surgery again  · Without TURP surgery, your prostate may grow larger, and your symptoms may get worse  Urine may not be able to flow through your urethra as it should  The urine may remain in your bladder, and cause an infection  Stones may form in your bladder and kidneys, and you may have blood in your urine  These problems can damage your bladder or urethra and over time may cause your kidneys to stop working  If you are unable to urinate on your own, a Gaston catheter may need to stay in place to drain your urine  CARE AGREEMENT:   You have the right to help plan your care  Learn about your health condition and how it may be treated  Discuss treatment options with your healthcare providers to decide what care you want to receive  You always have the right to refuse treatment  © Copyright [employer] 2018 Information is for End User's use only and may not be sold, redistributed or otherwise used for commercial purposes  All illustrations and images included in CareNotes® are the copyrighted property of A D A M , Inc  or [facility] Amanda Clements  The above information is an  only  It is not intended as medical advice for individual conditions or treatments   Talk to your doctor, nurse or pharmacist before following any medical regimen to see if it is safe and effective for you

## 2021-08-05 NOTE — PROGRESS NOTES
Uroflow    Date/Time: 8/5/2021 3:00 PM  Performed by: Rashida Hooks MD  Authorized by: Rashida Hooks MD   Universal Protocol:  Timeout called at: 8/5/2021 3:00 PM   Procedure - Urodynamics:  Procedure details: Simple Uroflow          Post-procedure:     Patient tolerance: Patient tolerated procedure well with no immediate complications      Comments:       Patient was only reported to void 103 cc this was a nondiagnostic uroflow    Postvoid residual demonstrated 125 cc

## 2021-08-05 NOTE — PROGRESS NOTES
Cystoscopy     Date/Time 8/5/2021 2:43 PM     Performed by  Kody Berman MD     Authorized by Kody Berman MD      Universal Protocol:  Timeout called at: 8/5/2021 2:43 PM       Procedure Details:  Procedure type: cystoscopy    Patient tolerance: Patient tolerated the procedure well with no immediate complications    Additional Procedure Details:   Cystoscopy procedure note:    Risk and benefits of flexible cystoscopy were discussed  Informed consent was obtained  Urine dip was adequate for cystoscopy  The patient was placed in the supine position  His genitalia was prepped with Betadine and draped in a sterile fashion  Viscous 2% lidocaine jelly was instilled into the urethra and allowed dwell time for local anesthesia  Flexible cystoscopy was then performed using a 16F scope  The distal urethra and prostatic urethra were evaluated and were normal in course and caliber  Patient had obstruction of the prostatic urethra primarily from the left lateral lobe  There was an intravesical component that was not large or pedunculated  There was significant 2 to 3+ trabeculation of the bladder  Once inside the bladder, it was carefully inspected in a 360 degree fashion  There was no evidence of mucosal abnormalities, lesions, diverticula or stones  Both ureteral orifices in their orthotopic location were visualized with clear efflux of urine  Retroflexion for evaluation of the bladder neck was normal      Overall this was a  Cystoscopy with signs of bladder outlet obstruction  Patient was filled with 450 cc with additional urine from presentation he was not able to void prior to the procedure  He had a relatively high capacity  Patient was then turned the left lateral decubitus position  Digital rectal exam was performed  Transrectal ultrasound probe was placed in 3 dimensional measurement was undertaken  Patient's prostate measured 55 6 g    There was a large central zone nodule on the left bladder neck  Patient was then given the opportunity to void

## 2021-08-06 NOTE — PROGRESS NOTES
Assessment & Plan:     K21 9 Acid reflux  I have evaluated the patient and found the condition to be: Stable    J45 909 Asthma  I have evaluated the patient and found the condition to be: Stable    I10 Essential hypertension  I have evaluated the patient and found the condition to be: Stable    G43 909 Migraine  I have evaluated the patient and found the condition to be: Stable    I87 2 Venous insufficiency  I have evaluated the patient and found the condition to be: Stable    N18 30 CKD (chronic kidney disease) stage 3, GFR 30-59 ml/min ([facility])  I have evaluated the patient and found the condition to be: Stable    F32 0 Mild depression ([address] )  I have evaluated the patient and found the condition to be: Stable    E78 5 Hyperlipidemia  I have evaluated the patient and found the condition to be: Stable    E55 9 Vitamin D deficiency  I have evaluated the patient and found the condition to be: Stable    F41 9 Anxiety  I have evaluated the patient and found the condition to be: Stable    Problem List Items Addressed This Visit        Musculoskeletal and Integument    Trochanteric bursitis of left hip     He has pain that is probably multifactorial but does seem to have some component of trochanteric bursitis    He had some relief in the past with injections so I performed one today         Relevant Orders    Large joint arthrocentesis: L greater trochanteric bursa    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 -  does have Psoriasis - Primary     He had previously been seen by Rheumatology but is now following with Dermatology and is considering restarting Humira as he stopped during COVID            Other    Hyperglycemia    Relevant Medications    lisinopril-hydrochlorothiazide (PRINZIDE,ZESTORETIC) 20-12 5 MG per tablet    Other Relevant Orders    Comprehensive metabolic panel    CBC and differential    Hemoglobin A1C    Hyperlipidemia    Relevant Medications    lisinopril-hydrochlorothiazide (PRINZIDE,ZESTORETIC) 20-12 5 MG per tablet    atorvastatin (LIPITOR) 20 mg tablet    Other Relevant Orders    Comprehensive metabolic panel    CBC and differential      Other Visit Diagnoses     Lumbosacral radiculitis        Relevant Medications    gabapentin (NEURONTIN) 300 mg capsule    Hypertension, unspecified type        Relevant Medications    lisinopril-hydrochlorothiazide (PRINZIDE,ZESTORETIC) 20-12 5 MG per tablet    amLODIPine (NORVASC) 10 mg tablet    Other Relevant Orders    Comprehensive metabolic panel    CBC and differential    Hemoglobin A1C        Large joint arthrocentesis: L greater trochanteric bursa  Universal Protocol:  Consent: Verbal consent obtained  Risks and benefits: risks, benefits and alternatives were discussed  Consent given by: patient  Patient understanding: patient states understanding of the procedure being performed    Supporting Documentation  Indications: pain   Procedure Details  Location: hip - L greater trochanteric bursa  Needle size: 25 G  Ultrasound guidance: no  Approach: lateral  Medications administered: 2 mL lidocaine 2 %; 20 mg triamcinolone acetonide 40 mg/mL                 Subjective:     Patient ID: Franny Ortiz is a 64 y o  male     Chief Complaint   Patient presents with    Follow-up      HPI patient presents today for follow-up for chronic health issues  He has a history of hypertension hyperlipidemia  Fortunately, denies chest pain, shortness of breath, palpitations or lightheadedness  He unfortunately does have some chronic pain  His back pain has been worsened he also is having pain in his left lateral hip  He gets some pain that radiates down from his back into the lower legs  He has been imaged in the past and has known significant degenerative arthritic changes  He is following with Urology this point for elevated PSA as well as enlarged prostate  He denies dysuria but does have some occasional urinary hesitancy  Flomax has been helpful    He has a prior history of asthma but is not having any current problems with wheezing or cough  Review of Systems   Constitutional: Negative for appetite change, chills, fatigue, fever and unexpected weight change  HENT: Negative for trouble swallowing  Eyes: Negative for visual disturbance  Respiratory: Negative for cough, chest tightness, shortness of breath and wheezing  Cardiovascular: Negative for chest pain, palpitations and leg swelling  Gastrointestinal: Negative for abdominal distention, abdominal pain, blood in stool, constipation and diarrhea  Endocrine: Negative for polyuria  Genitourinary: Negative for difficulty urinating and flank pain  Musculoskeletal: Positive for arthralgias, back pain, gait problem and neck stiffness  Negative for joint swelling and myalgias  Skin: Negative for rash  Neurological: Negative for dizziness and light-headedness  Hematological: Negative for adenopathy  Does not bruise/bleed easily  Psychiatric/Behavioral: Negative for dysphoric mood and sleep disturbance  The patient is not nervous/anxious  Objective:      /80 (BP Location: Left arm, Patient Position: Sitting, Cuff Size: Adult)   Pulse 92   Temp 98 °F (36 7 °C) (Temporal)   Ht 5' 4" (1 626 m)   Wt 88 6 kg (195 lb 6 4 oz)   SpO2 97%   BMI 33 54 kg/m²     Problem List Items Addressed This Visit        Musculoskeletal and Integument    Trochanteric bursitis of left hip     He has pain that is probably multifactorial but does seem to have some component of trochanteric bursitis    He had some relief in the past with injections so I performed one today         Relevant Orders    Large joint arthrocentesis: L greater trochanteric bursa    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 -  does have Psoriasis - Primary     He had previously been seen by Rheumatology but is now following with Dermatology and is considering restarting Humira as he stopped during COVID            Other    Hyperglycemia    Relevant Medications    lisinopril-hydrochlorothiazide (PRINZIDE,ZESTORETIC) 20-12 5 MG per tablet    Other Relevant Orders    Comprehensive metabolic panel    CBC and differential    Hemoglobin A1C    Hyperlipidemia    Relevant Medications    lisinopril-hydrochlorothiazide (PRINZIDE,ZESTORETIC) 20-12 5 MG per tablet    atorvastatin (LIPITOR) 20 mg tablet    Other Relevant Orders    Comprehensive metabolic panel    CBC and differential      Other Visit Diagnoses     Lumbosacral radiculitis        Relevant Medications    gabapentin (NEURONTIN) 300 mg capsule    Hypertension, unspecified type        Relevant Medications    lisinopril-hydrochlorothiazide (PRINZIDE,ZESTORETIC) 20-12 5 MG per tablet    amLODIPine (NORVASC) 10 mg tablet    Other Relevant Orders    Comprehensive metabolic panel    CBC and differential    Hemoglobin A1C          Physical Exam  Constitutional:       General: He is not in acute distress  Appearance: Normal appearance  He is well-developed  He is obese  He is not diaphoretic  HENT:      Head: Normocephalic  Right Ear: External ear normal       Left Ear: External ear normal       Nose: Nose normal    Eyes:      General:         Right eye: No discharge  Left eye: No discharge  Conjunctiva/sclera: Conjunctivae normal       Pupils: Pupils are equal, round, and reactive to light  Neck:      Thyroid: No thyromegaly  Trachea: No tracheal deviation  Cardiovascular:      Rate and Rhythm: Normal rate and regular rhythm  Heart sounds: Normal heart sounds  No murmur heard  No friction rub  Pulmonary:      Effort: Pulmonary effort is normal  No respiratory distress  Breath sounds: Normal breath sounds  No wheezing  Chest:      Chest wall: No tenderness  Abdominal:      General: There is no distension  Palpations: There is no mass  Tenderness: There is no abdominal tenderness  There is no guarding or rebound  Hernia: No hernia is present  Musculoskeletal:         General: Tenderness (He has some significant pain to palpation over his left trochanteric bursa) present  No swelling or deformity  Cervical back: Normal range of motion  Right lower leg: No edema  Left lower leg: No edema  Skin:     Findings: Rash ( he has some areas consistent with psoriasis on his lower extremities per) present  No erythema  Neurological:      General: No focal deficit present  Mental Status: He is alert  Cranial Nerves: No cranial nerve deficit  Coordination: Coordination normal    Psychiatric:         Thought Content:  Thought content normal

## 2021-08-09 ENCOUNTER — RA CDI HCC (OUTPATIENT)
Dept: OTHER | Facility: HOSPITAL | Age: 76
End: 2021-08-09

## 2021-08-09 NOTE — PROGRESS NOTES
[facility]  coding opportunities          Chart reviewed, no opportunity found: CHART REVIEWED, NO OPPORTUNITY FOUND                     Patients insurance company: [employer] (Medicare Advantage and Commercial)

## 2021-08-16 ENCOUNTER — OFFICE VISIT (OUTPATIENT)
Dept: FAMILY MEDICINE CLINIC | Facility: CLINIC | Age: 76
End: 2021-08-16
Payer: COMMERCIAL

## 2021-08-16 VITALS
SYSTOLIC BLOOD PRESSURE: 110 MMHG | OXYGEN SATURATION: 97 % | HEART RATE: 92 BPM | DIASTOLIC BLOOD PRESSURE: 80 MMHG | HEIGHT: 64 IN | TEMPERATURE: 98 F | BODY MASS INDEX: 33.36 KG/M2 | WEIGHT: 195.4 LBS

## 2021-08-16 DIAGNOSIS — I10 HYPERTENSION, UNSPECIFIED TYPE: ICD-10-CM

## 2021-08-16 DIAGNOSIS — M19.90 INFLAMMATORY ARTHROPATHY: Primary | ICD-10-CM

## 2021-08-16 DIAGNOSIS — M54.17 LUMBOSACRAL RADICULITIS: ICD-10-CM

## 2021-08-16 DIAGNOSIS — L40.9 PSORIASIS: ICD-10-CM

## 2021-08-16 DIAGNOSIS — E78.2 MIXED HYPERLIPIDEMIA: ICD-10-CM

## 2021-08-16 DIAGNOSIS — M70.62 TROCHANTERIC BURSITIS OF LEFT HIP: ICD-10-CM

## 2021-08-16 DIAGNOSIS — R73.9 HYPERGLYCEMIA: ICD-10-CM

## 2021-08-16 DIAGNOSIS — E78.5 HYPERLIPIDEMIA, UNSPECIFIED HYPERLIPIDEMIA TYPE: ICD-10-CM

## 2021-08-16 PROCEDURE — 1160F RVW MEDS BY RX/DR IN RCRD: CPT | Performed by: FAMILY MEDICINE

## 2021-08-16 PROCEDURE — 99214 OFFICE O/P EST MOD 30 MIN: CPT | Performed by: FAMILY MEDICINE

## 2021-08-16 PROCEDURE — T1015 CLINIC SERVICE: HCPCS | Performed by: FAMILY MEDICINE

## 2021-08-16 PROCEDURE — 3079F DIAST BP 80-89 MM HG: CPT | Performed by: FAMILY MEDICINE

## 2021-08-16 PROCEDURE — 20610 DRAIN/INJ JOINT/BURSA W/O US: CPT | Performed by: FAMILY MEDICINE

## 2021-08-16 PROCEDURE — 3074F SYST BP LT 130 MM HG: CPT | Performed by: FAMILY MEDICINE

## 2021-08-16 PROCEDURE — 1036F TOBACCO NON-USER: CPT | Performed by: FAMILY MEDICINE

## 2021-08-16 RX ORDER — LISINOPRIL AND HYDROCHLOROTHIAZIDE 20; 12.5 MG/1; MG/1
0.5 TABLET ORAL
Qty: 45 TABLET | Refills: 3 | Status: SHIPPED | OUTPATIENT
Start: 2021-08-16 | End: 2021-11-22 | Stop reason: SDUPTHER

## 2021-08-16 RX ORDER — ATORVASTATIN CALCIUM 20 MG/1
20 TABLET, FILM COATED ORAL DAILY
Qty: 90 TABLET | Refills: 3 | Status: SHIPPED | OUTPATIENT
Start: 2021-08-16 | End: 2021-12-26

## 2021-08-16 RX ORDER — LIDOCAINE HYDROCHLORIDE 20 MG/ML
2 INJECTION, SOLUTION INFILTRATION; PERINEURAL
Status: COMPLETED | OUTPATIENT
Start: 2021-08-16 | End: 2021-08-16

## 2021-08-16 RX ORDER — AMLODIPINE BESYLATE 10 MG/1
10 TABLET ORAL DAILY
Qty: 90 TABLET | Refills: 3 | Status: SHIPPED | OUTPATIENT
Start: 2021-08-16 | End: 2022-07-08

## 2021-08-16 RX ORDER — GABAPENTIN 300 MG/1
300 CAPSULE ORAL 3 TIMES DAILY
Qty: 270 CAPSULE | Refills: 3 | Status: SHIPPED | OUTPATIENT
Start: 2021-08-16

## 2021-08-16 RX ORDER — TRIAMCINOLONE ACETONIDE 40 MG/ML
20 INJECTION, SUSPENSION INTRA-ARTICULAR; INTRAMUSCULAR
Status: COMPLETED | OUTPATIENT
Start: 2021-08-16 | End: 2021-08-16

## 2021-08-16 RX ADMIN — LIDOCAINE HYDROCHLORIDE 2 ML: 20 INJECTION, SOLUTION INFILTRATION; PERINEURAL at 12:28

## 2021-08-16 RX ADMIN — TRIAMCINOLONE ACETONIDE 20 MG: 40 INJECTION, SUSPENSION INTRA-ARTICULAR; INTRAMUSCULAR at 12:28

## 2021-08-16 NOTE — ASSESSMENT & PLAN NOTE
He has pain that is probably multifactorial but does seem to have some component of trochanteric bursitis    He had some relief in the past with injections so I performed one today

## 2021-08-16 NOTE — ASSESSMENT & PLAN NOTE
He had previously been seen by Rheumatology but is now following with Dermatology and is considering restarting Humira as he [facility]

## 2021-09-10 DIAGNOSIS — F41.9 ANXIETY: ICD-10-CM

## 2021-09-10 RX ORDER — CITALOPRAM 20 MG/1
TABLET ORAL
Qty: 30 TABLET | Refills: 0 | Status: SHIPPED | OUTPATIENT
Start: 2021-09-10 | End: 2021-10-11

## 2021-09-28 ENCOUNTER — RA CDI HCC (OUTPATIENT)
Dept: OTHER | Facility: HOSPITAL | Age: 76
End: 2021-09-28

## 2021-10-05 ENCOUNTER — IMMUNIZATIONS (OUTPATIENT)
Dept: FAMILY MEDICINE CLINIC | Facility: CLINIC | Age: 76
End: 2021-10-05
Payer: COMMERCIAL

## 2021-10-05 VITALS — TEMPERATURE: 97.6 F

## 2021-10-05 DIAGNOSIS — Z23 NEED FOR INFLUENZA VACCINATION: Primary | ICD-10-CM

## 2021-10-05 PROCEDURE — G0008 ADMIN INFLUENZA VIRUS VAC: HCPCS

## 2021-10-05 PROCEDURE — 90662 IIV NO PRSV INCREASED AG IM: CPT

## 2021-10-06 ENCOUNTER — TELEPHONE (OUTPATIENT)
Dept: FAMILY MEDICINE CLINIC | Facility: CLINIC | Age: 76
End: 2021-10-06

## 2021-10-11 DIAGNOSIS — F41.9 ANXIETY: ICD-10-CM

## 2021-10-11 RX ORDER — CITALOPRAM 20 MG/1
TABLET ORAL
Qty: 30 TABLET | Refills: 1 | Status: SHIPPED | OUTPATIENT
Start: 2021-10-11 | End: 2021-12-10

## 2021-10-11 RX ORDER — CITALOPRAM 20 MG/1
TABLET ORAL
Qty: 30 TABLET | Refills: 0 | Status: CANCELLED | OUTPATIENT
Start: 2021-10-11

## 2021-10-13 RX ORDER — ALPRAZOLAM 0.5 MG/1
TABLET ORAL
Qty: 60 TABLET | Refills: 0 | Status: SHIPPED | OUTPATIENT
Start: 2021-10-13 | End: 2021-12-20 | Stop reason: SDUPTHER

## 2021-11-02 LAB
ALBUMIN SERPL-MCNC: 4.2 G/DL (ref 3.6–5.1)
ALBUMIN/GLOB SERPL: 1.7 (CALC) (ref 1–2.5)
ALP SERPL-CCNC: 56 U/L (ref 35–144)
ALT SERPL-CCNC: 20 U/L (ref 9–46)
AST SERPL-CCNC: 19 U/L (ref 10–35)
BASOPHILS # BLD AUTO: 50 CELLS/UL (ref 0–200)
BASOPHILS NFR BLD AUTO: 0.7 %
BILIRUB SERPL-MCNC: 1.2 MG/DL (ref 0.2–1.2)
BUN SERPL-MCNC: 11 MG/DL (ref 7–25)
BUN/CREAT SERPL: ABNORMAL (CALC) (ref 6–22)
CALCIUM SERPL-MCNC: 9.7 MG/DL (ref 8.6–10.3)
CHLORIDE SERPL-SCNC: 102 MMOL/L (ref 98–110)
CO2 SERPL-SCNC: 33 MMOL/L (ref 20–32)
CREAT SERPL-MCNC: 1.07 MG/DL (ref 0.7–1.18)
EOSINOPHIL # BLD AUTO: 767 CELLS/UL (ref 15–500)
EOSINOPHIL NFR BLD AUTO: 10.8 %
ERYTHROCYTE [DISTWIDTH] IN BLOOD BY AUTOMATED COUNT: 12.4 % (ref 11–15)
GLOBULIN SER CALC-MCNC: 2.5 G/DL (CALC) (ref 1.9–3.7)
GLUCOSE SERPL-MCNC: 105 MG/DL (ref 65–99)
HBA1C MFR BLD: 5.8 % OF TOTAL HGB
HCT VFR BLD AUTO: 43.1 % (ref 38.5–50)
HGB BLD-MCNC: 14.7 G/DL (ref 13.2–17.1)
LYMPHOCYTES # BLD AUTO: 1740 CELLS/UL (ref 850–3900)
LYMPHOCYTES NFR BLD AUTO: 24.5 %
MCH RBC QN AUTO: 28.9 PG (ref 27–33)
MCHC RBC AUTO-ENTMCNC: 34.1 G/DL (ref 32–36)
MCV RBC AUTO: 84.7 FL (ref 80–100)
MONOCYTES # BLD AUTO: 646 CELLS/UL (ref 200–950)
MONOCYTES NFR BLD AUTO: 9.1 %
NEUTROPHILS # BLD AUTO: 3898 CELLS/UL (ref 1500–7800)
NEUTROPHILS NFR BLD AUTO: 54.9 %
PLATELET # BLD AUTO: 204 THOUSAND/UL (ref 140–400)
PMV BLD REES-ECKER: 10.8 FL (ref 7.5–12.5)
POTASSIUM SERPL-SCNC: 4.2 MMOL/L (ref 3.5–5.3)
PROT SERPL-MCNC: 6.7 G/DL (ref 6.1–8.1)
RBC # BLD AUTO: 5.09 MILLION/UL (ref 4.2–5.8)
SL AMB EGFR AFRICAN AMERICAN: 77 ML/MIN/1.73M2
SL AMB EGFR NON AFRICAN AMERICAN: 66 ML/MIN/1.73M2
SODIUM SERPL-SCNC: 142 MMOL/L (ref 135–146)
WBC # BLD AUTO: 7.1 THOUSAND/UL (ref 3.8–10.8)

## 2021-11-15 ENCOUNTER — RA CDI HCC (OUTPATIENT)
Dept: OTHER | Facility: HOSPITAL | Age: 76
End: 2021-11-15

## 2021-11-22 ENCOUNTER — OFFICE VISIT (OUTPATIENT)
Dept: FAMILY MEDICINE CLINIC | Facility: CLINIC | Age: 76
End: 2021-11-22
Payer: COMMERCIAL

## 2021-11-22 VITALS
RESPIRATION RATE: 18 BRPM | DIASTOLIC BLOOD PRESSURE: 60 MMHG | WEIGHT: 195 LBS | BODY MASS INDEX: 33.29 KG/M2 | HEART RATE: 84 BPM | SYSTOLIC BLOOD PRESSURE: 100 MMHG | OXYGEN SATURATION: 98 % | HEIGHT: 64 IN

## 2021-11-22 DIAGNOSIS — R33.9 INCOMPLETE EMPTYING OF BLADDER: ICD-10-CM

## 2021-11-22 DIAGNOSIS — R07.89 COSTOCHONDRAL CHEST PAIN: ICD-10-CM

## 2021-11-22 DIAGNOSIS — R10.2 SUPRAPUBIC PAIN: ICD-10-CM

## 2021-11-22 DIAGNOSIS — R97.20 ELEVATED PSA: ICD-10-CM

## 2021-11-22 DIAGNOSIS — M48.062 SPINAL STENOSIS OF LUMBAR REGION WITH NEUROGENIC CLAUDICATION: ICD-10-CM

## 2021-11-22 DIAGNOSIS — R94.31 ABNORMAL EKG: ICD-10-CM

## 2021-11-22 DIAGNOSIS — E78.2 MIXED HYPERLIPIDEMIA: ICD-10-CM

## 2021-11-22 DIAGNOSIS — I10 HYPERTENSION, UNSPECIFIED TYPE: ICD-10-CM

## 2021-11-22 DIAGNOSIS — M94.0 COSTOCHONDRITIS: ICD-10-CM

## 2021-11-22 DIAGNOSIS — M19.90 INFLAMMATORY ARTHROPATHY: ICD-10-CM

## 2021-11-22 DIAGNOSIS — R07.2 PRECORDIAL CHEST PAIN: ICD-10-CM

## 2021-11-22 DIAGNOSIS — I10 ESSENTIAL HYPERTENSION: Primary | ICD-10-CM

## 2021-11-22 DIAGNOSIS — R73.9 HYPERGLYCEMIA: ICD-10-CM

## 2021-11-22 DIAGNOSIS — Z86.010 HX OF COLONIC POLYPS: ICD-10-CM

## 2021-11-22 DIAGNOSIS — E66.9 OBESITY, CLASS I, BMI 30-34.9: ICD-10-CM

## 2021-11-22 DIAGNOSIS — F32.A MILD DEPRESSION: ICD-10-CM

## 2021-11-22 DIAGNOSIS — N18.31 STAGE 3A CHRONIC KIDNEY DISEASE (HCC): ICD-10-CM

## 2021-11-22 DIAGNOSIS — L40.9 PSORIASIS: ICD-10-CM

## 2021-11-22 LAB
SL AMB  POCT GLUCOSE, UA: NEGATIVE
SL AMB LEUKOCYTE ESTERASE,UA: NEGATIVE
SL AMB POCT BILIRUBIN,UA: NEGATIVE
SL AMB POCT BLOOD,UA: NEGATIVE
SL AMB POCT CLARITY,UA: CLEAR
SL AMB POCT COLOR,UA: YELLOW
SL AMB POCT KETONES,UA: NEGATIVE
SL AMB POCT NITRITE,UA: NEGATIVE
SL AMB POCT PH,UA: 7.5
SL AMB POCT SPECIFIC GRAVITY,UA: 1.01
SL AMB POCT URINE PROTEIN: NEGATIVE
SL AMB POCT UROBILINOGEN: 0.2

## 2021-11-22 PROCEDURE — 3725F SCREEN DEPRESSION PERFORMED: CPT | Performed by: FAMILY MEDICINE

## 2021-11-22 PROCEDURE — 99215 OFFICE O/P EST HI 40 MIN: CPT | Performed by: FAMILY MEDICINE

## 2021-11-22 PROCEDURE — 1160F RVW MEDS BY RX/DR IN RCRD: CPT | Performed by: FAMILY MEDICINE

## 2021-11-22 PROCEDURE — 3074F SYST BP LT 130 MM HG: CPT | Performed by: FAMILY MEDICINE

## 2021-11-22 PROCEDURE — 1036F TOBACCO NON-USER: CPT | Performed by: FAMILY MEDICINE

## 2021-11-22 PROCEDURE — 81002 URINALYSIS NONAUTO W/O SCOPE: CPT | Performed by: FAMILY MEDICINE

## 2021-11-22 PROCEDURE — 93000 ELECTROCARDIOGRAM COMPLETE: CPT | Performed by: FAMILY MEDICINE

## 2021-11-22 PROCEDURE — 3078F DIAST BP <80 MM HG: CPT | Performed by: FAMILY MEDICINE

## 2021-11-22 RX ORDER — RIBOFLAVIN (VITAMIN B2) 100 MG
100 TABLET ORAL DAILY
COMMUNITY

## 2021-11-22 RX ORDER — LISINOPRIL AND HYDROCHLOROTHIAZIDE 20; 12.5 MG/1; MG/1
1 TABLET ORAL DAILY
Qty: 90 TABLET | Refills: 3
Start: 2021-11-22 | End: 2022-02-04 | Stop reason: SDUPTHER

## 2021-11-23 ENCOUNTER — TELEPHONE (OUTPATIENT)
Dept: FAMILY MEDICINE CLINIC | Facility: CLINIC | Age: 76
End: 2021-11-23

## 2021-12-09 ENCOUNTER — HOSPITAL ENCOUNTER (OUTPATIENT)
Dept: NUCLEAR MEDICINE | Facility: HOSPITAL | Age: 76
Discharge: HOME | End: 2021-12-09
Payer: COMMERCIAL

## 2021-12-09 ENCOUNTER — HOSPITAL ENCOUNTER (OUTPATIENT)
Dept: NON INVASIVE DIAGNOSTICS | Facility: HOSPITAL | Age: 76
Discharge: HOME | End: 2021-12-09
Payer: COMMERCIAL

## 2021-12-09 VITALS — BODY MASS INDEX: 33.29 KG/M2 | WEIGHT: 195 LBS | HEIGHT: 64 IN

## 2021-12-09 DIAGNOSIS — R07.89 COSTOCHONDRAL CHEST PAIN: ICD-10-CM

## 2021-12-09 DIAGNOSIS — M94.0 COSTOCHONDRITIS: ICD-10-CM

## 2021-12-09 DIAGNOSIS — R94.31 ABNORMAL EKG: ICD-10-CM

## 2021-12-09 LAB
BASELINE ST DEPRESSION: 0 MM
CHEST PAIN STATEMENT: NORMAL
MAX DIASTOLIC BP: 66 MMHG
MAX HEART RATE: 100 BPM
MAX PREDICTED HEART RATE: 142 BPM
MAX. SYSTOLIC BP: 144 MMHG
NUC STRESS EJECTION FRACTION: 69 %
PROTOCOL NAME: NORMAL
RATE PRESSURE PRODUCT: NORMAL
REASON FOR TERMINATION: NORMAL
SL CV REST NUCLEAR ISOTOPE DOSE: 10.7 MCI
SL CV STRESS NUCLEAR ISOTOPE DOSE: 33 MCI
SL CV STRESS RECOVERY BP: NORMAL MMHG
SL CV STRESS RECOVERY HR: 90 BPM
SL CV STRESS RECOVERY O2 SAT: 98 %
STRESS ANGINA INDEX: 0
STRESS BASELINE BP: NORMAL MMHG
STRESS BASELINE HR: 77 BPM
STRESS O2 SAT REST: 98 %
STRESS PEAK HR: 100 BPM
STRESS POST O2 SAT PEAK: 98 %
STRESS POST PEAK BP: 144 MMHG
STRESS ST DEPRESSION: 0 MM
STRESS/REST PERFUSION RATIO: 1
TARGET HR FORMULA: NORMAL
TEST INDICATION: NORMAL
TIME IN EXERCISE PHASE: NORMAL

## 2021-12-09 PROCEDURE — 93017 CV STRESS TEST TRACING ONLY: CPT

## 2021-12-09 PROCEDURE — 93018 CV STRESS TEST I&R ONLY: CPT | Performed by: INTERNAL MEDICINE

## 2021-12-09 PROCEDURE — 78452 HT MUSCLE IMAGE SPECT MULT: CPT

## 2021-12-09 PROCEDURE — A9502 TC99M TETROFOSMIN: HCPCS

## 2021-12-09 PROCEDURE — 93016 CV STRESS TEST SUPVJ ONLY: CPT | Performed by: INTERNAL MEDICINE

## 2021-12-09 PROCEDURE — G1004 CDSM NDSC: HCPCS

## 2021-12-09 PROCEDURE — 78452 HT MUSCLE IMAGE SPECT MULT: CPT | Performed by: INTERNAL MEDICINE

## 2021-12-09 RX ADMIN — REGADENOSON 0.4 MG: 0.08 INJECTION, SOLUTION INTRAVENOUS at 10:38

## 2021-12-10 DIAGNOSIS — F41.9 ANXIETY: ICD-10-CM

## 2021-12-10 RX ORDER — CITALOPRAM 20 MG/1
TABLET ORAL
Qty: 30 TABLET | Refills: 0 | Status: SHIPPED | OUTPATIENT
Start: 2021-12-10 | End: 2022-01-02

## 2021-12-20 ENCOUNTER — OFFICE VISIT (OUTPATIENT)
Dept: FAMILY MEDICINE CLINIC | Facility: CLINIC | Age: 76
End: 2021-12-20
Payer: COMMERCIAL

## 2021-12-20 VITALS
DIASTOLIC BLOOD PRESSURE: 68 MMHG | WEIGHT: 197 LBS | RESPIRATION RATE: 18 BRPM | OXYGEN SATURATION: 97 % | SYSTOLIC BLOOD PRESSURE: 136 MMHG | HEART RATE: 72 BPM | HEIGHT: 64 IN | BODY MASS INDEX: 33.63 KG/M2

## 2021-12-20 DIAGNOSIS — H69.83 DYSFUNCTION OF BOTH EUSTACHIAN TUBES: ICD-10-CM

## 2021-12-20 DIAGNOSIS — F41.9 ANXIETY: ICD-10-CM

## 2021-12-20 DIAGNOSIS — E66.9 OBESITY, CLASS I, BMI 30-34.9: ICD-10-CM

## 2021-12-20 DIAGNOSIS — L40.9 PSORIASIS: ICD-10-CM

## 2021-12-20 DIAGNOSIS — R07.89 COSTOCHONDRAL CHEST PAIN: ICD-10-CM

## 2021-12-20 DIAGNOSIS — R73.09 ELEVATED GLUCOSE: ICD-10-CM

## 2021-12-20 DIAGNOSIS — I10 ESSENTIAL HYPERTENSION: Primary | ICD-10-CM

## 2021-12-20 DIAGNOSIS — N18.31 STAGE 3A CHRONIC KIDNEY DISEASE (HCC): ICD-10-CM

## 2021-12-20 DIAGNOSIS — R09.82 POSTNASAL DRIP: ICD-10-CM

## 2021-12-20 DIAGNOSIS — J45.909 ASTHMA, UNSPECIFIED ASTHMA SEVERITY, UNSPECIFIED WHETHER COMPLICATED, UNSPECIFIED WHETHER PERSISTENT: ICD-10-CM

## 2021-12-20 DIAGNOSIS — R05.3 CHRONIC COUGH: ICD-10-CM

## 2021-12-20 PROCEDURE — 3725F SCREEN DEPRESSION PERFORMED: CPT | Performed by: FAMILY MEDICINE

## 2021-12-20 PROCEDURE — 1036F TOBACCO NON-USER: CPT | Performed by: FAMILY MEDICINE

## 2021-12-20 PROCEDURE — 99214 OFFICE O/P EST MOD 30 MIN: CPT | Performed by: FAMILY MEDICINE

## 2021-12-20 PROCEDURE — 1160F RVW MEDS BY RX/DR IN RCRD: CPT | Performed by: FAMILY MEDICINE

## 2021-12-20 RX ORDER — FLUTICASONE PROPIONATE 50 MCG
2 SPRAY, SUSPENSION (ML) NASAL DAILY
Qty: 48 G | Refills: 5 | Status: SHIPPED | OUTPATIENT
Start: 2021-12-20 | End: 2022-01-07 | Stop reason: ALTCHOICE

## 2021-12-20 RX ORDER — ALPRAZOLAM 0.5 MG/1
TABLET ORAL
Qty: 60 TABLET | Refills: 1 | Status: SHIPPED | OUTPATIENT
Start: 2021-12-20 | End: 2022-05-27 | Stop reason: SDUPTHER

## 2021-12-20 RX ORDER — ALBUTEROL SULFATE 90 UG/1
1 AEROSOL, METERED RESPIRATORY (INHALATION) EVERY 6 HOURS PRN
Qty: 54 G | Refills: 5 | Status: SHIPPED | OUTPATIENT
Start: 2021-12-20

## 2021-12-26 DIAGNOSIS — N40.1 BENIGN PROSTATIC HYPERPLASIA WITH INCOMPLETE BLADDER EMPTYING: ICD-10-CM

## 2021-12-26 DIAGNOSIS — E78.5 HYPERLIPIDEMIA, UNSPECIFIED HYPERLIPIDEMIA TYPE: ICD-10-CM

## 2021-12-26 DIAGNOSIS — R39.14 BENIGN PROSTATIC HYPERPLASIA WITH INCOMPLETE BLADDER EMPTYING: ICD-10-CM

## 2021-12-26 RX ORDER — ATORVASTATIN CALCIUM 20 MG/1
TABLET, FILM COATED ORAL
Qty: 90 TABLET | Refills: 0 | Status: SHIPPED | OUTPATIENT
Start: 2021-12-26

## 2021-12-27 RX ORDER — FINASTERIDE 5 MG/1
TABLET, FILM COATED ORAL
Qty: 90 TABLET | Refills: 0 | Status: SHIPPED | OUTPATIENT
Start: 2021-12-27 | End: 2022-01-21

## 2022-01-02 DIAGNOSIS — F41.9 ANXIETY: ICD-10-CM

## 2022-01-02 RX ORDER — CITALOPRAM 20 MG/1
TABLET ORAL
Qty: 30 TABLET | Refills: 0 | Status: SHIPPED | OUTPATIENT
Start: 2022-01-02 | End: 2022-02-04 | Stop reason: SDUPTHER

## 2022-01-06 ENCOUNTER — TELEPHONE (OUTPATIENT)
Dept: GASTROENTEROLOGY | Facility: HOSPITAL | Age: 77
End: 2022-01-06

## 2022-01-07 ENCOUNTER — ANESTHESIA (OUTPATIENT)
Dept: GASTROENTEROLOGY | Facility: HOSPITAL | Age: 77
End: 2022-01-07

## 2022-01-07 ENCOUNTER — ANESTHESIA EVENT (OUTPATIENT)
Dept: GASTROENTEROLOGY | Facility: HOSPITAL | Age: 77
End: 2022-01-07

## 2022-01-07 ENCOUNTER — HOSPITAL ENCOUNTER (OUTPATIENT)
Dept: GASTROENTEROLOGY | Facility: HOSPITAL | Age: 77
Setting detail: OUTPATIENT SURGERY
Discharge: HOME | End: 2022-01-07
Attending: INTERNAL MEDICINE
Payer: COMMERCIAL

## 2022-01-07 VITALS
RESPIRATION RATE: 21 BRPM | HEIGHT: 64 IN | BODY MASS INDEX: 32.78 KG/M2 | HEART RATE: 65 BPM | OXYGEN SATURATION: 95 % | SYSTOLIC BLOOD PRESSURE: 116 MMHG | DIASTOLIC BLOOD PRESSURE: 65 MMHG | WEIGHT: 192 LBS | TEMPERATURE: 98.1 F

## 2022-01-07 DIAGNOSIS — Z12.11 ENCOUNTER FOR SCREENING FOR MALIGNANT NEOPLASM OF COLON: ICD-10-CM

## 2022-01-07 DIAGNOSIS — Z86.010 PERSONAL HISTORY OF COLONIC POLYPS: ICD-10-CM

## 2022-01-07 DIAGNOSIS — Z86.010 HX OF COLONIC POLYPS: ICD-10-CM

## 2022-01-07 PROCEDURE — 88305 TISSUE EXAM BY PATHOLOGIST: CPT | Performed by: PATHOLOGY

## 2022-01-07 PROCEDURE — 45380 COLONOSCOPY AND BIOPSY: CPT | Performed by: INTERNAL MEDICINE

## 2022-01-07 RX ORDER — PROPOFOL 10 MG/ML
INJECTION, EMULSION INTRAVENOUS AS NEEDED
Status: DISCONTINUED | OUTPATIENT
Start: 2022-01-07 | End: 2022-01-07

## 2022-01-07 RX ORDER — SODIUM CHLORIDE 9 MG/ML
125 INJECTION, SOLUTION INTRAVENOUS CONTINUOUS
Status: DISCONTINUED | OUTPATIENT
Start: 2022-01-07 | End: 2022-01-11 | Stop reason: HOSPADM

## 2022-01-07 RX ADMIN — PROPOFOL 50 MG: 10 INJECTION, EMULSION INTRAVENOUS at 09:24

## 2022-01-07 RX ADMIN — LIDOCAINE HYDROCHLORIDE 100 MG: 20 INJECTION INTRAVENOUS at 09:19

## 2022-01-07 RX ADMIN — SODIUM CHLORIDE 125 ML/HR: 0.9 INJECTION, SOLUTION INTRAVENOUS at 08:48

## 2022-01-07 RX ADMIN — PROPOFOL 100 MG: 10 INJECTION, EMULSION INTRAVENOUS at 09:19

## 2022-01-07 NOTE — H&P
H&P EXAM - Outpatient Endoscopy   [facility] 64 y o  male MRN: [id number]    [facility] PRE/POST   Encounter: [id number]        Impression: hx of polyps; screening    Plan:colonoscopy    Chief Complaint: surveillance suggested for adenomas removed in 2015     Physical Exam: no distress   Chest: clear   Heart: rrr

## 2022-01-07 NOTE — ANESTHESIA PREPROCEDURE EVALUATION
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 -  does have Psoriasis   (+) Osteoarthritis of multiple joints   (+) Primary localized osteoarthrosis of ankle and foot   (+) Thoracic degenerative disc disease      NEURO/PSYCH   (+) Anxiety   (+) Fibromyalgia   (+) Migraine   (+) Mild depression (HCC)      PULMONARY   (+) Asthma        Physical Exam    Airway    Mallampati score: II  TM Distance: >3 FB  Neck ROM: full     Dental   No notable dental hx     Cardiovascular  Rhythm: regular, Rate: normal, Cardiovascular exam normal    Pulmonary  Pulmonary exam normal Breath sounds clear to auscultation,     Other Findings        Anesthesia Plan  ASA Score- 2     Anesthesia Type- general and IV sedation with anesthesia with ASA Monitors  Additional Monitors:   Airway Plan:           Plan Factors-    Chart reviewed  Patient summary reviewed  Patient is not a current smoker  Patient instructed to abstain from smoking on day of procedure  Patient did not smoke on day of surgery  Induction-     Postoperative Plan-     Informed Consent- Anesthetic plan and risks discussed with patient  I personally reviewed this patient with the CRNA  Discussed and agreed on the Anesthesia Plan with the CRNA  Helen Menard

## 2022-01-21 DIAGNOSIS — R39.14 BENIGN PROSTATIC HYPERPLASIA WITH INCOMPLETE BLADDER EMPTYING: ICD-10-CM

## 2022-01-21 DIAGNOSIS — N40.1 BENIGN PROSTATIC HYPERPLASIA WITH INCOMPLETE BLADDER EMPTYING: ICD-10-CM

## 2022-01-21 RX ORDER — FINASTERIDE 5 MG/1
TABLET, FILM COATED ORAL
Qty: 90 TABLET | Refills: 0 | Status: SHIPPED | OUTPATIENT
Start: 2022-01-21 | End: 2022-02-04 | Stop reason: SDUPTHER

## 2022-02-04 DIAGNOSIS — R73.9 HYPERGLYCEMIA: ICD-10-CM

## 2022-02-04 DIAGNOSIS — E78.2 MIXED HYPERLIPIDEMIA: ICD-10-CM

## 2022-02-04 DIAGNOSIS — F41.9 ANXIETY: ICD-10-CM

## 2022-02-04 DIAGNOSIS — I10 HYPERTENSION, UNSPECIFIED TYPE: ICD-10-CM

## 2022-02-04 RX ORDER — LISINOPRIL AND HYDROCHLOROTHIAZIDE 20; 12.5 MG/1; MG/1
1 TABLET ORAL DAILY
Qty: 90 TABLET | Refills: 0
Start: 2022-02-04 | End: 2022-07-08

## 2022-02-04 RX ORDER — CITALOPRAM 20 MG/1
20 TABLET ORAL DAILY
Qty: 30 TABLET | Refills: 0 | Status: SHIPPED | OUTPATIENT
Start: 2022-02-04 | End: 2022-02-12

## 2022-02-08 ENCOUNTER — OFFICE VISIT (OUTPATIENT)
Dept: FAMILY MEDICINE CLINIC | Facility: CLINIC | Age: 77
End: 2022-02-08
Payer: COMMERCIAL

## 2022-02-08 ENCOUNTER — OFFICE VISIT (OUTPATIENT)
Dept: UROLOGY | Facility: CLINIC | Age: 77
End: 2022-02-08
Payer: COMMERCIAL

## 2022-02-08 VITALS
SYSTOLIC BLOOD PRESSURE: 155 MMHG | BODY MASS INDEX: 33.29 KG/M2 | DIASTOLIC BLOOD PRESSURE: 100 MMHG | HEART RATE: 81 BPM | WEIGHT: 195 LBS | HEIGHT: 64 IN

## 2022-02-08 VITALS
DIASTOLIC BLOOD PRESSURE: 90 MMHG | WEIGHT: 197.4 LBS | HEART RATE: 92 BPM | OXYGEN SATURATION: 99 % | HEIGHT: 64 IN | RESPIRATION RATE: 12 BRPM | BODY MASS INDEX: 33.7 KG/M2 | SYSTOLIC BLOOD PRESSURE: 140 MMHG

## 2022-02-08 DIAGNOSIS — I10 ESSENTIAL HYPERTENSION: Primary | ICD-10-CM

## 2022-02-08 DIAGNOSIS — R35.0 BENIGN PROSTATIC HYPERPLASIA WITH URINARY FREQUENCY: Primary | ICD-10-CM

## 2022-02-08 DIAGNOSIS — N40.1 BENIGN PROSTATIC HYPERPLASIA WITH URINARY FREQUENCY: Primary | ICD-10-CM

## 2022-02-08 PROCEDURE — 99214 OFFICE O/P EST MOD 30 MIN: CPT | Performed by: INTERNAL MEDICINE

## 2022-02-08 PROCEDURE — 99214 OFFICE O/P EST MOD 30 MIN: CPT | Performed by: PHYSICIAN ASSISTANT

## 2022-02-08 RX ORDER — FLUOCINOLONE ACETONIDE 0.1 MG/ML
SOLUTION TOPICAL
COMMUNITY
Start: 2022-01-26

## 2022-02-08 RX ORDER — TACROLIMUS 1 MG/G
OINTMENT TOPICAL
COMMUNITY
Start: 2022-01-31

## 2022-02-08 NOTE — ASSESSMENT & PLAN NOTE
Recent blood pressure elevation more likely due to corticosteroid injection due to his psoriasis  His blood pressure is much better right now  Was recheck by me 145/90  His blood pressure cuff seems to be reliable  We not going to change any of his medications  He will follow-up with us in 7 days  He will continue to monitor blood pressures  He does not report any symptoms today except of mild headache  No neurologic deficit  He will limit his salt intake as much as he can, he is not on any NSAIDs

## 2022-02-08 NOTE — PROGRESS NOTES
Assessment/Plan:    Essential hypertension  Recent blood pressure elevation more likely due to corticosteroid injection due to his psoriasis  His blood pressure is much better right now  Was recheck by me 145/90  His blood pressure cuff seems to be reliable  We not going to change any of his medications  He will follow-up with us in 7 days  He will continue to monitor blood pressures  He does not report any symptoms today except of mild headache  No neurologic deficit  He will limit his salt intake as much as he can, he is not on any NSAIDs  Migraine  Complaints of headaches but is more likely due to his blood pressure being slightly uncontrolled right now due to recent steroid shot  Will watch for now  Follow-up in 7 days  Diagnoses and all orders for this visit:    Essential hypertension    Other orders  -     tacrolimus (PROTOPIC) 0 1 % ointment; APPLY TO EARS TWICE A DAY WHEN NEEDED  -     fluocinolone (SYNALAR) 0 01 % external solution; APPLY TOPICALLY TO SCALP ONCE A DAY AS NEEDED FOR PSORIASIS          Subjective:      Patient ID: Kleber Mcdowell is a 64 y o  male  Patient came today with concern of having elevated blood pressure last night up to 016 systolic, he checked it with his blood pressure monitor  He is compliant with his amlodipine and lisinopril-hydrochlorothiazide combination pill that he usually takes in the morning  He said that he was also complaining of I fullness and headaches when it is all happened  He said that recently had a corticosteroid shot due to his psoriasis  Hypertension  Associated symptoms include headaches  Pertinent negatives include no chest pain or shortness of breath         The following portions of the patient's history were reviewed and updated as appropriate: allergies, current medications, past family history, past medical history, past social history, past surgical history, and problem list     Review of Systems   Constitutional: Negative for chills and fever  Respiratory: Negative for chest tightness and shortness of breath  Cardiovascular: Negative for chest pain  Neurological: Positive for headaches  Negative for dizziness, tremors, seizures, facial asymmetry, speech difficulty, weakness and light-headedness  Psychiatric/Behavioral: Negative for confusion           Objective:      /90 (BP Location: Left arm, Patient Position: Sitting, Cuff Size: Standard)   Pulse 92   Resp 12   Ht 5' 4" (1 626 m)   Wt 89 5 kg (197 lb 6 4 oz)   SpO2 99%   BMI 33 88 kg/m²     Allergies   Allergen Reactions    Amoxicillin Rash    Amoxicillin-Pot Clavulanate Er  [Amoxicillin-Pot Clavulanate] Hives and Rash    Codeine Rash and Shortness Of Breath    Penicillin G Rash    Albumen, Egg - Food Allergy     Other      pork    Pork-Derived Products - Food Allergy Hives    Prochlorperazine Other (See Comments) and Itching     rash all over the body    Compazine  [Prochlorperazine Edisylate] Rash    Iodinated Diagnostic Agents Rash    Latex Rash and Itching    Valdecoxib Itching, Rash and Headache     Category: HEADACHES;           Current Outpatient Medications:     acetaminophen (TYLENOL) 325 mg tablet, Take 2 tablets (650 mg total) by mouth every 6 (six) hours as needed for mild pain or fever, Disp: 30 tablet, Rfl: 3    albuterol (PROVENTIL HFA,VENTOLIN HFA) 90 mcg/act inhaler, Inhale 1 puff every 6 (six) hours as needed (Q6H PRN), Disp: 54 g, Rfl: 5    ALPRAZolam (XANAX) 0 5 mg tablet, TAKE 1 TABLET BY MOUTH 1 TO 2 TIMES DAILY AS NEEDED FOR ANXIETY, Disp: 60 tablet, Rfl: 1    amLODIPine (NORVASC) 10 mg tablet, Take 1 tablet (10 mg total) by mouth daily, Disp: 90 tablet, Rfl: 3    Ascorbic Acid (vitamin C) 100 MG tablet, Take 100 mg by mouth daily, Disp: , Rfl:     atorvastatin (LIPITOR) 20 mg tablet, Take 1 tablet by mouth daily, Disp: 90 tablet, Rfl: 0    azelastine (ASTELIN) 0 1 % nasal spray, 2 sprays into each nostril daily, Disp: 2 Bottle, Rfl: 6    citalopram (CeleXA) 20 mg tablet, Take 1 tablet (20 mg total) by mouth daily, Disp: 30 tablet, Rfl: 0    diclofenac sodium (VOLTAREN) 1 %, Apply 2 g topically 2 (two) times a day, Disp: , Rfl:     finasteride (PROSCAR) 5 mg tablet, Take 1 tablet (5 mg total) by mouth daily, Disp: 90 tablet, Rfl: 3    fluocinolone (SYNALAR) 0 01 % external solution, APPLY TOPICALLY TO SCALP ONCE A DAY AS NEEDED FOR PSORIASIS, Disp: , Rfl:     gabapentin (NEURONTIN) 300 mg capsule, Take 1 capsule (300 mg total) by mouth 3 (three) times a day, Disp: 270 capsule, Rfl: 3    Humira Pen 40 MG/0 4ML PNKT, As directed by Dermatology, Disp: , Rfl:     hydrocortisone valerate (WEST-VITO) 0 2 % ointment, APPLY  OINTMENT EXTERNALLY DAILY, Disp: 45 g, Rfl: 0    lisinopril-hydrochlorothiazide (PRINZIDE,ZESTORETIC) 20-12 5 MG per tablet, Take 1 tablet by mouth daily, Disp: 90 tablet, Rfl: 0    omeprazole (PriLOSEC) 20 mg delayed release capsule, Take 1 capsule (20 mg total) by mouth daily as needed (heartburn), Disp: 90 capsule, Rfl: 1    tacrolimus (PROTOPIC) 0 1 % ointment, APPLY TO EARS TWICE A DAY WHEN NEEDED, Disp: , Rfl:     tamsulosin (FLOMAX) 0 4 mg, Take 1 capsule (0 4 mg total) by mouth daily with dinner, Disp: 90 capsule, Rfl: 3    vitamin B-12 (VITAMIN B-12) 1,000 mcg tablet, Take 1,000 mcg by mouth daily  , Disp: , Rfl:     VITAMIN D, ERGOCALCIFEROL, PO, Take 2,000 Units by mouth, Disp: , Rfl:     Vitamins-Lipotropics (LIPO FLAVONOID PLUS PO), Take by mouth, Disp: , Rfl:     fluticasone-salmeterol (Advair Diskus) 250-50 mcg/dose inhaler, Inhale 1 puff 2 (two) times a day Rinse mouth after use   (Patient not taking: Reported on 2/8/2022 ), Disp: 3 Inhaler, Rfl: 3    Liniments (ANALGESIC BALM EX), Apply topically  (Patient not taking: Reported on 2/8/2022 ), Disp: , Rfl:     polyethylene glycol (MIRALAX) 17 g packet, Take 17 g by mouth daily for 5 days, Disp: 5 each, Rfl: 0     There are no Patient Instructions on file for this visit  Physical Exam  Vitals reviewed  Constitutional:       General: He is not in acute distress  Appearance: He is not toxic-appearing  HENT:      Head: Normocephalic  Cardiovascular:      Rate and Rhythm: Normal rate  Pulses: Normal pulses  Pulmonary:      Effort: Pulmonary effort is normal    Skin:     General: Skin is warm  Neurological:      General: No focal deficit present  Mental Status: He is alert  Cranial Nerves: No cranial nerve deficit  Motor: No weakness        Coordination: Coordination normal       Gait: Gait normal    Psychiatric:         Mood and Affect: Mood normal          Behavior: Behavior normal

## 2022-02-08 NOTE — ASSESSMENT & PLAN NOTE
Complaints of headaches but is more likely due to his blood pressure being slightly uncontrolled right now due to recent steroid shot  Will watch for now  Follow-up in 7 days

## 2022-02-09 NOTE — PROGRESS NOTES
2/8/2022      Chief Complaint   Patient presents with    Follow-up     BPH / WEAK STREAM         Assessment and Plan    64 y o  male managed by Dr Susana Arora    1  BPH with LUTS    He will continue the Flomax 0 4 mg nightly and finasteride 5 mg daily  Although he still has residual symptoms this is overall working very well for him  He empties well, has no hematuria no UTIs  PVRs are less than 100ml  Repeat cystoscopy and/or transrectal ultrasound will not be helpful in decision making, rather his subjective urinary bother will be the hatfield in deciding future changes to his treatment plan  I explained this to him today  He prefers to return in six months for symptom reassessment  Previous PSAs were low and stable between 1-2, no further screening needed  History of Present Illness  Chandan Sánchez is a 64 y o  male here for evaluation of six-month follow-up BPH  Pre treatment AUA symptom score of 31  Now symptom score down to 17 with a combination of Flomax and finasteride at seven months into treatment  He subjectively agrees with this improvement as well  He has had no side effects or issues with pricing of the medications  Cystoscopy and TRUS measurements taken at last visit do demonstrate bilobar obstructive BPH and a prostate on ultrasound measuring around 50 g, near 100 g on CT  TURP was offered if symptoms progress or did not improved his satisfaction  He is disinclined toward surgery at this point in time and prefers to continue medications as is  I agree with this  He may continue to have further improvement with medications nearing the one year pamella  Repeat cystoscopy and/or transrectal ultrasound will not be helpful in decision making, rather his subjective urinary bother will be the hatfield in deciding his treatment plan  I explained this to him today        Lab Results   Component Value Date    PSA 2 1 06/02/2021    PSA 1 0 11/12/2020       Review of Systems   Constitutional: Negative for activity change, appetite change, chills, fever and unexpected weight change  HENT: Negative  Respiratory: Negative  Negative for shortness of breath  Cardiovascular: Negative  Negative for chest pain  Gastrointestinal: Negative for abdominal pain, diarrhea, nausea and vomiting  Endocrine: Negative  Genitourinary: Positive for frequency and urgency  Negative for decreased urine volume, difficulty urinating, dysuria, flank pain, hematuria and testicular pain  Musculoskeletal: Negative for back pain and gait problem  Skin: Negative  Allergic/Immunologic: Negative  Neurological: Negative  Hematological: Negative for adenopathy  Does not bruise/bleed easily  AUA SYMPTOM SCORE      Most Recent Value   AUA SYMPTOM SCORE    How often have you had a sensation of not emptying your bladder completely after you finished urinating? 3   How often have you had to urinate again less than two hours after you finished urinating? 2   How often have you found you stopped and started again several times when you urinate? 2   How often have you found it difficult to postpone urination? 2   How often have you had a weak urinary stream? 2   How often have you had to push or strain to begin urination? 3   How many times did you most typically get up to urinate from the time you went to bed at night until the time you got up in the morning? 3   Quality of Life: If you were to spend the rest of your life with your urinary condition just the way it is now, how would you feel about that? --   AUA SYMPTOM SCORE 17           Vitals  Vitals:    02/08/22 1413   BP: 155/100   Pulse: 81   Weight: 88 5 kg (195 lb)   Height: 5' 4" (1 626 m)       Physical Exam  Vitals and nursing note reviewed  Constitutional:       General: He is not in acute distress  Appearance: Normal appearance  He is well-developed  He is not diaphoretic  HENT:      Head: Normocephalic and atraumatic     Pulmonary:      Effort: Pulmonary effort is normal       Comments: No cough or audible wheeze  Abdominal:      General: There is no distension  Tenderness: There is no abdominal tenderness  There is no right CVA tenderness or left CVA tenderness  Musculoskeletal:      Right lower leg: No edema  Left lower leg: No edema  Skin:     General: Skin is warm and dry  Neurological:      Mental Status: He is alert and oriented to person, place, and time        Gait: Gait normal    Psychiatric:         Speech: Speech normal          Behavior: Behavior normal            Past History  Past Medical History:   Diagnosis Date    Anxiety and depression     Arthritis     Asthma     Back pain, chronic     Last assessed: 3/11/15    BPH (benign prostatic hyperplasia)     Cataract     Last assessed: 14    GERD (gastroesophageal reflux disease)     Hyperlipidemia     Hypertension     Neuropathy     Osteoporosis     Panic disorder     Right-sided Bell's palsy     Last assessed: 3/10/14    Seasonal allergies      Social History     Socioeconomic History    Marital status: /Civil Union     Spouse name: None    Number of children: 3    Years of education: None    Highest education level: None   Occupational History    Occupation:    Tobacco Use    Smoking status: Former Smoker     Types: Cigars     Quit date:      Years since quittin 1    Smokeless tobacco: Former User    Tobacco comment: quit in , former cigar and cigarette, pipe smoker, per [employer]script, Past history of chewing tobacco use, active, per [employer]   Vaping Use    Vaping Use: Never used   Substance and Sexual Activity    Alcohol use: No     Comment: former drinker    Drug use: No    Sexual activity: Not Currently   Other Topics Concern    None   Social History Narrative    Functioning activity level, participates in light activities both inside and outside of the home (gardening, walking,  cycling, cooking)    High school or GED    Lives independently     Social Determinants of Health     Financial Resource Strain: Not on file   Food Insecurity: Not on file   Transportation Needs: Not on file   Physical Activity: Not on file   Stress: Not on file   Social Connections: Not on file   Intimate Partner Violence: Not on file   Housing Stability: Not on file     Social History     Tobacco Use   Smoking Status Former Smoker    Types: Cigars    Quit date: 5    Years since quittin 1   Smokeless Tobacco Former User   Tobacco Comment    quit in , former cigar and cigarette, pipe smoker, per [employer]script, Past history of chewing tobacco use, active, per [employer]     Family History   Problem Relation Age of Onset    Cataracts Mother     Hyperlipidemia Mother     Colon polyps Father        The following portions of the patient's history were reviewed and updated as appropriate: allergies, current medications, past medical history, past social history, past surgical history and problem list     Results  No results found for this or any previous visit (from the past 1 hour(s)) ]  Lab Results   Component Value Date    PSA 2 1 2021    PSA 1 0 2020     Lab Results   Component Value Date    GLUCOSE 79 2015    CALCIUM 9 7 2021     2017    K 4 2 2021    CO2 33 (H) 2021     2021    BUN 11 2021    CREATININE 1 07 2021     Lab Results   Component Value Date    WBC 7 1 2021    HGB 14 7 2021    HCT 43 1 2021    MCV 84 7 2021     2021

## 2022-02-12 DIAGNOSIS — F41.9 ANXIETY: ICD-10-CM

## 2022-02-12 RX ORDER — CITALOPRAM 20 MG/1
TABLET ORAL
Qty: 30 TABLET | Refills: 0 | Status: SHIPPED | OUTPATIENT
Start: 2022-02-12 | End: 2022-03-18

## 2022-02-16 ENCOUNTER — OFFICE VISIT (OUTPATIENT)
Dept: FAMILY MEDICINE CLINIC | Facility: CLINIC | Age: 77
End: 2022-02-16
Payer: COMMERCIAL

## 2022-02-16 ENCOUNTER — HOSPITAL ENCOUNTER (OUTPATIENT)
Dept: CT IMAGING | Facility: HOSPITAL | Age: 77
Discharge: HOME | End: 2022-02-16
Payer: COMMERCIAL

## 2022-02-16 ENCOUNTER — TELEPHONE (OUTPATIENT)
Dept: FAMILY MEDICINE CLINIC | Facility: CLINIC | Age: 77
End: 2022-02-16

## 2022-02-16 VITALS
BODY MASS INDEX: 32.95 KG/M2 | DIASTOLIC BLOOD PRESSURE: 72 MMHG | TEMPERATURE: 98.1 F | HEIGHT: 64 IN | WEIGHT: 193 LBS | HEART RATE: 88 BPM | OXYGEN SATURATION: 98 % | SYSTOLIC BLOOD PRESSURE: 134 MMHG

## 2022-02-16 DIAGNOSIS — N18.31 STAGE 3A CHRONIC KIDNEY DISEASE (HCC): ICD-10-CM

## 2022-02-16 DIAGNOSIS — R51.9 NONINTRACTABLE HEADACHE, UNSPECIFIED CHRONICITY PATTERN, UNSPECIFIED HEADACHE TYPE: ICD-10-CM

## 2022-02-16 DIAGNOSIS — S09.90XA INJURY OF HEAD, INITIAL ENCOUNTER: ICD-10-CM

## 2022-02-16 DIAGNOSIS — L01.00 IMPETIGO: Primary | ICD-10-CM

## 2022-02-16 DIAGNOSIS — F32.A MILD DEPRESSION: ICD-10-CM

## 2022-02-16 PROCEDURE — 1160F RVW MEDS BY RX/DR IN RCRD: CPT | Performed by: FAMILY MEDICINE

## 2022-02-16 PROCEDURE — 3078F DIAST BP <80 MM HG: CPT | Performed by: FAMILY MEDICINE

## 2022-02-16 PROCEDURE — 70450 CT HEAD/BRAIN W/O DYE: CPT

## 2022-02-16 PROCEDURE — 99215 OFFICE O/P EST HI 40 MIN: CPT | Performed by: FAMILY MEDICINE

## 2022-02-16 PROCEDURE — 3075F SYST BP GE 130 - 139MM HG: CPT | Performed by: FAMILY MEDICINE

## 2022-02-16 PROCEDURE — G1004 CDSM NDSC: HCPCS

## 2022-02-16 PROCEDURE — 1036F TOBACCO NON-USER: CPT | Performed by: FAMILY MEDICINE

## 2022-02-16 NOTE — PATIENT INSTRUCTIONS
Stop neosporin  Begin using bactroban 3x/day for 5 days  Clean face once to twice daily with warm water and gentle soap    STAT CT head today    Return next Tuesday

## 2022-02-16 NOTE — TELEPHONE ENCOUNTER
Spoke with patient , informed about results of CT, to fallow up with Eye doctor regarding pain medications recommendations  Patient will call us back if headaches don't go away

## 2022-02-16 NOTE — TELEPHONE ENCOUNTER
Please call pt -  His STAT CT head - no acute findings  No bleed  Advise that he keep scheduled eye dr followup this week  And return here if headaches not improving  Note - he can try taking ibuprofen - over the counter 200 mg tabs - if needed for severe headache  Can take 2 tabs (400 mg ) up to 2x/day as needed with food; Avoid taking with aspirin  Remain on prilosec - and if any GI Upset  - have him STOP the ibuprofen  Can also consider concussion clinic if headache continues  ED with severe headache

## 2022-02-16 NOTE — PROGRESS NOTES
FAMILY PRACTICE OFFICE VISIT    NAME: Matty Mcintyre    AGE: 64 y o  SEX: male  : 1945   MRN: [id number]    DATE: 2022  TIME: 12:44 PM    Assessment and Plan     1  Impetigo  Stop neosporin  Begin using bactroban 3x/day for 5 days  Clean face once to twice daily with warm water and gentle soap    - mupirocin (BACTROBAN) 2 % ointment; Apply topically 3 (three) times a day For 5 days - to affected area  Dispense: 22 g; Refill: 0        Pt with known glaucoma and does use eye drops    Our office will call over to dr Williams Ashley to find out what labs pt had done today thru their office  F/u with ophthal in 2 days as scheduled  Call if not improving/worsening    Note - doubt facial bone fractures  2   Headache -   Left side   Side of injury  suspect possible mild concussion  However, will send for CT head to r/o intracranial bleed  Pt with persistent headache and some confusion and dizziness/change in vision    CT STAT - Patient to call for results if he/she does not hear from us    If symptoms worse - to call 911  Pending results if negative for acute findings - will send to concussion clinic  Tylenol prn  Not on blood thinners  If CT (-) for bleed - will recommend short course of nsaids prn severe pain with food  Remote h/o gi bleed but no problems for years  Will have pt take with food    Note - no signs or symptoms of CVA on exam    F/u next week    ED at any point if worsening     Addendum - received results of CT and sent the following message to staff to notify patient:    Please call pt -  His STAT CT head - no acute findings  No bleed  Advise that he keep scheduled eye dr followup this week  And return here if headaches not improving    Note - he can try taking ibuprofen - over the counter 200 mg tabs - if needed for severe headache  Can take 2 tabs (400 mg ) up to 2x/day as needed with food;     Avoid taking with aspirin  Remain on prilosec - and if any GI Upset  - have him STOP the ibuprofen      Can also consider concussion clinic if headache continues      ED with severe headache                Chief Complaint     Chief Complaint   Patient presents with    Facial Injury     on 2/10/22       History of Present Illness   David Mcghee is a 64y o -year-old male who presents today s/p hitting the left side of his face on cabinet Last week (6 days ago)- injured the left side of his face  Took tylenol and began using antibacterial ointment on left side of nose  Denies LOC    Went to eye dr last week as well - prior to injury with cabinet due to red eye  And eye dr removed a foreign body from eye      Went to eye dr today in followup   And was sent for bloodwork    And then pt to return in 2 more days for another eye check    However, eye dr sent pt here today due to ongoing headache and to have nose checked as it remains red  Review of Systems   Review of Systems   Constitutional: Negative for fatigue and fever  HENT:        Pt is able to breathe ok out of his nose  Slight am nasal congestion only - and then he uses vaseline for dryness  Eyes: Positive for pain and redness  Pt reported eye pain which he reported to eye dr today  Respiratory: Negative for shortness of breath  Cardiovascular: Negative for chest pain  Skin:        Skin on nose - appears to look much better as per pt  Neurological: Positive for dizziness and headaches  Negative for seizures, syncope and speech difficulty          Pt with some increased confusion         Active Problem List     Patient Active Problem List   Diagnosis    Acid reflux    Asthma    Bilateral hearing loss    BPH associated with nocturia    Cervical spinal stenosis    Fibromyalgia    Chronic sinusitis    Mild depression ([address] )    Hyperglycemia    Hyperlipidemia    Essential hypertension    Incomplete emptying of bladder    Lumbar radiculopathy    Lumbar spinal stenosis    Migraine    Peripheral neuropathy    Psoriasis    Rotator cuff tendinitis    Seborrheic dermatitis    TB lung, latent    Testicular hypogonadism    Thoracic degenerative disc disease    Tinnitus    Venous insufficiency    Primary localized osteoarthrosis of ankle and foot    Tarsal tunnel syndrome    Plantar fascial fibromatosis    Anxiety    Insomnia    CKD (chronic kidney disease) stage 3, GFR 30-59 ml/min ([facility])    Osteoarthritis of multiple joints    Trochanteric bursitis of left hip    Perforation of right tympanic membrane w remote surgery both ears      Vitamin D deficiency    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 -  does have Psoriasis    Costochondritis    Chronic cough    Lipoma of torso/right lateral flank    Elevated PSA    Costochondral chest pain    Precordial chest pain    Suprapubic pain    Abnormal EKG         Past Medical History:  Past Medical History:   Diagnosis Date    Anxiety and depression     Arthritis     Asthma     Back pain, chronic     Last assessed: 3/11/15    BPH (benign prostatic hyperplasia)     Cataract     Last assessed: 7/16/14    GERD (gastroesophageal reflux disease)     Hyperlipidemia     Hypertension     Neuropathy     Osteoporosis     Panic disorder     Right-sided Bell's palsy     Last assessed: 3/10/14    Seasonal allergies        Past Surgical History:  Past Surgical History:   Procedure Laterality Date    CARPAL TUNNEL RELEASE Right 1999    Neuroplasty Decompression Median Nerve at Carpal Tunnel    CATARACT EXTRACTION  2015    COLONOSCOPY  02/2015    2 polyps, diverticulosis by Dr Meza Oxford  08/05/2021    [facility] and 1990, ear drum,  per [employer]    ESOPHAGOGASTRODUODENOSCOPY  02/2015    2 cm sliding hiatal hernia, grade 1-2 esophagitis, duodenitis by Dr Richie Zuleta      Spinal Anesthesia Epidural, x3 2004, per [employer]    POLYPECTOMY  2015  TYMPANOPLASTY Bilateral     Dr Figueredo Semen       Family History:  Family History   Problem Relation Age of Onset    Cataracts Mother     Hyperlipidemia Mother     Colon polyps Father        Social History:  Social History     Socioeconomic History    Marital status: /Civil Union     Spouse name: Not on file    Number of children: 3    Years of education: Not on file    Highest education level: Not on file   Occupational History    Occupation:    Tobacco Use    Smoking status: Former Smoker     Types: Cigars     Quit date:      Years since quittin     Smokeless tobacco: Former User    Tobacco comment: quit in , former cigar and cigarette, pipe smoker, per [employer]script, Past history of chewing tobacco use, active, per [employer]   Vaping Use    Vaping Use: Never used   Substance and Sexual Activity    Alcohol use: No     Comment: former drinker    Drug use: No    Sexual activity: Not Currently   Other Topics Concern    Not on file   Social History Narrative    Functioning activity level, participates in light activities both inside and outside of the home (gardening, walking,  cycling, cooking)    High school or GED    Lives independently     Social Determinants of Health     Financial Resource Strain: Not on file   Food Insecurity: Not on file   Transportation Needs: Not on file   Physical Activity: Not on file   Stress: Not on file   Social Connections: Not on file   Intimate Partner Violence: Not on file   Housing Stability: Not on file       Objective     Vitals:    22 1241   BP: 134/72   Pulse: 88   Temp: 98 1 °F (36 7 °C)   SpO2: 98%     Wt Readings from Last 3 Encounters:   22 87 5 kg (193 lb)   22 88 5 kg (195 lb)   22 89 5 kg (197 lb 6 4 oz)       Physical Exam  Vitals and nursing note reviewed  Constitutional:       General: He is not in acute distress  Appearance: Normal appearance   He is not ill-appearing, toxic-appearing or diaphoretic  HENT:      Head: Normocephalic  Comments: No tenderness over zygomatic arch or mandible  No tenderness over periorbital bones  No tenderness over bridge of nose which is reddened  Right Ear: There is no impacted cerumen  Left Ear: Tympanic membrane normal  There is no impacted cerumen  Ears:      Comments: Right TM - with 2 chronic perforations - pt aware  No drainage  No reyna sign       Nose: No congestion  Comments: Slight friability in both nares - left more than right  No active bleeding  No gross septal deviation         Mouth/Throat:      Mouth: Mucous membranes are moist       Pharynx: No posterior oropharyngeal erythema  Comments: Tongue midline    Eyes:      General: No scleral icterus  Right eye: No discharge  Left eye: No discharge  Extraocular Movements: Extraocular movements intact  Pupils: Pupils are equal, round, and reactive to light  Comments: No raccoon eyes  Reddened conjunctiva left eye  Without active discharge  No nystagmus     Neck:      Vascular: No carotid bruit  Cardiovascular:      Rate and Rhythm: Normal rate and regular rhythm  Pulses: Normal pulses  Heart sounds: Normal heart sounds  No murmur heard  Pulmonary:      Effort: Pulmonary effort is normal  No respiratory distress  Breath sounds: Normal breath sounds  No wheezing, rhonchi or rales  Musculoskeletal:      Cervical back: Neck supple  No rigidity or tenderness  Right lower leg: No edema  Left lower leg: No edema  Lymphadenopathy:      Cervical: No cervical adenopathy  Skin:     General: Skin is warm  Coloration: Skin is not jaundiced  Comments: reddness - with superficial abrasion which has become honey crusted  - improving as per pt - - located to the  top bridge and  left side of nose -No drainage  Very slight edema only     Neurological:      General: No focal deficit present        Mental Status: He is alert and oriented to person, place, and time  Cranial Nerves: No cranial nerve deficit  Psychiatric:         Behavior: Behavior normal          Thought Content:  Thought content normal          Judgment: Judgment normal       Comments: Pt slightly anxious but appropriate           Pertinent Laboratory/Diagnostic Studies:  Lab Results   Component Value Date    GLUCOSE 79 05/13/2015    BUN 11 11/02/2021    CREATININE 1 07 11/02/2021    CALCIUM 9 7 11/02/2021     02/02/2017    K 4 2 11/02/2021    CO2 33 (H) 11/02/2021     11/02/2021     Lab Results   Component Value Date    ALT 20 11/02/2021    AST 19 11/02/2021    ALKPHOS 56 11/02/2021    BILITOT 1 2 02/02/2017       Lab Results   Component Value Date    WBC 7 1 11/02/2021    HGB 14 7 11/02/2021    HCT 43 1 11/02/2021    MCV 84 7 11/02/2021     11/02/2021       No results found for: TSH    Lab Results   Component Value Date    CHOL 165 10/15/2016     Lab Results   Component Value Date    TRIG 99 06/02/2021     Lab Results   Component Value Date    HDL 37 (L) 06/02/2021     Lab Results   Component Value Date    LDLCALC 94 06/02/2021     Lab Results   Component Value Date    HGBA1C 5 8 (H) 11/02/2021       Results for orders placed or performed during the hospital encounter of 01/07/22   Tissue Exam   Result Value Ref Range    Case Report       Surgical Pathology Report                         Case: [id number]                                   Authorizing Provider:  Diana Sweeney MD             Collected:           01/07/2022 [address]               Ordering Location:     [facility]        Received:            01/07/2022 [address] [place] [address] Endoscopy                                                          Pathologist:           Omar Lugo MD                                                   Specimen:    Polyp, Colorectal, cold bx: rectal polyp                                                   Final Diagnosis       A  Polyp, Colorectal, rectal polyp :  - Tubular adenoma  - Negative for high-grade dysplasia and malignancy  Additional Information       All reported additional testing was performed with appropriately reactive controls  These tests were developed and their performance characteristics determined by [facility] or appropriate performing facility, though some tests may be performed on tissues which have not been validated for performance characteristics (such as staining performed on alcohol exposed cell blocks and decalcified tissues)  Results should be interpreted with caution and in the context of the patients clinical condition  These tests may not be cleared or approved by the [place]  Food and Drug Administration, though the FDA has determined that such clearance or approval is not necessary  These tests are used for clinical purposes and they should not be regarded as investigational or for research  This laboratory has been approved by [place] [id number], designated as a high-complexity laboratory and is qualified to perform these tests     Interpretation performed at [facility], [facility] [place] [address] [place] [address] GI - All Specimens          :    Adenoma(s)      Gross Description          A  The specimen is received in formalin, labeled with the patient's name and hospital number, and is designated "rectal polyp  The specimen consists of a single tan soft tissue fragment measuring 0 3 cm in greatest dimension  The specimen is entirely submitted in a screened cassette  Note: The estimated total formalin fixation time based upon information provided by the submitting clinician and the standard processing schedule is under 72 hours  Maddie                  No orders of the defined types were placed in this encounter        ALLERGIES:  Allergies   Allergen Reactions    Amoxicillin Rash    Amoxicillin-Pot Clavulanate Er  [Amoxicillin-Pot Clavulanate] Hives and Rash    Codeine Rash and Shortness Of Breath    Penicillin G Rash    Albumen, Egg - Food Allergy     Other      pork    Pork-Derived Products - Food Allergy Hives    Prochlorperazine Other (See Comments) and Itching     rash all over the body    Compazine  [Prochlorperazine Edisylate] Rash    Iodinated Diagnostic Agents Rash    Latex Rash and Itching    Valdecoxib Itching, Rash and Headache     Category: HEADACHES;        Current Medications     Current Outpatient Medications   Medication Sig Dispense Refill    acetaminophen (TYLENOL) 325 mg tablet Take 2 tablets (650 mg total) by mouth every 6 (six) hours as needed for mild pain or fever 30 tablet 3    albuterol (PROVENTIL HFA,VENTOLIN HFA) 90 mcg/act inhaler Inhale 1 puff every 6 (six) hours as needed (Q6H PRN) 54 g 5    ALPRAZolam (XANAX) 0 5 mg tablet TAKE 1 TABLET BY MOUTH 1 TO 2 TIMES DAILY AS NEEDED FOR ANXIETY 60 tablet 1    amLODIPine (NORVASC) 10 mg tablet Take 1 tablet (10 mg total) by mouth daily 90 tablet 3    Ascorbic Acid (vitamin C) 100 MG tablet Take 100 mg by mouth daily      atorvastatin (LIPITOR) 20 mg tablet Take 1 tablet by mouth daily 90 tablet 0    azelastine (ASTELIN) 0 1 % nasal spray 2 sprays into each nostril daily 2 Bottle 6    citalopram (CeleXA) 20 mg tablet Take 1 tablet by mouth once daily 30 tablet 0    diclofenac sodium (VOLTAREN) 1 % Apply 2 g topically 2 (two) times a day      finasteride (PROSCAR) 5 mg tablet Take 1 tablet (5 mg total) by mouth daily 90 tablet 3    fluocinolone (SYNALAR) 0 01 % external solution APPLY TOPICALLY TO SCALP ONCE A DAY AS NEEDED FOR PSORIASIS      fluticasone-salmeterol (Advair Diskus) 250-50 mcg/dose inhaler Inhale 1 puff 2 (two) times a day Rinse mouth after use   (Patient not taking: Reported on 2/8/2022 ) 3 Inhaler 3    gabapentin (NEURONTIN) 300 mg capsule Take 1 capsule (300 mg total) by mouth 3 (three) times a day 270 capsule 3    Humira Pen 40 MG/0 4ML PNKT As directed by Dermatology      hydrocortisone valerate (WEST-VITO) 0 2 % ointment APPLY  OINTMENT EXTERNALLY DAILY 45 g 0    Liniments (ANALGESIC BALM EX) Apply topically  (Patient not taking: Reported on 2/8/2022 )      lisinopril-hydrochlorothiazide (PRINZIDE,ZESTORETIC) 20-12 5 MG per tablet Take 1 tablet by mouth daily 90 tablet 0    omeprazole (PriLOSEC) 20 mg delayed release capsule Take 1 capsule (20 mg total) by mouth daily as needed (heartburn) 90 capsule 1    polyethylene glycol (MIRALAX) 17 g packet Take 17 g by mouth daily for 5 days 5 each 0    tacrolimus (PROTOPIC) 0 1 % ointment APPLY TO EARS TWICE A DAY WHEN NEEDED      tamsulosin (FLOMAX) 0 4 mg Take 1 capsule (0 4 mg total) by mouth daily with dinner 90 capsule 3    vitamin B-12 (VITAMIN B-12) 1,000 mcg tablet Take 1,000 mcg by mouth daily        VITAMIN D, ERGOCALCIFEROL, PO Take 2,000 Units by mouth      Vitamins-Lipotropics (LIPO FLAVONOID PLUS PO) Take by mouth       No current facility-administered medications for this visit           Health Maintenance     Health Maintenance   Topic Date Due    Fall Risk  04/13/2022   [facility] Medicare Annual Wellness Visit (AWV)  04/13/2022    BMI: Followup Plan  04/13/2022    COVID-19 Vaccine (4 - Booster for Pfizer series) 03/28/2022    Depression Remission PHQ  12/20/2022    BMI: Adult  02/08/2023    DTaP,Tdap,and Td Vaccines (3 - Td or Tdap) 10/04/2029    Hepatitis C Screening  Completed    Pneumococcal Vaccine: 65+ Years  Completed    Influenza Vaccine  Completed    HIB Vaccine  Aged Out    Hepatitis B Vaccine  Aged Out    IPV Vaccine  Aged Out    Hepatitis A Vaccine  Aged Out    Meningococcal ACWY Vaccine  Aged Out    HPV Vaccine  Aged Out    Colorectal Cancer Screening  Discontinued     Immunization History   Administered Date(s) Administered    COVID-19 PFIZER VACCINE 0 3 ML IM 02/22/2021, 03/15/2021, 10/28/2021    Hep A, adult 05/12/2009    INFLUENZA 10/01/2015, 09/20/2016    Influenza Split High Dose Preservative Free IM 11/12/2012, 10/01/2015, 09/20/2016, 10/05/2017    Influenza, high dose seasonal 0 7 mL 11/27/2018, 10/04/2019, 10/07/2020, 10/05/2021    Influenza, seasonal, injectable 10/05/2010    Pneumococcal Conjugate 13-Valent 07/24/2015    Pneumococcal Polysaccharide PPV23 1945, 01/27/2020    Tdap 05/12/2009, 10/04/2019          Karin Amaya DO

## 2022-02-21 NOTE — RESULT ENCOUNTER NOTE
Our office was able to contact pt re: CT results  It is documented in another note  Will close off on this note

## 2022-03-07 LAB
ALBUMIN SERPL-MCNC: 4.4 G/DL (ref 3.6–5.1)
ALBUMIN/GLOB SERPL: 1.9 (CALC) (ref 1–2.5)
ALP SERPL-CCNC: 44 U/L (ref 35–144)
ALT SERPL-CCNC: 34 U/L (ref 9–46)
AST SERPL-CCNC: 24 U/L (ref 10–35)
BASOPHILS # BLD AUTO: 41 CELLS/UL (ref 0–200)
BASOPHILS NFR BLD AUTO: 0.6 %
BILIRUB SERPL-MCNC: 1.3 MG/DL (ref 0.2–1.2)
BUN SERPL-MCNC: 11 MG/DL (ref 7–25)
BUN/CREAT SERPL: ABNORMAL (CALC) (ref 6–22)
CALCIUM SERPL-MCNC: 9.3 MG/DL (ref 8.6–10.3)
CHLORIDE SERPL-SCNC: 102 MMOL/L (ref 98–110)
CHOLEST SERPL-MCNC: 126 MG/DL
CHOLEST/HDLC SERPL: 2.7 (CALC)
CO2 SERPL-SCNC: 31 MMOL/L (ref 20–32)
CREAT SERPL-MCNC: 0.96 MG/DL (ref 0.7–1.18)
EOSINOPHIL # BLD AUTO: 843 CELLS/UL (ref 15–500)
EOSINOPHIL NFR BLD AUTO: 12.4 %
ERYTHROCYTE [DISTWIDTH] IN BLOOD BY AUTOMATED COUNT: 14 % (ref 11–15)
GLOBULIN SER CALC-MCNC: 2.3 G/DL (CALC) (ref 1.9–3.7)
GLUCOSE SERPL-MCNC: 91 MG/DL (ref 65–99)
HBA1C MFR BLD: 6.2 % OF TOTAL HGB
HCT VFR BLD AUTO: 43.1 % (ref 38.5–50)
HDLC SERPL-MCNC: 46 MG/DL
HGB BLD-MCNC: 14.4 G/DL (ref 13.2–17.1)
LDLC SERPL CALC-MCNC: 62 MG/DL (CALC)
LYMPHOCYTES # BLD AUTO: 1652 CELLS/UL (ref 850–3900)
LYMPHOCYTES NFR BLD AUTO: 24.3 %
MCH RBC QN AUTO: 28.5 PG (ref 27–33)
MCHC RBC AUTO-ENTMCNC: 33.4 G/DL (ref 32–36)
MCV RBC AUTO: 85.3 FL (ref 80–100)
MONOCYTES # BLD AUTO: 537 CELLS/UL (ref 200–950)
MONOCYTES NFR BLD AUTO: 7.9 %
NEUTROPHILS # BLD AUTO: 3726 CELLS/UL (ref 1500–7800)
NEUTROPHILS NFR BLD AUTO: 54.8 %
NONHDLC SERPL-MCNC: 80 MG/DL (CALC)
PLATELET # BLD AUTO: 213 THOUSAND/UL (ref 140–400)
PMV BLD REES-ECKER: 10.8 FL (ref 7.5–12.5)
POTASSIUM SERPL-SCNC: 4.6 MMOL/L (ref 3.5–5.3)
PROT SERPL-MCNC: 6.7 G/DL (ref 6.1–8.1)
RBC # BLD AUTO: 5.05 MILLION/UL (ref 4.2–5.8)
SL AMB EGFR AFRICAN AMERICAN: 87 ML/MIN/1.73M2
SL AMB EGFR NON AFRICAN AMERICAN: 75 ML/MIN/1.73M2
SODIUM SERPL-SCNC: 140 MMOL/L (ref 135–146)
TRIGL SERPL-MCNC: 93 MG/DL
WBC # BLD AUTO: 6.8 THOUSAND/UL (ref 3.8–10.8)

## 2022-03-18 DIAGNOSIS — F41.9 ANXIETY: ICD-10-CM

## 2022-03-18 RX ORDER — CITALOPRAM 20 MG/1
TABLET ORAL
Qty: 30 TABLET | Refills: 0 | Status: SHIPPED | OUTPATIENT
Start: 2022-03-18 | End: 2022-05-09

## 2022-03-29 DIAGNOSIS — N40.1 BENIGN PROSTATIC HYPERPLASIA WITH INCOMPLETE BLADDER EMPTYING: ICD-10-CM

## 2022-03-29 DIAGNOSIS — R39.14 BENIGN PROSTATIC HYPERPLASIA WITH INCOMPLETE BLADDER EMPTYING: ICD-10-CM

## 2022-03-29 RX ORDER — FINASTERIDE 5 MG/1
TABLET, FILM COATED ORAL
Qty: 90 TABLET | Refills: 0 | Status: SHIPPED | OUTPATIENT
Start: 2022-03-29 | End: 2022-07-27 | Stop reason: SDUPTHER

## 2022-04-01 ENCOUNTER — APPOINTMENT (OUTPATIENT)
Dept: RADIOLOGY | Facility: MEDICAL CENTER | Age: 77
End: 2022-04-01
Payer: COMMERCIAL

## 2022-04-01 DIAGNOSIS — Z11.7 ENCOUNTER FOR TESTING FOR LATENT TUBERCULOSIS: ICD-10-CM

## 2022-04-01 DIAGNOSIS — L40.0 PSORIASIS VULGARIS: ICD-10-CM

## 2022-04-01 PROCEDURE — 71046 X-RAY EXAM CHEST 2 VIEWS: CPT

## 2022-04-15 ENCOUNTER — OFFICE VISIT (OUTPATIENT)
Dept: FAMILY MEDICINE CLINIC | Facility: CLINIC | Age: 77
End: 2022-04-15
Payer: COMMERCIAL

## 2022-04-15 VITALS
DIASTOLIC BLOOD PRESSURE: 78 MMHG | HEART RATE: 72 BPM | RESPIRATION RATE: 18 BRPM | BODY MASS INDEX: 33.63 KG/M2 | SYSTOLIC BLOOD PRESSURE: 118 MMHG | OXYGEN SATURATION: 98 % | HEIGHT: 64 IN | WEIGHT: 197 LBS

## 2022-04-15 DIAGNOSIS — F32.4 MAJOR DEPRESSIVE DISORDER WITH SINGLE EPISODE, IN PARTIAL REMISSION (HCC): ICD-10-CM

## 2022-04-15 DIAGNOSIS — I10 ESSENTIAL HYPERTENSION: ICD-10-CM

## 2022-04-15 DIAGNOSIS — H53.2 DOUBLE VISION: ICD-10-CM

## 2022-04-15 DIAGNOSIS — R73.9 HYPERGLYCEMIA: ICD-10-CM

## 2022-04-15 DIAGNOSIS — N18.31 STAGE 3A CHRONIC KIDNEY DISEASE (HCC): ICD-10-CM

## 2022-04-15 DIAGNOSIS — Z00.00 MEDICARE ANNUAL WELLNESS VISIT, SUBSEQUENT: Primary | ICD-10-CM

## 2022-04-15 DIAGNOSIS — E78.2 MIXED HYPERLIPIDEMIA: ICD-10-CM

## 2022-04-15 DIAGNOSIS — E29.1 TESTICULAR HYPOGONADISM: ICD-10-CM

## 2022-04-15 DIAGNOSIS — R04.0 EPISTAXIS: ICD-10-CM

## 2022-04-15 DIAGNOSIS — K21.9 GASTROESOPHAGEAL REFLUX DISEASE WITHOUT ESOPHAGITIS: ICD-10-CM

## 2022-04-15 DIAGNOSIS — R97.20 ELEVATED PSA: ICD-10-CM

## 2022-04-15 PROBLEM — G25.0 ESSENTIAL TREMOR: Status: ACTIVE | Noted: 2022-04-15

## 2022-04-15 PROCEDURE — 1101F PT FALLS ASSESS-DOCD LE1/YR: CPT | Performed by: FAMILY MEDICINE

## 2022-04-15 PROCEDURE — 1036F TOBACCO NON-USER: CPT | Performed by: FAMILY MEDICINE

## 2022-04-15 PROCEDURE — 1125F AMNT PAIN NOTED PAIN PRSNT: CPT | Performed by: FAMILY MEDICINE

## 2022-04-15 PROCEDURE — G0439 PPPS, SUBSEQ VISIT: HCPCS | Performed by: FAMILY MEDICINE

## 2022-04-15 PROCEDURE — 3725F SCREEN DEPRESSION PERFORMED: CPT | Performed by: FAMILY MEDICINE

## 2022-04-15 PROCEDURE — 99214 OFFICE O/P EST MOD 30 MIN: CPT | Performed by: FAMILY MEDICINE

## 2022-04-15 PROCEDURE — 1160F RVW MEDS BY RX/DR IN RCRD: CPT | Performed by: FAMILY MEDICINE

## 2022-04-15 PROCEDURE — 1170F FXNL STATUS ASSESSED: CPT | Performed by: FAMILY MEDICINE

## 2022-04-15 PROCEDURE — 3074F SYST BP LT 130 MM HG: CPT | Performed by: FAMILY MEDICINE

## 2022-04-15 PROCEDURE — 3288F FALL RISK ASSESSMENT DOCD: CPT | Performed by: FAMILY MEDICINE

## 2022-04-15 PROCEDURE — 3078F DIAST BP <80 MM HG: CPT | Performed by: FAMILY MEDICINE

## 2022-04-15 RX ORDER — VALACYCLOVIR HYDROCHLORIDE 1 G/1
1000 TABLET, FILM COATED ORAL 2 TIMES DAILY
COMMUNITY
Start: 2022-02-18

## 2022-04-15 NOTE — PROGRESS NOTES
Assessment/Plan:       Problem List Items Addressed This Visit        Digestive    Acid reflux    Relevant Orders    CBC and differential       Endocrine    Testicular hypogonadism       Cardiovascular and Mediastinum    Essential hypertension    Relevant Orders    CBC and differential    Comprehensive metabolic panel       Genitourinary    CKD (chronic kidney disease) stage 3, GFR 30-59 ml/min ([facility])    Relevant Orders    CBC and differential    Comprehensive metabolic panel       Other    Major depressive disorder with single episode, in partial remission ([facility])    Relevant Orders    CBC and differential    TSH, 3rd generation with Free T4 reflex    Hyperglycemia    Relevant Orders    Hemoglobin A1C    Hyperlipidemia    Relevant Orders    Comprehensive metabolic panel    Lipid Panel with Direct LDL reflex    Elevated PSA     Continue Urology follow-up         Double vision     He is following with ophthalmology in this regard  Other Visit Diagnoses     Medicare annual wellness visit, subsequent    -  Primary    Epistaxis        Relevant Orders    Ambulatory Referral to Otolaryngology    CBC and differential            Subjective:      Patient ID: Kim Benavides is a 64 y o  male  HPI patient presents today for follow-up for chronic health issues  He is having some significant diffuse pain  Unfortunately, he had to skip his Humira dosing due to cost constraints  This is being worked on by Rheumatology  He has psoriatic arthritis which is clearly flaring symptomatic we  He also notes a tremor over the past 3 months or so  He notes he gets worse if he is stressed  He is not aware family history of tremor  Mood seems pretty good at this point  He denies excessive depression at this time      The following portions of the patient's history were reviewed and updated as appropriate: allergies, current medications, past family history, past medical history, past social history, past surgical history and problem list       Current Outpatient Medications:     acetaminophen (TYLENOL) 325 mg tablet, Take 2 tablets (650 mg total) by mouth every 6 (six) hours as needed for mild pain or fever, Disp: 30 tablet, Rfl: 3    albuterol (PROVENTIL HFA,VENTOLIN HFA) 90 mcg/act inhaler, Inhale 1 puff every 6 (six) hours as needed (Q6H PRN), Disp: 54 g, Rfl: 5    ALPRAZolam (XANAX) 0 5 mg tablet, TAKE 1 TABLET BY MOUTH 1 TO 2 TIMES DAILY AS NEEDED FOR ANXIETY, Disp: 60 tablet, Rfl: 1    amLODIPine (NORVASC) 10 mg tablet, Take 1 tablet (10 mg total) by mouth daily, Disp: 90 tablet, Rfl: 3    Ascorbic Acid (vitamin C) 100 MG tablet, Take 100 mg by mouth daily, Disp: , Rfl:     atorvastatin (LIPITOR) 20 mg tablet, Take 1 tablet by mouth daily, Disp: 90 tablet, Rfl: 0    azelastine (ASTELIN) 0 1 % nasal spray, 2 sprays into each nostril daily, Disp: 2 Bottle, Rfl: 6    Carboxymethylcellul-Glycerin (REFRESH RELIEVA OP), Apply to eye, Disp: , Rfl:     citalopram (CeleXA) 20 mg tablet, Take 1 tablet by mouth once daily, Disp: 30 tablet, Rfl: 0    diclofenac sodium (VOLTAREN) 1 %, Apply 2 g topically 2 (two) times a day, Disp: , Rfl:     finasteride (PROSCAR) 5 mg tablet, Take 1 tablet by mouth once daily, Disp: 90 tablet, Rfl: 0    fluocinolone (SYNALAR) 0 01 % external solution, APPLY TOPICALLY TO SCALP ONCE A DAY AS NEEDED FOR PSORIASIS, Disp: , Rfl:     gabapentin (NEURONTIN) 300 mg capsule, Take 1 capsule (300 mg total) by mouth 3 (three) times a day, Disp: 270 capsule, Rfl: 3    Humira Pen 40 MG/0 4ML PNKT, As directed by Dermatology, Disp: , Rfl:     hydrocortisone valerate (WEST-VITO) 0 2 % ointment, APPLY  OINTMENT EXTERNALLY DAILY, Disp: 45 g, Rfl: 0    lisinopril-hydrochlorothiazide (PRINZIDE,ZESTORETIC) 20-12 5 MG per tablet, Take 1 tablet by mouth daily, Disp: 90 tablet, Rfl: 0    omeprazole (PriLOSEC) 20 mg delayed release capsule, Take 1 capsule (20 mg total) by mouth daily as needed (heartburn), Disp: 90 capsule, Rfl: 1    polyethylene glycol (MIRALAX) 17 g packet, Take 17 g by mouth daily for 5 days, Disp: 5 each, Rfl: 0    tacrolimus (PROTOPIC) 0 1 % ointment, APPLY TO EARS TWICE A DAY WHEN NEEDED, Disp: , Rfl:     tamsulosin (FLOMAX) 0 4 mg, Take 1 capsule (0 4 mg total) by mouth daily with dinner, Disp: 90 capsule, Rfl: 3    valACYclovir (VALTREX) 1,000 mg tablet, Take 1,000 mg by mouth 2 (two) times a day, Disp: , Rfl:     vitamin B-12 (VITAMIN B-12) 1,000 mcg tablet, Take 1,000 mcg by mouth daily  , Disp: , Rfl:     VITAMIN D, ERGOCALCIFEROL, PO, Take 2,000 Units by mouth, Disp: , Rfl:     Vitamins-Lipotropics (LIPO FLAVONOID PLUS PO), Take by mouth, Disp: , Rfl:      Review of Systems   Constitutional: Negative for appetite change, chills, fatigue, fever and unexpected weight change  HENT: Positive for nosebleeds  Negative for trouble swallowing  Eyes: Negative for visual disturbance  Respiratory: Negative for cough, chest tightness, shortness of breath and wheezing  Cardiovascular: Negative for chest pain, palpitations and leg swelling  Gastrointestinal: Negative for abdominal distention, abdominal pain, blood in stool, constipation and diarrhea  Endocrine: Negative for polyuria  Genitourinary: Negative for difficulty urinating and flank pain  Musculoskeletal: Positive for arthralgias, back pain, gait problem and neck stiffness  Negative for myalgias  Skin: Negative for rash  Neurological: Negative for dizziness and light-headedness  Hematological: Negative for adenopathy  Does not bruise/bleed easily  Psychiatric/Behavioral: Negative for dysphoric mood and sleep disturbance  The patient is not nervous/anxious  Objective:      /78 (BP Location: Right arm, Patient Position: Sitting, Cuff Size: Large)   Pulse 72   Resp 18   Ht 5' 4" (1 626 m)   Wt 89 4 kg (197 lb)   SpO2 98%   BMI 33 81 kg/m²          Physical Exam  Vitals reviewed     Constitutional: Appearance: He is well-developed  HENT:      Head: Normocephalic  Cardiovascular:      Rate and Rhythm: Regular rhythm  Heart sounds: Normal heart sounds  No murmur heard  Pulmonary:      Effort: No respiratory distress  Breath sounds: No wheezing or rales  Abdominal:      General: There is no distension  Tenderness: There is no abdominal tenderness  Musculoskeletal:         General: No swelling  Right lower leg: No edema  Left lower leg: No edema  Skin:     Findings: No erythema or rash  Neurological:      General: No focal deficit present  Mental Status: He is alert and oriented to person, place, and time        Comments: He has a very tremor of both hands at this time   Psychiatric:         Mood and Affect: Mood normal            Destiney Fregoso MD

## 2022-04-15 NOTE — PROGRESS NOTES
Assessment and Plan:     Problem List Items Addressed This Visit     None      Visit Diagnoses     Medicare annual wellness visit, subsequent    -  Primary      Patient presents today for follow-up for chronic health issues  Overall, he is stable  Unfortunately, he continues to have significant diffuse pain which is mostly arthritic in origin  He is up-to-date on vaccines except for his COVID 4th shot which she will consider getting at the pharmacy  BMI Counseling: Body mass index is 33 81 kg/m²  The BMI is above normal  Nutrition recommendations include encouraging healthy choices of fruits and vegetables  Exercise recommendations include moderate physical activity 150 minutes/week  Rationale for BMI follow-up plan is due to patient being overweight or obese  Preventive health issues were discussed with patient, and age appropriate screening tests were ordered as noted in patient's After Visit Summary  Personalized health advice and appropriate referrals for health education or preventive services given if needed, as noted in patient's After Visit Summary       History of Present Illness:     Patient presents for Medicare Annual Wellness visit    Patient Care Team:  Aaron John MD as PCP - General  Aaron John MD as PCP - PCP-[facility] Attributed-[facility]  MD Oswaldo Martinez Smoker, DO  HALIMA Joseph MD (Gastroenterology)     Problem List:     Patient Active Problem List   Diagnosis    Acid reflux    Asthma    Bilateral hearing loss    BPH associated with nocturia    Cervical spinal stenosis    Fibromyalgia    Chronic sinusitis    Mild depression    Hyperglycemia    Hyperlipidemia    Essential hypertension    Incomplete emptying of bladder    Lumbar radiculopathy    Lumbar spinal stenosis    Migraine    Peripheral neuropathy    Psoriasis    Rotator cuff tendinitis    Seborrheic dermatitis    TB lung, latent    Testicular hypogonadism    Thoracic degenerative disc disease    Tinnitus    Venous insufficiency    Primary localized osteoarthrosis of ankle and foot    Tarsal tunnel syndrome    Plantar fascial fibromatosis    Anxiety    Insomnia    CKD (chronic kidney disease) stage 3, GFR 30-59 ml/min ([facility]    Osteoarthritis of multiple joints    Trochanteric bursitis of left hip    Perforation of right tympanic membrane w remote surgery both ears      Vitamin D deficiency    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 -  does have Psoriasis    Costochondritis    Chronic cough    Lipoma of torso/right lateral flank    Elevated PSA    Costochondral chest pain    Precordial chest pain    Suprapubic pain    Abnormal EKG      Past Medical and Surgical History:     Past Medical History:   Diagnosis Date    Anxiety and depression     Arthritis     Asthma     Back pain, chronic     Last assessed: 3/11/15    BPH (benign prostatic hyperplasia)     Cataract     Last assessed: 7/16/14    GERD (gastroesophageal reflux disease)     Hyperlipidemia     Hypertension     Neuropathy     Osteoporosis     Panic disorder     Right-sided Bell's palsy     Last assessed: 3/10/14    Seasonal allergies      Past Surgical History:   Procedure Laterality Date    CARPAL TUNNEL RELEASE Right 1999    Neuroplasty Decompression Median Nerve at Carpal Tunnel    CATARACT EXTRACTION  2015    COLONOSCOPY  02/2015    2 polyps, diverticulosis by Dr Tyler Biswas  08/05/2021    [facility] and 1990, ear drum,  per [employer]    ESOPHAGOGASTRODUODENOSCOPY  02/2015    2 cm sliding hiatal hernia, grade 1-2 esophagitis, duodenitis by Dr Cassy Muñoz Left     OTHER SURGICAL HISTORY      Spinal Anesthesia Epidural, x3 2004, per [employer]    POLYPECTOMY  2015    TYMPANOPLASTY Bilateral 2000    Dr Rudi Hoff      Family History:     Family History   Problem Relation Age of Onset  Cataracts Mother     Hyperlipidemia Mother     Colon polyps Father       Social History:     Social History     Socioeconomic History    Marital status: /Civil Union     Spouse name: None    Number of children: 3    Years of education: None    Highest education level: None   Occupational History    Occupation:    Tobacco Use    Smoking status: Former Smoker     Types: Cigars     Quit date:      Years since quittin 3    Smokeless tobacco: Former User    Tobacco comment: quit in , former cigar and cigarette, pipe smoker, per [employer], Past history of chewing tobacco use, active, per [employer]   Vaping Use    Vaping Use: Never used   Substance and Sexual Activity    Alcohol use: No     Comment: former drinker    Drug use: No    Sexual activity: Not Currently   Other Topics Concern    None   Social History Narrative    Functioning activity level, participates in light activities both inside and outside of the home (gardening, walking,  cycling, cooking)    High school or GED    Lives independently     Social Determinants of Health     Financial Resource Strain: Not on file   Food Insecurity: Not on file   Transportation Needs: Not on file   Physical Activity: Not on file   Stress: Not on file   Social Connections: Not on file   Intimate Partner Violence: Not on file   Housing Stability: Not on file      Medications and Allergies:     Current Outpatient Medications   Medication Sig Dispense Refill    acetaminophen (TYLENOL) 325 mg tablet Take 2 tablets (650 mg total) by mouth every 6 (six) hours as needed for mild pain or fever 30 tablet 3    albuterol (PROVENTIL HFA,VENTOLIN HFA) 90 mcg/act inhaler Inhale 1 puff every 6 (six) hours as needed (Q6H PRN) 54 g 5    ALPRAZolam (XANAX) 0 5 mg tablet TAKE 1 TABLET BY MOUTH 1 TO 2 TIMES DAILY AS NEEDED FOR ANXIETY 60 tablet 1    amLODIPine (NORVASC) 10 mg tablet Take 1 tablet (10 mg total) by mouth daily 90 tablet 3    Ascorbic Acid (vitamin C) 100 MG tablet Take 100 mg by mouth daily      atorvastatin (LIPITOR) 20 mg tablet Take 1 tablet by mouth daily 90 tablet 0    azelastine (ASTELIN) 0 1 % nasal spray 2 sprays into each nostril daily 2 Bottle 6    Carboxymethylcellul-Glycerin (REFRESH RELIEVA OP) Apply to eye      citalopram (CeleXA) 20 mg tablet Take 1 tablet by mouth once daily 30 tablet 0    diclofenac sodium (VOLTAREN) 1 % Apply 2 g topically 2 (two) times a day      finasteride (PROSCAR) 5 mg tablet Take 1 tablet by mouth once daily 90 tablet 0    fluocinolone (SYNALAR) 0 01 % external solution APPLY TOPICALLY TO SCALP ONCE A DAY AS NEEDED FOR PSORIASIS      gabapentin (NEURONTIN) 300 mg capsule Take 1 capsule (300 mg total) by mouth 3 (three) times a day 270 capsule 3    hydrocortisone valerate (WEST-VITO) 0 2 % ointment APPLY  OINTMENT EXTERNALLY DAILY 45 g 0    lisinopril-hydrochlorothiazide (PRINZIDE,ZESTORETIC) 20-12 5 MG per tablet Take 1 tablet by mouth daily 90 tablet 0    omeprazole (PriLOSEC) 20 mg delayed release capsule Take 1 capsule (20 mg total) by mouth daily as needed (heartburn) 90 capsule 1    polyethylene glycol (MIRALAX) 17 g packet Take 17 g by mouth daily for 5 days 5 each 0    tacrolimus (PROTOPIC) 0 1 % ointment APPLY TO EARS TWICE A DAY WHEN NEEDED      tamsulosin (FLOMAX) 0 4 mg Take 1 capsule (0 4 mg total) by mouth daily with dinner 90 capsule 3    valACYclovir (VALTREX) 1,000 mg tablet Take 1,000 mg by mouth 2 (two) times a day      vitamin B-12 (VITAMIN B-12) 1,000 mcg tablet Take 1,000 mcg by mouth daily        VITAMIN D, ERGOCALCIFEROL, PO Take 2,000 Units by mouth      Vitamins-Lipotropics (LIPO FLAVONOID PLUS PO) Take by mouth      fluticasone-salmeterol (Advair Diskus) 250-50 mcg/dose inhaler Inhale 1 puff 2 (two) times a day Rinse mouth after use   (Patient not taking: Reported on 2/8/2022 ) 3 Inhaler 3    Humira Pen 40 MG/0 4ML PNKT As directed by Dermatology (Patient not taking: Reported on 4/15/2022 )      Liniments (ANALGESIC BALM EX) Apply topically  (Patient not taking: Reported on 2/8/2022 )      mupirocin (BACTROBAN) 2 % ointment Apply topically 3 (three) times a day For 5 days - to affected area (Patient not taking: Reported on 4/15/2022 ) 22 g 0     No current facility-administered medications for this visit       Allergies   Allergen Reactions    Amoxicillin Rash    Amoxicillin-Pot Clavulanate Er  [Amoxicillin-Pot Clavulanate] Hives and Rash    Codeine Rash and Shortness Of Breath    Penicillin G Rash    Albumen, Egg - Food Allergy     Other      pork    Pork-Derived Products - Food Allergy Hives    Prochlorperazine Other (See Comments) and Itching     rash all over the body    Compazine  [Prochlorperazine Edisylate] Rash    Iodinated Diagnostic Agents Rash    Latex Rash and Itching    Valdecoxib Itching, Rash and Headache     Category: HEADACHES;       Immunizations:     Immunization History   Administered Date(s) Administered    COVID-19 PFIZER VACCINE 0 3 ML IM 02/22/2021, 03/15/2021, 10/28/2021    Hep A, adult 05/12/2009    INFLUENZA 10/01/2015, 09/20/2016    Influenza Split High Dose Preservative Free IM 11/12/2012, 10/01/2015, 09/20/2016, 10/05/2017    Influenza, high dose seasonal 0 7 mL 11/27/2018, 10/04/2019, 10/07/2020, 10/05/2021    Influenza, seasonal, injectable 10/05/2010    Pneumococcal Conjugate 13-Valent 07/24/2015    Pneumococcal Polysaccharide PPV23 1943, 01/27/2020    Tdap 05/12/2009, 10/04/2019      Health Maintenance:         Topic Date Due    Hepatitis C Screening  Completed    Colorectal Cancer Screening  Discontinued         Topic Date Due    COVID-19 Vaccine (4 - Booster for Pfizer series) 03/28/2022      Medicare Health Risk Assessment:     /78 (BP Location: Right arm, Patient Position: Sitting, Cuff Size: Large)   Pulse 72   Resp 18   Ht 5' 4" (1 626 m)   Wt 89 4 kg (197 lb)   SpO2 98%   BMI 33 81 kg/m²      Keara Babb is here for his Subsequent Wellness visit  Health Risk Assessment:   Patient rates overall health as fair  Patient feels that their physical health rating is same  Patient is satisfied with their life  Eyesight was rated as same  Hearing was rated as much worse  Patient feels that their emotional and mental health rating is same  Patients states they are often angry  Patient states they are often unusually tired/fatigued  Pain experienced in the last 7 days has been some  Patient's pain rating has been 8/10  Patient states that he has experienced no weight loss or gain in last 6 months  Depression Screening:   PHQ-9 Score: 4      Fall Risk Screening: In the past year, patient has experienced: no history of falling in past year      Home Safety:  Patient has trouble with stairs inside or outside of their home  Patient has working smoke alarms and has working carbon monoxide detector  Home safety hazards include: none  Nutrition:   Current diet is Other (please comment) and Limited junk food  Medications:   Patient is currently taking over-the-counter supplements  OTC medications include: see medication list  Patient is able to manage medications  Activities of Daily Living (ADLs)/Instrumental Activities of Daily Living (IADLs):   Walk and transfer into and out of bed and chair?: Yes  Dress and groom yourself?: Yes    Bathe or shower yourself?: Yes    Feed yourself?  Yes  Do your laundry/housekeeping?: Yes  Manage your money, pay your bills and track your expenses?: Yes  Make your own meals?: Yes    Do your own shopping?: Yes    Previous Hospitalizations:   Any hospitalizations or ED visits within the last 12 months?: No      Advance Care Planning:   Living will: No    Durable POA for healthcare: No    Advanced directive: No    Five wishes given: Yes      Cognitive Screening:   Provider or family/friend/caregiver concerned regarding cognition?: No    PREVENTIVE SCREENINGS      Cardiovascular Screening:    General: Screening Not Indicated and History Lipid Disorder      Diabetes Screening:     General: Screening Current      Colorectal Cancer Screening:     General: Screening Current      Prostate Cancer Screening:    General: Screening Not Indicated      Osteoporosis Screening:    General: Screening Not Indicated      Abdominal Aortic Aneurysm (AAA) Screening:    Risk factors include: tobacco use        General: Screening Current      Lung Cancer Screening:     General: Screening Not Indicated      Hepatitis C Screening:    General: Screening Current    Screening, Brief Intervention, and Referral to Treatment (SBIRT)    Screening  Typical number of drinks in a day: 0  Typical number of drinks in a week: 0  Interpretation: Low risk drinking behavior      Single Item Drug Screening:  How often have you used an illegal drug (including marijuana) or a prescription medication for non-medical reasons in the past year? never    Single Item Drug Screen Score: 0  Interpretation: Negative screen for possible drug use disorder      Lamar Leonard MD

## 2022-05-09 DIAGNOSIS — F41.9 ANXIETY: ICD-10-CM

## 2022-05-09 RX ORDER — CITALOPRAM 20 MG/1
TABLET ORAL
Qty: 30 TABLET | Refills: 0 | Status: SHIPPED | OUTPATIENT
Start: 2022-05-09 | End: 2022-06-08

## 2022-05-27 DIAGNOSIS — F41.9 ANXIETY: ICD-10-CM

## 2022-05-31 RX ORDER — ALPRAZOLAM 0.5 MG/1
TABLET ORAL
Qty: 60 TABLET | Refills: 0 | Status: SHIPPED | OUTPATIENT
Start: 2022-05-31 | End: 2022-07-27 | Stop reason: SDUPTHER

## 2022-06-08 DIAGNOSIS — F41.9 ANXIETY: ICD-10-CM

## 2022-06-08 RX ORDER — CITALOPRAM 20 MG/1
TABLET ORAL
Qty: 30 TABLET | Refills: 0 | Status: SHIPPED | OUTPATIENT
Start: 2022-06-08 | End: 2022-07-05

## 2022-07-05 DIAGNOSIS — F41.9 ANXIETY: ICD-10-CM

## 2022-07-05 RX ORDER — CITALOPRAM 20 MG/1
TABLET ORAL
Qty: 30 TABLET | Refills: 0 | Status: SHIPPED | OUTPATIENT
Start: 2022-07-05 | End: 2022-07-27 | Stop reason: SDUPTHER

## 2022-07-08 DIAGNOSIS — I10 HYPERTENSION, UNSPECIFIED TYPE: ICD-10-CM

## 2022-07-08 DIAGNOSIS — E78.2 MIXED HYPERLIPIDEMIA: ICD-10-CM

## 2022-07-08 DIAGNOSIS — R73.9 HYPERGLYCEMIA: ICD-10-CM

## 2022-07-08 RX ORDER — AMLODIPINE BESYLATE 10 MG/1
TABLET ORAL
Qty: 90 TABLET | Refills: 0 | Status: SHIPPED | OUTPATIENT
Start: 2022-07-08 | End: 2022-09-13

## 2022-07-08 RX ORDER — LISINOPRIL AND HYDROCHLOROTHIAZIDE 20; 12.5 MG/1; MG/1
TABLET ORAL
Qty: 45 TABLET | Refills: 0 | Status: SHIPPED | OUTPATIENT
Start: 2022-07-08 | End: 2022-09-13

## 2022-07-27 DIAGNOSIS — N40.1 BENIGN PROSTATIC HYPERPLASIA WITH INCOMPLETE BLADDER EMPTYING: ICD-10-CM

## 2022-07-27 DIAGNOSIS — R39.14 BENIGN PROSTATIC HYPERPLASIA WITH INCOMPLETE BLADDER EMPTYING: ICD-10-CM

## 2022-07-27 DIAGNOSIS — F41.9 ANXIETY: ICD-10-CM

## 2022-07-27 RX ORDER — CITALOPRAM 20 MG/1
20 TABLET ORAL DAILY
Qty: 30 TABLET | Refills: 0 | Status: SHIPPED | OUTPATIENT
Start: 2022-07-27 | End: 2022-09-13

## 2022-07-27 RX ORDER — FINASTERIDE 5 MG/1
5 TABLET, FILM COATED ORAL DAILY
Qty: 90 TABLET | Refills: 0 | OUTPATIENT
Start: 2022-07-27

## 2022-07-27 RX ORDER — TAMSULOSIN HYDROCHLORIDE 0.4 MG/1
0.4 CAPSULE ORAL
Qty: 90 CAPSULE | Refills: 3 | OUTPATIENT
Start: 2022-07-27

## 2022-07-28 RX ORDER — FINASTERIDE 5 MG/1
5 TABLET, FILM COATED ORAL DAILY
Qty: 90 TABLET | Refills: 0 | Status: SHIPPED | OUTPATIENT
Start: 2022-07-28 | End: 2022-08-09 | Stop reason: SDUPTHER

## 2022-07-28 RX ORDER — TAMSULOSIN HYDROCHLORIDE 0.4 MG/1
0.4 CAPSULE ORAL
Qty: 90 CAPSULE | Refills: 0 | Status: SHIPPED | OUTPATIENT
Start: 2022-07-28 | End: 2022-08-09 | Stop reason: SDUPTHER

## 2022-07-29 RX ORDER — ALPRAZOLAM 0.5 MG/1
TABLET ORAL
Qty: 60 TABLET | Refills: 0 | Status: SHIPPED | OUTPATIENT
Start: 2022-07-29 | End: 2022-09-14

## 2022-08-09 ENCOUNTER — OFFICE VISIT (OUTPATIENT)
Dept: UROLOGY | Facility: CLINIC | Age: 77
End: 2022-08-09
Payer: COMMERCIAL

## 2022-08-09 VITALS
WEIGHT: 193 LBS | HEIGHT: 64 IN | DIASTOLIC BLOOD PRESSURE: 78 MMHG | SYSTOLIC BLOOD PRESSURE: 112 MMHG | HEART RATE: 77 BPM | BODY MASS INDEX: 32.95 KG/M2

## 2022-08-09 DIAGNOSIS — R39.14 BENIGN PROSTATIC HYPERPLASIA WITH INCOMPLETE BLADDER EMPTYING: Primary | ICD-10-CM

## 2022-08-09 DIAGNOSIS — N40.1 BENIGN PROSTATIC HYPERPLASIA WITH INCOMPLETE BLADDER EMPTYING: Primary | ICD-10-CM

## 2022-08-09 LAB — POST-VOID RESIDUAL VOLUME, ML POC: 37 ML

## 2022-08-09 PROCEDURE — 1160F RVW MEDS BY RX/DR IN RCRD: CPT | Performed by: PHYSICIAN ASSISTANT

## 2022-08-09 PROCEDURE — 99214 OFFICE O/P EST MOD 30 MIN: CPT | Performed by: PHYSICIAN ASSISTANT

## 2022-08-09 PROCEDURE — 3074F SYST BP LT 130 MM HG: CPT | Performed by: PHYSICIAN ASSISTANT

## 2022-08-09 PROCEDURE — 51798 US URINE CAPACITY MEASURE: CPT | Performed by: PHYSICIAN ASSISTANT

## 2022-08-09 PROCEDURE — 3078F DIAST BP <80 MM HG: CPT | Performed by: PHYSICIAN ASSISTANT

## 2022-08-09 RX ORDER — TAMSULOSIN HYDROCHLORIDE 0.4 MG/1
0.4 CAPSULE ORAL
Qty: 90 CAPSULE | Refills: 3 | Status: SHIPPED | OUTPATIENT
Start: 2022-08-09

## 2022-08-09 RX ORDER — FINASTERIDE 5 MG/1
5 TABLET, FILM COATED ORAL DAILY
Qty: 90 TABLET | Refills: 3 | Status: SHIPPED | OUTPATIENT
Start: 2022-08-09

## 2022-08-09 NOTE — PROGRESS NOTES
8/9/2022      Chief Complaint   Patient presents with    Follow-up    Benign Prostatic Hypertrophy         Assessment and Plan    76 y o  male managed by Dr Susana Arora    1  BPH with LUTS    BPH essentially improved on flomax/proscar combination the past year  Symptoms this year essentially stable symptoms  Still has complaints of frequency/nocturia and slower stream at times  Compliant with medication  Discussed TURP again today he is not interested in surgical intervention  He will be visiting family in [place] next month, I a set up his refills so that he is ready for travel  He will call if there is any changes in the next six months, otherwise he feels the symptoms are annoying but livable for the rest of his life  History of Present Illness  Chandan Sánchez is a 76 y o  male here for evaluation of six-month follow-up BPH with lower urinary tract symptoms  He is on Flomax and finasteride now for one year  Mildly elevated postvoid residuals were overall symptom wise working well  He did have cystoscopy and transrectal ultrasound in 2021, gland measuring around 50 g on ultrasound but looked bigger on CT scan  He is not interested in surgical intervention, TURP was offered  Initial AUA symptom score at first visit was 31, was down to 17 about seven months into treatment, and 21 today one year  Review of Systems   Constitutional: Negative for activity change, appetite change, chills, fever and unexpected weight change  HENT: Negative  Respiratory: Negative  Negative for shortness of breath  Cardiovascular: Negative  Negative for chest pain  Gastrointestinal: Negative for abdominal pain, diarrhea, nausea and vomiting  Endocrine: Negative  Genitourinary: Positive for frequency and urgency  Negative for decreased urine volume, difficulty urinating, dysuria, flank pain, hematuria and testicular pain  Musculoskeletal: Positive for arthralgias and back pain  Negative for gait problem  Skin: Negative  Allergic/Immunologic: Negative  Neurological: Negative  Hematological: Negative for adenopathy  Does not bruise/bleed easily  Urinary Incontinence Screening    Flowsheet Row Most Recent Value   Urinary Incontinence    Urinary Incontinence? Yes  [sometimes]   Incomplete emptying? Yes   Urinary frequency? Yes   Urinary urgency? Yes   Urinary hesitancy? No   Dysuria (painful difficult urination)? No   Nocturia (waking up to use the bathroom)? Yes   Straining (having to push to go)? Yes   Weak stream? Yes   Intermittent stream? Yes          AUA SYMPTOM SCORE    Flowsheet Row Most Recent Value   AUA SYMPTOM SCORE    How often have you had a sensation of not emptying your bladder completely after you finished urinating? 3   How often have you had to urinate again less than two hours after you finished urinating? 3   How often have you found you stopped and started again several times when you urinate? 4   How often have you found it difficult to postpone urination? 3   How often have you had a weak urinary stream? 2   How often have you had to push or strain to begin urination? 3   How many times did you most typically get up to urinate from the time you went to bed at night until the time you got up in the morning? 3   Quality of Life: If you were to spend the rest of your life with your urinary condition just the way it is now, how would you feel about that? 5   AUA SYMPTOM SCORE 21           Vitals  Vitals:    08/09/22 1101   BP: 112/78   Pulse: 77   Weight: 87 5 kg (193 lb)   Height: 5' 4" (1 626 m)       Physical Exam  Vitals and nursing note reviewed  Constitutional:       General: He is not in acute distress  Appearance: Normal appearance  He is well-developed  He is not diaphoretic  HENT:      Head: Normocephalic and atraumatic  Pulmonary:      Effort: Pulmonary effort is normal       Comments: No cough or audible wheeze  Abdominal:      General: There is no distension  Tenderness: There is no abdominal tenderness  There is no right CVA tenderness or left CVA tenderness  Musculoskeletal:      Right lower leg: No edema  Left lower leg: No edema  Skin:     General: Skin is warm and dry  Neurological:      Mental Status: He is alert and oriented to person, place, and time        Gait: Gait normal    Psychiatric:         Speech: Speech normal          Behavior: Behavior normal            Past History  Past Medical History:   Diagnosis Date    Anxiety and depression     Arthritis     Asthma     Back pain, chronic     Last assessed: 3/11/15    BPH (benign prostatic hyperplasia)     Cataract     Last assessed: 14    GERD (gastroesophageal reflux disease)     Hyperlipidemia     Hypertension     Neuropathy     Osteoporosis     Panic disorder     Right-sided Bell's palsy     Last assessed: 3/10/14    Seasonal allergies      Social History     Socioeconomic History    Marital status: /Civil Union     Spouse name: Not on file    Number of children: 3    Years of education: Not on file    Highest education level: Not on file   Occupational History    Occupation:    Tobacco Use    Smoking status: Former Smoker     Types: Cigars     Quit date:      Years since quittin 6    Smokeless tobacco: Former User    Tobacco comment: quit in , former cigar and cigarette, pipe smoker, per allscript, Past history of chewing tobacco use, active, per [employer]   Vaping Use    Vaping Use: Never used   Substance and Sexual Activity    Alcohol use: No     Comment: former drinker    Drug use: No    Sexual activity: Not Currently   Other Topics Concern    Not on file   Social History Narrative    Functioning activity level, participates in light activities both inside and outside of the home (gardening, walking,  cycling, cooking)    High school or GED    Lives independently     Social Determinants of Health     Financial Resource Strain: Not on file   Food Insecurity: Not on file   Transportation Needs: Not on file   Physical Activity: Not on file   Stress: Not on file   Social Connections: Not on file   Intimate Partner Violence: Not on file   Housing Stability: Not on file     Social History     Tobacco Use   Smoking Status Former Smoker    Types: Cigars    Quit date: 5    Years since quittin 6   Smokeless Tobacco Former User   Tobacco Comment    quit in , former cigar and cigarette, pipe smoker, per allscript, Past history of chewing tobacco use, active, per [employer]     Family History   Problem Relation Age of Onset    Cataracts Mother     Hyperlipidemia Mother     Colon polyps Father        The following portions of the patient's history were reviewed and updated as appropriate: allergies, current medications, past medical history, past social history, past surgical history and problem list     Results  Recent Results (from the past 1 hour(s))   POCT Measure PVR    Collection Time: 22 11:30 AM   Result Value Ref Range    POST-VOID RESIDUAL VOLUME, ML POC 37 mL   ]  Lab Results   Component Value Date    PSA 2 1 2021    PSA 1 0 2020     Lab Results   Component Value Date    GLUCOSE 79 2015    CALCIUM 9 3 2022     2017    K 4 6 2022    CO2 31 2022     2022    BUN 11 2022    CREATININE 0 96 2022     Lab Results   Component Value Date    WBC 6 8 2022    HGB 14 4 2022    HCT 43 1 2022    MCV 85 3 2022     2022

## 2022-09-10 LAB
ALBUMIN SERPL-MCNC: 4.2 G/DL (ref 3.6–5.1)
ALBUMIN/GLOB SERPL: 1.5 (CALC) (ref 1–2.5)
ALP SERPL-CCNC: 59 U/L (ref 35–144)
ALT SERPL-CCNC: 22 U/L (ref 9–46)
AST SERPL-CCNC: 17 U/L (ref 10–35)
BASOPHILS # BLD AUTO: 74 CELLS/UL (ref 0–200)
BASOPHILS NFR BLD AUTO: 0.8 %
BILIRUB SERPL-MCNC: 1.2 MG/DL (ref 0.2–1.2)
BUN SERPL-MCNC: 12 MG/DL (ref 7–25)
BUN/CREAT SERPL: ABNORMAL (CALC) (ref 6–22)
CALCIUM SERPL-MCNC: 9.8 MG/DL (ref 8.6–10.3)
CHLORIDE SERPL-SCNC: 102 MMOL/L (ref 98–110)
CHOLEST SERPL-MCNC: 161 MG/DL
CHOLEST/HDLC SERPL: 3 (CALC)
CO2 SERPL-SCNC: 34 MMOL/L (ref 20–32)
CREAT SERPL-MCNC: 1.12 MG/DL (ref 0.7–1.28)
EOSINOPHIL # BLD AUTO: 1343 CELLS/UL (ref 15–500)
EOSINOPHIL NFR BLD AUTO: 14.6 %
ERYTHROCYTE [DISTWIDTH] IN BLOOD BY AUTOMATED COUNT: 13.1 % (ref 11–15)
GFR/BSA.PRED SERPLBLD CYS-BASED-ARV: 67 ML/MIN/1.73M2
GLOBULIN SER CALC-MCNC: 2.8 G/DL (CALC) (ref 1.9–3.7)
GLUCOSE SERPL-MCNC: 110 MG/DL (ref 65–99)
HBA1C MFR BLD: 6.3 % OF TOTAL HGB
HCT VFR BLD AUTO: 44.8 % (ref 38.5–50)
HDLC SERPL-MCNC: 53 MG/DL
HGB BLD-MCNC: 15.2 G/DL (ref 13.2–17.1)
LDLC SERPL CALC-MCNC: 90 MG/DL (CALC)
LYMPHOCYTES # BLD AUTO: 2282 CELLS/UL (ref 850–3900)
LYMPHOCYTES NFR BLD AUTO: 24.8 %
MCH RBC QN AUTO: 28.1 PG (ref 27–33)
MCHC RBC AUTO-ENTMCNC: 33.9 G/DL (ref 32–36)
MCV RBC AUTO: 82.8 FL (ref 80–100)
MONOCYTES # BLD AUTO: 727 CELLS/UL (ref 200–950)
MONOCYTES NFR BLD AUTO: 7.9 %
NEUTROPHILS # BLD AUTO: 4775 CELLS/UL (ref 1500–7800)
NEUTROPHILS NFR BLD AUTO: 51.9 %
NONHDLC SERPL-MCNC: 108 MG/DL (CALC)
PLATELET # BLD AUTO: 231 THOUSAND/UL (ref 140–400)
PMV BLD REES-ECKER: 10.6 FL (ref 7.5–12.5)
POTASSIUM SERPL-SCNC: 4.8 MMOL/L (ref 3.5–5.3)
PROT SERPL-MCNC: 7 G/DL (ref 6.1–8.1)
RBC # BLD AUTO: 5.41 MILLION/UL (ref 4.2–5.8)
SODIUM SERPL-SCNC: 142 MMOL/L (ref 135–146)
TRIGL SERPL-MCNC: 88 MG/DL
TSH SERPL-ACNC: 2.53 MIU/L (ref 0.4–4.5)
WBC # BLD AUTO: 9.2 THOUSAND/UL (ref 3.8–10.8)

## 2022-09-13 DIAGNOSIS — E78.2 MIXED HYPERLIPIDEMIA: ICD-10-CM

## 2022-09-13 DIAGNOSIS — F41.9 ANXIETY: ICD-10-CM

## 2022-09-13 DIAGNOSIS — R73.9 HYPERGLYCEMIA: ICD-10-CM

## 2022-09-13 DIAGNOSIS — I10 HYPERTENSION, UNSPECIFIED TYPE: ICD-10-CM

## 2022-09-13 DIAGNOSIS — E78.5 HYPERLIPIDEMIA, UNSPECIFIED HYPERLIPIDEMIA TYPE: ICD-10-CM

## 2022-09-13 RX ORDER — AMLODIPINE BESYLATE 10 MG/1
TABLET ORAL
Qty: 90 TABLET | Refills: 0 | Status: SHIPPED | OUTPATIENT
Start: 2022-09-13 | End: 2022-10-05 | Stop reason: SDUPTHER

## 2022-09-13 RX ORDER — CITALOPRAM 20 MG/1
TABLET ORAL
Qty: 30 TABLET | Refills: 0 | Status: SHIPPED | OUTPATIENT
Start: 2022-09-13 | End: 2022-10-05 | Stop reason: SDUPTHER

## 2022-09-13 RX ORDER — ATORVASTATIN CALCIUM 20 MG/1
TABLET, FILM COATED ORAL
Qty: 90 TABLET | Refills: 0 | Status: SHIPPED | OUTPATIENT
Start: 2022-09-13 | End: 2022-10-05 | Stop reason: SDUPTHER

## 2022-09-13 RX ORDER — LISINOPRIL AND HYDROCHLOROTHIAZIDE 20; 12.5 MG/1; MG/1
TABLET ORAL
Qty: 45 TABLET | Refills: 0 | Status: SHIPPED | OUTPATIENT
Start: 2022-09-13 | End: 2022-10-05 | Stop reason: SDUPTHER

## 2022-09-14 ENCOUNTER — OFFICE VISIT (OUTPATIENT)
Dept: FAMILY MEDICINE CLINIC | Facility: CLINIC | Age: 77
End: 2022-09-14
Payer: COMMERCIAL

## 2022-09-14 VITALS
HEIGHT: 64 IN | BODY MASS INDEX: 33.63 KG/M2 | OXYGEN SATURATION: 95 % | WEIGHT: 197 LBS | DIASTOLIC BLOOD PRESSURE: 68 MMHG | SYSTOLIC BLOOD PRESSURE: 130 MMHG | HEART RATE: 73 BPM

## 2022-09-14 DIAGNOSIS — K21.9 GASTROESOPHAGEAL REFLUX DISEASE WITHOUT ESOPHAGITIS: ICD-10-CM

## 2022-09-14 DIAGNOSIS — I10 ESSENTIAL HYPERTENSION: Primary | ICD-10-CM

## 2022-09-14 DIAGNOSIS — R73.9 HYPERGLYCEMIA: ICD-10-CM

## 2022-09-14 DIAGNOSIS — E78.2 MIXED HYPERLIPIDEMIA: ICD-10-CM

## 2022-09-14 DIAGNOSIS — J32.4 CHRONIC PANSINUSITIS: ICD-10-CM

## 2022-09-14 DIAGNOSIS — G25.0 ESSENTIAL TREMOR: ICD-10-CM

## 2022-09-14 DIAGNOSIS — J30.89 NON-SEASONAL ALLERGIC RHINITIS, UNSPECIFIED TRIGGER: ICD-10-CM

## 2022-09-14 DIAGNOSIS — F41.9 ANXIETY: ICD-10-CM

## 2022-09-14 DIAGNOSIS — M19.90 INFLAMMATORY ARTHROPATHY: ICD-10-CM

## 2022-09-14 DIAGNOSIS — H69.83 DYSFUNCTION OF BOTH EUSTACHIAN TUBES: ICD-10-CM

## 2022-09-14 DIAGNOSIS — F32.4 MAJOR DEPRESSIVE DISORDER WITH SINGLE EPISODE, IN PARTIAL REMISSION (HCC): ICD-10-CM

## 2022-09-14 DIAGNOSIS — R09.82 POSTNASAL DRIP: ICD-10-CM

## 2022-09-14 DIAGNOSIS — J45.909 ASTHMA, UNSPECIFIED ASTHMA SEVERITY, UNSPECIFIED WHETHER COMPLICATED, UNSPECIFIED WHETHER PERSISTENT: ICD-10-CM

## 2022-09-14 DIAGNOSIS — N18.31 STAGE 3A CHRONIC KIDNEY DISEASE (HCC): ICD-10-CM

## 2022-09-14 DIAGNOSIS — L21.9 SEBORRHEIC DERMATITIS OF SCALP: ICD-10-CM

## 2022-09-14 DIAGNOSIS — M54.17 LUMBOSACRAL RADICULITIS: ICD-10-CM

## 2022-09-14 PROBLEM — R07.2 PRECORDIAL CHEST PAIN: Status: RESOLVED | Noted: 2021-11-22 | Resolved: 2022-09-14

## 2022-09-14 PROBLEM — R10.2 SUPRAPUBIC PAIN: Status: RESOLVED | Noted: 2021-11-22 | Resolved: 2022-09-14

## 2022-09-14 PROCEDURE — 1160F RVW MEDS BY RX/DR IN RCRD: CPT | Performed by: FAMILY MEDICINE

## 2022-09-14 PROCEDURE — 3075F SYST BP GE 130 - 139MM HG: CPT | Performed by: FAMILY MEDICINE

## 2022-09-14 PROCEDURE — 99215 OFFICE O/P EST HI 40 MIN: CPT | Performed by: FAMILY MEDICINE

## 2022-09-14 PROCEDURE — 3078F DIAST BP <80 MM HG: CPT | Performed by: FAMILY MEDICINE

## 2022-09-14 RX ORDER — CLOTRIMAZOLE AND BETAMETHASONE DIPROPIONATE 10; .64 MG/G; MG/G
CREAM TOPICAL 2 TIMES DAILY
Qty: 30 G | Refills: 0 | Status: SHIPPED | OUTPATIENT
Start: 2022-09-14

## 2022-09-14 RX ORDER — MONTELUKAST SODIUM 10 MG/1
10 TABLET ORAL
Qty: 90 TABLET | Refills: 3 | Status: SHIPPED | OUTPATIENT
Start: 2022-09-14 | End: 2022-10-05 | Stop reason: SDUPTHER

## 2022-09-14 RX ORDER — AZELASTINE 1 MG/ML
2 SPRAY, METERED NASAL DAILY
Status: CANCELLED | OUTPATIENT
Start: 2022-09-14

## 2022-09-14 RX ORDER — GABAPENTIN 300 MG/1
300 CAPSULE ORAL 3 TIMES DAILY
Qty: 270 CAPSULE | Refills: 3 | Status: SHIPPED | OUTPATIENT
Start: 2022-09-14 | End: 2022-10-05 | Stop reason: SDUPTHER

## 2022-09-14 RX ORDER — ALPRAZOLAM 0.5 MG/1
TABLET ORAL
Qty: 60 TABLET | Refills: 0 | Status: SHIPPED | OUTPATIENT
Start: 2022-09-14

## 2022-09-14 RX ORDER — METHYLPREDNISOLONE 4 MG/1
TABLET ORAL
Qty: 21 TABLET | Refills: 0 | Status: SHIPPED | OUTPATIENT
Start: 2022-09-14

## 2022-09-14 RX ORDER — AZITHROMYCIN 250 MG/1
TABLET, FILM COATED ORAL
Qty: 6 TABLET | Refills: 1 | Status: SHIPPED | OUTPATIENT
Start: 2022-09-14 | End: 2022-09-20

## 2022-09-14 RX ORDER — ALPRAZOLAM 0.5 MG/1
TABLET ORAL
Qty: 60 TABLET | Refills: 0 | Status: SHIPPED | OUTPATIENT
Start: 2022-09-14 | End: 2022-09-14 | Stop reason: SDUPTHER

## 2022-09-14 RX ORDER — ALBUTEROL SULFATE 90 UG/1
1 AEROSOL, METERED RESPIRATORY (INHALATION) EVERY 6 HOURS PRN
Qty: 54 G | Refills: 5 | Status: SHIPPED | OUTPATIENT
Start: 2022-09-14

## 2022-09-14 NOTE — PROGRESS NOTES
EchoAssessment/Plan:       Problem List Items Addressed This Visit        Digestive    Acid reflux     Stable at this time  Respiratory    Asthma     Asthma is controlled at this time but he does have lot of allergic symptoms and eosinophils were quite high on recent CBC  I am going to send in montelukast any can also try something over-the-counter such as Claritin or Zyrtec daily  Relevant Medications    albuterol (PROVENTIL HFA,VENTOLIN HFA) 90 mcg/act inhaler    montelukast (SINGULAIR) 10 mg tablet    Other Relevant Orders    CBC and differential    Chronic sinusitis     He has what sounds like sinusitis now for the last 3 weeks  I am going to place him on a Medrol Dosepak as well as a Z-Garth  Since he will be traveling internationally in the near future I did give him a refill on the Z-Garth that he did feel before travel  Relevant Medications    methylPREDNISolone 4 MG tablet therapy pack    montelukast (SINGULAIR) 10 mg tablet    Non-seasonal allergic rhinitis     Asthma is controlled at this time but he does have lot of allergic symptoms and eosinophils were quite high on recent CBC  I am going to send in montelukast any can also try something over-the-counter such as Claritin or Zyrtec daily             Relevant Medications    methylPREDNISolone 4 MG tablet therapy pack    montelukast (SINGULAIR) 10 mg tablet       Cardiovascular and Mediastinum    Essential hypertension - Primary    Relevant Orders    CBC and differential    Comprehensive metabolic panel    Lipid Panel with Direct LDL reflex       Nervous and Auditory    Essential tremor    Relevant Medications    gabapentin (NEURONTIN) 300 mg capsule       Musculoskeletal and Integument    Seborrheic dermatitis of scalp     Stable with current treatment         Relevant Medications    clotrimazole-betamethasone (LOTRISONE) 1-0 05 % cream    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 -  does have Psoriasis       Genitourinary    CKD (chronic kidney disease) stage 3, GFR 30-59 ml/min ([facility])    Relevant Orders    Comprehensive metabolic panel    Hemoglobin A1C       Other    Major depressive disorder with single episode, in partial remission ([facility] [address] )     He seems to be pretty stable at this time  In routine follow-up  Relevant Medications    ALPRAZolam (XANAX) 0 5 mg tablet    Hyperglycemia    Relevant Orders    CBC and differential    Comprehensive metabolic panel    Hyperlipidemia    Relevant Orders    Comprehensive metabolic panel    Lipid Panel with Direct LDL reflex    Anxiety    Relevant Medications    ALPRAZolam (XANAX) 0 5 mg tablet    Other Relevant Orders    CBC and differential      Other Visit Diagnoses     Dysfunction of both eustachian tubes        Postnasal drip        Lumbosacral radiculitis        Relevant Medications    gabapentin (NEURONTIN) 300 mg capsule            Subjective:      Patient ID: [place]  is a 76 y o  male  HPI  Presents today for follow-up for chronic health issues  Also has plan of continuous facial pressure for the past 2 to 3 weeks after experiencing upper respiratory infection  Denies cough or shortness of breath  Is having no fever or chills  Notes some pain to palpation over his frontal sinuses and does have a history of sinusitis  He notes that his depression seems to be pretty well controlled this time    The following portions of the patient's history were reviewed and updated as appropriate: allergies, current medications, past family history, past medical history, past social history, past surgical history and problem list       Current Outpatient Medications:     acetaminophen (TYLENOL) 325 mg tablet, Take 2 tablets (650 mg total) by mouth every 6 (six) hours as needed for mild pain or fever, Disp: 30 tablet, Rfl: 3    albuterol (PROVENTIL HFA,VENTOLIN HFA) 90 mcg/act inhaler, Inhale 1 puff every 6 (six) hours as needed (Q6H PRN), Disp: 54 g, Rfl: 5    ALPRAZolam (XANAX) 0 5 mg tablet, Take 1 tablet up to twice daily as needed  , Disp: 60 tablet, Rfl: 0    amLODIPine (NORVASC) 10 mg tablet, Take 1 tablet by mouth once daily, Disp: 90 tablet, Rfl: 0    Ascorbic Acid (vitamin C) 100 MG tablet, Take 100 mg by mouth daily, Disp: , Rfl:     atorvastatin (LIPITOR) 20 mg tablet, Take 1 tablet by mouth once daily, Disp: 90 tablet, Rfl: 0    azelastine (ASTELIN) 0 1 % nasal spray, 2 sprays into each nostril daily, Disp: 2 Bottle, Rfl: 6    Carboxymethylcellul-Glycerin (REFRESH RELIEVA OP), Apply to eye, Disp: , Rfl:     citalopram (CeleXA) 20 mg tablet, Take 1 tablet by mouth once daily, Disp: 30 tablet, Rfl: 0    clotrimazole-betamethasone (LOTRISONE) 1-0 05 % cream, Apply topically 2 (two) times a day, Disp: 30 g, Rfl: 0    diclofenac sodium (VOLTAREN) 1 %, Apply 2 g topically 2 (two) times a day, Disp: , Rfl:     finasteride (PROSCAR) 5 mg tablet, Take 1 tablet (5 mg total) by mouth daily, Disp: 90 tablet, Rfl: 3    fluocinolone (SYNALAR) 0 01 % external solution, APPLY TOPICALLY TO SCALP ONCE A DAY AS NEEDED FOR PSORIASIS, Disp: , Rfl:     gabapentin (NEURONTIN) 300 mg capsule, Take 1 capsule (300 mg total) by mouth 3 (three) times a day, Disp: 270 capsule, Rfl: 3    Humira Pen 40 MG/0 4ML PNKT, As directed by Dermatology, Disp: , Rfl:     hydrocortisone valerate (WEST-VITO) 0 2 % ointment, APPLY  OINTMENT EXTERNALLY DAILY, Disp: 45 g, Rfl: 0    lisinopril-hydrochlorothiazide (PRINZIDE,ZESTORETIC) 20-12 5 MG per tablet, TAKE 1/2 (ONE-HALF) TABLET BY MOUTH ONCE DAILY AT BEDTIME, Disp: 45 tablet, Rfl: 0    methylPREDNISolone 4 MG tablet therapy pack, Use as directed on package, Disp: 21 tablet, Rfl: 0    montelukast (SINGULAIR) 10 mg tablet, Take 1 tablet (10 mg total) by mouth daily at bedtime, Disp: 90 tablet, Rfl: 3    omeprazole (PriLOSEC) 20 mg delayed release capsule, Take 1 capsule (20 mg total) by mouth daily as needed (heartburn), Disp: 90 capsule, Rfl: 1    tacrolimus (PROTOPIC) 0 1 % ointment, APPLY TO EARS TWICE A DAY WHEN NEEDED, Disp: , Rfl:     tamsulosin (FLOMAX) 0 4 mg, Take 1 capsule (0 4 mg total) by mouth daily with dinner, Disp: 90 capsule, Rfl: 3    vitamin B-12 (VITAMIN B-12) 1,000 mcg tablet, Take 1,000 mcg by mouth daily  , Disp: , Rfl:     VITAMIN D, ERGOCALCIFEROL, PO, Take 2,000 Units by mouth, Disp: , Rfl:     polyethylene glycol (MIRALAX) 17 g packet, Take 17 g by mouth daily for 5 days (Patient not taking: Reported on 9/14/2022), Disp: 5 each, Rfl: 0    Vitamins-Lipotropics (LIPO FLAVONOID PLUS PO), Take by mouth (Patient not taking: Reported on 9/14/2022), Disp: , Rfl:      Review of Systems   Constitutional: Positive for fatigue  HENT: Positive for congestion, ear pain, postnasal drip, rhinorrhea, sinus pressure, sinus pain and sore throat  Negative for ear discharge and trouble swallowing  Respiratory: Negative for chest tightness, shortness of breath and wheezing  Cardiovascular: Negative for chest pain, palpitations and leg swelling  Gastrointestinal: Negative for abdominal pain, constipation and diarrhea  Endocrine: Negative for polyuria  Genitourinary: Negative for difficulty urinating and flank pain  Musculoskeletal: Negative for arthralgias and myalgias  Skin: Negative for rash  Psychiatric/Behavioral: Negative for sleep disturbance  Objective:      /68 (BP Location: Left arm, Patient Position: Sitting, Cuff Size: Large)   Pulse 73   Ht 5' 4" (1 626 m)   Wt 89 4 kg (197 lb)   SpO2 95%   BMI 33 81 kg/m²          Physical Exam  Vitals reviewed  Constitutional:       Appearance: He is well-developed  HENT:      Head: Normocephalic  Comments: He has pain to palpation over his frontal and maxillary sinuses     Left Ear: Tympanic membrane normal       Mouth/Throat:      Mouth: Mucous membranes are moist    Eyes:      Pupils: Pupils are equal, round, and reactive to light        Comments: Subconjunctival hemorrhage  Cardiovascular:      Rate and Rhythm: Regular rhythm  Heart sounds: Normal heart sounds  No murmur heard  Pulmonary:      Effort: No respiratory distress  Breath sounds: No wheezing or rales  Abdominal:      General: There is no distension  Tenderness: There is no abdominal tenderness  Musculoskeletal:      Cervical back: No rigidity  Lymphadenopathy:      Cervical: No cervical adenopathy  Skin:     Findings: No erythema or rash  Neurological:      Mental Status: He is alert and oriented to person, place, and time             Kay Casanova MD

## 2022-09-14 NOTE — ASSESSMENT & PLAN NOTE
Asthma is controlled at this time but he does have lot of allergic symptoms and eosinophils were quite high on recent CBC  I am going to send in montelukast any can also try something over-the-counter such as Claritin or Zyrtec daily

## 2022-09-14 NOTE — ASSESSMENT & PLAN NOTE
He has what sounds like sinusitis now for the last 3 weeks  I am going to place him on a Medrol Dosepak as well as a Z-Garth  Since he will be traveling internationally in the near future I did give him a refill on the Z-Garth that he did feel before travel

## 2022-09-26 ENCOUNTER — TELEPHONE (OUTPATIENT)
Dept: FAMILY MEDICINE CLINIC | Facility: CLINIC | Age: 77
End: 2022-09-26

## 2022-09-28 ENCOUNTER — IMMUNIZATIONS (OUTPATIENT)
Dept: FAMILY MEDICINE CLINIC | Facility: CLINIC | Age: 77
End: 2022-09-28
Payer: COMMERCIAL

## 2022-09-28 DIAGNOSIS — Z23 NEED FOR INFLUENZA VACCINATION: Primary | ICD-10-CM

## 2022-09-28 PROCEDURE — G0008 ADMIN INFLUENZA VIRUS VAC: HCPCS

## 2022-09-28 PROCEDURE — 90662 IIV NO PRSV INCREASED AG IM: CPT

## 2022-10-05 DIAGNOSIS — I10 HYPERTENSION, UNSPECIFIED TYPE: ICD-10-CM

## 2022-10-05 DIAGNOSIS — E78.5 HYPERLIPIDEMIA, UNSPECIFIED HYPERLIPIDEMIA TYPE: ICD-10-CM

## 2022-10-05 DIAGNOSIS — J32.4 CHRONIC PANSINUSITIS: ICD-10-CM

## 2022-10-05 DIAGNOSIS — M54.17 LUMBOSACRAL RADICULITIS: ICD-10-CM

## 2022-10-05 DIAGNOSIS — R73.9 HYPERGLYCEMIA: ICD-10-CM

## 2022-10-05 DIAGNOSIS — F41.9 ANXIETY: ICD-10-CM

## 2022-10-05 DIAGNOSIS — J30.89 NON-SEASONAL ALLERGIC RHINITIS, UNSPECIFIED TRIGGER: ICD-10-CM

## 2022-10-05 DIAGNOSIS — E78.2 MIXED HYPERLIPIDEMIA: ICD-10-CM

## 2022-10-05 LAB
ALBUMIN SERPL-MCNC: 4.2 G/DL (ref 3.6–5.1)
ALBUMIN/GLOB SERPL: 1.8 (CALC) (ref 1–2.5)
ALP SERPL-CCNC: 56 U/L (ref 35–144)
ALT SERPL-CCNC: 23 U/L (ref 9–46)
AST SERPL-CCNC: 21 U/L (ref 10–35)
BASOPHILS # BLD AUTO: 62 CELLS/UL (ref 0–200)
BASOPHILS NFR BLD AUTO: 0.9 %
BILIRUB SERPL-MCNC: 1.4 MG/DL (ref 0.2–1.2)
BUN SERPL-MCNC: 10 MG/DL (ref 7–25)
BUN/CREAT SERPL: ABNORMAL (CALC) (ref 6–22)
CALCIUM SERPL-MCNC: 9.7 MG/DL (ref 8.6–10.3)
CHLORIDE SERPL-SCNC: 104 MMOL/L (ref 98–110)
CHOLEST SERPL-MCNC: 152 MG/DL
CHOLEST/HDLC SERPL: 3.1 (CALC)
CO2 SERPL-SCNC: 33 MMOL/L (ref 20–32)
CREAT SERPL-MCNC: 1 MG/DL (ref 0.7–1.28)
EOSINOPHIL # BLD AUTO: 807 CELLS/UL (ref 15–500)
EOSINOPHIL NFR BLD AUTO: 11.7 %
ERYTHROCYTE [DISTWIDTH] IN BLOOD BY AUTOMATED COUNT: 13.4 % (ref 11–15)
GFR/BSA.PRED SERPLBLD CYS-BASED-ARV: 77 ML/MIN/1.73M2
GLOBULIN SER CALC-MCNC: 2.4 G/DL (CALC) (ref 1.9–3.7)
GLUCOSE SERPL-MCNC: 109 MG/DL (ref 65–99)
HBA1C MFR BLD: 6.3 % OF TOTAL HGB
HCT VFR BLD AUTO: 42.4 % (ref 38.5–50)
HDLC SERPL-MCNC: 49 MG/DL
HGB BLD-MCNC: 14.5 G/DL (ref 13.2–17.1)
LDLC SERPL CALC-MCNC: 87 MG/DL (CALC)
LYMPHOCYTES # BLD AUTO: 1849 CELLS/UL (ref 850–3900)
LYMPHOCYTES NFR BLD AUTO: 26.8 %
MCH RBC QN AUTO: 28.3 PG (ref 27–33)
MCHC RBC AUTO-ENTMCNC: 34.2 G/DL (ref 32–36)
MCV RBC AUTO: 82.8 FL (ref 80–100)
MONOCYTES # BLD AUTO: 559 CELLS/UL (ref 200–950)
MONOCYTES NFR BLD AUTO: 8.1 %
NEUTROPHILS # BLD AUTO: 3623 CELLS/UL (ref 1500–7800)
NEUTROPHILS NFR BLD AUTO: 52.5 %
NONHDLC SERPL-MCNC: 103 MG/DL (CALC)
PLATELET # BLD AUTO: 217 THOUSAND/UL (ref 140–400)
PMV BLD REES-ECKER: 10.7 FL (ref 7.5–12.5)
POTASSIUM SERPL-SCNC: 4.5 MMOL/L (ref 3.5–5.3)
PROT SERPL-MCNC: 6.6 G/DL (ref 6.1–8.1)
RBC # BLD AUTO: 5.12 MILLION/UL (ref 4.2–5.8)
SODIUM SERPL-SCNC: 143 MMOL/L (ref 135–146)
TRIGL SERPL-MCNC: 70 MG/DL
WBC # BLD AUTO: 6.9 THOUSAND/UL (ref 3.8–10.8)

## 2022-10-05 NOTE — TELEPHONE ENCOUNTER
Message received from staff that patient requested temporary travel supply  Contacted patient, states he requires temp supply of all meds sent by PCP during appointment on 9/14  Will be traveling to [place] for [place] weeks, leaving on 10/8  Orders pended for PCP - did not order alprazolam as patient has been filling order every 6-8 weeks, last filled on 9/14

## 2022-10-07 RX ORDER — MONTELUKAST SODIUM 10 MG/1
10 TABLET ORAL
Qty: 30 TABLET | Refills: 0 | Status: SHIPPED | OUTPATIENT
Start: 2022-10-07

## 2022-10-07 RX ORDER — GABAPENTIN 300 MG/1
300 CAPSULE ORAL 3 TIMES DAILY
Qty: 90 CAPSULE | Refills: 0 | Status: SHIPPED | OUTPATIENT
Start: 2022-10-07

## 2022-10-07 RX ORDER — CITALOPRAM 20 MG/1
20 TABLET ORAL DAILY
Qty: 30 TABLET | Refills: 0 | Status: SHIPPED | OUTPATIENT
Start: 2022-10-07

## 2022-10-07 RX ORDER — ATORVASTATIN CALCIUM 20 MG/1
20 TABLET, FILM COATED ORAL DAILY
Qty: 30 TABLET | Refills: 0 | Status: SHIPPED | OUTPATIENT
Start: 2022-10-07

## 2022-10-07 RX ORDER — AMLODIPINE BESYLATE 10 MG/1
10 TABLET ORAL DAILY
Qty: 30 TABLET | Refills: 0 | Status: SHIPPED | OUTPATIENT
Start: 2022-10-07

## 2022-10-07 RX ORDER — LISINOPRIL AND HYDROCHLOROTHIAZIDE 20; 12.5 MG/1; MG/1
0.5 TABLET ORAL DAILY
Qty: 15 TABLET | Refills: 0 | Status: SHIPPED | OUTPATIENT
Start: 2022-10-07

## 2022-11-28 DIAGNOSIS — F41.9 ANXIETY: ICD-10-CM

## 2022-11-28 RX ORDER — CITALOPRAM 20 MG/1
TABLET ORAL
Qty: 30 TABLET | Refills: 0 | Status: SHIPPED | OUTPATIENT
Start: 2022-11-28

## 2022-12-07 ENCOUNTER — OFFICE VISIT (OUTPATIENT)
Dept: FAMILY MEDICINE CLINIC | Facility: CLINIC | Age: 77
End: 2022-12-07

## 2022-12-07 VITALS
DIASTOLIC BLOOD PRESSURE: 64 MMHG | TEMPERATURE: 97.9 F | SYSTOLIC BLOOD PRESSURE: 131 MMHG | HEIGHT: 64 IN | BODY MASS INDEX: 32.74 KG/M2 | WEIGHT: 191.8 LBS | HEART RATE: 84 BPM | OXYGEN SATURATION: 96 %

## 2022-12-07 DIAGNOSIS — R05.1 ACUTE COUGH: Primary | ICD-10-CM

## 2022-12-07 RX ORDER — BENZONATATE 100 MG/1
100 CAPSULE ORAL 3 TIMES DAILY PRN
Qty: 20 CAPSULE | Refills: 0 | Status: SHIPPED | OUTPATIENT
Start: 2022-12-07

## 2022-12-07 RX ORDER — AZITHROMYCIN 250 MG/1
TABLET, FILM COATED ORAL
COMMUNITY
Start: 2022-10-04 | End: 2022-12-07

## 2022-12-07 NOTE — PROGRESS NOTES
Assessment/Plan:       Problem List Items Addressed This Visit    None  Visit Diagnoses     Acute cough    -  Primary    Relevant Medications    benzonatate (TESSALON PERLES) 100 mg capsule          1  Acute cough  Likely viral consider CXR if not improving, symptomatic treatment  COVID testing negative  - benzonatate (TESSALON PERLES) 100 mg capsule; Take 1 capsule (100 mg total) by mouth 3 (three) times a day as needed for cough  Dispense: 20 capsule; Refill: 0    Subjective:      Patient ID: Antonella Meyers is a 76 y o  male  HPI    76year old male with pmh of asthma, chronic cough, presenting for two weeks of dry cough  He did recently travel to [place], two negative COVID tests  No fevers, congestion, symptoms staying about the same not worsening or improving, partially improved with OTC meds  The following portions of the patient's history were reviewed and updated as appropriate: allergies, current medications, past family history, past medical history, past social history, past surgical history and problem list       Current Outpatient Medications:   •  acetaminophen (TYLENOL) 325 mg tablet, Take 2 tablets (650 mg total) by mouth every 6 (six) hours as needed for mild pain or fever, Disp: 30 tablet, Rfl: 3  •  albuterol (PROVENTIL HFA,VENTOLIN HFA) 90 mcg/act inhaler, Inhale 1 puff every 6 (six) hours as needed (Q6H PRN), Disp: 54 g, Rfl: 5  •  ALPRAZolam (XANAX) 0 5 mg tablet, Take 1 tablet up to twice daily as needed  , Disp: 60 tablet, Rfl: 0  •  amLODIPine (NORVASC) 10 mg tablet, Take 1 tablet (10 mg total) by mouth daily, Disp: 30 tablet, Rfl: 0  •  Ascorbic Acid (vitamin C) 100 MG tablet, Take 100 mg by mouth daily, Disp: , Rfl:   •  atorvastatin (LIPITOR) 20 mg tablet, Take 1 tablet (20 mg total) by mouth daily, Disp: 30 tablet, Rfl: 0  •  benzonatate (TESSALON PERLES) 100 mg capsule, Take 1 capsule (100 mg total) by mouth 3 (three) times a day as needed for cough, Disp: 20 capsule, Rfl: 0  •  Carboxymethylcellul-Glycerin (REFRESH RELIEVA OP), Apply to eye, Disp: , Rfl:   •  citalopram (CeleXA) 20 mg tablet, Take 1 tablet by mouth once daily, Disp: 30 tablet, Rfl: 0  •  clotrimazole-betamethasone (LOTRISONE) 1-0 05 % cream, Apply topically 2 (two) times a day, Disp: 30 g, Rfl: 0  •  diclofenac sodium (VOLTAREN) 1 %, Apply 2 g topically 2 (two) times a day, Disp: , Rfl:   •  finasteride (PROSCAR) 5 mg tablet, Take 1 tablet (5 mg total) by mouth daily, Disp: 90 tablet, Rfl: 3  •  fluocinolone (SYNALAR) 0 01 % external solution, APPLY TOPICALLY TO SCALP ONCE A DAY AS NEEDED FOR PSORIASIS, Disp: , Rfl:   •  gabapentin (NEURONTIN) 300 mg capsule, Take 1 capsule (300 mg total) by mouth 3 (three) times a day, Disp: 90 capsule, Rfl: 0  •  Humira Pen 40 MG/0 4ML PNKT, As directed by Dermatology, Disp: , Rfl:   •  hydrocortisone valerate (WEST-VITO) 0 2 % ointment, APPLY  OINTMENT EXTERNALLY DAILY, Disp: 45 g, Rfl: 0  •  lisinopril-hydrochlorothiazide (PRINZIDE,ZESTORETIC) 20-12 5 MG per tablet, Take 0 5 tablets by mouth daily Temp travel supply, Disp: 15 tablet, Rfl: 0  •  omeprazole (PriLOSEC) 20 mg delayed release capsule, Take 1 capsule (20 mg total) by mouth daily as needed (heartburn), Disp: 90 capsule, Rfl: 1  •  tacrolimus (PROTOPIC) 0 1 % ointment, APPLY TO EARS TWICE A DAY WHEN NEEDED, Disp: , Rfl:   •  vitamin B-12 (VITAMIN B-12) 1,000 mcg tablet, Take 1,000 mcg by mouth daily  , Disp: , Rfl:   •  VITAMIN D, ERGOCALCIFEROL, PO, Take 2,000 Units by mouth, Disp: , Rfl:   •  montelukast (SINGULAIR) 10 mg tablet, Take 1 tablet (10 mg total) by mouth daily at bedtime Temp travel supply (Patient not taking: Reported on 12/7/2022), Disp: 30 tablet, Rfl: 0  •  polyethylene glycol (MIRALAX) 17 g packet, Take 17 g by mouth daily for 5 days (Patient not taking: Reported on 9/14/2022), Disp: 5 each, Rfl: 0  •  tamsulosin (FLOMAX) 0 4 mg, Take 1 capsule (0 4 mg total) by mouth daily with dinner (Patient not taking: Reported on 12/7/2022), Disp: 90 capsule, Rfl: 3  •  Vitamins-Lipotropics (LIPO FLAVONOID PLUS PO), Take by mouth (Patient not taking: Reported on 9/14/2022), Disp: , Rfl:      Review of Systems   Constitutional: Negative for activity change and appetite change  Respiratory: Positive for cough  Negative for chest tightness, shortness of breath and wheezing  Cardiovascular: Negative for chest pain and palpitations  Gastrointestinal: Negative for abdominal distention and abdominal pain  Musculoskeletal: Negative for arthralgias and back pain  Objective:      /64 (BP Location: Left arm, Patient Position: Sitting, Cuff Size: Standard)   Pulse 84   Temp 97 9 °F (36 6 °C) (Tympanic)   Ht 5' 4" (1 626 m)   Wt 87 kg (191 lb 12 8 oz)   SpO2 96%   BMI 32 92 kg/m²          Physical Exam  Constitutional:       Appearance: Normal appearance  Cardiovascular:      Rate and Rhythm: Normal rate and regular rhythm  Pulmonary:      Effort: Pulmonary effort is normal       Breath sounds: Normal breath sounds  Abdominal:      General: Abdomen is flat  Tenderness: There is no abdominal tenderness  Musculoskeletal:         General: Normal range of motion  Neurological:      Mental Status: He is alert             Gabe Harvey MD

## 2022-12-07 NOTE — PATIENT INSTRUCTIONS
1  Acute cough  -     benzonatate (TESSALON PERLES) 100 mg capsule; Take 1 capsule (100 mg total) by mouth 3 (three) times a day as needed for cough     Call us if worsening or not improving

## 2022-12-08 LAB
SARS-COV-2 AG UPPER RESP QL IA: NEGATIVE
VALID CONTROL: NORMAL

## 2022-12-29 DIAGNOSIS — F41.9 ANXIETY: ICD-10-CM

## 2022-12-29 RX ORDER — CITALOPRAM 20 MG/1
TABLET ORAL
Qty: 30 TABLET | Refills: 0 | Status: SHIPPED | OUTPATIENT
Start: 2022-12-29

## 2023-01-05 DIAGNOSIS — I10 HYPERTENSION, UNSPECIFIED TYPE: ICD-10-CM

## 2023-01-05 DIAGNOSIS — R73.9 HYPERGLYCEMIA: ICD-10-CM

## 2023-01-05 DIAGNOSIS — E78.2 MIXED HYPERLIPIDEMIA: ICD-10-CM

## 2023-01-05 RX ORDER — LISINOPRIL AND HYDROCHLOROTHIAZIDE 20; 12.5 MG/1; MG/1
TABLET ORAL
Qty: 15 TABLET | Refills: 0 | Status: SHIPPED | OUTPATIENT
Start: 2023-01-05

## 2023-02-05 DIAGNOSIS — I10 HYPERTENSION, UNSPECIFIED TYPE: ICD-10-CM

## 2023-02-05 DIAGNOSIS — F41.9 ANXIETY: ICD-10-CM

## 2023-02-05 DIAGNOSIS — R73.9 HYPERGLYCEMIA: ICD-10-CM

## 2023-02-05 DIAGNOSIS — E78.2 MIXED HYPERLIPIDEMIA: ICD-10-CM

## 2023-02-05 RX ORDER — LISINOPRIL AND HYDROCHLOROTHIAZIDE 20; 12.5 MG/1; MG/1
TABLET ORAL
Qty: 15 TABLET | Refills: 0 | Status: SHIPPED | OUTPATIENT
Start: 2023-02-05

## 2023-02-05 RX ORDER — AMLODIPINE BESYLATE 10 MG/1
TABLET ORAL
Qty: 90 TABLET | Refills: 0 | Status: SHIPPED | OUTPATIENT
Start: 2023-02-05

## 2023-02-05 RX ORDER — CITALOPRAM 20 MG/1
TABLET ORAL
Qty: 30 TABLET | Refills: 0 | Status: SHIPPED | OUTPATIENT
Start: 2023-02-05

## 2023-02-07 ENCOUNTER — OFFICE VISIT (OUTPATIENT)
Dept: FAMILY MEDICINE CLINIC | Facility: CLINIC | Age: 78
End: 2023-02-07

## 2023-02-07 VITALS
DIASTOLIC BLOOD PRESSURE: 78 MMHG | HEIGHT: 64 IN | OXYGEN SATURATION: 98 % | WEIGHT: 189 LBS | HEART RATE: 77 BPM | TEMPERATURE: 97.5 F | SYSTOLIC BLOOD PRESSURE: 124 MMHG | BODY MASS INDEX: 32.27 KG/M2

## 2023-02-07 DIAGNOSIS — M19.90 INFLAMMATORY ARTHROPATHY: ICD-10-CM

## 2023-02-07 DIAGNOSIS — F41.9 ANXIETY: ICD-10-CM

## 2023-02-07 DIAGNOSIS — L40.9 PSORIASIS: ICD-10-CM

## 2023-02-07 DIAGNOSIS — R05.9 COUGH IN ADULT PATIENT: ICD-10-CM

## 2023-02-07 DIAGNOSIS — J30.89 NON-SEASONAL ALLERGIC RHINITIS, UNSPECIFIED TRIGGER: ICD-10-CM

## 2023-02-07 DIAGNOSIS — Z79.620 ADALIMUMAB (HUMIRA) LONG-TERM USE: ICD-10-CM

## 2023-02-07 DIAGNOSIS — J02.9 SORE THROAT: ICD-10-CM

## 2023-02-07 DIAGNOSIS — J45.20 MILD INTERMITTENT ASTHMA WITHOUT COMPLICATION: ICD-10-CM

## 2023-02-07 DIAGNOSIS — J31.0 CHRONIC RHINITIS: ICD-10-CM

## 2023-02-07 DIAGNOSIS — L23.9 ALLERGIC DERMATITIS: Primary | ICD-10-CM

## 2023-02-07 DIAGNOSIS — D72.10 EOSINOPHILIA, UNSPECIFIED TYPE: ICD-10-CM

## 2023-02-07 DIAGNOSIS — R73.9 HYPERGLYCEMIA: ICD-10-CM

## 2023-02-07 PROBLEM — R07.89 COSTOCHONDRAL CHEST PAIN: Status: RESOLVED | Noted: 2021-11-22 | Resolved: 2023-02-07

## 2023-02-07 LAB
SARS-COV-2 AG UPPER RESP QL IA: NEGATIVE
VALID CONTROL: NORMAL

## 2023-02-07 RX ORDER — HYDROXYZINE HYDROCHLORIDE 25 MG/1
25 TABLET, FILM COATED ORAL EVERY 8 HOURS PRN
Qty: 15 TABLET | Refills: 0 | Status: SHIPPED | OUTPATIENT
Start: 2023-02-07

## 2023-02-07 RX ORDER — BETAMETHASONE DIPROPIONATE 0.5 MG/ML
LOTION, AUGMENTED TOPICAL
COMMUNITY
Start: 2023-02-01

## 2023-02-07 RX ORDER — PREDNISONE 10 MG/1
TABLET ORAL
Qty: 20 TABLET | Refills: 0 | Status: SHIPPED | OUTPATIENT
Start: 2023-02-07

## 2023-02-07 RX ORDER — TRIAMCINOLONE ACETONIDE 1 MG/G
CREAM TOPICAL
COMMUNITY
Start: 2023-01-31

## 2023-02-07 NOTE — PATIENT INSTRUCTIONS
He does have significant generalized itching, has been a chronic issue but worse after a trip to [place] last month with exposure to family members dog  Does have longstanding psoriasis, he will continue to see advanced dermatology, is on long-term Humira  Does have chronic rhinitis, chronic sinus issues, history mastoidectomy on left  Had seen allergist few years ago, Dr Deepak Berg -consider reevaluation  Covid test neg here today    He will use hydroxyzine every 8 hours as needed for itch, watching for oversedation  He does use Alprazolam 0 5 mg twice daily for anxiety  He has tolerated steroids in the past, last use was in September for sinuses, he will use course of prednisone  We did discuss this can elevate his sugars, back in October A1c was 6 3     CBC, CMP, TSH were okay back in September other than eosinophilia, count was 1343  He will redo CBC, CMP  He has had no increased shortness of breath or wheezing, chest x-ray was okay in April of last year, history of treatment for TB, consider redo chest x-ray this year      He will continue follow-up with Dr Mallory Moore here along with his specialists -  has multiple skin creams from [facility]

## 2023-02-07 NOTE — PROGRESS NOTES
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 -  does have Psoriasis    Psoriasis    Relevant Medications    betamethasone, augmented, (DIPROLENE) 0 05 % lotion    triamcinolone (KENALOG) 0 1 % cream    hydrOXYzine HCL (ATARAX) 25 mg tablet    predniSONE 10 mg tablet    Other Relevant Orders    CBC    Comprehensive metabolic panel    Asthma    Non-seasonal allergic rhinitis    Relevant Medications    predniSONE 10 mg tablet    Anxiety    Eosinophilia   Other Visit Diagnoses     Allergic dermatitis w itching    -  Primary    Relevant Medications    betamethasone, augmented, (DIPROLENE) 0 05 % lotion    triamcinolone (KENALOG) 0 1 % cream    hydrOXYzine HCL (ATARAX) 25 mg tablet    predniSONE 10 mg tablet    Chronic rhinitis with history left mastoidectomy        Relevant Medications    predniSONE 10 mg tablet    Sore throat        Relevant Orders    POCT Rapid Covid Ag (Completed)    Cough in adult patient        Relevant Orders    POCT Rapid Covid Ag (Completed)          Patient Instructions   He does have significant generalized itching, has been a chronic issue but worse after a trip to [place] last month with exposure to family members dog  Does have longstanding psoriasis, he will continue to see advanced dermatology, is on long-term Humira  Does have chronic rhinitis, chronic sinus issues, history mastoidectomy on left  Had seen allergist few years ago, Dr Tristin Wong -consider reevaluation  Covid test neg here today    He will use hydroxyzine every 8 hours as needed for itch, watching for oversedation    He does use Alprazolam 0 5 mg twice daily for anxiety  He has tolerated steroids in the past, last use was in September for sinuses, he will use course of prednisone  We did discuss this can elevate his sugars, back in October A1c was 6 3     CBC, CMP, TSH were okay back in September other than eosinophilia, count was 1343  He will redo CBC, CMP  He has had no increased shortness of breath or wheezing, chest x-ray was okay in April of last year, history of treatment for TB, consider redo chest x-ray this year  He will continue follow-up with Dr Mallory Moore here along with his specialists -  has multiple skin creams from [address] [place] [address] Dr Maddox   Patient presents with   • Rash     All over the body, very itching at night , taking Benadryl not helping     • Cough     Taking NyQuil -not helping    • Sore Throat     For about 7 days       History of Present Illness   Clau Zavala is a 76y o -year-old male who is in for an acute visit, he relates ongoing generalized itch, feels it has worsened since he visited his daughter in [place] 1 month ago, had exposure to their dog  Does have psoriasis, continues on Humira through advanced dermatology  Lesions especially on buttocks, elbows, itch is generalized  Also again has cough with congestion, chronic sinus history, had used Medrol pack along with azithromycin back in September, also had seen [facility] back in September, CT scanning showed old left mastoidectomy, sinuses were clear  Does have eosinophilia, did see allergist years ago  Testing showed allergies to pollen  Remains very anxious, uses benzodiazepine twice daily      Review of Systems   Review of Systems   Constitutional: Negative for appetite change, fatigue, fever and unexpected weight change  HENT: Positive for congestion and sore throat  Negative for trouble swallowing  Respiratory: Positive for cough  Negative for chest tightness and shortness of breath  Cardiovascular: Negative for chest pain, palpitations and leg swelling  Gastrointestinal: Negative for abdominal pain, blood in stool, nausea and vomiting  No acid reflux     No change in bowel   Genitourinary: Negative for dysuria and hematuria  Skin: Positive for rash  Neurological: Negative for dizziness, syncope, light-headedness and headaches  Psychiatric/Behavioral: Positive for sleep disturbance  Negative for behavioral problems and confusion  The patient is nervous/anxious  Active Problem List     Patient Active Problem List   Diagnosis   • Acid reflux   • Asthma   • Bilateral hearing loss   • BPH associated with nocturia   • Cervical spinal stenosis   • Fibromyalgia   • Chronic sinusitis   • Major depressive disorder with single episode, in partial remission ([facility])   • Hyperglycemia   • Hyperlipidemia   • Essential hypertension   • Incomplete emptying of bladder   • Lumbar radiculopathy   • Lumbar spinal stenosis   • Migraine   • Peripheral neuropathy   • Psoriasis   • Rotator cuff tendinitis   • Seborrheic dermatitis of scalp   • TB lung, latent   • Testicular hypogonadism   • Thoracic degenerative disc disease   • Tinnitus   • Venous insufficiency   • Primary localized osteoarthrosis of ankle and foot   • Tarsal tunnel syndrome   • Plantar fascial fibromatosis   • Anxiety   • Insomnia   • CKD (chronic kidney disease) stage 3, GFR 30-59 ml/min ([facility])   • Osteoarthritis of multiple joints   • Trochanteric bursitis of left hip   • Perforation of right tympanic membrane w remote surgery both ears     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 -  does have Psoriasis   • Chronic cough   • Lipoma of torso/right lateral flank   • Elevated PSA   • Abnormal EKG   • Double vision   • Essential tremor   • Non-seasonal allergic rhinitis   • Eosinophilia   • Adalimumab (Humira) long-term use       Past Medical History:  Reviewed    Past Surgical History:  Reviewed    Family History:  Reviewed    Social History:  Reviewed    Objective     Vitals:    02/07/23 1138   BP: 124/78   BP Location: Left arm   Patient Position: Sitting   Cuff Size: Large   Pulse: 77   Temp: 97 5 °F (36 4 °C)   SpO2: 98%   Weight: 85 7 kg (189 lb)   Height: 5' 4" (1 626 m)     Body mass index is 32 44 kg/m²  BP Readings from Last 3 Encounters:   02/07/23 124/78   12/07/22 131/64   09/14/22 130/68       Wt Readings from Last 3 Encounters:   02/07/23 85 7 kg (189 lb)   12/07/22 87 kg (191 lb 12 8 oz)   09/14/22 89 4 kg (197 lb)       Physical Exam  Constitutional:       Comments: Pleasant but anxious 76year-old, afebrile, oxygenating well, looks comfortable at rest other than frequent scratching of skin   HENT:      Ears:      Comments: Scarring TM, no redness of TM, does have some irritation left ear canal due to Q-tip use  Eyes:      General:         Right eye: No discharge  Left eye: No discharge  Cardiovascular:      Rate and Rhythm: Normal rate and regular rhythm  Heart sounds: Normal heart sounds  Pulmonary:      Effort: Pulmonary effort is normal  No respiratory distress  Breath sounds: Normal breath sounds  No wheezing, rhonchi or rales  Musculoskeletal:      Right lower leg: No edema  Left lower leg: No edema  Lymphadenopathy:      Cervical: No cervical adenopathy  Skin:     Coloration: Skin is not jaundiced  Comments: Psoriatic patches on buttocks, elbows  Difficult to ascertain rash which she describes as generalized, does have dry skin  Multiple keratotic lesions on back           ALLERGIES:  Allergies   Allergen Reactions   • Amoxicillin Rash   • Amoxicillin-Pot Clavulanate Er  [Amoxicillin-Pot Clavulanate] Hives and Rash   • Codeine Rash and Shortness Of Breath   • Penicillin G Rash   • Albumen, Egg - Food Allergy    • Other      pork   • Pork-Derived Products - Food Allergy Hives   • Prochlorperazine Other (See Comments) and Itching     rash all over the body   • Compazine  [Prochlorperazine Edisylate] Rash   • Iodinated Contrast Media Rash   • Latex Rash and Itching   • Valdecoxib Itching, Rash and Headache     Category: HEADACHES;        Current Medications     Current Outpatient Medications   Medication Sig Dispense Refill   • acetaminophen (TYLENOL) 325 mg tablet Take 2 tablets (650 mg total) by mouth every 6 (six) hours as needed for mild pain or fever 30 tablet 3   • albuterol (PROVENTIL HFA,VENTOLIN HFA) 90 mcg/act inhaler Inhale 1 puff every 6 (six) hours as needed (Q6H PRN) 54 g 5   • ALPRAZolam (XANAX) 0 5 mg tablet Take 1 tablet up to twice daily as needed   60 tablet 0   • amLODIPine (NORVASC) 10 mg tablet Take 1 tablet by mouth once daily 90 tablet 0   • Ascorbic Acid (vitamin C) 100 MG tablet Take 100 mg by mouth daily     • atorvastatin (LIPITOR) 20 mg tablet Take 1 tablet (20 mg total) by mouth daily 30 tablet 0   • Carboxymethylcellul-Glycerin (REFRESH RELIEVA OP) Apply to eye     • citalopram (CeleXA) 20 mg tablet Take 1 tablet by mouth once daily 30 tablet 0   • diclofenac sodium (VOLTAREN) 1 % Apply 2 g topically 2 (two) times a day     • finasteride (PROSCAR) 5 mg tablet Take 1 tablet (5 mg total) by mouth daily 90 tablet 3   • fluocinolone (SYNALAR) 0 01 % external solution APPLY TOPICALLY TO SCALP ONCE A DAY AS NEEDED FOR PSORIASIS     • gabapentin (NEURONTIN) 300 mg capsule Take 1 capsule (300 mg total) by mouth 3 (three) times a day 90 capsule 0   • Humira Pen 40 MG/0 4ML PNKT As directed by Dermatology     • hydrocortisone valerate (WEST-VITO) 0 2 % ointment APPLY  OINTMENT EXTERNALLY DAILY 45 g 0   • hydrOXYzine HCL (ATARAX) 25 mg tablet Take 1 tablet (25 mg total) by mouth every 8 (eight) hours as needed for itching 15 tablet 0   • lisinopril-hydrochlorothiazide (PRINZIDE,ZESTORETIC) 20-12 5 MG per tablet Take 1/2 (one-half) tablet by mouth once daily 15 tablet 0   • omeprazole (PriLOSEC) 20 mg delayed release capsule Take 1 capsule (20 mg total) by mouth daily as needed (heartburn) 90 capsule 1   • predniSONE 10 mg tablet Use 40mg x 2 days, 30 mg x 2 days, 20 mg x 2 days, 10 mg x 2 days then stop 20 tablet 0   • tacrolimus (PROTOPIC) 0 1 % ointment APPLY TO EARS TWICE A DAY WHEN NEEDED     • tamsulosin (FLOMAX) 0 4 mg Take 1 capsule (0 4 mg total) by mouth daily with dinner 90 capsule 3   • vitamin B-12 (VITAMIN B-12) 1,000 mcg tablet Take 1,000 mcg by mouth daily       • VITAMIN D, ERGOCALCIFEROL, PO Take 2,000 Units by mouth     • Vitamins-Lipotropics (LIPO FLAVONOID PLUS PO) Take by mouth     • benzonatate (TESSALON PERLES) 100 mg capsule Take 1 capsule (100 mg total) by mouth 3 (three) times a day as needed for cough (Patient not taking: Reported on 2/7/2023) 20 capsule 0   • betamethasone, augmented, (DIPROLENE) 0 05 % lotion APPLY TO SCALP ONCE DAILY FOR 2 WEEKS AS NEEDED FOR ITCHING     • montelukast (SINGULAIR) 10 mg tablet Take 1 tablet (10 mg total) by mouth daily at bedtime Temp travel supply (Patient not taking: Reported on 12/7/2022) 30 tablet 0   • polyethylene glycol (MIRALAX) 17 g packet Take 17 g by mouth daily for 5 days (Patient not taking: Reported on 9/14/2022) 5 each 0   • triamcinolone (KENALOG) 0 1 % cream APPLY ONCE A DAY TO THE AFECTED AREAS AS DIRECTED       No current facility-administered medications for this visit              Orders Placed This Encounter   Procedures   • CBC   • Comprehensive metabolic panel   • POCT Rapid Covid Ag         Hernan Aquino DO

## 2023-02-17 LAB
ALBUMIN SERPL-MCNC: 4 G/DL (ref 3.6–5.1)
ALBUMIN/GLOB SERPL: 1.7 (CALC) (ref 1–2.5)
ALP SERPL-CCNC: 54 U/L (ref 35–144)
ALT SERPL-CCNC: 22 U/L (ref 9–46)
AST SERPL-CCNC: 13 U/L (ref 10–35)
BASOPHILS # BLD AUTO: 53 CELLS/UL (ref 0–200)
BASOPHILS NFR BLD AUTO: 0.5 %
BILIRUB SERPL-MCNC: 1.2 MG/DL (ref 0.2–1.2)
BUN SERPL-MCNC: 19 MG/DL (ref 7–25)
BUN/CREAT SERPL: ABNORMAL (CALC) (ref 6–22)
CALCIUM SERPL-MCNC: 9.5 MG/DL (ref 8.6–10.3)
CHLORIDE SERPL-SCNC: 99 MMOL/L (ref 98–110)
CO2 SERPL-SCNC: 34 MMOL/L (ref 20–32)
CREAT SERPL-MCNC: 0.99 MG/DL (ref 0.7–1.28)
EOSINOPHIL # BLD AUTO: 583 CELLS/UL (ref 15–500)
EOSINOPHIL NFR BLD AUTO: 5.5 %
ERYTHROCYTE [DISTWIDTH] IN BLOOD BY AUTOMATED COUNT: 13.6 % (ref 11–15)
GFR/BSA.PRED SERPLBLD CYS-BASED-ARV: 77 ML/MIN/1.73M2
GLOBULIN SER CALC-MCNC: 2.3 G/DL (CALC) (ref 1.9–3.7)
GLUCOSE SERPL-MCNC: 112 MG/DL (ref 65–99)
HCT VFR BLD AUTO: 44.5 % (ref 38.5–50)
HGB BLD-MCNC: 14.8 G/DL (ref 13.2–17.1)
LYMPHOCYTES # BLD AUTO: 1950 CELLS/UL (ref 850–3900)
LYMPHOCYTES NFR BLD AUTO: 18.4 %
MCH RBC QN AUTO: 28.1 PG (ref 27–33)
MCHC RBC AUTO-ENTMCNC: 33.3 G/DL (ref 32–36)
MCV RBC AUTO: 84.6 FL (ref 80–100)
MONOCYTES # BLD AUTO: 636 CELLS/UL (ref 200–950)
MONOCYTES NFR BLD AUTO: 6 %
NEUTROPHILS # BLD AUTO: 7378 CELLS/UL (ref 1500–7800)
NEUTROPHILS NFR BLD AUTO: 69.6 %
PLATELET # BLD AUTO: 251 THOUSAND/UL (ref 140–400)
PMV BLD REES-ECKER: 10.6 FL (ref 7.5–12.5)
POTASSIUM SERPL-SCNC: 4.7 MMOL/L (ref 3.5–5.3)
PROT SERPL-MCNC: 6.3 G/DL (ref 6.1–8.1)
RBC # BLD AUTO: 5.26 MILLION/UL (ref 4.2–5.8)
SODIUM SERPL-SCNC: 138 MMOL/L (ref 135–146)
WBC # BLD AUTO: 10.6 THOUSAND/UL (ref 3.8–10.8)

## 2023-03-02 DIAGNOSIS — R73.9 HYPERGLYCEMIA: ICD-10-CM

## 2023-03-02 DIAGNOSIS — E78.2 MIXED HYPERLIPIDEMIA: ICD-10-CM

## 2023-03-02 DIAGNOSIS — I10 HYPERTENSION, UNSPECIFIED TYPE: ICD-10-CM

## 2023-03-02 DIAGNOSIS — F41.9 ANXIETY: ICD-10-CM

## 2023-03-02 RX ORDER — LISINOPRIL AND HYDROCHLOROTHIAZIDE 20; 12.5 MG/1; MG/1
1 TABLET ORAL DAILY
Qty: 15 TABLET | Refills: 0 | Status: SHIPPED | OUTPATIENT
Start: 2023-03-02

## 2023-03-02 RX ORDER — ALPRAZOLAM 0.5 MG/1
TABLET ORAL
Qty: 60 TABLET | Refills: 0 | Status: SHIPPED | OUTPATIENT
Start: 2023-03-02

## 2023-03-02 RX ORDER — CITALOPRAM 20 MG/1
20 TABLET ORAL DAILY
Qty: 30 TABLET | Refills: 0 | Status: SHIPPED | OUTPATIENT
Start: 2023-03-02

## 2023-03-03 DIAGNOSIS — L40.9 PSORIASIS: ICD-10-CM

## 2023-03-03 DIAGNOSIS — L23.9 ALLERGIC DERMATITIS: ICD-10-CM

## 2023-03-03 RX ORDER — HYDROXYZINE HYDROCHLORIDE 25 MG/1
25 TABLET, FILM COATED ORAL EVERY 8 HOURS PRN
Qty: 15 TABLET | Refills: 0 | Status: SHIPPED | OUTPATIENT
Start: 2023-03-03

## 2023-03-17 ENCOUNTER — RA CDI HCC (OUTPATIENT)
Dept: OTHER | Facility: HOSPITAL | Age: 78
End: 2023-03-17

## 2023-03-17 NOTE — PROGRESS NOTES
[facility]  coding opportunities       Chart reviewed, no opportunity found: CHART REVIEWED, NO OPPORTUNITY FOUND        Patients Insurance     Medicare Insurance: [employer] [facility] Advantage

## 2023-03-23 ENCOUNTER — OFFICE VISIT (OUTPATIENT)
Dept: FAMILY MEDICINE CLINIC | Facility: CLINIC | Age: 78
End: 2023-03-23

## 2023-03-23 VITALS
HEIGHT: 64 IN | BODY MASS INDEX: 33.63 KG/M2 | TEMPERATURE: 97.8 F | OXYGEN SATURATION: 97 % | DIASTOLIC BLOOD PRESSURE: 60 MMHG | SYSTOLIC BLOOD PRESSURE: 108 MMHG | WEIGHT: 197 LBS | HEART RATE: 84 BPM

## 2023-03-23 DIAGNOSIS — L40.9 PSORIASIS: ICD-10-CM

## 2023-03-23 DIAGNOSIS — I10 ESSENTIAL HYPERTENSION: Primary | ICD-10-CM

## 2023-03-23 DIAGNOSIS — R73.03 PREDIABETES: ICD-10-CM

## 2023-03-23 LAB — SL AMB POCT HEMOGLOBIN AIC: 6.2 (ref ?–6.5)

## 2023-03-23 NOTE — PATIENT INSTRUCTIONS
Blood pressure is fine here today, he will continue lisinopril HCT 20/12 5 daily  At visit with me in February I ordered CBC, CMP, those are stable, creatinine 0 99 with potassium 4 7, glucose 112  He is status post course of prednisone in February, A1c redone here today is 6 2- was 6 3 back in October  Continue to watch healthy diet, limit carbohydrates, sweets, snacks, portions  Weight is up 6 to 8 pounds  Last eosinophil count was down to 583  October cholesterol was 152 with HDL 49, LDL 87, stay on atorvastatin 20 mg daily as is  History depression, stay on Celexa 20 mg daily, alprazolam 0 5 mg twice daily as needed, TSH was okay back in September  Does continue with chronic itching, does have psoriasis, continues to see advanced dermatology, Humira has been changed to [employer]  He is scheduled to see Dr Carlton Dsouza in April with an annual wellness visit

## 2023-03-23 NOTE — PROGRESS NOTES
FAMILY PRACTICE OFFICE VISIT  Danieltrang Salazar [facility]  [address]  [address] [address]      NAME: Ana Mcmahon  AGE: 76 y o  SEX: male  : 1945   MRN: [id number]    DATE: 3/23/2023  TIME: 11:15 AM    Assessment and Plan     Problem List Items Addressed This Visit     Prediabetes    Relevant Orders    POCT hemoglobin A1c (Completed)    Psoriasis    Relevant Medications    Risankizumab-rzaa (SKYRIZI PEN SC)    Essential hypertension - Primary       Patient Instructions   Blood pressure is fine here today, he will continue lisinopril HCT 20/12 5 daily  At visit with me in February I ordered CBC, CMP, those are stable, creatinine 0 99 with potassium 4 7, glucose 112  He is status post course of prednisone in February, A1c redone here today is 6 2- was 6 3 back in October  Continue to watch healthy diet, limit carbohydrates, sweets, snacks, portions  Weight is up 6 to 8 pounds  Last eosinophil count was down to 583  October cholesterol was 152 with HDL 49, LDL 87, stay on atorvastatin 20 mg daily as is  History depression, stay on Celexa 20 mg daily, alprazolam 0 5 mg twice daily as needed, TSH was okay back in September  Does continue with chronic itching, does have psoriasis, continues to see advanced dermatology, [employer] has been changed to [employer]  He is scheduled to see Dr Carlton Dsouza in April with an annual wellness visit  Chief Complaint     Chief Complaint   Patient presents with   • Follow-up       History of Present Illness   Ana Mcmahon is a 76y o -year-old male who I had seen back in February, today appears to be an older appointment, he is set up for an annual wellness next month with Dr Carlton Dsouza  He is feeling about the same as at baseline, continues with itch, does see dermatology, Humira was changed to [employer]  Did use prednisone in February without much benefit        Review of Systems   Review of Systems   Constitutional: Positive for fatigue  Negative for appetite change and fever  HENT: Negative for sore throat and trouble swallowing  Respiratory: Only requires albuterol on occasion, no increased cough or shortness of breath   Cardiovascular:        Has chronic left shoulder pain, no new chest pain, no increased palpitations, no increased swelling in ankle   Gastrointestinal: Negative for abdominal pain, blood in stool, nausea and vomiting  Occ acid reflux with belching, uses omeprazole twice weekly  No change in bowel   Genitourinary: Negative for dysuria and hematuria  Skin:        Chronic itching, history psoriasis   Neurological: Positive for dizziness, light-headedness and headaches  Negative for syncope  Same   Psychiatric/Behavioral: The patient is nervous/anxious  Active Problem List     Patient Active Problem List   Diagnosis   • Acid reflux   • Asthma   • Bilateral hearing loss   • BPH associated with nocturia   • Cervical spinal stenosis   • Fibromyalgia   • Chronic sinusitis   • Major depressive disorder with single episode, in partial remission ([facility])   • Prediabetes   • Hyperlipidemia   • Essential hypertension   • Incomplete emptying of bladder   • Lumbar radiculopathy   • Lumbar spinal stenosis   • Migraine   • Peripheral neuropathy   • Psoriasis   • Rotator cuff tendinitis   • Seborrheic dermatitis of scalp   • TB lung, latent   • Testicular hypogonadism   • Thoracic degenerative disc disease   • Tinnitus   • Venous insufficiency   • Primary localized osteoarthrosis of ankle and foot   • Tarsal tunnel syndrome   • Plantar fascial fibromatosis   • Anxiety   • Insomnia   • CKD (chronic kidney disease) stage 3, GFR 30-59 ml/min ([facility])   • Osteoarthritis of multiple joints   • Trochanteric bursitis of left hip   • Perforation of right tympanic membrane w remote surgery both ears     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 -  does have Psoriasis   • Chronic cough   • Lipoma of torso/right lateral flank   • Elevated PSA   • Abnormal EKG   • Double vision   • Essential tremor   • Non-seasonal allergic rhinitis   • Eosinophilia       Past Medical History:  Reviewed    Past Surgical History:  Reviewed    Family History:  Reviewed    Social History:  Reviewed    Objective     Vitals:    03/23/23 0955   BP: 108/60   BP Location: Left arm   Patient Position: Sitting   Cuff Size: Large   Pulse: 84   Temp: 97 8 °F (36 6 °C)   SpO2: 97%   Weight: 89 4 kg (197 lb)   Height: 5' 4" (1 626 m)     Body mass index is 33 81 kg/m²  BP Readings from Last 3 Encounters:   03/23/23 108/60   02/07/23 124/78   12/07/22 131/64       Wt Readings from Last 3 Encounters:   03/23/23 89 4 kg (197 lb)   02/07/23 85 7 kg (189 lb)   12/07/22 87 kg (191 lb 12 8 oz)       Physical Exam  Constitutional:       Appearance: Normal appearance  He is not ill-appearing  Eyes:      General: No scleral icterus  Cardiovascular:      Rate and Rhythm: Normal rate and regular rhythm  Pulmonary:      Effort: Pulmonary effort is normal       Breath sounds: Normal breath sounds  Abdominal:      Palpations: Abdomen is soft  Tenderness: There is no abdominal tenderness  Musculoskeletal:      Right lower leg: No edema  Left lower leg: No edema  Skin:     Coloration: Skin is not jaundiced  Neurological:      Mental Status: He is alert     Psychiatric:         Behavior: Behavior normal          ALLERGIES:  Allergies   Allergen Reactions   • Amoxicillin Rash   • Amoxicillin-Pot Clavulanate Er  [Amoxicillin-Pot Clavulanate] Hives and Rash   • Codeine Rash and Shortness Of Breath   • Penicillin G Rash   • Albumen, Egg - Food Allergy    • Other      pork   • Pork-Derived Products - Food Allergy Hives   • Prochlorperazine Other (See Comments) and Itching     rash all over the body   • Compazine  [Prochlorperazine Edisylate] Rash   • Iodinated Contrast Media Rash   • Latex Rash and Itching   • Valdecoxib Itching, Rash and Headache     Category: HEADACHES;        Current Medications     Current Outpatient Medications   Medication Sig Dispense Refill   • acetaminophen (TYLENOL) 325 mg tablet Take 2 tablets (650 mg total) by mouth every 6 (six) hours as needed for mild pain or fever 30 tablet 3   • albuterol (PROVENTIL HFA,VENTOLIN HFA) 90 mcg/act inhaler Inhale 1 puff every 6 (six) hours as needed (Q6H PRN) 54 g 5   • ALPRAZolam (XANAX) 0 5 mg tablet Take 1 tablet up to twice daily as needed   60 tablet 0   • amLODIPine (NORVASC) 10 mg tablet Take 1 tablet by mouth once daily 90 tablet 0   • Ascorbic Acid (vitamin C) 100 MG tablet Take 100 mg by mouth daily     • atorvastatin (LIPITOR) 20 mg tablet Take 1 tablet (20 mg total) by mouth daily 30 tablet 0   • betamethasone, augmented, (DIPROLENE) 0 05 % lotion APPLY TO SCALP ONCE DAILY FOR 2 WEEKS AS NEEDED FOR ITCHING     • Carboxymethylcellul-Glycerin (REFRESH RELIEVA OP) Apply to eye     • citalopram (CeleXA) 20 mg tablet Take 1 tablet (20 mg total) by mouth daily 30 tablet 0   • diclofenac sodium (VOLTAREN) 1 % Apply 2 g topically 2 (two) times a day     • finasteride (PROSCAR) 5 mg tablet Take 1 tablet (5 mg total) by mouth daily 90 tablet 3   • fluocinolone (SYNALAR) 0 01 % external solution APPLY TOPICALLY TO SCALP ONCE A DAY AS NEEDED FOR PSORIASIS     • gabapentin (NEURONTIN) 300 mg capsule Take 1 capsule (300 mg total) by mouth 3 (three) times a day 90 capsule 0   • hydrocortisone valerate (WEST-VITO) 0 2 % ointment APPLY  OINTMENT EXTERNALLY DAILY 45 g 0   • hydrOXYzine HCL (ATARAX) 25 mg tablet Take 1 tablet (25 mg total) by mouth every 8 (eight) hours as needed for itching 15 tablet 0   • lisinopril-hydrochlorothiazide (PRINZIDE,ZESTORETIC) 20-12 5 MG per tablet Take 1 tablet by mouth daily 15 tablet 0   • omeprazole (PriLOSEC) 20 mg delayed release capsule Take 1 capsule (20 mg total) by mouth daily as needed (heartburn) 90 capsule 1 • Risankizumab-rzaa (SKYRIZI PEN SC) Inject under the skin Q 4 weeks via Dermatology     • tacrolimus (PROTOPIC) 0 1 % ointment APPLY TO EARS TWICE A DAY WHEN NEEDED     • tamsulosin (FLOMAX) 0 4 mg Take 1 capsule (0 4 mg total) by mouth daily with dinner 90 capsule 3   • triamcinolone (KENALOG) 0 1 % cream APPLY ONCE A DAY TO THE AFECTED AREAS AS DIRECTED     • vitamin B-12 (VITAMIN B-12) 1,000 mcg tablet Take 1,000 mcg by mouth daily       • VITAMIN D, ERGOCALCIFEROL, PO Take 2,000 Units by mouth     • Vitamins-Lipotropics (LIPO FLAVONOID PLUS PO) Take by mouth     • benzonatate (TESSALON PERLES) 100 mg capsule Take 1 capsule (100 mg total) by mouth 3 (three) times a day as needed for cough (Patient not taking: Reported on 2/7/2023) 20 capsule 0   • polyethylene glycol (MIRALAX) 17 g packet Take 17 g by mouth daily for 5 days (Patient not taking: Reported on 9/14/2022) 5 each 0     No current facility-administered medications for this visit              Orders Placed This Encounter   Procedures   • POCT hemoglobin A1c         Marlis Nissen, DO

## 2023-04-05 DIAGNOSIS — I10 HYPERTENSION, UNSPECIFIED TYPE: ICD-10-CM

## 2023-04-05 DIAGNOSIS — F41.9 ANXIETY: ICD-10-CM

## 2023-04-05 RX ORDER — CITALOPRAM 20 MG/1
20 TABLET ORAL DAILY
Qty: 30 TABLET | Refills: 0 | Status: SHIPPED | OUTPATIENT
Start: 2023-04-05

## 2023-04-05 RX ORDER — AMLODIPINE BESYLATE 10 MG/1
10 TABLET ORAL DAILY
Qty: 90 TABLET | Refills: 0 | Status: SHIPPED | OUTPATIENT
Start: 2023-04-05

## 2023-04-06 RX ORDER — ALPRAZOLAM 0.5 MG/1
TABLET ORAL
Qty: 60 TABLET | Refills: 0 | Status: SHIPPED | OUTPATIENT
Start: 2023-04-06

## 2023-04-17 PROBLEM — L30.9 DERMATITIS: Status: ACTIVE | Noted: 2023-04-17

## 2023-05-02 LAB
25(OH)D3 SERPL-MCNC: 33 NG/ML (ref 30–100)
ALBUMIN SERPL-MCNC: 4.1 G/DL (ref 3.6–5.1)
ALBUMIN/GLOB SERPL: 1.8 (CALC) (ref 1–2.5)
ALP SERPL-CCNC: 48 U/L (ref 35–144)
ALT SERPL-CCNC: 18 U/L (ref 9–46)
AST SERPL-CCNC: 13 U/L (ref 10–35)
BASOPHILS # BLD AUTO: 47 CELLS/UL (ref 0–200)
BASOPHILS NFR BLD AUTO: 0.4 %
BILIRUB SERPL-MCNC: 1.5 MG/DL (ref 0.2–1.2)
BUN SERPL-MCNC: 16 MG/DL (ref 7–25)
BUN/CREAT SERPL: ABNORMAL (CALC) (ref 6–22)
CALCIUM SERPL-MCNC: 9.6 MG/DL (ref 8.6–10.3)
CHLORIDE SERPL-SCNC: 104 MMOL/L (ref 98–110)
CHOLEST SERPL-MCNC: 157 MG/DL
CHOLEST/HDLC SERPL: 2.6 (CALC)
CO2 SERPL-SCNC: 33 MMOL/L (ref 20–32)
CREAT SERPL-MCNC: 1.02 MG/DL (ref 0.7–1.28)
EOSINOPHIL # BLD AUTO: 807 CELLS/UL (ref 15–500)
EOSINOPHIL NFR BLD AUTO: 6.9 %
ERYTHROCYTE [DISTWIDTH] IN BLOOD BY AUTOMATED COUNT: 13.1 % (ref 11–15)
GFR/BSA.PRED SERPLBLD CYS-BASED-ARV: 75 ML/MIN/1.73M2
GLOBULIN SER CALC-MCNC: 2.3 G/DL (CALC) (ref 1.9–3.7)
GLUCOSE SERPL-MCNC: 114 MG/DL (ref 65–99)
HBA1C MFR BLD: 6 % OF TOTAL HGB
HCT VFR BLD AUTO: 43.8 % (ref 38.5–50)
HDLC SERPL-MCNC: 60 MG/DL
HGB BLD-MCNC: 15.2 G/DL (ref 13.2–17.1)
LDLC SERPL CALC-MCNC: 81 MG/DL (CALC)
LYMPHOCYTES # BLD AUTO: 2995 CELLS/UL (ref 850–3900)
LYMPHOCYTES NFR BLD AUTO: 25.6 %
MCH RBC QN AUTO: 29.1 PG (ref 27–33)
MCHC RBC AUTO-ENTMCNC: 34.7 G/DL (ref 32–36)
MCV RBC AUTO: 83.9 FL (ref 80–100)
MONOCYTES # BLD AUTO: 866 CELLS/UL (ref 200–950)
MONOCYTES NFR BLD AUTO: 7.4 %
NEUTROPHILS # BLD AUTO: 6985 CELLS/UL (ref 1500–7800)
NEUTROPHILS NFR BLD AUTO: 59.7 %
NONHDLC SERPL-MCNC: 97 MG/DL (CALC)
PLATELET # BLD AUTO: 235 THOUSAND/UL (ref 140–400)
PMV BLD REES-ECKER: 10.3 FL (ref 7.5–12.5)
POTASSIUM SERPL-SCNC: 4.9 MMOL/L (ref 3.5–5.3)
PROT SERPL-MCNC: 6.4 G/DL (ref 6.1–8.1)
PSA SERPL-MCNC: 0.36 NG/ML
RBC # BLD AUTO: 5.22 MILLION/UL (ref 4.2–5.8)
SODIUM SERPL-SCNC: 142 MMOL/L (ref 135–146)
TRIGL SERPL-MCNC: 80 MG/DL
TSH SERPL-ACNC: 1.35 MIU/L (ref 0.4–4.5)
WBC # BLD AUTO: 11.7 THOUSAND/UL (ref 3.8–10.8)

## 2023-05-03 ENCOUNTER — PATIENT OUTREACH (OUTPATIENT)
Dept: FAMILY MEDICINE CLINIC | Facility: CLINIC | Age: 78
End: 2023-05-03

## 2023-05-03 NOTE — PROGRESS NOTES
IDA TORRES received referral regarding positive SDOH/financial difficulties  IDA TORRES noted in last office visit that the patient has major depressive disorder but is under control  IDA TORRES placed call to [facility] who stated that he worked hard for his children and grandchildren  He continued that he is retired but social security is not enough money  He has a small pension as well  He explained when he had a injury his  was with the company and did not feel well represented  He spoke in detail about it  Although it was in the past it is still affecting him  Matty lives with spouse  They have a mortgage and expressed frustration that the rate keeps changing  He stated that he has dignity and does not want children to know having issues  He continued that he pays $160/mo for Medicare but it does not always cover his medications  He receives $1400/mo in social security and  $345/mo in pension and expressed frustration that it does not go up with cost of living  He has never applied for secondary insurance coverage  IDA TORRES discussed [facility] to assist with cost of medications  He is hesitant and will think about it  IDA TORRES also explained the [employer] for homeowners  He indicated that he has never applied for any assistance but agreed to look it over  IDA TORRES confirmed his email address on the chart and will email the information  Matty indicated that he depression is not going away and often redirected conversation back to the situation with his employer prior to retiring  He is not connected with counseling but is not interested  He does not feel the counselors need to know about his life  He talks with Dr Darya Rodriguez and [facility] discussed advanced directives  He does not want his children to know about his life  IDA TORRES explained purpose of a power of  and he choose if spouse if wishes  He declined the need        Matty drives but not as frequently or as long and does not have any concerns accessing care  He is independent with IADLs and ADLs

## 2023-05-04 ENCOUNTER — TELEPHONE (OUTPATIENT)
Dept: FAMILY MEDICINE CLINIC | Facility: CLINIC | Age: 78
End: 2023-05-04

## 2023-05-04 NOTE — TELEPHONE ENCOUNTER
Pt called asking for his recent blood test results  I saw they were not resulted yet so I told him when I see your note we will call him  Please advise, thank you

## 2023-05-05 DIAGNOSIS — F41.9 ANXIETY: ICD-10-CM

## 2023-05-05 DIAGNOSIS — E78.5 HYPERLIPIDEMIA, UNSPECIFIED HYPERLIPIDEMIA TYPE: ICD-10-CM

## 2023-05-05 RX ORDER — CITALOPRAM 20 MG/1
20 TABLET ORAL DAILY
Qty: 30 TABLET | Refills: 0 | Status: SHIPPED | OUTPATIENT
Start: 2023-05-05

## 2023-05-05 RX ORDER — ATORVASTATIN CALCIUM 20 MG/1
20 TABLET, FILM COATED ORAL DAILY
Qty: 30 TABLET | Refills: 0 | Status: SHIPPED | OUTPATIENT
Start: 2023-05-05

## 2023-05-10 ENCOUNTER — PATIENT OUTREACH (OUTPATIENT)
Dept: FAMILY MEDICINE CLINIC | Facility: CLINIC | Age: 78
End: 2023-05-10

## 2023-05-10 NOTE — PROGRESS NOTES
"IDA TORRES placed follow call to the patient, Matty who confirmed receiving the information IDA TORRES sent at last outreach  He advised that he is \"not the type of person to get help from the government\"  IDA TORRES encouraged him to use services he qualifies as he has worked hard throughout his life and paid into taxes which helps fund different programs  He still declined as he feels people abuse the system  IDA TORRES advised using services he honestly qualifies for is not abusing the system  Matty expressed understanding but still declined as he is doing okay overall  Matty discussed that is disappointed that his insurance company will not cover MRI cost  He reported not given a reason but did receive a denial letter  He does not think the denial reason was on it  He is paying it  IDA TORRES explained he can request an appeal and explained the process  [facility] had encouraged him to call the insurance company who told him to pay monthly  He does have monthly payments set up but that it will take years to pay off  IDA TORRES inquired if he has ever tried applying for any financial assistance/[employer] [employer] through [employer]  He again declined need for assistance  IDA TORRES encouraged him to contact insurance prior to any future tests/procedures to determine coverage  He expressed understanding and is aware can contact IDA TORRES as needs arise  IDA TORRES will close referral as patient is declining services at this time  IDA TORRES will remain available for future psychosocial support as needed    "

## 2023-05-15 DIAGNOSIS — E78.2 MIXED HYPERLIPIDEMIA: ICD-10-CM

## 2023-05-15 DIAGNOSIS — R73.9 HYPERGLYCEMIA: ICD-10-CM

## 2023-05-15 DIAGNOSIS — I10 HYPERTENSION, UNSPECIFIED TYPE: ICD-10-CM

## 2023-05-15 RX ORDER — LISINOPRIL AND HYDROCHLOROTHIAZIDE 20; 12.5 MG/1; MG/1
1 TABLET ORAL DAILY
Qty: 90 TABLET | Refills: 3 | Status: SHIPPED | OUTPATIENT
Start: 2023-05-15

## 2023-05-16 NOTE — PROGRESS NOTES
The patient is requesting a refill of lisinopril hydrochlorothiazide      Problem List Items Addressed This Visit        Other    Hyperlipidemia    Relevant Medications    lisinopril-hydrochlorothiazide (PRINZIDE,ZESTORETIC) 20-12 5 MG per tablet   Other Visit Diagnoses     Hyperglycemia        Relevant Medications    lisinopril-hydrochlorothiazide (PRINZIDE,ZESTORETIC) 20-12 5 MG per tablet    Hypertension, unspecified type        Relevant Medications    lisinopril-hydrochlorothiazide (PRINZIDE,ZESTORETIC) 20-12 5 MG per tablet

## 2023-06-15 DIAGNOSIS — E78.5 HYPERLIPIDEMIA, UNSPECIFIED HYPERLIPIDEMIA TYPE: ICD-10-CM

## 2023-06-15 RX ORDER — ATORVASTATIN CALCIUM 20 MG/1
20 TABLET, FILM COATED ORAL DAILY
Qty: 90 TABLET | Refills: 0 | Status: SHIPPED | OUTPATIENT
Start: 2023-06-15

## 2023-06-15 RX ORDER — ATORVASTATIN CALCIUM 20 MG/1
20 TABLET, FILM COATED ORAL DAILY
Qty: 30 TABLET | Refills: 0 | Status: SHIPPED | OUTPATIENT
Start: 2023-06-15 | End: 2023-06-15

## 2023-07-10 DIAGNOSIS — E78.5 HYPERLIPIDEMIA, UNSPECIFIED HYPERLIPIDEMIA TYPE: ICD-10-CM

## 2023-07-10 RX ORDER — ATORVASTATIN CALCIUM 20 MG/1
TABLET, FILM COATED ORAL
Qty: 30 TABLET | Refills: 0 | Status: SHIPPED | OUTPATIENT
Start: 2023-07-10

## 2023-07-24 DIAGNOSIS — I10 HYPERTENSION, UNSPECIFIED TYPE: ICD-10-CM

## 2023-07-24 DIAGNOSIS — E78.2 MIXED HYPERLIPIDEMIA: ICD-10-CM

## 2023-07-24 DIAGNOSIS — F41.9 ANXIETY: ICD-10-CM

## 2023-07-24 DIAGNOSIS — N40.1 BENIGN PROSTATIC HYPERPLASIA WITH INCOMPLETE BLADDER EMPTYING: ICD-10-CM

## 2023-07-24 DIAGNOSIS — R39.14 BENIGN PROSTATIC HYPERPLASIA WITH INCOMPLETE BLADDER EMPTYING: ICD-10-CM

## 2023-07-24 DIAGNOSIS — R73.9 HYPERGLYCEMIA: ICD-10-CM

## 2023-07-24 RX ORDER — LISINOPRIL AND HYDROCHLOROTHIAZIDE 20; 12.5 MG/1; MG/1
1 TABLET ORAL DAILY
Qty: 90 TABLET | Refills: 0 | Status: SHIPPED | OUTPATIENT
Start: 2023-07-24

## 2023-07-24 RX ORDER — FINASTERIDE 5 MG/1
5 TABLET, FILM COATED ORAL DAILY
Qty: 90 TABLET | Refills: 0 | Status: SHIPPED | OUTPATIENT
Start: 2023-07-24 | End: 2023-07-26 | Stop reason: SDUPTHER

## 2023-07-26 DIAGNOSIS — R39.14 BENIGN PROSTATIC HYPERPLASIA WITH INCOMPLETE BLADDER EMPTYING: ICD-10-CM

## 2023-07-26 DIAGNOSIS — N40.1 BENIGN PROSTATIC HYPERPLASIA WITH INCOMPLETE BLADDER EMPTYING: ICD-10-CM

## 2023-07-26 RX ORDER — ALPRAZOLAM 0.5 MG/1
TABLET ORAL
Qty: 60 TABLET | Refills: 0 | Status: SHIPPED | OUTPATIENT
Start: 2023-07-26

## 2023-07-27 RX ORDER — FINASTERIDE 5 MG/1
5 TABLET, FILM COATED ORAL DAILY
Qty: 90 TABLET | Refills: 0 | Status: SHIPPED | OUTPATIENT
Start: 2023-07-27

## 2023-08-11 DIAGNOSIS — L23.9 ALLERGIC DERMATITIS: ICD-10-CM

## 2023-08-11 DIAGNOSIS — L40.9 PSORIASIS: ICD-10-CM

## 2023-08-11 DIAGNOSIS — F41.9 ANXIETY: ICD-10-CM

## 2023-08-11 DIAGNOSIS — L30.9 DERMATITIS: ICD-10-CM

## 2023-08-11 DIAGNOSIS — M19.90 INFLAMMATORY ARTHROPATHY: ICD-10-CM

## 2023-08-11 RX ORDER — HYDROXYZINE HYDROCHLORIDE 25 MG/1
25 TABLET, FILM COATED ORAL EVERY 8 HOURS PRN
Qty: 60 TABLET | Refills: 0 | Status: SHIPPED | OUTPATIENT
Start: 2023-08-11

## 2023-08-11 NOTE — TELEPHONE ENCOUNTER
Prednisone request- Medication failed [facility] protocol. Please forward to your office staff for further review as this medication was reviewed by a [facility] RN.

## 2023-08-14 RX ORDER — ALPRAZOLAM 0.5 MG/1
TABLET ORAL
Qty: 60 TABLET | Refills: 0 | Status: SHIPPED | OUTPATIENT
Start: 2023-08-14

## 2023-08-14 RX ORDER — PREDNISONE 10 MG/1
TABLET ORAL
Qty: 45 TABLET | Refills: 0 | Status: CANCELLED | OUTPATIENT
Start: 2023-08-14

## 2023-08-14 NOTE — TELEPHONE ENCOUNTER
Patient has been informed of medication being sent.  And was informed to schedule a visit if he felt he needed the other medication

## 2023-08-24 DIAGNOSIS — R39.14 BENIGN PROSTATIC HYPERPLASIA WITH INCOMPLETE BLADDER EMPTYING: ICD-10-CM

## 2023-08-24 DIAGNOSIS — N40.1 BENIGN PROSTATIC HYPERPLASIA WITH INCOMPLETE BLADDER EMPTYING: ICD-10-CM

## 2023-08-24 DIAGNOSIS — R73.9 HYPERGLYCEMIA: ICD-10-CM

## 2023-08-24 DIAGNOSIS — F41.9 ANXIETY: ICD-10-CM

## 2023-08-24 DIAGNOSIS — E78.2 MIXED HYPERLIPIDEMIA: ICD-10-CM

## 2023-08-24 DIAGNOSIS — I10 HYPERTENSION, UNSPECIFIED TYPE: ICD-10-CM

## 2023-08-24 RX ORDER — AMLODIPINE BESYLATE 10 MG/1
10 TABLET ORAL DAILY
Qty: 90 TABLET | Refills: 0 | Status: SHIPPED | OUTPATIENT
Start: 2023-08-24

## 2023-08-24 RX ORDER — CITALOPRAM 20 MG/1
20 TABLET ORAL DAILY
Qty: 90 TABLET | Refills: 0 | Status: SHIPPED | OUTPATIENT
Start: 2023-08-24

## 2023-08-24 RX ORDER — FINASTERIDE 5 MG/1
5 TABLET, FILM COATED ORAL DAILY
Qty: 90 TABLET | Refills: 0 | OUTPATIENT
Start: 2023-08-24

## 2023-08-24 RX ORDER — LISINOPRIL AND HYDROCHLOROTHIAZIDE 20; 12.5 MG/1; MG/1
1 TABLET ORAL DAILY
Qty: 90 TABLET | Refills: 0 | Status: SHIPPED | OUTPATIENT
Start: 2023-08-24

## 2023-09-28 DIAGNOSIS — F41.9 ANXIETY: ICD-10-CM

## 2023-09-28 RX ORDER — CITALOPRAM 20 MG/1
20 TABLET ORAL DAILY
Qty: 90 TABLET | Refills: 0 | Status: CANCELLED | OUTPATIENT
Start: 2023-09-28

## 2023-10-09 DIAGNOSIS — R39.14 BENIGN PROSTATIC HYPERPLASIA WITH INCOMPLETE BLADDER EMPTYING: ICD-10-CM

## 2023-10-09 DIAGNOSIS — N40.1 BENIGN PROSTATIC HYPERPLASIA WITH INCOMPLETE BLADDER EMPTYING: ICD-10-CM

## 2023-10-09 RX ORDER — TAMSULOSIN HYDROCHLORIDE 0.4 MG/1
0.4 CAPSULE ORAL
Qty: 90 CAPSULE | Refills: 0 | Status: SHIPPED | OUTPATIENT
Start: 2023-10-09

## 2023-10-20 DIAGNOSIS — E78.5 HYPERLIPIDEMIA, UNSPECIFIED HYPERLIPIDEMIA TYPE: ICD-10-CM

## 2023-10-20 RX ORDER — ATORVASTATIN CALCIUM 20 MG/1
20 TABLET, FILM COATED ORAL DAILY
Qty: 30 TABLET | Refills: 0 | Status: SHIPPED | OUTPATIENT
Start: 2023-10-20

## 2023-10-23 ENCOUNTER — OFFICE VISIT (OUTPATIENT)
Dept: FAMILY MEDICINE CLINIC | Facility: CLINIC | Age: 78
End: 2023-10-23
Payer: COMMERCIAL

## 2023-10-23 VITALS
TEMPERATURE: 98.2 F | DIASTOLIC BLOOD PRESSURE: 70 MMHG | BODY MASS INDEX: 32.95 KG/M2 | WEIGHT: 193 LBS | OXYGEN SATURATION: 99 % | SYSTOLIC BLOOD PRESSURE: 126 MMHG | HEIGHT: 64 IN | HEART RATE: 76 BPM | RESPIRATION RATE: 18 BRPM

## 2023-10-23 DIAGNOSIS — R73.03 PREDIABETES: ICD-10-CM

## 2023-10-23 DIAGNOSIS — M19.90 INFLAMMATORY ARTHROPATHY: ICD-10-CM

## 2023-10-23 DIAGNOSIS — N18.31 STAGE 3A CHRONIC KIDNEY DISEASE (HCC): ICD-10-CM

## 2023-10-23 DIAGNOSIS — Z23 ENCOUNTER FOR IMMUNIZATION: ICD-10-CM

## 2023-10-23 DIAGNOSIS — H91.93 BILATERAL HEARING LOSS, UNSPECIFIED HEARING LOSS TYPE: ICD-10-CM

## 2023-10-23 DIAGNOSIS — I10 ESSENTIAL HYPERTENSION: Primary | ICD-10-CM

## 2023-10-23 DIAGNOSIS — M54.16 LUMBAR RADICULOPATHY: ICD-10-CM

## 2023-10-23 DIAGNOSIS — F32.4 MAJOR DEPRESSIVE DISORDER WITH SINGLE EPISODE, IN PARTIAL REMISSION (HCC): ICD-10-CM

## 2023-10-23 DIAGNOSIS — M48.062 SPINAL STENOSIS OF LUMBAR REGION WITH NEUROGENIC CLAUDICATION: ICD-10-CM

## 2023-10-23 DIAGNOSIS — L40.9 PSORIASIS: ICD-10-CM

## 2023-10-23 DIAGNOSIS — D72.10 EOSINOPHILIA, UNSPECIFIED TYPE: ICD-10-CM

## 2023-10-23 PROBLEM — H53.2 DOUBLE VISION: Status: RESOLVED | Noted: 2022-04-15 | Resolved: 2023-10-23

## 2023-10-23 PROBLEM — L30.9 DERMATITIS: Status: RESOLVED | Noted: 2023-04-17 | Resolved: 2023-10-23

## 2023-10-23 PROCEDURE — G0008 ADMIN INFLUENZA VIRUS VAC: HCPCS | Performed by: FAMILY MEDICINE

## 2023-10-23 PROCEDURE — 99214 OFFICE O/P EST MOD 30 MIN: CPT | Performed by: FAMILY MEDICINE

## 2023-10-23 PROCEDURE — 90662 IIV NO PRSV INCREASED AG IM: CPT | Performed by: FAMILY MEDICINE

## 2023-10-23 NOTE — PROGRESS NOTES
Assessment/Plan:       Problem List Items Addressed This Visit        Cardiovascular and Mediastinum    Essential hypertension - Primary     Well-controlled this time. 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 Psoriasis       Genitourinary    CKD (chronic kidney disease) stage 3, GFR 30-59 ml/min ([facility])    Relevant Orders    CBC and differential    Comprehensive metabolic panel       Other    Major depressive disorder with single episode, in partial remission ([facility])    Relevant Orders    CBC and differential    Comprehensive metabolic panel    TSH, 3rd generation with Free T4 reflex    Prediabetes    Relevant Orders    CBC and differential    Comprehensive metabolic panel    Hemoglobin A1C    Lumbar spinal stenosis    Eosinophilia    Relevant Orders    CBC and differential   Other Visit Diagnoses     Encounter for immunization        Relevant Orders    influenza vaccine, high-dose, PF 0.7 mL (FLUZONE HIGH-DOSE) (Completed)            Subjective:      Patient ID: Shantelle Kyle is a 78 y.o. male. HPI  Patient presents today for follow-up for chronic health issues. He is pretty stable at this time. He is noting some decreased hearing bilaterally. He has a history of some hypertension which is well controlled. He is due for some blood work.       The following portions of the patient's history were reviewed and updated as appropriate: allergies, current medications, past family history, past medical history, past social history, past surgical history and problem list.      Current Outpatient Medications:   •  acetaminophen (TYLENOL) 325 mg tablet, Take 2 tablets (650 mg total) by mouth every 6 (six) hours as needed for mild pain or fever, Disp: 30 tablet, Rfl: 3  •  albuterol (PROVENTIL HFA,VENTOLIN HFA) 90 mcg/act inhaler, Inhale 1 puff every 6 (six) hours as needed (Q6H PRN), Disp: 54 g, Rfl: 5  •  ALPRAZolam (XANAX) 0.5 mg tablet, Take 1 tablet up to twice daily as needed. , Disp: 60 tablet, Rfl: 0  •  amLODIPine (NORVASC) 10 mg tablet, Take 1 tablet (10 mg total) by mouth daily, Disp: 90 tablet, Rfl: 0  •  Ascorbic Acid (vitamin C) 100 MG tablet, Take 100 mg by mouth daily, Disp: , Rfl:   •  atorvastatin (LIPITOR) 20 mg tablet, Take 1 tablet (20 mg total) by mouth daily, Disp: 30 tablet, Rfl: 0  •  betamethasone, augmented, (DIPROLENE) 0.05 % lotion, APPLY TO SCALP ONCE DAILY FOR 2 WEEKS AS NEEDED FOR ITCHING, Disp: , Rfl:   •  Carboxymethylcellul-Glycerin (REFRESH RELIEVA OP), Apply to eye, Disp: , Rfl:   •  citalopram (CeleXA) 20 mg tablet, Take 1 tablet (20 mg total) by mouth daily, Disp: 90 tablet, Rfl: 0  •  diclofenac sodium (VOLTAREN) 1 %, Apply 2 g topically 2 (two) times a day, Disp: , Rfl:   •  diphenhydrAMINE (BENADRYL) 12.5 mg/5 mL oral liquid, Take by mouth 3 (three) times a day as needed for allergies, Disp: , Rfl:   •  finasteride (PROSCAR) 5 mg tablet, Take 1 tablet (5 mg total) by mouth daily, Disp: 90 tablet, Rfl: 0  •  fluocinolone (SYNALAR) 0.01 % external solution, APPLY TOPICALLY TO SCALP ONCE A DAY AS NEEDED FOR PSORIASIS, Disp: , Rfl:   •  gabapentin (NEURONTIN) 300 mg capsule, Take 1 capsule (300 mg total) by mouth 3 (three) times a day, Disp: 90 capsule, Rfl: 0  •  hydrocortisone valerate (WEST-VITO) 0.2 % ointment, APPLY  OINTMENT EXTERNALLY DAILY, Disp: 45 g, Rfl: 0  •  hydrOXYzine HCL (ATARAX) 25 mg tablet, Take 1 tablet (25 mg total) by mouth every 8 (eight) hours as needed for itching, Disp: 60 tablet, Rfl: 0  •  lisinopril-hydrochlorothiazide (PRINZIDE,ZESTORETIC) 20-12.5 MG per tablet, Take 1 tablet by mouth daily, Disp: 90 tablet, Rfl: 0  •  omeprazole (PriLOSEC) 20 mg delayed release capsule, Take 1 capsule (20 mg total) by mouth daily as needed (heartburn), Disp: 90 capsule, Rfl: 1  •  Risankizumab-rzaa (SKYRIZI PEN SC), Inject under the skin Q 4 weeks via Dermatology, Disp: , Rfl:   •  tacrolimus (PROTOPIC) 0.1 % ointment, APPLY TO EARS TWICE A DAY WHEN NEEDED, Disp: , Rfl:   •  tamsulosin (FLOMAX) 0.4 mg, Take 1 capsule (0.4 mg total) by mouth daily with dinner, Disp: 90 capsule, Rfl: 0  •  triamcinolone (KENALOG) 0.1 % cream, Apply up to 3 times daily on itchy skin lesions as needed. , Disp: 45 g, Rfl: 5  •  vitamin B-12 (VITAMIN B-12) 1,000 mcg tablet, Take 1,000 mcg by mouth daily  , Disp: , Rfl:   •  VITAMIN D, ERGOCALCIFEROL, PO, Take 2,000 Units by mouth, Disp: , Rfl:   •  Vitamins-Lipotropics (LIPO FLAVONOID PLUS PO), Take by mouth, Disp: , Rfl:      Review of Systems   Constitutional:  Negative for fatigue. HENT:  Positive for congestion and hearing loss. Negative for trouble swallowing. Respiratory:  Negative for chest tightness, shortness of breath and wheezing. Cardiovascular:  Negative for chest pain, palpitations and leg swelling. Gastrointestinal:  Negative for abdominal pain, constipation and diarrhea. Endocrine: Negative for polyuria. Genitourinary:  Negative for difficulty urinating and flank pain. Musculoskeletal:  Positive for back pain. Negative for arthralgias and myalgias. Skin:  Negative for rash. Psychiatric/Behavioral:  Negative for sleep disturbance. Objective:      /70 (BP Location: Left arm, Patient Position: Sitting, Cuff Size: Standard)   Pulse 76   Temp 98.2 °F (36.8 °C) (Tympanic)   Resp 18   Ht 5' 4" (1.626 m)   Wt 87.5 kg (193 lb)   SpO2 99%   BMI 33.13 kg/m²          Physical Exam  Vitals reviewed. Constitutional:       General: He is not in acute distress. Appearance: He is well-developed. He is not diaphoretic. HENT:      Head: Normocephalic. Right Ear: There is no impacted cerumen. Left Ear: There is no impacted cerumen. Ears:      Comments: He has some significant scarring in particular of his right TM. Eyes:      General:         Right eye: No discharge. Left eye: No discharge. Pupils: Pupils are equal, round, and reactive to light. Neck:      Thyroid: No thyromegaly. Trachea: No tracheal deviation. Cardiovascular:      Rate and Rhythm: Normal rate and regular rhythm. Heart sounds: Normal heart sounds. No murmur heard. Pulmonary:      Effort: Pulmonary effort is normal. No respiratory distress. Breath sounds: No wheezing or rales. Abdominal:      General: There is no distension. Palpations: Abdomen is soft. Tenderness: There is no abdominal tenderness. Musculoskeletal:         General: Normal range of motion. Right lower leg: No edema. Left lower leg: No edema. Lymphadenopathy:      Cervical: No cervical adenopathy. Skin:     General: Skin is warm. Findings: No erythema. Neurological:      General: No focal deficit present. Mental Status: He is alert and oriented to person, place, and time. Gait: Gait normal.   Psychiatric:         Thought Content:  Thought content normal.         Judgment: Judgment normal.           Leotis Canavan, MD

## 2023-11-04 ENCOUNTER — HOSPITAL ENCOUNTER (OUTPATIENT)
Facility: HOSPITAL | Age: 78
Setting detail: OBSERVATION
Discharge: HOME | End: 2023-11-05
Attending: EMERGENCY MEDICINE | Admitting: INTERNAL MEDICINE
Payer: COMMERCIAL

## 2023-11-04 ENCOUNTER — APPOINTMENT (EMERGENCY)
Dept: CT IMAGING | Facility: HOSPITAL | Age: 78
End: 2023-11-04
Payer: COMMERCIAL

## 2023-11-04 DIAGNOSIS — K56.7 ILEUS (HCC): ICD-10-CM

## 2023-11-04 DIAGNOSIS — N40.0 ENLARGED PROSTATE: ICD-10-CM

## 2023-11-04 DIAGNOSIS — R10.9 ABDOMINAL PAIN: Primary | ICD-10-CM

## 2023-11-04 DIAGNOSIS — K44.9 HIATAL HERNIA: ICD-10-CM

## 2023-11-04 DIAGNOSIS — K52.9 ENTERITIS: ICD-10-CM

## 2023-11-04 LAB
2HR DELTA HS TROPONIN: 0 NG/L
ALBUMIN SERPL BCP-MCNC: 4.1 G/DL (ref 3.5–5)
ALP SERPL-CCNC: 40 U/L (ref 34–104)
ALT SERPL W P-5'-P-CCNC: 17 U/L (ref 7–52)
ANION GAP SERPL CALCULATED.3IONS-SCNC: 8 MMOL/L
AST SERPL W P-5'-P-CCNC: 13 U/L (ref 13–39)
BASOPHILS # BLD AUTO: 0.02 THOUSANDS/ÂΜL (ref 0–0.1)
BASOPHILS NFR BLD AUTO: 0 % (ref 0–1)
BILIRUB SERPL-MCNC: 1.76 MG/DL (ref 0.2–1)
BILIRUB UR QL STRIP: NEGATIVE
BUN SERPL-MCNC: 14 MG/DL (ref 5–25)
CALCIUM SERPL-MCNC: 9.2 MG/DL (ref 8.4–10.2)
CARDIAC TROPONIN I PNL SERPL HS: 5 NG/L
CARDIAC TROPONIN I PNL SERPL HS: 5 NG/L
CHLORIDE SERPL-SCNC: 100 MMOL/L (ref 96–108)
CLARITY UR: CLEAR
CO2 SERPL-SCNC: 29 MMOL/L (ref 21–32)
COLOR UR: YELLOW
CREAT SERPL-MCNC: 1.13 MG/DL (ref 0.6–1.3)
EOSINOPHIL # BLD AUTO: 0.89 THOUSAND/ÂΜL (ref 0–0.61)
EOSINOPHIL NFR BLD AUTO: 10 % (ref 0–6)
ERYTHROCYTE [DISTWIDTH] IN BLOOD BY AUTOMATED COUNT: 13.1 % (ref 11.6–15.1)
GFR SERPL CREATININE-BSD FRML MDRD: 61 ML/MIN/1.73SQ M
GLUCOSE SERPL-MCNC: 113 MG/DL (ref 65–140)
GLUCOSE UR STRIP-MCNC: NEGATIVE MG/DL
HCT VFR BLD AUTO: 45.6 % (ref 36.5–49.3)
HGB BLD-MCNC: 14.8 G/DL (ref 12–17)
HGB UR QL STRIP.AUTO: NEGATIVE
IMM GRANULOCYTES # BLD AUTO: 0.02 THOUSAND/UL (ref 0–0.2)
IMM GRANULOCYTES NFR BLD AUTO: 0 % (ref 0–2)
KETONES UR STRIP-MCNC: NEGATIVE MG/DL
LEUKOCYTE ESTERASE UR QL STRIP: NEGATIVE
LIPASE SERPL-CCNC: 14 U/L (ref 11–82)
LYMPHOCYTES # BLD AUTO: 2.53 THOUSANDS/ÂΜL (ref 0.6–4.47)
LYMPHOCYTES NFR BLD AUTO: 30 % (ref 14–44)
MCH RBC QN AUTO: 27.9 PG (ref 26.8–34.3)
MCHC RBC AUTO-ENTMCNC: 32.5 G/DL (ref 31.4–37.4)
MCV RBC AUTO: 86 FL (ref 82–98)
MONOCYTES # BLD AUTO: 0.82 THOUSAND/ÂΜL (ref 0.17–1.22)
MONOCYTES NFR BLD AUTO: 10 % (ref 4–12)
NEUTROPHILS # BLD AUTO: 4.29 THOUSANDS/ÂΜL (ref 1.85–7.62)
NEUTS SEG NFR BLD AUTO: 50 % (ref 43–75)
NITRITE UR QL STRIP: NEGATIVE
NRBC BLD AUTO-RTO: 0 /100 WBCS
PH UR STRIP.AUTO: 6 [PH] (ref 4.5–8)
PLATELET # BLD AUTO: 220 THOUSANDS/UL (ref 149–390)
PMV BLD AUTO: 9.9 FL (ref 8.9–12.7)
POTASSIUM SERPL-SCNC: 4.1 MMOL/L (ref 3.5–5.3)
PROT SERPL-MCNC: 6.8 G/DL (ref 6.4–8.4)
PROT UR STRIP-MCNC: NEGATIVE MG/DL
RBC # BLD AUTO: 5.3 MILLION/UL (ref 3.88–5.62)
SODIUM SERPL-SCNC: 137 MMOL/L (ref 135–147)
SP GR UR STRIP.AUTO: 1.01 (ref 1–1.03)
UROBILINOGEN UR QL STRIP.AUTO: 0.2 E.U./DL
WBC # BLD AUTO: 8.57 THOUSAND/UL (ref 4.31–10.16)

## 2023-11-04 PROCEDURE — 80053 COMPREHEN METABOLIC PANEL: CPT

## 2023-11-04 PROCEDURE — 36415 COLL VENOUS BLD VENIPUNCTURE: CPT

## 2023-11-04 PROCEDURE — G1004 CDSM NDSC: HCPCS

## 2023-11-04 PROCEDURE — C9113 INJ PANTOPRAZOLE SODIUM, VIA: HCPCS

## 2023-11-04 PROCEDURE — 99285 EMERGENCY DEPT VISIT HI MDM: CPT

## 2023-11-04 PROCEDURE — 74176 CT ABD & PELVIS W/O CONTRAST: CPT

## 2023-11-04 PROCEDURE — 93005 ELECTROCARDIOGRAM TRACING: CPT

## 2023-11-04 PROCEDURE — 84484 ASSAY OF TROPONIN QUANT: CPT

## 2023-11-04 PROCEDURE — 96374 THER/PROPH/DIAG INJ IV PUSH: CPT

## 2023-11-04 PROCEDURE — 85025 COMPLETE CBC W/AUTO DIFF WBC: CPT

## 2023-11-04 PROCEDURE — 81003 URINALYSIS AUTO W/O SCOPE: CPT

## 2023-11-04 PROCEDURE — 83690 ASSAY OF LIPASE: CPT

## 2023-11-04 PROCEDURE — 96375 TX/PRO/DX INJ NEW DRUG ADDON: CPT

## 2023-11-04 PROCEDURE — 96361 HYDRATE IV INFUSION ADD-ON: CPT

## 2023-11-04 RX ORDER — FAMOTIDINE 10 MG/ML
20 INJECTION, SOLUTION INTRAVENOUS ONCE
Status: COMPLETED | OUTPATIENT
Start: 2023-11-04 | End: 2023-11-04

## 2023-11-04 RX ORDER — PANTOPRAZOLE SODIUM 40 MG/10ML
40 INJECTION, POWDER, LYOPHILIZED, FOR SOLUTION INTRAVENOUS ONCE
Status: COMPLETED | OUTPATIENT
Start: 2023-11-04 | End: 2023-11-04

## 2023-11-04 RX ORDER — AMLODIPINE BESYLATE 10 MG/1
10 TABLET ORAL DAILY
Status: CANCELLED | OUTPATIENT
Start: 2023-11-05

## 2023-11-04 RX ORDER — PANTOPRAZOLE SODIUM 40 MG/1
40 TABLET, DELAYED RELEASE ORAL
Status: CANCELLED | OUTPATIENT
Start: 2023-11-05

## 2023-11-04 RX ORDER — HYDROXYZINE HYDROCHLORIDE 25 MG/1
25 TABLET, FILM COATED ORAL EVERY 8 HOURS PRN
Status: CANCELLED | OUTPATIENT
Start: 2023-11-04

## 2023-11-04 RX ORDER — CITALOPRAM 20 MG/1
20 TABLET ORAL DAILY
Status: CANCELLED | OUTPATIENT
Start: 2023-11-05

## 2023-11-04 RX ORDER — TAMSULOSIN HYDROCHLORIDE 0.4 MG/1
0.4 CAPSULE ORAL
Status: CANCELLED | OUTPATIENT
Start: 2023-11-05

## 2023-11-04 RX ORDER — KETOROLAC TROMETHAMINE 30 MG/ML
15 INJECTION, SOLUTION INTRAMUSCULAR; INTRAVENOUS ONCE
Status: COMPLETED | OUTPATIENT
Start: 2023-11-04 | End: 2023-11-04

## 2023-11-04 RX ORDER — LISINOPRIL 20 MG/1
20 TABLET ORAL DAILY
Status: CANCELLED | OUTPATIENT
Start: 2023-11-05

## 2023-11-04 RX ORDER — ATORVASTATIN CALCIUM 20 MG/1
20 TABLET, FILM COATED ORAL DAILY
Status: CANCELLED | OUTPATIENT
Start: 2023-11-05

## 2023-11-04 RX ORDER — ALPRAZOLAM 0.5 MG/1
0.5 TABLET ORAL 2 TIMES DAILY PRN
Status: CANCELLED | OUTPATIENT
Start: 2023-11-04

## 2023-11-04 RX ORDER — GABAPENTIN 300 MG/1
300 CAPSULE ORAL 3 TIMES DAILY
Status: CANCELLED | OUTPATIENT
Start: 2023-11-05

## 2023-11-04 RX ORDER — DIPHENHYDRAMINE HCL 25 MG
50 TABLET ORAL
Status: DISCONTINUED | OUTPATIENT
Start: 2023-11-04 | End: 2023-11-05

## 2023-11-04 RX ORDER — FINASTERIDE 5 MG/1
5 TABLET, FILM COATED ORAL DAILY
Status: CANCELLED | OUTPATIENT
Start: 2023-11-05

## 2023-11-04 RX ORDER — METHYLPREDNISOLONE 16 MG/1
32 TABLET ORAL
Status: DISCONTINUED | OUTPATIENT
Start: 2023-11-04 | End: 2023-11-05

## 2023-11-04 RX ADMIN — PANTOPRAZOLE SODIUM 40 MG: 40 INJECTION, POWDER, FOR SOLUTION INTRAVENOUS at 18:05

## 2023-11-04 RX ADMIN — SODIUM CHLORIDE 1000 ML: 0.9 INJECTION, SOLUTION INTRAVENOUS at 20:32

## 2023-11-04 RX ADMIN — KETOROLAC TROMETHAMINE 15 MG: 30 INJECTION, SOLUTION INTRAMUSCULAR; INTRAVENOUS at 20:34

## 2023-11-04 RX ADMIN — METHYLPREDNISOLONE 32 MG: 16 TABLET ORAL at 22:54

## 2023-11-04 RX ADMIN — FAMOTIDINE 20 MG: 10 INJECTION INTRAVENOUS at 18:06

## 2023-11-04 NOTE — ED NOTES
Pt unable to provide urine specimen at this time. Pt will notify RN when able to provide sample.      Aspen Manrique RN  11/04/23 0015

## 2023-11-04 NOTE — ED PROVIDER NOTES
History  Chief Complaint   Patient presents with    Abdominal Pain     Pt c/o abdominal pain and bloating since yesterday, also with CP and belching. 78 YOM with PMH anxiety, depression, asthma, GERD, HTN, HLD presents with abdominal pain since yesterday. Pt reports it is in his epigastric region and radiates into his chest. Reports it as a stabbing pain. Admits to increased belching. States last night he felt very nauseous but did not vomit. Also reports increased abdominal distention. States that his last BM was this morning and it was normal. No diarrhea. Denies fever, chills, cough, congestion. No known sick contacts. Has not tried any medication at home. Prior to Admission Medications   Prescriptions Last Dose Informant Patient Reported? Taking? ALPRAZolam (XANAX) 0.5 mg tablet  Self No No   Sig: Take 1 tablet up to twice daily as needed.    Ascorbic Acid (vitamin C) 100 MG tablet  Self Yes No   Sig: Take 100 mg by mouth daily   Carboxymethylcellul-Glycerin (REFRESH RELIEVA OP)  Self Yes No   Sig: Apply to eye   Risankizumab-rzaa (SKYRIZI PEN SC)  Self Yes No   Sig: Inject under the skin Q 4 weeks via Dermatology   VITAMIN D, ERGOCALCIFEROL, PO  Self Yes No   Sig: Take 2,000 Units by mouth   Vitamins-Lipotropics (LIPO FLAVONOID PLUS PO)  Self Yes No   Sig: Take by mouth   acetaminophen (TYLENOL) 325 mg tablet  Self No No   Sig: Take 2 tablets (650 mg total) by mouth every 6 (six) hours as needed for mild pain or fever   albuterol (PROVENTIL HFA,VENTOLIN HFA) 90 mcg/act inhaler  Self No No   Sig: Inhale 1 puff every 6 (six) hours as needed (Q6H PRN)   amLODIPine (NORVASC) 10 mg tablet  Self No No   Sig: Take 1 tablet (10 mg total) by mouth daily   atorvastatin (LIPITOR) 20 mg tablet  Self No No   Sig: Take 1 tablet (20 mg total) by mouth daily   betamethasone, augmented, (DIPROLENE) 0.05 % lotion  Self Yes No   Sig: APPLY TO SCALP ONCE DAILY FOR 2 WEEKS AS NEEDED FOR ITCHING   citalopram (CeleXA) 20 mg tablet  Self No No   Sig: Take 1 tablet (20 mg total) by mouth daily   diclofenac sodium (VOLTAREN) 1 %  Self Yes No   Sig: Apply 2 g topically 2 (two) times a day   diphenhydrAMINE (BENADRYL) 12.5 mg/5 mL oral liquid  Self Yes No   Sig: Take by mouth 3 (three) times a day as needed for allergies   finasteride (PROSCAR) 5 mg tablet  Self No No   Sig: Take 1 tablet (5 mg total) by mouth daily   fluocinolone (SYNALAR) 0.01 % external solution  Self Yes No   Sig: APPLY TOPICALLY TO SCALP ONCE A DAY AS NEEDED FOR PSORIASIS   gabapentin (NEURONTIN) 300 mg capsule  Self No No   Sig: Take 1 capsule (300 mg total) by mouth 3 (three) times a day   hydrOXYzine HCL (ATARAX) 25 mg tablet  Self No No   Sig: Take 1 tablet (25 mg total) by mouth every 8 (eight) hours as needed for itching   hydrocortisone valerate (WEST-VITO) 0.2 % ointment  Self No No   Sig: APPLY  OINTMENT EXTERNALLY DAILY   lisinopril-hydrochlorothiazide (PRINZIDE,ZESTORETIC) 20-12.5 MG per tablet  Self No No   Sig: Take 1 tablet by mouth daily   omeprazole (PriLOSEC) 20 mg delayed release capsule  Self No No   Sig: Take 1 capsule (20 mg total) by mouth daily as needed (heartburn)   tacrolimus (PROTOPIC) 0.1 % ointment  Self Yes No   Sig: APPLY TO EARS TWICE A DAY WHEN NEEDED   tamsulosin (FLOMAX) 0.4 mg  Self No No   Sig: Take 1 capsule (0.4 mg total) by mouth daily with dinner   triamcinolone (KENALOG) 0.1 % cream  Self No No   Sig: Apply up to 3 times daily on itchy skin lesions as needed.    vitamin B-12 (VITAMIN B-12) 1,000 mcg tablet  Self Yes No   Sig: Take 1,000 mcg by mouth daily        Facility-Administered Medications: None       Past Medical History:   Diagnosis Date    Anxiety and depression     Arthritis     Asthma     Back pain, chronic     Last assessed: 3/11/15    BPH (benign prostatic hyperplasia)     Cataract     Last assessed: 7/16/14    Costochondral chest pain 11/22/2021    Double vision 04/15/2022    GERD (gastroesophageal reflux disease)     Hyperlipidemia     Hypertension     Neuropathy     Osteoporosis     Panic disorder     Right-sided Bell's palsy     Last assessed: 3/10/14    Seasonal allergies     Testicular hypogonadism 2012       Past Surgical History:   Procedure Laterality Date    CARPAL TUNNEL RELEASE Right 1999    Neuroplasty Decompression Median Nerve at Carpal Tunnel    CATARACT EXTRACTION  2015    COLONOSCOPY  2015    2 polyps, diverticulosis by Dr. Elijah Mulligan  2021    [address] and 1990, ear drum,  per [employer]    ESOPHAGOGASTRODUODENOSCOPY  2015    2 cm sliding hiatal hernia, grade 1-2 esophagitis, duodenitis by Dr. Indira Prado      Spinal Anesthesia Epidural, x3 2004, per [employer]    POLYPECTOMY  2015    TYMPANOPLASTY Bilateral     Dr Freeman Patrick       Family History   Problem Relation Age of Onset    Cataracts Mother     Hyperlipidemia Mother     Colon polyps Father      I have reviewed and agree with the history as documented. E-Cigarette/Vaping    E-Cigarette Use Never User      E-Cigarette/Vaping Substances     Social History     Tobacco Use    Smoking status: Former     Types: Cigars     Quit date:      Years since quittin.8    Smokeless tobacco: Former    Tobacco comments:     quit in , former cigar and cigarette, pipe smoker, per [employer], Past history of chewing tobacco use, active, per [employer]   Vaping Use    Vaping Use: Never used   Substance Use Topics    Alcohol use: No     Comment: former drinker    Drug use: No       Review of Systems   Constitutional:  Negative for chills and fever. HENT:  Negative for congestion. Respiratory:  Negative for cough. Gastrointestinal:  Positive for abdominal distention, abdominal pain and nausea. Negative for blood in stool, diarrhea and vomiting. Genitourinary:  Negative for dysuria, hematuria and urgency.    Neurological:  Negative for dizziness, syncope, weakness and light-headedness. All other systems reviewed and are negative. Physical Exam  Physical Exam  Vitals and nursing note reviewed. Constitutional:       General: He is not in acute distress. Appearance: He is well-developed. He is not ill-appearing. HENT:      Head: Normocephalic and atraumatic. Eyes:      Conjunctiva/sclera: Conjunctivae normal.   Cardiovascular:      Rate and Rhythm: Normal rate and regular rhythm. Heart sounds: No murmur heard. Pulmonary:      Effort: Pulmonary effort is normal. No respiratory distress. Breath sounds: Normal breath sounds. Abdominal:      Palpations: Abdomen is soft. Tenderness: There is abdominal tenderness in the epigastric area and periumbilical area. Musculoskeletal:         General: No swelling. Cervical back: Neck supple. Skin:     General: Skin is warm and dry. Capillary Refill: Capillary refill takes less than 2 seconds. Neurological:      Mental Status: He is alert.    Psychiatric:         Mood and Affect: Mood normal.         Vital Signs  ED Triage Vitals   Temperature Pulse Respirations Blood Pressure SpO2   11/04/23 1731 11/04/23 1731 11/04/23 1731 11/04/23 1731 11/04/23 1731   98.2 °F (36.8 °C) 81 18 148/78 100 %      Temp Source Heart Rate Source Patient Position - Orthostatic VS BP Location FiO2 (%)   11/04/23 1731 11/04/23 1731 11/04/23 2036 11/04/23 2036 --   Oral Monitor Lying Right arm       Pain Score       11/04/23 1731       6           Vitals:    11/04/23 1731 11/04/23 2036   BP: 148/78 120/71   Pulse: 81 65   Patient Position - Orthostatic VS:  Lying         Visual Acuity      ED Medications  Medications   sodium chloride 0.9 % bolus 1,000 mL (1,000 mL Intravenous New Bag 11/4/23 2032)   Famotidine (PF) (PEPCID) injection 20 mg (20 mg Intravenous Given 11/4/23 1806)   pantoprazole (PROTONIX) injection 40 mg (40 mg Intravenous Given 11/4/23 1805)   ketorolac (TORADOL) injection 15 mg (15 mg Intravenous Given 11/4/23 2034)       Diagnostic Studies  Results Reviewed       Procedure Component Value Units Date/Time    HS Troponin I 2hr [[id number]]  (Normal) Collected: 11/04/23 1950    Lab Status: Final result Specimen: Blood from Arm, Right Updated: 11/04/23 2016     hs TnI 2hr 5 ng/L      Delta 2hr hsTnI 0 ng/L     Urine Macroscopic, POC [[id number]] Collected: 11/04/23 1923    Lab Status: Final result Specimen: Urine Updated: 11/04/23 1925     Color, UA Yellow     Clarity, UA Clear     pH, UA 6.0     Leukocytes, UA Negative     Nitrite, UA Negative     Protein, UA Negative mg/dl      Glucose, UA Negative mg/dl      Ketones, UA Negative mg/dl      Urobilinogen, UA 0.2 E.U./dl      Bilirubin, UA Negative     Occult Blood, UA Negative     Specific Gravity, UA 1.010    Narrative:      CLINITEK RESULT    HS Troponin 0hr (reflex protocol) [[id number]]  (Normal) Collected: 11/04/23 1759    Lab Status: Final result Specimen: Blood from Arm, Right Updated: 11/04/23 1832     hs TnI 0hr 5 ng/L     Comprehensive metabolic panel [[id number]]  (Abnormal) Collected: 11/04/23 1759    Lab Status: Final result Specimen: Blood from Arm, Right Updated: 11/04/23 1828     Sodium 137 mmol/L      Potassium 4.1 mmol/L      Chloride 100 mmol/L      CO2 29 mmol/L      ANION GAP 8 mmol/L      BUN 14 mg/dL      Creatinine 1.13 mg/dL      Glucose 113 mg/dL      Calcium 9.2 mg/dL      AST 13 U/L      ALT 17 U/L      Alkaline Phosphatase 40 U/L      Total Protein 6.8 g/dL      Albumin 4.1 g/dL      Total Bilirubin 1.76 mg/dL      eGFR 61 ml/min/1.73sq m     Narrative:      [place] guidelines for Chronic Kidney Disease (CKD):     Stage 1 with normal or high GFR (GFR > 90 mL/min/1.73 square meters)    Stage 2 Mild CKD (GFR = 60-89 mL/min/1.73 square meters)    Stage 3A Moderate CKD (GFR = 45-59 mL/min/1.73 square meters)    Stage 3B Moderate CKD (GFR = 30-44 mL/min/1.73 square meters) Stage 4 Severe CKD (GFR = 15-29 mL/min/1.73 square meters)    Stage 5 End Stage CKD (GFR <15 mL/min/1.73 square meters)  Note: GFR calculation is accurate only with a steady state creatinine    Lipase [[id number]]  (Normal) Collected: 11/04/23 1759    Lab Status: Final result Specimen: Blood from Arm, Right Updated: 11/04/23 1828     Lipase 14 u/L     CBC and differential [[id number]]  (Abnormal) Collected: 11/04/23 1759    Lab Status: Final result Specimen: Blood from Arm, Right Updated: 11/04/23 1804     WBC 8.57 Thousand/uL      RBC 5.30 Million/uL      Hemoglobin 14.8 g/dL      Hematocrit 45.6 %      MCV 86 fL      MCH 27.9 pg      MCHC 32.5 g/dL      RDW 13.1 %      MPV 9.9 fL      Platelets 698 Thousands/uL      nRBC 0 /100 WBCs      Neutrophils Relative 50 %      Immat GRANS % 0 %      Lymphocytes Relative 30 %      Monocytes Relative 10 %      Eosinophils Relative 10 %      Basophils Relative 0 %      Neutrophils Absolute 4.29 Thousands/µL      Immature Grans Absolute 0.02 Thousand/uL      Lymphocytes Absolute 2.53 Thousands/µL      Monocytes Absolute 0.82 Thousand/µL      Eosinophils Absolute 0.89 Thousand/µL      Basophils Absolute 0.02 Thousands/µL                    CT abdomen pelvis wo contrast   Final Result by Isaac Wasserman MD (11/04 2023)      Findings above suggestive of nonspecific enteritis involving mid distal small bowel loop as above with associated secondary ileus versus partial/developing small bowel obstruction. No free intraperitoneal air. Correlate clinically and consider further    evaluation with serial abdominal radiographs after the administration of oral contrast.      Small hiatal hernia. Prostatomegaly.       Workstation performed: [id number]                    Procedures  ECG 12 Lead Documentation Only    Date/Time: 11/4/2023 5:39 PM    Performed by: Alex Lund PA-C  Authorized by: Alex Lund PA-C    Indications / Diagnosis:  Epigastric pain  ECG reviewed by me, the ED Provider: yes    Patient location:  ED  Previous ECG:     Previous ECG:  Compared to current    Similarity:  No change  Interpretation:     Interpretation: normal    Rate:     ECG rate:  72    ECG rate assessment: normal    Rhythm:     Rhythm: sinus rhythm and A-V block    Ectopy:     Ectopy: none    QRS:     QRS axis:  Normal    QRS intervals:  Normal  Conduction:     Conduction: normal    ST segments:     ST segments:  Normal  T waves:     T waves: normal    ECG 12 Lead Documentation Only    Date/Time: 11/4/2023 7:54 PM    Performed by: Ingrid Petersen PA-C  Authorized by: Ingrid Petersen PA-C    Indications / Diagnosis:  2 hour  ECG reviewed by me, the ED Provider: yes    Patient location:  ED  Previous ECG:     Previous ECG:  Compared to current    Similarity:  No change  Interpretation:     Interpretation: normal    Rate:     ECG rate:  66    ECG rate assessment: normal    Rhythm:     Rhythm: sinus rhythm    Ectopy:     Ectopy: none    QRS:     QRS axis:  Normal    QRS intervals:  Normal  Conduction:     Conduction: normal    ST segments:     ST segments:  Normal  T waves:     T waves: normal             ED Course  ED Course as of 11/04/23 2101   Sat Nov 04, 2023   1832 hs TnI 0hr: 5   1925 TOTAL BILIRUBIN(!): 1.76   2025 CT abdomen pelvis wo contrast  Findings above suggestive of nonspecific enteritis involving mid distal small bowel loop as above with associated secondary ileus versus partial/developing small bowel obstruction. No free intraperitoneal air. Correlate clinically and consider further   evaluation with serial abdominal radiographs after the administration of oral contrast.     Small hiatal hernia. Prostatomegaly.      2027 TT to general surgery    2055 S/o to CM: pending surgery rec, admit to [facility] for ileus                                              Medical Decision Making   78 YOM with PMH anxiety, depression, asthma, GERD, HTN, HLD presents with abdominal pain since yesterday. Pt reports it is in his epigastric region and radiates into his chest. Reports it as a stabbing pain. Admits to increased belching. States last night he felt very nauseous but did not vomit. Also reports increased abdominal distention. Blood work looks normal with the exception of elevated bili. CT scan shows "Findings above suggestive of nonspecific enteritis involving mid distal small bowel loop as above with associated secondary ileus versus partial/developing small bowel obstruction. No free intraperitoneal air. Correlate clinically and consider further evaluation with serial abdominal radiographs after the administration of oral contrast. Small hiatal hernia."     Reached out to general surgery at 2026. On call resident having issues with viewing images, will reach back out. Signed out to [employer] at 2100: pending surgery recommendation, admit to [facility]    Problems Addressed:  Abdominal pain: acute illness or injury  Enlarged prostate: chronic illness or injury  Enteritis: acute illness or injury  Hiatal hernia: chronic illness or injury  Ileus [facility]): acute illness or injury    Amount and/or Complexity of Data Reviewed  Labs: ordered. Decision-making details documented in ED Course. Radiology: ordered. Decision-making details documented in ED Course. ECG/medicine tests: ordered and independent interpretation performed. Risk  Prescription drug management.              Disposition  Final diagnoses:   Abdominal pain   Hiatal hernia   Enlarged prostate   Enteritis   Ileus ([address]) -  nonspecific enteritis involving mid distal small bowel loop as above with associated secondary ileus versus partial/developing small bowel obstruction     Time reflects when diagnosis was documented in both MDM as applicable and the Disposition within this note       Time User Action Codes Description Comment    11/4/2023  8:27 PM Jhonnie Burkitt Add [R10.9] Abdominal pain     11/4/2023  8:28 PM Jhonnie Burkitt Add [K44.9] Hiatal hernia     11/4/2023  8:28 PM Mertha Poet Add [N40.0] Enlarged prostate     11/4/2023  8:28 PM Mertha Poet Add [K52.9] Enteritis     11/4/2023  8:28 PM Mertha Poet Add [K56.7] Ileus ([address])     11/4/2023  8:29 PM Mertha Poet Modify [K56.7] Ileus ([address])  nonspecific enteritis involving mid distal small bowel loop as above with associated secondary ileus versus partial/developing small bowel obstruction          ED Disposition       None          Follow-up Information    None         Patient's Medications   Discharge Prescriptions    No medications on file       No discharge procedures on file.     PDMP Review         Value Time User    PDMP Reviewed  Yes 8/14/2023 12:33 PM Sindi Ramos MD            ED Provider  Electronically Signed by             Crista Joya PA-C  11/04/23 [id number]

## 2023-11-05 ENCOUNTER — APPOINTMENT (OUTPATIENT)
Dept: CT IMAGING | Facility: HOSPITAL | Age: 78
End: 2023-11-05
Payer: COMMERCIAL

## 2023-11-05 VITALS
HEIGHT: 64 IN | SYSTOLIC BLOOD PRESSURE: 127 MMHG | BODY MASS INDEX: 33.2 KG/M2 | HEART RATE: 95 BPM | OXYGEN SATURATION: 100 % | TEMPERATURE: 97.3 F | DIASTOLIC BLOOD PRESSURE: 81 MMHG | WEIGHT: 194.45 LBS | RESPIRATION RATE: 17 BRPM

## 2023-11-05 PROBLEM — R10.84 GENERALIZED ABDOMINAL PAIN: Status: ACTIVE | Noted: 2023-11-05

## 2023-11-05 LAB
ALBUMIN SERPL BCP-MCNC: 4 G/DL (ref 3.5–5)
ALP SERPL-CCNC: 41 U/L (ref 34–104)
ALT SERPL W P-5'-P-CCNC: 16 U/L (ref 7–52)
ANION GAP SERPL CALCULATED.3IONS-SCNC: 8 MMOL/L
AST SERPL W P-5'-P-CCNC: 15 U/L (ref 13–39)
ATRIAL RATE: 66 BPM
ATRIAL RATE: 72 BPM
BASOPHILS # BLD AUTO: 0.01 THOUSANDS/ÂΜL (ref 0–0.1)
BASOPHILS NFR BLD AUTO: 0 % (ref 0–1)
BILIRUB SERPL-MCNC: 1.45 MG/DL (ref 0.2–1)
BUN SERPL-MCNC: 14 MG/DL (ref 5–25)
CALCIUM SERPL-MCNC: 8.7 MG/DL (ref 8.4–10.2)
CHLORIDE SERPL-SCNC: 105 MMOL/L (ref 96–108)
CO2 SERPL-SCNC: 25 MMOL/L (ref 21–32)
CREAT SERPL-MCNC: 1.05 MG/DL (ref 0.6–1.3)
EOSINOPHIL # BLD AUTO: 0.06 THOUSAND/ÂΜL (ref 0–0.61)
EOSINOPHIL NFR BLD AUTO: 1 % (ref 0–6)
ERYTHROCYTE [DISTWIDTH] IN BLOOD BY AUTOMATED COUNT: 12.8 % (ref 11.6–15.1)
GFR SERPL CREATININE-BSD FRML MDRD: 66 ML/MIN/1.73SQ M
GLUCOSE SERPL-MCNC: 160 MG/DL (ref 65–140)
HCT VFR BLD AUTO: 44.8 % (ref 36.5–49.3)
HGB BLD-MCNC: 14.4 G/DL (ref 12–17)
IMM GRANULOCYTES # BLD AUTO: 0.02 THOUSAND/UL (ref 0–0.2)
IMM GRANULOCYTES NFR BLD AUTO: 0 % (ref 0–2)
LYMPHOCYTES # BLD AUTO: 1 THOUSANDS/ÂΜL (ref 0.6–4.47)
LYMPHOCYTES NFR BLD AUTO: 19 % (ref 14–44)
MAGNESIUM SERPL-MCNC: 2.1 MG/DL (ref 1.9–2.7)
MCH RBC QN AUTO: 27.4 PG (ref 26.8–34.3)
MCHC RBC AUTO-ENTMCNC: 32.1 G/DL (ref 31.4–37.4)
MCV RBC AUTO: 85 FL (ref 82–98)
MONOCYTES # BLD AUTO: 0.12 THOUSAND/ÂΜL (ref 0.17–1.22)
MONOCYTES NFR BLD AUTO: 2 % (ref 4–12)
NEUTROPHILS # BLD AUTO: 4.17 THOUSANDS/ÂΜL (ref 1.85–7.62)
NEUTS SEG NFR BLD AUTO: 78 % (ref 43–75)
NRBC BLD AUTO-RTO: 0 /100 WBCS
P AXIS: 19 DEGREES
P AXIS: 54 DEGREES
PLATELET # BLD AUTO: 207 THOUSANDS/UL (ref 149–390)
PMV BLD AUTO: 10.5 FL (ref 8.9–12.7)
POTASSIUM SERPL-SCNC: 4.1 MMOL/L (ref 3.5–5.3)
PR INTERVAL: 206 MS
PR INTERVAL: 212 MS
PROT SERPL-MCNC: 6.6 G/DL (ref 6.4–8.4)
QRS AXIS: 26 DEGREES
QRS AXIS: 3 DEGREES
QRSD INTERVAL: 92 MS
QRSD INTERVAL: 98 MS
QT INTERVAL: 398 MS
QT INTERVAL: 410 MS
QTC INTERVAL: 429 MS
QTC INTERVAL: 435 MS
RBC # BLD AUTO: 5.25 MILLION/UL (ref 3.88–5.62)
SODIUM SERPL-SCNC: 138 MMOL/L (ref 135–147)
T WAVE AXIS: 22 DEGREES
T WAVE AXIS: 37 DEGREES
VENTRICULAR RATE: 66 BPM
VENTRICULAR RATE: 72 BPM
WBC # BLD AUTO: 5.38 THOUSAND/UL (ref 4.31–10.16)

## 2023-11-05 PROCEDURE — NC001 PR NO CHARGE: Performed by: INTERNAL MEDICINE

## 2023-11-05 PROCEDURE — 85025 COMPLETE CBC W/AUTO DIFF WBC: CPT

## 2023-11-05 PROCEDURE — G1004 CDSM NDSC: HCPCS

## 2023-11-05 PROCEDURE — 93010 ELECTROCARDIOGRAM REPORT: CPT | Performed by: INTERNAL MEDICINE

## 2023-11-05 PROCEDURE — 99222 1ST HOSP IP/OBS MODERATE 55: CPT | Performed by: INTERNAL MEDICINE

## 2023-11-05 PROCEDURE — 74176 CT ABD & PELVIS W/O CONTRAST: CPT

## 2023-11-05 PROCEDURE — C9113 INJ PANTOPRAZOLE SODIUM, VIA: HCPCS

## 2023-11-05 PROCEDURE — 80053 COMPREHEN METABOLIC PANEL: CPT

## 2023-11-05 PROCEDURE — 83735 ASSAY OF MAGNESIUM: CPT

## 2023-11-05 PROCEDURE — NC001 PR NO CHARGE: Performed by: SURGERY

## 2023-11-05 RX ORDER — GABAPENTIN 300 MG/1
300 CAPSULE ORAL 3 TIMES DAILY
Status: DISCONTINUED | OUTPATIENT
Start: 2023-11-05 | End: 2023-11-05

## 2023-11-05 RX ORDER — PANTOPRAZOLE SODIUM 40 MG/10ML
40 INJECTION, POWDER, LYOPHILIZED, FOR SOLUTION INTRAVENOUS EVERY 12 HOURS SCHEDULED
Status: DISCONTINUED | OUTPATIENT
Start: 2023-11-05 | End: 2023-11-05

## 2023-11-05 RX ORDER — HYDROMORPHONE HCL IN WATER/PF 6 MG/30 ML
0.2 PATIENT CONTROLLED ANALGESIA SYRINGE INTRAVENOUS EVERY 6 HOURS PRN
Status: DISCONTINUED | OUTPATIENT
Start: 2023-11-05 | End: 2023-11-05 | Stop reason: HOSPADM

## 2023-11-05 RX ORDER — PANTOPRAZOLE SODIUM 40 MG/1
40 TABLET, DELAYED RELEASE ORAL DAILY
Qty: 30 TABLET | Refills: 0 | Status: SHIPPED | OUTPATIENT
Start: 2023-11-05

## 2023-11-05 RX ORDER — FINASTERIDE 5 MG/1
5 TABLET, FILM COATED ORAL DAILY
Status: DISCONTINUED | OUTPATIENT
Start: 2023-11-05 | End: 2023-11-05 | Stop reason: HOSPADM

## 2023-11-05 RX ORDER — PANTOPRAZOLE SODIUM 40 MG/10ML
40 INJECTION, POWDER, LYOPHILIZED, FOR SOLUTION INTRAVENOUS EVERY 12 HOURS SCHEDULED
Status: DISCONTINUED | OUTPATIENT
Start: 2023-11-05 | End: 2023-11-05 | Stop reason: HOSPADM

## 2023-11-05 RX ORDER — HYDROCHLOROTHIAZIDE 12.5 MG/1
12.5 TABLET ORAL DAILY
Status: DISCONTINUED | OUTPATIENT
Start: 2023-11-05 | End: 2023-11-05

## 2023-11-05 RX ORDER — ALPRAZOLAM 0.5 MG/1
0.5 TABLET ORAL 2 TIMES DAILY PRN
Status: DISCONTINUED | OUTPATIENT
Start: 2023-11-05 | End: 2023-11-05 | Stop reason: HOSPADM

## 2023-11-05 RX ORDER — GABAPENTIN 300 MG/1
600 CAPSULE ORAL
Status: DISCONTINUED | OUTPATIENT
Start: 2023-11-05 | End: 2023-11-05 | Stop reason: HOSPADM

## 2023-11-05 RX ORDER — ACETAMINOPHEN 325 MG/1
650 TABLET ORAL EVERY 6 HOURS PRN
Status: DISCONTINUED | OUTPATIENT
Start: 2023-11-05 | End: 2023-11-05 | Stop reason: HOSPADM

## 2023-11-05 RX ORDER — AMLODIPINE BESYLATE 10 MG/1
10 TABLET ORAL DAILY
Status: DISCONTINUED | OUTPATIENT
Start: 2023-11-05 | End: 2023-11-05 | Stop reason: HOSPADM

## 2023-11-05 RX ORDER — DIPHENHYDRAMINE HYDROCHLORIDE 50 MG/ML
12.5 INJECTION INTRAMUSCULAR; INTRAVENOUS
Status: DISCONTINUED | OUTPATIENT
Start: 2023-11-05 | End: 2023-11-05 | Stop reason: HOSPADM

## 2023-11-05 RX ORDER — DIPHENHYDRAMINE HYDROCHLORIDE 50 MG/ML
50 INJECTION INTRAMUSCULAR; INTRAVENOUS ONCE AS NEEDED
Status: DISCONTINUED | OUTPATIENT
Start: 2023-11-05 | End: 2023-11-05 | Stop reason: HOSPADM

## 2023-11-05 RX ORDER — ATORVASTATIN CALCIUM 20 MG/1
20 TABLET, FILM COATED ORAL DAILY
Status: DISCONTINUED | OUTPATIENT
Start: 2023-11-05 | End: 2023-11-05 | Stop reason: HOSPADM

## 2023-11-05 RX ORDER — HYDRALAZINE HYDROCHLORIDE 20 MG/ML
5 INJECTION INTRAMUSCULAR; INTRAVENOUS EVERY 6 HOURS PRN
Status: DISCONTINUED | OUTPATIENT
Start: 2023-11-05 | End: 2023-11-05 | Stop reason: HOSPADM

## 2023-11-05 RX ORDER — SODIUM CHLORIDE, SODIUM GLUCONATE, SODIUM ACETATE, POTASSIUM CHLORIDE, MAGNESIUM CHLORIDE, SODIUM PHOSPHATE, DIBASIC, AND POTASSIUM PHOSPHATE .53; .5; .37; .037; .03; .012; .00082 G/100ML; G/100ML; G/100ML; G/100ML; G/100ML; G/100ML; G/100ML
75 INJECTION, SOLUTION INTRAVENOUS CONTINUOUS
Status: DISCONTINUED | OUTPATIENT
Start: 2023-11-05 | End: 2023-11-05 | Stop reason: HOSPADM

## 2023-11-05 RX ORDER — ALBUTEROL SULFATE 90 UG/1
1 AEROSOL, METERED RESPIRATORY (INHALATION) EVERY 6 HOURS PRN
Status: DISCONTINUED | OUTPATIENT
Start: 2023-11-05 | End: 2023-11-05 | Stop reason: HOSPADM

## 2023-11-05 RX ORDER — CITALOPRAM 20 MG/1
20 TABLET ORAL DAILY
Status: DISCONTINUED | OUTPATIENT
Start: 2023-11-05 | End: 2023-11-05 | Stop reason: HOSPADM

## 2023-11-05 RX ORDER — ONDANSETRON 2 MG/ML
4 INJECTION INTRAMUSCULAR; INTRAVENOUS EVERY 6 HOURS PRN
Status: DISCONTINUED | OUTPATIENT
Start: 2023-11-05 | End: 2023-11-05 | Stop reason: HOSPADM

## 2023-11-05 RX ORDER — TAMSULOSIN HYDROCHLORIDE 0.4 MG/1
0.4 CAPSULE ORAL
Status: DISCONTINUED | OUTPATIENT
Start: 2023-11-05 | End: 2023-11-05 | Stop reason: HOSPADM

## 2023-11-05 RX ORDER — GABAPENTIN 300 MG/1
300 CAPSULE ORAL DAILY
Status: DISCONTINUED | OUTPATIENT
Start: 2023-11-06 | End: 2023-11-05 | Stop reason: HOSPADM

## 2023-11-05 RX ADMIN — SODIUM CHLORIDE, SODIUM GLUCONATE, SODIUM ACETATE, POTASSIUM CHLORIDE, MAGNESIUM CHLORIDE, SODIUM PHOSPHATE, DIBASIC, AND POTASSIUM PHOSPHATE 75 ML/HR: .53; .5; .37; .037; .03; .012; .00082 INJECTION, SOLUTION INTRAVENOUS at 00:53

## 2023-11-05 RX ADMIN — ATORVASTATIN CALCIUM 20 MG: 20 TABLET, FILM COATED ORAL at 10:45

## 2023-11-05 RX ADMIN — ACETAMINOPHEN 325MG 650 MG: 325 TABLET ORAL at 12:23

## 2023-11-05 RX ADMIN — PANTOPRAZOLE SODIUM 40 MG: 40 INJECTION, POWDER, FOR SOLUTION INTRAVENOUS at 05:58

## 2023-11-05 RX ADMIN — TAMSULOSIN HYDROCHLORIDE 0.4 MG: 0.4 CAPSULE ORAL at 15:53

## 2023-11-05 RX ADMIN — CITALOPRAM HYDROBROMIDE 20 MG: 20 TABLET ORAL at 10:45

## 2023-11-05 RX ADMIN — AMLODIPINE BESYLATE 10 MG: 10 TABLET ORAL at 10:46

## 2023-11-05 RX ADMIN — FINASTERIDE 5 MG: 5 TABLET, FILM COATED ORAL at 10:45

## 2023-11-05 RX ADMIN — GABAPENTIN 300 MG: 300 CAPSULE ORAL at 10:45

## 2023-11-05 NOTE — ASSESSMENT & PLAN NOTE
Lab Results   Component Value Date    EGFR 61 11/04/2023    EGFR 75 05/01/2023    EGFR 77 02/17/2023    CREATININE 1.13 11/04/2023    CREATININE 1.02 05/01/2023    CREATININE 0.99 02/17/2023   Approximately at baseline  Continue monitoring CMP

## 2023-11-05 NOTE — ASSESSMENT & PLAN NOTE
Patient on 10 mg amlodipine, 20 mg lisinopril, and 12.5 mg HCTZ at home  IV hydralazine prn while patient is NPO  Continue to monitor blood pressures

## 2023-11-05 NOTE — ASSESSMENT & PLAN NOTE
Patient with PMHx of asthma, HTN, HLD, BPH, CKD, and depression presented to the ED after developing generalized abdominal pain, distension, nausea, and belching x one day  Pt reports last bowel movement this AM. Denies fever, chills, myalgias, diarrhea, or vomiting. CT abdomen/pelvis demonstrating "Findings above suggestive of nonspecific enteritis involving mid distal small bowel loop as above with associated secondary ileus versus partial/developing small bowel obstruction. No free intraperitoneal air. Correlate clinically and consider further evaluation with serial abdominal radiographs after the administration of oral contrast. Small hiatal hernia.  Prostatomegaly."  WBC 8.57, not fulfilling septic criteria  Lipase 14  NPO for now  Supportive care, pain control, IV fluids  Surgery consulted by ED, recommendations appreciated  Ordered repeat CT abdomen/pelvis scan with oral barium for further evaluation secondary to contrast allergy  Will initiate prep for contrast study in case more definitive contrast scan needed  Will see in consult if indeed SBO

## 2023-11-05 NOTE — DISCHARGE SUMMARY
[facility] [employer]  Discharge- Matty Mcintyre 1945, 78 y.o. male MRN: [id number]  Unit/Bed#: E5 -01 Encounter: [id number]  Primary Care Provider: Andrew Horan MD   Date and time admitted to hospital: 11/4/2023  5:32 PM    * Generalized abdominal pain  Assessment & Plan  Patient with PMHx of asthma, HTN, HLD, BPH, CKD, and depression presented to the ED after developing generalized abdominal pain, distension, nausea, and belching x one day  CT abdomen/pelvis demonstrating "Findings above suggestive of nonspecific enteritis involving mid distal small bowel loop as above with associated secondary ileus versus partial/developing small bowel obstruction. No free intraperitoneal air. Correlate clinically and consider further evaluation with serial abdominal radiographs after the administration of oral contrast. Small hiatal hernia.  Prostatomegaly."  WBC 8.57, not fulfilling septic criteria  Lipase 14  Surgery consulted patient underwent repeat CT scan which revealed no bowel obstruction or acute process  Diet was gradually advanced, he is tolerating his diet without any abdominal pain, nausea or vomiting  Will be discharged home today with outpatient follow-up      CKD (chronic kidney disease) stage 3, GFR 30-59 ml/min [facility])  Assessment & Plan  Lab Results   Component Value Date    EGFR 66 11/05/2023    EGFR 61 11/04/2023    EGFR 75 05/01/2023    CREATININE 1.05 11/05/2023    CREATININE 1.13 11/04/2023    CREATININE 1.02 05/01/2023   Approximately at baseline    Essential hypertension  Assessment & Plan  Patient on 10 mg amlodipine, 20 mg lisinopril, and 12.5 mg HCTZ at home    Hyperlipidemia  Assessment & Plan  Continue statin     Major depressive disorder with single episode, in partial remission ([address])  Assessment & Plan  Mood stable   Continue celexa and xanax prn    BPH associated with nocturia  Assessment & Plan  Pt on flomax and proscar at home     Asthma  Assessment & Plan  No acute exacerbation        Transition of Care Discharge Summary - [facility] Internal Medicine    Patient Information: Marrianne Goldmann 78 y.o. male MRN: [id number]  Unit/Bed#: E5 -01 Encounter: [id number]    Discharging Physician / Practitioner: Phil Naqvi MD  PCP: Marv Curtis MD  Admission Date: 11/4/2023  Discharge Date: 11/05/23    Disposition:      Other: home      Reason for Admission: Gastroenteritis    Discharge Diagnoses:     Principal Problem:    Generalized abdominal pain  Active Problems:    Asthma    BPH associated with nocturia    Major depressive disorder with single episode, in partial remission ([address])    Hyperlipidemia    Essential hypertension    CKD (chronic kidney disease) stage 3, GFR 30-59 ml/min ([facility])  Resolved Problems:    * No resolved hospital problems. *      Consultations During Hospital Stay:  IP CONSULT TO ACUTE CARE SURGERY      Procedures Performed:     none    Medication Adjustments and Discharge Medications:  Medication Dosing Tapers - Please refer to Discharge Medication List for details on any medication dosing tapers (if applicable to patient). Discharge Medication List: See after visit summary for reconciled discharge medications. Wound Care Recommendations:  When applicable, please see wound care section of After Visit Summary. Diet Recommendations at Discharge:  Diet -        Diet Orders   (From admission, onward)                 Start     Ordered    11/05/23 1213  Diet Surgical; Surgical Soft/Lite Meal  Diet effective now        References:    Adult Nutrition Support Algorithm    RD Therapeutic Diet Order Protocol   Question Answer Comment   Diet Type Surgical    Surgical Surgical Soft/Lite Meal    RD to adjust diet per protocol? Yes        11/05/23 1212                  Fluid Restriction - No Fluid Restriction at Discharge.       Significant Findings / Test Results:     CT abdomen pelvis wo contrast    Result Date: 11/5/2023  Impression: No evidence of bowel obstruction. No acute process seen. Coronary atherosclerosis, renal cyst, enlarged prostate, and other findings as above. Workstation performed: [id number]     CT abdomen pelvis wo contrast    Result Date: 11/4/2023  Impression: Findings above suggestive of nonspecific enteritis involving mid distal small bowel loop as above with associated secondary ileus versus partial/developing small bowel obstruction. No free intraperitoneal air. Correlate clinically and consider further evaluation with serial abdominal radiographs after the administration of oral contrast. Small hiatal hernia. Prostatomegaly. Workstation performed: [id number]        Hospital Course:     Fredo Howe is a 78 y.o. male patient who originally presented to the hospital on 11/4/2023 due to abdominal pain, nausea, vomiting. Initial CAT scan concerning for possible ileus surgery consulted patient underwent repeat CT scan which did not reveal any acute process. His diet was gradually resumed and he is doing well tolerating his diet no further episodes of abdominal pain, nausea or vomiting. He is otherwise stable for discharge home today    Please see above problem list for further details. Condition at Discharge: good     Discharge Day Visit / Exam:     Subjective: Patient seen and examined at bedside, denies any further abdominal pain, tolerating diet    Vitals: Blood Pressure: 127/81 (11/05/23 1506)  Pulse: 95 (11/05/23 1506)  Temperature: (!) 97.3 °F (36.3 °C) (11/05/23 1506)  Temp Source: Oral (11/05/23 1506)  Respirations: 17 (11/05/23 1506)  Height: 5' 4" (162.6 cm) (11/05/23 0024)  Weight - Scale: 88.2 kg (194 lb 7.1 oz) (11/05/23 0024)  SpO2: 100 % (11/05/23 1506)    Physical Exam:    Constitutional: Patient is oriented to person, place and time, no acute distress  HEENT:  Normocephalic, atraumatic  Cardiovascular: Normal S1S2, RRR, No murmurs/rubs/gallops appreciated.   Pulmonary:  Bilateral air entry, No rhonchi/rales/wheezing appreciated  Abdominal: Soft, Bowel sounds present, Non-tender, Non-distended  Extremities:  No cyanosis, clubbing or edema. Neurological: Cranial nerves II-XII grossly intact, sensation intact, otherwise no focal neurological symptoms. Discharge instructions/Information to patient and family:   See after visit summary section titled Discharge Instructions for information provided to patient and family. Planned Readmission: no      Discharge Statement:  I spent 35 minutes discharging the patient. This time was spent on the day of discharge. I had direct contact with the patient on the day of discharge. Greater than 50% of the total time was spent examining patient, answering all patient questions, arranging and discussing plan of care with patient as well as directly providing post-discharge instructions. Additional time then spent on discharge activities.     ** Please Note: This note has been constructed using a voice recognition system **

## 2023-11-05 NOTE — ED CARE HANDOFF
Emergency Department Sign Out Note        Sign out and transfer of care from [facility]. See Separate Emergency Department note. The patient, Marco Molina, was evaluated by the previous provider for Abdominal Pain.     Workup Completed:  Results Reviewed       Procedure Component Value Units Date/Time    HS Troponin I 2hr [[id number]]  (Normal) Collected: 11/04/23 1950    Lab Status: Final result Specimen: Blood from Arm, Right Updated: 11/04/23 2016     hs TnI 2hr 5 ng/L      Delta 2hr hsTnI 0 ng/L     Urine Macroscopic, POC [[id number]] Collected: 11/04/23 1923    Lab Status: Final result Specimen: Urine Updated: 11/04/23 1925     Color, UA Yellow     Clarity, UA Clear     pH, UA 6.0     Leukocytes, UA Negative     Nitrite, UA Negative     Protein, UA Negative mg/dl      Glucose, UA Negative mg/dl      Ketones, UA Negative mg/dl      Urobilinogen, UA 0.2 E.U./dl      Bilirubin, UA Negative     Occult Blood, UA Negative     Specific Gravity, UA 1.010    Narrative:      CLINITEK RESULT    HS Troponin 0hr (reflex protocol) [[id number]]  (Normal) Collected: 11/04/23 1759    Lab Status: Final result Specimen: Blood from Arm, Right Updated: 11/04/23 1832     hs TnI 0hr 5 ng/L     Comprehensive metabolic panel [[id number]]  (Abnormal) Collected: 11/04/23 1759    Lab Status: Final result Specimen: Blood from Arm, Right Updated: 11/04/23 1828     Sodium 137 mmol/L      Potassium 4.1 mmol/L      Chloride 100 mmol/L      CO2 29 mmol/L      ANION GAP 8 mmol/L      BUN 14 mg/dL      Creatinine 1.13 mg/dL      Glucose 113 mg/dL      Calcium 9.2 mg/dL      AST 13 U/L      ALT 17 U/L      Alkaline Phosphatase 40 U/L      Total Protein 6.8 g/dL      Albumin 4.1 g/dL      Total Bilirubin 1.76 mg/dL      eGFR 61 ml/min/1.73sq m     Narrative:      [place] guidelines for Chronic Kidney Disease (CKD):     Stage 1 with normal or high GFR (GFR > 90 mL/min/1.73 square meters)    Stage 2 Mild CKD (GFR = 60-89 mL/min/1.73 square meters)    Stage 3A Moderate CKD (GFR = 45-59 mL/min/1.73 square meters)    Stage 3B Moderate CKD (GFR = 30-44 mL/min/1.73 square meters)    Stage 4 Severe CKD (GFR = 15-29 mL/min/1.73 square meters)    Stage 5 End Stage CKD (GFR <15 mL/min/1.73 square meters)  Note: GFR calculation is accurate only with a steady state creatinine    Lipase [[id number]]  (Normal) Collected: 11/04/23 1759    Lab Status: Final result Specimen: Blood from Arm, Right Updated: 11/04/23 1828     Lipase 14 u/L     CBC and differential [[id number]]  (Abnormal) Collected: 11/04/23 1759    Lab Status: Final result Specimen: Blood from Arm, Right Updated: 11/04/23 1804     WBC 8.57 Thousand/uL      RBC 5.30 Million/uL      Hemoglobin 14.8 g/dL      Hematocrit 45.6 %      MCV 86 fL      MCH 27.9 pg      MCHC 32.5 g/dL      RDW 13.1 %      MPV 9.9 fL      Platelets 286 Thousands/uL      nRBC 0 /100 WBCs      Neutrophils Relative 50 %      Immat GRANS % 0 %      Lymphocytes Relative 30 %      Monocytes Relative 10 %      Eosinophils Relative 10 %      Basophils Relative 0 %      Neutrophils Absolute 4.29 Thousands/µL      Immature Grans Absolute 0.02 Thousand/uL      Lymphocytes Absolute 2.53 Thousands/µL      Monocytes Absolute 0.82 Thousand/µL      Eosinophils Absolute 0.89 Thousand/µL      Basophils Absolute 0.02 Thousands/µL           CT abdomen pelvis wo contrast   Final Result by Aren Purdy MD (11/04 2023)      Findings above suggestive of nonspecific enteritis involving mid distal small bowel loop as above with associated secondary ileus versus partial/developing small bowel obstruction. No free intraperitoneal air. Correlate clinically and consider further    evaluation with serial abdominal radiographs after the administration of oral contrast.      Small hiatal hernia. Prostatomegaly. Workstation performed: [id number]             ED Course / Workup Pending (followup):   Discussion with Gen Surgery for recs.              ED Course as of 11/04/23 2146   Sat Nov 04, 2023 2056 S/o from [facility]. Enteritis w/ secondary ileus vs developing obstruction. Will discuss with surgery. Procedures  Medical Decision Making      Signout from Keisha Saha. Dispo pending discussion with general surgery resident, possibly [facility]  admit. Patient was evaluated for abdominal pain and distention, found to have enteritis secondary to ileus versus partial/developing small bowel obstruction. Did discuss with general surgery resident Dr. Prince Ryan. Patient will need a contrast study, given contrast allergy patient will need prep prior to imaging. Will admit to [facility] for observation and CT in the AM.  Surgery will consult. [facility] is agreeable to admission at this time. At the time of admission, the patient is in no acute distress. I discussed with the patient and family the diagnosis, treatment plan, and plan for admission; pt was given the opportunity to ask questions and verbalized understanding. The patient agrees with the plan of care and admission at this time. Amount and/or Complexity of Data Reviewed  Labs: ordered. Radiology: ordered. Risk  Prescription drug management. Decision regarding hospitalization.             Disposition  Final diagnoses:   Abdominal pain   Hiatal hernia   Enlarged prostate   Enteritis   Ileus ([address]) -  nonspecific enteritis involving mid distal small bowel loop as above with associated secondary ileus versus partial/developing small bowel obstruction     Time reflects when diagnosis was documented in both MDM as applicable and the Disposition within this note       Time User Action Codes Description Comment    11/4/2023  8:27 PM Kanwal Lias Add [R10.9] Abdominal pain     11/4/2023  8:28 PM Kanwal Lias Add [K44.9] Hiatal hernia     11/4/2023  8:28 PM Kanwal Lias Add [N40.0] Enlarged prostate     11/4/2023  8:28 PM Kanwal Lias Add [K52.9] Enteritis     11/4/2023  8:28 PM Sheron Harvey Davin Shea [K56.7] Ileus ([address])     11/4/2023  8:29 PM Memo Piper [K56.7] Ileus ([address])  nonspecific enteritis involving mid distal small bowel loop as above with associated secondary ileus versus partial/developing small bowel obstruction          ED Disposition       ED Disposition   Admit    Condition   Stable    Date/Time   Sat Nov 4, 2023  9:36 PM    Comment   Case was discussed with [facility] and the patient's admission status was agreed to be Admission Status: observation status to the service of Dr. Nate De Oliveira. Follow-up Information    None       Patient's Medications   Discharge Prescriptions    No medications on file     No discharge procedures on file.        ED Provider  Electronically Signed by Romana Files, PA-C  11/04/23 3308

## 2023-11-05 NOTE — PLAN OF CARE

## 2023-11-05 NOTE — PLAN OF CARE
Problem: Potential for Falls  Goal: Patient will remain free of falls  Description: INTERVENTIONS:  - Educate patient/family on patient safety including physical limitations  - Instruct patient to call for assistance with activity   - Consult OT/PT to assist with strengthening/mobility   - Keep Call bell within reach  - Keep bed low and locked with side rails adjusted as appropriate  - Keep care items and personal belongings within reach  - Initiate and maintain comfort rounds  - Make Fall Risk Sign visible to staff  - Apply yellow socks and bracelet for high fall risk patients  - Consider moving patient to room near nurses station  Outcome: Progressing     Problem: PAIN - ADULT  Goal: Verbalizes/displays adequate comfort level or baseline comfort level  Description: Interventions:  - Encourage patient to monitor pain and request assistance  - Assess pain using appropriate pain scale  - Administer analgesics based on type and severity of pain and evaluate response  - Implement non-pharmacological measures as appropriate and evaluate response  - Consider cultural and social influences on pain and pain management  - Notify physician/advanced practitioner if interventions unsuccessful or patient reports new pain  Outcome: Progressing     Problem: INFECTION - ADULT  Goal: Absence or prevention of progression during hospitalization  Description: INTERVENTIONS:  - Assess and monitor for signs and symptoms of infection  - Monitor lab/diagnostic results  - Monitor all insertion sites, i.e. indwelling lines, tubes, and drains  - Monitor endotracheal if appropriate and nasal secretions for changes in amount and color  - [place] appropriate cooling/warming therapies per order  - Administer medications as ordered  - Instruct and encourage patient and family to use good hand hygiene technique  - Identify and instruct in appropriate isolation precautions for identified infection/condition  Outcome: Progressing     Problem: SAFETY ADULT  Goal: Maintain or return to baseline ADL function  Description: INTERVENTIONS:  -  Assess patient's ability to carry out ADLs; assess patient's baseline for ADL function and identify physical deficits which impact ability to perform ADLs (bathing, care of mouth/teeth, toileting, grooming, dressing, etc.)  - Assess/evaluate cause of self-care deficits   - Assess range of motion  - Assess patient's mobility; develop plan if impaired  - Assess patient's need for assistive devices and provide as appropriate  - Encourage maximum independence but intervene and supervise when necessary  - Involve family in performance of ADLs  - Assess for home care needs following discharge   - Consider OT consult to assist with ADL evaluation and planning for discharge  - Provide patient education as appropriate  Outcome: Progressing     Problem: DISCHARGE PLANNING  Goal: Discharge to home or other facility with appropriate resources  Description: INTERVENTIONS:  - Identify barriers to discharge w/patient and caregiver  - Arrange for needed discharge resources and transportation as appropriate  - Identify discharge learning needs (meds, wound care, etc.)  - Arrange for interpretive services to assist at discharge as needed  - Refer to Case Management Department for coordinating discharge planning if the patient needs post-hospital services based on physician/advanced practitioner order or complex needs related to functional status, cognitive ability, or social support system  Outcome: Progressing     Problem: Knowledge Deficit  Goal: Patient/family/caregiver demonstrates understanding of disease process, treatment plan, medications, and discharge instructions  Description: Complete learning assessment and assess knowledge base.   Interventions:  - Provide teaching at level of understanding  - Provide teaching via preferred learning methods  Outcome: Progressing     Problem: GASTROINTESTINAL - ADULT  Goal: Minimal or absence of nausea and/or vomiting  Description: INTERVENTIONS:  - Administer IV fluids if ordered to ensure adequate hydration  - Maintain NPO status until nausea and vomiting are resolved  - Nasogastric tube if ordered  - Administer ordered antiemetic medications as needed  - Provide nonpharmacologic comfort measures as appropriate  - Advance diet as tolerated, if ordered  - Consider nutrition services referral to assist patient with adequate nutrition and appropriate food choices  Outcome: Progressing  Goal: Maintains or returns to baseline bowel function  Description: INTERVENTIONS:  - Assess bowel function  - Encourage oral fluids to ensure adequate hydration  - Administer IV fluids if ordered to ensure adequate hydration  - Administer ordered medications as needed  - Encourage mobilization and activity  - Consider nutritional services referral to assist patient with adequate nutrition and appropriate food choices  Outcome: Progressing  Goal: Maintains adequate nutritional intake  Description: INTERVENTIONS:  - Monitor percentage of each meal consumed  - Identify factors contributing to decreased intake, treat as appropriate  - Assist with meals as needed  - Monitor I&O, weight, and lab values if indicated  - Obtain nutrition services referral as needed  Outcome: Progressing     Problem: METABOLIC, FLUID AND ELECTROLYTES - ADULT  Goal: Electrolytes maintained within normal limits  Description: INTERVENTIONS:  - Monitor labs and assess patient for signs and symptoms of electrolyte imbalances  - Administer electrolyte replacement as ordered  - Monitor response to electrolyte replacements, including repeat lab results as appropriate  - Instruct patient on fluid and nutrition as appropriate  Outcome: Progressing  Goal: Fluid balance maintained  Description: INTERVENTIONS:  - Monitor labs   - Monitor I/O and WT  - Instruct patient on fluid and nutrition as appropriate  - Assess for signs & symptoms of volume excess or deficit  Outcome: Progressing

## 2023-11-05 NOTE — ASSESSMENT & PLAN NOTE
Patient with PMHx of asthma, HTN, HLD, BPH, CKD, and depression presented to the ED after developing generalized abdominal pain, distension, nausea, and belching x one day  CT abdomen/pelvis demonstrating "Findings above suggestive of nonspecific enteritis involving mid distal small bowel loop as above with associated secondary ileus versus partial/developing small bowel obstruction. No free intraperitoneal air. Correlate clinically and consider further evaluation with serial abdominal radiographs after the administration of oral contrast. Small hiatal hernia.  Prostatomegaly."  WBC 8.57, not fulfilling septic criteria  Lipase 14  NPO for now  Supportive care, pain control, IV fluids  Surgery consulted by ED, recommendations appreciated  Ordered repeat CT abdomen/pelvis scan with oral barium for further evaluation secondary to contrast allergy  Will initiate prep for contrast study in case more definitive contrast scan needed  Will see in consult if indeed SBO

## 2023-11-05 NOTE — CASE MANAGEMENT
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  2020    Vitamin D deficiency 2020    Trochanteric bursitis of left hip 2019    CKD (chronic kidney disease) stage 3, GFR 30-59 ml/min ([facility]) 2018    Osteoarthritis of multiple joints 2018    Primary localized osteoarthrosis of ankle and foot 2018    Tarsal tunnel syndrome 2018    Plantar fascial fibromatosis 2018    Anxiety 2018    Insomnia 2018    BPH associated with nocturia 2017    Incomplete emptying of bladder 10/02/2017    TB lung, latent 06/15/2017    Chronic sinusitis 2017    Migraine 2016    Cervical spinal stenosis 2016    Lumbar spinal stenosis 2016    Thoracic degenerative disc disease 2016    Fibromyalgia 2016    Bilateral hearing loss 2016    Lumbar radiculopathy 2016    Major depressive disorder with single episode, in partial remission ([address]) 2015    Prediabetes 2015    Acid reflux 2015    Venous insufficiency 2015    Rotator cuff tendinitis 2014    Seborrheic dermatitis of scalp 2013    Asthma 2012    Hyperlipidemia 2012    Peripheral neuropathy 2012    Tinnitus 2012    Essential hypertension 2012 Psoriasis 05/22/2012      LOS (days): 0  Geometric Mean LOS (GMLOS) (days):   Days to GMLOS:     OBJECTIVE:              Current admission status: Observation       Preferred Pharmacy:   [facility] [address] ANGIE WHITESIDE [facility], [address] [place] [address]  [address] [place] [address]  Phone: [phone] Fax: [phone]    [address] [facility], [id number] [place] [address]  Phone: [phone] Fax: [phone]    Primary Care Provider: Nancye Boast, MD    Primary Insurance: [facility]  Secondary Insurance:     ASSESSMENT:  [place] Proxies    There are no active Health Care Proxies on file. Readmission Root Cause  30 Day Readmission: No    Patient Information  Admitted from[de-identified] Home  Mental Status: Alert, Confused  During Assessment patient was accompanied by: Daughter (By telephone)  Assessment information provided by[de-identified] Daughter (By telephone)  Primary Caregiver: Self  Support Systems: Spouse/significant other, Daughter, Family members  [facility]: [address] [place] [address] do you live in?: [address] [facility],[address] entry access options.  Select all that apply.: Stairs, Garage  Number of steps to enter home.: 2  Type of Current Residence: [address] [place] home  Upon entering residence, is there a bedroom on the main floor (no further steps)?: No  A bedroom is located on the following floor levels of residence (select all that apply):: 2nd Floor  Upon entering residence, is there a bathroom on the main floor (no further steps)?: No  Indicate which floors of current residence have a bathroom (select all the apply):: 2nd Floor  Number of steps to 2nd floor from main floor: One Flight  In the last 12 months, was there a time when you were not able to pay the mortgage or rent on time?: No  In the last 12 months, how many places have you lived?: 1  In the last 12 months, was there a time when you did not have a steady place to sleep or slept in a shelter (including now)?: No  Homeless/housing insecurity resource given?: N/A  Living Arrangements: Lives w/ Spouse/significant other    Activities of Daily Living Prior to Admission  Functional Status: Independent  Completes ADLs independently?: Yes  Ambulates independently?: Yes  Does patient use assisted devices?: No  Does patient currently own DME?: No  Does patient have a history of Outpatient Therapy (PT/OT)?: No  Does the patient have a history of Short-Term Rehab?: Yes ([facility])  Does patient have a history of HHC?: No  Does patient currently have [facility]?: No    Patient Information Continued  Income Source: Pension/correction  Does patient have prescription coverage?: Yes  Within the past 12 months, you worried that your food would run out before you got the money to buy more.: Never true  Within the past 12 months, the food you bought just didn't last and you didn't have money to get more.: Never true  Food insecurity resource given?: N/A  Does patient receive dialysis treatments?: No  Does patient have a history of substance abuse?: No  Does patient have a history of Mental Health Diagnosis?: No    Means of Transportation  Means of Transport to Appts[de-identified] Drives Self  In the past 12 months, has lack of transportation kept you from medical appointments or from getting medications?: No  In the past 12 months, has lack of transportation kept you from meetings, work, or from getting things needed for daily living?: No  Was application for public transport provided?: N/A        DISCHARGE DETAILS:    Discharge planning discussed with[de-identified] Froylan Quintanilla (Daughter) [phone] (M)  Freedom of Choice: Yes  Comments - Freedom of Choice: Pt prefers to discharge home and follow up w/ op providers  CM contacted family/caregiver?: Yes  Were Treatment Team discharge recommendations reviewed with patient/caregiver?: Yes  Did patient/caregiver verbalize understanding of patient care needs?: Yes  Were patient/caregiver advised of the risks associated with not following Treatment Team discharge recommendations?: Yes    Contacts  Patient Contacts: Jethro Kelley (Daughter) [phone] (M)  Relationship to Patient[de-identified] Family  Contact Method: Phone  Phone Number: Jethro Kelley (Daughter) [phone] Shannan Hartmann  Reason/Outcome: Emergency Contact, Discharge [address]         Is the patient interested in [facility] at discharge?: No  DME Referral Provided  Referral made for DME?: No    Treatment Team Recommendation: Home (w/ op follow up)    Additional Comments: CM spoke w/ Dtr Carine to complete initial assessment and discuss dcp. Once medically clear Pt will discharge home and follow up with his primary care team. No needs identified. Pt was NPO w/ advancement to liquids. Dtr reports she will arrive at hospital to be available to bring Pt home, should Pt tolerate his meal. Dtr will transport Pt home by car. CM remains available through discharge.

## 2023-11-05 NOTE — H&P
[facility]  H&P  Name: Jens Simon 78 y.o. male I MRN: [id number]  Unit/Bed#: E5 MS 5-80 I Date of Admission: 11/4/2023   Date of Service: 11/5/2023 I Hospital Day: 0      Assessment/Plan   * Generalized abdominal pain  Assessment & Plan  Patient with PMHx of asthma, HTN, HLD, BPH, CKD, and depression presented to the ED after developing generalized abdominal pain, distension, nausea, and bletching x one day  Pt reports last bowel movement this AM. Denies fever, chills, myalgias, diarrhea, or vomiting. CT abdomen/pelvis demonstrating "Findings above suggestive of nonspecific enteritis involving mid distal small bowel loop as above with associated secondary ileus versus partial/developing small bowel obstruction. No free intraperitoneal air. Correlate clinically and consider further evaluation with serial abdominal radiographs after the administration of oral contrast. Small hiatal hernia.  Prostatomegaly."  WBC 8.57, not fulfilling septic criteria  Lipase 14  NPO for now  Supportive care, pain control, IV fluids  Surgery consulted by ED, recommendations appreciated  Ordered repeat CT abdomen/pelvis scan with oral barium for further evaluation secondary to contrast allergy  Will initiate prep for contrast study in case more definitive contrast scan needed  Will see in consult if indeed SBO    CKD (chronic kidney disease) stage 3, GFR 30-59 ml/min [facility])  Assessment & Plan  Lab Results   Component Value Date    EGFR 61 11/04/2023    EGFR 75 05/01/2023    EGFR 77 02/17/2023    CREATININE 1.13 11/04/2023    CREATININE 1.02 05/01/2023    CREATININE 0.99 02/17/2023   Approximately at baseline  Continue monitoring CMP    Essential hypertension  Assessment & Plan  Patient on 10 mg amlodipine, 20 mg lisinopril, and 12.5 mg HCTZ at home  IV hydralazine prn while patient is NPO  Continue to monitor blood pressures    Hyperlipidemia  Assessment & Plan  Continue statin when diet advanced    Major depressive disorder with single episode, in partial remission (HCC)  Assessment & Plan  Mood stable   Continue celexa and xanax prn when diet advanced    BPH associated with nocturia  Assessment & Plan  Pt on flomax and proscar at home   Urinary retention protocol    Asthma  Assessment & Plan  No acute exacerbation  Continue albuterol as needed           VTE Pharmacologic Prophylaxis: VTE Score: 5 High Risk (Score >/= 5) - Pharmacological DVT Prophylaxis Contraindicated. Sequential Compression Devices Ordered. Code Status: Level 1 - Full Code   Discussion with family:  Update in AM.     Anticipated Length of Stay: Patient will be admitted on an observation basis with an anticipated length of stay of less than 2 midnights secondary to generalized abdominal pain, r/o SBO. Total Time Spent on Date of Encounter in care of patient: 75 minutes This time was spent on one or more of the following: performing physical exam; counseling and coordination of care; obtaining or reviewing history; documenting in the medical record; reviewing/ordering tests, medications or procedures; communicating with other healthcare professionals and discussing with patient's family/caregivers. Chief Complaint: "I started getting diffuse abdominal pain and really bloated"    History of Present Illness:  Fran Bennett is a 78 y.o. male who presents with diffuse abdominal pain. She reports that beginning yesterday he started with diffuse abdominal pain and feeling very bloated. He reports that he then started belching with nausea and felt like he had epigastric/GERD symptoms as well. Patient reports that he was encouraged by his family to come in for care. Patient denies any other associated symptoms such as fever, chills, myalgias, diarrhea, or constipation. Patient reports that his last bowel movement was early this morning which was normal for him.   Patient reports that he has not taken his medications today secondary to his nausea and abdominal discomfort. Patient reports that he normally ambulates without any assistive devices at home. Patient denies any other complaints such as chest pain, palpitations, shortness of breath, wheezing, urinary or neuro symptoms, or pedal edema. Review of Systems:  Review of Systems   Constitutional:  Positive for appetite change (decreased). Negative for chills, diaphoresis, fatigue and fever. HENT:  Negative for congestion, rhinorrhea and sore throat. Eyes:  Negative for photophobia and visual disturbance. Respiratory:  Negative for cough, shortness of breath and wheezing. Cardiovascular:  Negative for chest pain, palpitations and leg swelling. Gastrointestinal:  Positive for abdominal distention, abdominal pain (diffuse) and nausea. Negative for blood in stool, constipation, diarrhea and vomiting. Bletching    Genitourinary:  Negative for dysuria and hematuria. Musculoskeletal:  Negative for arthralgias, back pain, myalgias and neck stiffness. Skin:  Negative for color change and rash. Neurological:  Negative for dizziness, seizures, syncope, weakness, light-headedness and headaches. Psychiatric/Behavioral:  Negative for agitation, behavioral problems and confusion. The patient is not nervous/anxious. All other systems reviewed and are negative.       Past Medical and Surgical History:   Past Medical History:   Diagnosis Date    Anxiety and depression     Arthritis     Asthma     Back pain, chronic     Last assessed: 3/11/15    BPH (benign prostatic hyperplasia)     Cataract     Last assessed: 7/16/14    Costochondral chest pain 11/22/2021    Double vision 04/15/2022    GERD (gastroesophageal reflux disease)     Hyperlipidemia     Hypertension     Neuropathy     Osteoporosis     Panic disorder     Right-sided Bell's palsy     Last assessed: 3/10/14    Seasonal allergies     Testicular hypogonadism 11/26/2012       Past Surgical History:   Procedure Laterality Date CARPAL TUNNEL RELEASE Right 1999    Neuroplasty Decompression Median Nerve at Carpal Tunnel    CATARACT EXTRACTION  2015    COLONOSCOPY  02/2015    2 polyps, diverticulosis by Dr. Lei Hobbs  08/05/2021    [address] and 1990, ear drum,  per [employer]    ESOPHAGOGASTRODUODENOSCOPY  02/2015    2 cm sliding hiatal hernia, grade 1-2 esophagitis, duodenitis by Dr. Shad Mckay      Spinal Anesthesia Epidural, x3 2004, per [employer]    POLYPECTOMY  2015    TYMPANOPLASTY Bilateral 2000    Dr Roxy Fatima       Meds/Allergies:  Prior to Admission medications    Medication Sig Start Date End Date Taking? Authorizing Provider   acetaminophen (TYLENOL) 325 mg tablet Take 2 tablets (650 mg total) by mouth every 6 (six) hours as needed for mild pain or fever 1/9/19  Yes Beulah Ellis MD   albuterol (PROVENTIL HFA,VENTOLIN HFA) 90 mcg/act inhaler Inhale 1 puff every 6 (six) hours as needed (Q6H PRN) 9/14/22  Yes Christen Page MD   ALPRAZolam Benigno Lemon) 0.5 mg tablet Take 1 tablet up to twice daily as needed.  8/14/23  Yes Christen Page MD   amLODIPine (NORVASC) 10 mg tablet Take 1 tablet (10 mg total) by mouth daily 8/24/23  Yes Christen Page MD   Ascorbic Acid (vitamin C) 100 MG tablet Take 100 mg by mouth daily   Yes Historical Provider, MD   atorvastatin (LIPITOR) 20 mg tablet Take 1 tablet (20 mg total) by mouth daily 10/20/23  Yes Christen Page MD   betamethasone, augmented, (DIPROLENE) 0.05 % lotion APPLY TO SCALP ONCE DAILY FOR 2 WEEKS AS NEEDED FOR ITCHING 2/1/23  Yes Historical Provider, MD   citalopram (CeleXA) 20 mg tablet Take 1 tablet (20 mg total) by mouth daily 8/24/23  Yes Christen Page MD   diclofenac sodium (VOLTAREN) 1 % Apply 2 g topically 2 (two) times a day   Yes Historical Provider, MD   diphenhydrAMINE (BENADRYL) 12.5 mg/5 mL oral liquid Take by mouth 3 (three) times a day as needed for allergies   Yes Historical Provider, MD   finasteride (PROSCAR) 5 mg tablet Take 1 tablet (5 mg total) by mouth daily 7/27/23  Yes Maria Eugenia Man PA-C   gabapentin (NEURONTIN) 300 mg capsule Take 1 capsule (300 mg total) by mouth 3 (three) times a day 10/7/22  Yes Javon Waddell MD   hydrocortisone valerate (WEST-VITO) 0.2 % ointment APPLY  OINTMENT EXTERNALLY DAILY 7/20/21  Yes Javon Waddell MD   hydrOXYzine HCL (ATARAX) 25 mg tablet Take 1 tablet (25 mg total) by mouth every 8 (eight) hours as needed for itching 8/11/23  Yes Javon Waddell MD   lisinopril-hydrochlorothiazide [facility]) 20-12.5 MG per tablet Take 1 tablet by mouth daily 8/24/23  Yes Javon Waddell MD   omeprazole (PriLOSEC) 20 mg delayed release capsule Take 1 capsule (20 mg total) by mouth daily as needed (heartburn) 4/13/21  Yes Rajat Guaman DO   Risankizumab-rzaa (SKYRIZI PEN SC) Inject under the skin Q 4 weeks via Dermatology   Yes Historical Provider, MD   tacrolimus (PROTOPIC) 0.1 % ointment APPLY TO EARS TWICE A DAY WHEN NEEDED 1/31/22  Yes Historical Provider, MD   tamsulosin (FLOMAX) 0.4 mg Take 1 capsule (0.4 mg total) by mouth daily with dinner 10/9/23  Yes Eda Calderon PA-C   triamcinolone (KENALOG) 0.1 % cream Apply up to 3 times daily on itchy skin lesions as needed.  4/17/23  Yes Javon Waddell MD   vitamin B-12 (VITAMIN B-12) 1,000 mcg tablet Take 1,000 mcg by mouth daily     Yes Historical Provider, MD   Vitamins-Lipotropics (LIPO FLAVONOID PLUS PO) Take by mouth   Yes Historical Provider, MD   Carboxymethylcellul-Glycerin (Dedra Hedger OP) Apply to eye    Historical Provider, MD   fluocinolone (SYNALAR) 0.01 % external solution APPLY TOPICALLY TO SCALP ONCE A DAY AS NEEDED FOR PSORIASIS 1/26/22   Historical Provider, MD   VITAMIN D, ERGOCALCIFEROL, PO Take 2,000 Units by mouth    Historical Provider, MD     I have reviewed home medications with patient personally. Allergies: Allergies   Allergen Reactions    Amoxicillin Rash    Amoxicillin-Pot Clavulanate Er  [Amoxicillin-Pot Clavulanate] Hives and Rash    Codeine Rash and Shortness Of Breath    Penicillin G Rash    Albumen, Egg - Food Allergy     Other      pork    Pork-Derived Products - Food Allergy Hives    Prochlorperazine Other (See Comments) and Itching     rash all over the body    Compazine  [Prochlorperazine Edisylate] Rash    Iodinated Contrast Media Rash    Latex Rash and Itching    Valdecoxib Itching, Rash and Headache     Category: HEADACHES;        Social History:  Marital Status: /Civil Union   Patient Pre-hospital Living Situation: Home  Patient Pre-hospital Level of Mobility: walks  Patient Pre-hospital Diet Restrictions: None  Substance Use History:   Social History     Substance and Sexual Activity   Alcohol Use No    Comment: former drinker     Social History     Tobacco Use   Smoking Status Former    Types: Cigars    Quit date:     Years since quittin.8   Smokeless Tobacco Former   Tobacco Comments    quit in , former cigar and cigarette, pipe smoker, per [employer], Past history of chewing tobacco use, active, per [employer]     Social History     Substance and Sexual Activity   Drug Use No       Family History:  Family History   Problem Relation Age of Onset    Cataracts Mother     Hyperlipidemia Mother     Colon polyps Father        Physical Exam:     Vitals:   Blood Pressure: 142/83 (23)  Pulse: 63 (23)  Temperature: (!) 97.3 °F (36.3 °C) (23)  Temp Source: Oral (23)  Respirations: 17 (23)  Weight - Scale: 87.3 kg (192 lb 7.4 oz) (23 1730)  SpO2: 96 % (23)    Physical Exam  Vitals and nursing note reviewed. Constitutional:       General: He is not in acute distress. Appearance: He is well-developed. He is not ill-appearing. HENT:      Head: Normocephalic and atraumatic.       Nose: Nose normal. No congestion. Mouth/Throat:      Mouth: Mucous membranes are moist.      Pharynx: Oropharynx is clear. Eyes:      Conjunctiva/sclera: Conjunctivae normal.   Cardiovascular:      Rate and Rhythm: Normal rate and regular rhythm. Heart sounds: Normal heart sounds. No murmur heard. No friction rub. No gallop. Pulmonary:      Effort: Pulmonary effort is normal. No respiratory distress. Breath sounds: Normal breath sounds. No wheezing, rhonchi or rales. Abdominal:      General: There is distension. Palpations: Abdomen is soft. Tenderness: There is abdominal tenderness (diffuse, but greatest in LLQ). Comments: Hypermotile bowel sounds, occasional belching during conversation   Musculoskeletal:      Cervical back: Neck supple. Right lower leg: No edema. Left lower leg: No edema. Skin:     General: Skin is warm and dry. Capillary Refill: Capillary refill takes less than 2 seconds. Neurological:      General: No focal deficit present. Mental Status: He is alert and oriented to person, place, and time. Mental status is at baseline.    Psychiatric:         Mood and Affect: Mood normal.         Behavior: Behavior normal.          Additional Data:     Lab Results:  Results from last 7 days   Lab Units 11/04/23  1759   WBC Thousand/uL 8.57   HEMOGLOBIN g/dL 14.8   HEMATOCRIT % 45.6   PLATELETS Thousands/uL 220   NEUTROS PCT % 50   LYMPHS PCT % 30   MONOS PCT % 10   EOS PCT % 10*     Results from last 7 days   Lab Units 11/04/23  1759   SODIUM mmol/L 137   POTASSIUM mmol/L 4.1   CHLORIDE mmol/L 100   CO2 mmol/L 29   BUN mg/dL 14   CREATININE mg/dL 1.13   ANION GAP mmol/L 8   CALCIUM mg/dL 9.2   ALBUMIN g/dL 4.1   TOTAL BILIRUBIN mg/dL 1.76*   ALK PHOS U/L 40   ALT U/L 17   AST U/L 13   GLUCOSE RANDOM mg/dL 113                       Lines/Drains:  Invasive Devices       Peripheral Intravenous Line  Duration             Peripheral IV 11/04/23 Right;Ventral (anterior) Forearm <1 day                        Imaging: Reviewed radiology reports from this admission including: abdominal/pelvic CT  CT abdomen pelvis wo contrast   Final Result by Jared Millard MD (11/04 2023)      Findings above suggestive of nonspecific enteritis involving mid distal small bowel loop as above with associated secondary ileus versus partial/developing small bowel obstruction. No free intraperitoneal air. Correlate clinically and consider further    evaluation with serial abdominal radiographs after the administration of oral contrast.      Small hiatal hernia. Prostatomegaly. Workstation performed: [id number]         CT abdomen pelvis wo contrast    (Results Pending)       EKG and Other Studies Reviewed on Admission:   EKG: NSR. HR 66.    ** Please Note: This note has been constructed using a voice recognition system.  **

## 2023-11-05 NOTE — ASSESSMENT & PLAN NOTE
Lab Results   Component Value Date    EGFR 66 11/05/2023    EGFR 61 11/04/2023    EGFR 75 05/01/2023    CREATININE 1.05 11/05/2023    CREATININE 1.13 11/04/2023    CREATININE 1.02 05/01/2023   Approximately at baseline

## 2023-11-05 NOTE — CONSULTS
Consultation - General Surgery   [facility] 78 y.o. male MRN: [id number]  Unit/Bed#: ED-08 Encounter: [id number]    Assessment/Plan     Assessment:  79 yo M with PMH of asthma, BPH, GERD, HLD, and HTN (no previous surgeries), who presents with non-specific enteritis with associated secondary ileus    Vitals normal on room air    WBC 8.57  Hb 14.8  Cr 1.13    11/4/23 CT abdomen pelvis w IV contrast: Findings above suggestive of nonspecific enteritis involving mid distal small bowel loop as above with associated secondary ileus versus partial/developing small bowel obstruction. No free intraperitoneal air. 11/5/23 CT abdomen pelvis w PO contrast: No bowel obstruction, no acute process    Plan:  No evidence of bowel obstruction on PO contrast imaging with contrast transit into colon, no indication for surgical intervention  Currently NPO, ok to advance diet as tolerated  IVF while NPO, decrease as PO intake increase  PRN analgesia  PRN antiemetics  DVT PPX  Stable for discharge from surgical perspective once able to adequately maintain hydration by mouth    History of Present Illness     HPI:  [facility] is a 78 y.o. male who presents with abdominal pain and nausea for one day. He reports the pain was diffuse and mild. He has had associated nausea and bloating but denies any vomiting. He also believes his abdomen is more distended than normal. His last BM was yesterday morning and was normal. Denies diarrhea, fever, sick contacts or abnormal dietary intake. Upon presentation he was found to have normal vitals and normal labs. CT imaging showed findings suggestive of nonspecific enteritis involving mid distal small bowel loop with associated secondary ileus versus partial/developing SBO. General surgery consulted for possible partial/developing SBO. Admitted to medicine and recommended PO contrast imaging that demonstrated no evidence of bowel obstruction.     Consult to surgery general  Consult performed by: Aaron Musa MD  Consult ordered by: Glenn Del Valle PA-C          Review of Systems   Constitutional: Negative. HENT: Negative. Eyes: Negative. Respiratory: Negative. Cardiovascular: Negative. Gastrointestinal:  Positive for abdominal distention, abdominal pain (Mild diffuse) and nausea. Negative for diarrhea and vomiting. Endocrine: Negative. Genitourinary: Negative. Musculoskeletal: Negative. Skin: Negative. Neurological: Negative. Psychiatric/Behavioral: Negative.            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 Mikala Goldman  08/05/2021    Reggie, ear drum,  per [employer]    ESOPHAGOGASTRODUODENOSCOPY  02/2015    2 cm sliding hiatal hernia, grade 1-2 esophagitis, duodenitis by Dr. Kaity Starr      Spinal Anesthesia Epidural, x3 2004, per [employer]    POLYPECTOMY  2015    TYMPANOPLASTY Bilateral 2000    Dr Shayna Cruz History     Substance and Sexual Activity   Alcohol Use No    Comment: former drinker     Social History     Substance and Sexual Activity   Drug Use No     E-Cigarette/Vaping    E-Cigarette Use Never User      E-Cigarette/Vaping Substances     Social History     Tobacco Use   Smoking Status Former    Types: Cigars    Quit date:     Years since quittin.8   Smokeless Tobacco Former   Tobacco Comments    quit in , former cigar and cigarette, pipe smoker, per [employer], Past history of chewing tobacco use, active, per [employer]     Family History:   Family History   Problem Relation Age of Onset    Cataracts Mother     Hyperlipidemia Mother     Colon polyps Father        Meds/Allergies   current meds:   No current facility-administered medications for this encounter. and PTA meds:   Prior to Admission Medications   Prescriptions Last Dose Informant Patient Reported? Taking? ALPRAZolam (XANAX) 0.5 mg tablet  Self No No   Sig: Take 1 tablet up to twice daily as needed.    Ascorbic Acid (vitamin C) 100 MG tablet  Self Yes No   Sig: Take 100 mg by mouth daily   Carboxymethylcellul-Glycerin (REFRESH RELIEVA OP)  Self Yes No   Sig: Apply to eye   Risankizumab-rzaa (SKYRIZI PEN SC)  Self Yes No   Sig: Inject under the skin Q 4 weeks via Dermatology   VITAMIN D, ERGOCALCIFEROL, PO  Self Yes No   Sig: Take 2,000 Units by mouth   Vitamins-Lipotropics (LIPO FLAVONOID PLUS PO)  Self Yes No   Sig: Take by mouth   acetaminophen (TYLENOL) 325 mg tablet  Self No No   Sig: Take 2 tablets (650 mg total) by mouth every 6 (six) hours as needed for mild pain or fever   albuterol (PROVENTIL HFA,VENTOLIN HFA) 90 mcg/act inhaler  Self No No   Sig: Inhale 1 puff every 6 (six) hours as needed (Q6H PRN)   amLODIPine (NORVASC) 10 mg tablet  Self No No   Sig: Take 1 tablet (10 mg total) by mouth daily   atorvastatin (LIPITOR) 20 mg tablet  Self No No   Sig: Take 1 tablet (20 mg total) by mouth daily   betamethasone, augmented, (DIPROLENE) 0.05 % lotion  Self Yes No   Sig: APPLY TO SCALP ONCE DAILY FOR 2 WEEKS AS NEEDED FOR ITCHING   citalopram (CeleXA) 20 mg tablet  Self No No   Sig: Take 1 tablet (20 mg total) by mouth daily   diclofenac sodium (VOLTAREN) 1 %  Self Yes No   Sig: Apply 2 g topically 2 (two) times a day   diphenhydrAMINE (BENADRYL) 12.5 mg/5 mL oral liquid  Self Yes No   Sig: Take by mouth 3 (three) times a day as needed for allergies   finasteride (PROSCAR) 5 mg tablet  Self No No   Sig: Take 1 tablet (5 mg total) by mouth daily   fluocinolone (SYNALAR) 0.01 % external solution  Self Yes No   Sig: APPLY TOPICALLY TO SCALP ONCE A DAY AS NEEDED FOR PSORIASIS   gabapentin (NEURONTIN) 300 mg capsule  Self No No   Sig: Take 1 capsule (300 mg total) by mouth 3 (three) times a day   hydrOXYzine HCL (ATARAX) 25 mg tablet  Self No No   Sig: Take 1 tablet (25 mg total) by mouth every 8 (eight) hours as needed for itching   hydrocortisone valerate (WEST-VITO) 0.2 % ointment  Self No No   Sig: APPLY  OINTMENT EXTERNALLY DAILY   lisinopril-hydrochlorothiazide (PRINZIDE,ZESTORETIC) 20-12.5 MG per tablet  Self No No   Sig: Take 1 tablet by mouth daily   omeprazole (PriLOSEC) 20 mg delayed release capsule  Self No No   Sig: Take 1 capsule (20 mg total) by mouth daily as needed (heartburn)   tacrolimus (PROTOPIC) 0.1 % ointment  Self Yes No   Sig: APPLY TO EARS TWICE A DAY WHEN NEEDED   tamsulosin (FLOMAX) 0.4 mg  Self No No   Sig: Take 1 capsule (0.4 mg total) by mouth daily with dinner   triamcinolone (KENALOG) 0.1 % cream  Self No No   Sig: Apply up to 3 times daily on itchy skin lesions as needed.    vitamin B-12 (VITAMIN B-12) 1,000 mcg tablet  Self Yes No   Sig: Take 1,000 mcg by mouth daily        Facility-Administered Medications: None     Allergies   Allergen Reactions    Amoxicillin Rash    Amoxicillin-Pot Clavulanate Er  [Amoxicillin-Pot Clavulanate] Hives and Rash    Codeine Rash and Shortness Of Breath    Penicillin G Rash    Albumen, Egg - Food Allergy     Other      pork    Pork-Derived Products - Food Allergy Hives    Prochlorperazine Other (See Comments) and Itching     rash all over the body    Compazine [Prochlorperazine Edisylate] Rash    Iodinated Contrast Media Rash    Latex Rash and Itching    Valdecoxib Itching, Rash and Headache     Category: HEADACHES;        Objective   First Vitals:   Blood Pressure: 148/78 (11/04/23 1731)  Pulse: 81 (11/04/23 1731)  Temperature: 98.2 °F (36.8 °C) (11/04/23 1731)  Temp Source: Oral (11/04/23 1731)  Respirations: 18 (11/04/23 1731)  Weight - Scale: 87.3 kg (192 lb 7.4 oz) (11/04/23 1730)  SpO2: 100 % (11/04/23 1731)    Current Vitals:   Blood Pressure: 120/71 (11/04/23 2036)  Pulse: 65 (11/04/23 2036)  Temperature: 98.2 °F (36.8 °C) (11/04/23 1731)  Temp Source: Oral (11/04/23 1731)  Respirations: 16 (11/04/23 2036)  Weight - Scale: 87.3 kg (192 lb 7.4 oz) (11/04/23 1730)  SpO2: 98 % (11/04/23 2036)    No intake or output data in the 24 hours ending 11/04/23 2123    Invasive Devices       Peripheral Intravenous Line  Duration             Peripheral IV 11/04/23 Right;Ventral (anterior) Forearm <1 day                    Physical Exam  Constitutional:       Appearance: Normal appearance. HENT:      Head: Normocephalic and atraumatic. Right Ear: External ear normal.      Left Ear: External ear normal.      Nose: Nose normal.      Mouth/Throat:      Mouth: Mucous membranes are moist.      Pharynx: Oropharynx is clear. Eyes:      Extraocular Movements: Extraocular movements intact. Conjunctiva/sclera: Conjunctivae normal.      Pupils: Pupils are equal, round, and reactive to light. Cardiovascular:      Rate and Rhythm: Normal rate and regular rhythm. Pulses: Normal pulses. Pulmonary:      Effort: Pulmonary effort is normal.   Abdominal:      General: There is distension (Minimal distension). Tenderness: There is abdominal tenderness (Minimally tender diffusely). There is no guarding or rebound. Musculoskeletal:         General: Normal range of motion. Cervical back: Normal range of motion. Skin:     General: Skin is warm and dry. Neurological:      General: No focal deficit present. Mental Status: He is alert and oriented to person, place, and time. Psychiatric:         Mood and Affect: Mood normal.         Behavior: Behavior normal.           Lab Results: I have personally reviewed pertinent lab results. Imaging: I have personally reviewed pertinent reports. EKG, Pathology, and Other Studies: I have personally reviewed pertinent reports.

## 2023-11-06 ENCOUNTER — OFFICE VISIT (OUTPATIENT)
Dept: FAMILY MEDICINE CLINIC | Facility: CLINIC | Age: 78
End: 2023-11-06
Payer: MEDICARE

## 2023-11-06 VITALS
HEART RATE: 70 BPM | WEIGHT: 193.6 LBS | BODY MASS INDEX: 33.23 KG/M2 | SYSTOLIC BLOOD PRESSURE: 128 MMHG | DIASTOLIC BLOOD PRESSURE: 64 MMHG | OXYGEN SATURATION: 98 %

## 2023-11-06 DIAGNOSIS — R05.1 ACUTE COUGH: ICD-10-CM

## 2023-11-06 DIAGNOSIS — R14.0 BLOATING: Primary | ICD-10-CM

## 2023-11-06 DIAGNOSIS — A08.4 VIRAL GASTROENTERITIS: ICD-10-CM

## 2023-11-06 PROCEDURE — 99214 OFFICE O/P EST MOD 30 MIN: CPT | Performed by: INTERNAL MEDICINE

## 2023-11-06 RX ORDER — SIMETHICONE 180 MG
180 CAPSULE ORAL EVERY 6 HOURS PRN
Qty: 60 CAPSULE | Refills: 0 | Status: SHIPPED | OUTPATIENT
Start: 2023-11-06

## 2023-11-06 NOTE — ASSESSMENT & PLAN NOTE
Symptoms of abdominal discomfort and bloating associated with cough and nasal congestion likely due to viral etiology. CT of the abdomen was negative, recent blood work was okay. We will continue with symptomatic management with Mucinex, simethicone. If any worsening of the symptoms he will let me know. He already resumed diet and he is able to tolerate oral intake well.

## 2023-11-06 NOTE — PROGRESS NOTES
Assessment/Plan:    Viral gastroenteritis  Symptoms of abdominal discomfort and bloating associated with cough and nasal congestion likely due to viral etiology. CT of the abdomen was negative, recent blood work was okay. We will continue with symptomatic management with Mucinex, simethicone. If any worsening of the symptoms he will let me know. He already resumed diet and he is able to tolerate oral intake well. Diagnoses and all orders for this visit:    Bloating  -     simethicone (MYLICON,GAS-X) 522 MG capsule; Take 1 capsule (180 mg total) by mouth every 6 (six) hours as needed for flatulence    Acute cough  -     dextromethorphan-guaifenesin (MUCINEX DM)  MG per 12 hr tablet; Take 1 tablet by mouth every 12 (twelve) hours    Viral gastroenteritis          Subjective:      Patient ID: Shantelle Kyle is a 78 y.o. male. Patient came today with complaints of cough associated with abdominal discomfort in bloating. He was emergency room recently, his blood work was okay, CT scan was concerning for developing bowel obstruction but then repeated came back to be normal.        The following portions of the patient's history were reviewed and updated as appropriate: allergies, current medications, past family history, past medical history, past social history, past surgical history, and problem list.    Review of Systems   Constitutional:  Negative for appetite change, chills, fatigue and fever. HENT:  Negative for rhinorrhea, sinus pain and sore throat. Respiratory:  Positive for cough. Negative for chest tightness and shortness of breath. Cardiovascular:  Negative for chest pain. Gastrointestinal:  Positive for abdominal distention and abdominal pain. Negative for diarrhea, nausea and vomiting. Musculoskeletal:  Negative for arthralgias and myalgias.          Objective:      /64 (BP Location: Left arm, Patient Position: Sitting, Cuff Size: Large)   Pulse 70   Wt 87.8 kg (193 lb 9.6 oz)   SpO2 98%   BMI 33.23 kg/m²     Allergies   Allergen Reactions    Amoxicillin Rash    Amoxicillin-Pot Clavulanate Er  [Amoxicillin-Pot Clavulanate] Hives and Rash    Codeine Rash and Shortness Of Breath    Penicillin G Rash    Albumen, Egg - Food Allergy     Other      pork    Pork-Derived Products - Food Allergy Hives    Prochlorperazine Other (See Comments) and Itching     rash all over the body    Compazine  [Prochlorperazine Edisylate] Rash    Iodinated Contrast Media Rash    Latex Rash and Itching    Valdecoxib Itching, Rash and Headache     Category: HEADACHES;           Current Outpatient Medications:     acetaminophen (TYLENOL) 325 mg tablet, Take 2 tablets (650 mg total) by mouth every 6 (six) hours as needed for mild pain or fever, Disp: 30 tablet, Rfl: 3    albuterol (PROVENTIL HFA,VENTOLIN HFA) 90 mcg/act inhaler, Inhale 1 puff every 6 (six) hours as needed (Q6H PRN), Disp: 54 g, Rfl: 5    ALPRAZolam (XANAX) 0.5 mg tablet, Take 1 tablet up to twice daily as needed. , Disp: 60 tablet, Rfl: 0    amLODIPine (NORVASC) 10 mg tablet, Take 1 tablet (10 mg total) by mouth daily, Disp: 90 tablet, Rfl: 0    Ascorbic Acid (vitamin C) 100 MG tablet, Take 100 mg by mouth daily, Disp: , Rfl:     atorvastatin (LIPITOR) 20 mg tablet, Take 1 tablet (20 mg total) by mouth daily, Disp: 30 tablet, Rfl: 0    betamethasone, augmented, (DIPROLENE) 0.05 % lotion, APPLY TO SCALP ONCE DAILY FOR 2 WEEKS AS NEEDED FOR ITCHING, Disp: , Rfl:     Carboxymethylcellul-Glycerin (REFRESH RELIEVA OP), Apply to eye, Disp: , Rfl:     citalopram (CeleXA) 20 mg tablet, Take 1 tablet (20 mg total) by mouth daily, Disp: 90 tablet, Rfl: 0    dextromethorphan-guaifenesin (MUCINEX DM)  MG per 12 hr tablet, Take 1 tablet by mouth every 12 (twelve) hours, Disp: 60 tablet, Rfl: 0    diclofenac sodium (VOLTAREN) 1 %, Apply 2 g topically 2 (two) times a day, Disp: , Rfl:     diphenhydrAMINE (BENADRYL) 12.5 mg/5 mL oral liquid, Take by mouth 3 (three) times a day as needed for allergies, Disp: , Rfl:     finasteride (PROSCAR) 5 mg tablet, Take 1 tablet (5 mg total) by mouth daily, Disp: 90 tablet, Rfl: 0    fluocinolone (SYNALAR) 0.01 % external solution, APPLY TOPICALLY TO SCALP ONCE A DAY AS NEEDED FOR PSORIASIS, Disp: , Rfl:     gabapentin (NEURONTIN) 300 mg capsule, Take 1 capsule (300 mg total) by mouth 3 (three) times a day, Disp: 90 capsule, Rfl: 0    hydrocortisone valerate (WEST-VITO) 0.2 % ointment, APPLY  OINTMENT EXTERNALLY DAILY, Disp: 45 g, Rfl: 0    hydrOXYzine HCL (ATARAX) 25 mg tablet, Take 1 tablet (25 mg total) by mouth every 8 (eight) hours as needed for itching, Disp: 60 tablet, Rfl: 0    lisinopril-hydrochlorothiazide (PRINZIDE,ZESTORETIC) 20-12.5 MG per tablet, Take 1 tablet by mouth daily, Disp: 90 tablet, Rfl: 0    omeprazole (PriLOSEC) 20 mg delayed release capsule, Take 1 capsule (20 mg total) by mouth daily as needed (heartburn), Disp: 90 capsule, Rfl: 1    pantoprazole (PROTONIX) 40 mg tablet, Take 1 tablet (40 mg total) by mouth daily, Disp: 30 tablet, Rfl: 0    Risankizumab-rzaa (SKYRIZI PEN SC), Inject under the skin Q 4 weeks via Dermatology, Disp: , Rfl:     simethicone (MYLICON,GAS-X) 574 MG capsule, Take 1 capsule (180 mg total) by mouth every 6 (six) hours as needed for flatulence, Disp: 60 capsule, Rfl: 0    tacrolimus (PROTOPIC) 0.1 % ointment, APPLY TO EARS TWICE A DAY WHEN NEEDED, Disp: , Rfl:     tamsulosin (FLOMAX) 0.4 mg, Take 1 capsule (0.4 mg total) by mouth daily with dinner, Disp: 90 capsule, Rfl: 0    triamcinolone (KENALOG) 0.1 % cream, Apply up to 3 times daily on itchy skin lesions as needed. , Disp: 45 g, Rfl: 5    vitamin B-12 (VITAMIN B-12) 1,000 mcg tablet, Take 1,000 mcg by mouth daily  , Disp: , Rfl:     VITAMIN D, ERGOCALCIFEROL, PO, Take 2,000 Units by mouth, Disp: , Rfl:     Vitamins-Lipotropics (LIPO FLAVONOID PLUS PO), Take by mouth, Disp: , Rfl:   No current facility-administered medications for this visit. There are no Patient Instructions on file for this visit. Physical Exam  Constitutional:       General: He is not in acute distress. Appearance: He is not ill-appearing or toxic-appearing. Cardiovascular:      Heart sounds: No murmur heard. No gallop. Pulmonary:      Effort: No respiratory distress. Breath sounds: No wheezing or rales. Abdominal:      General: There is distension. Palpations: There is no mass. Tenderness: There is abdominal tenderness. There is no guarding or rebound. Hernia: No hernia is present.

## 2023-11-18 DIAGNOSIS — E78.5 HYPERLIPIDEMIA, UNSPECIFIED HYPERLIPIDEMIA TYPE: ICD-10-CM

## 2023-11-18 RX ORDER — ATORVASTATIN CALCIUM 20 MG/1
20 TABLET, FILM COATED ORAL DAILY
Qty: 90 TABLET | Refills: 0 | Status: SHIPPED | OUTPATIENT
Start: 2023-11-18

## 2023-11-21 LAB
ALBUMIN SERPL-MCNC: 4.3 G/DL (ref 3.6–5.1)
ALBUMIN/GLOB SERPL: 1.8 (CALC) (ref 1–2.5)
ALP SERPL-CCNC: 48 U/L (ref 35–144)
ALT SERPL-CCNC: 21 U/L (ref 9–46)
AST SERPL-CCNC: 17 U/L (ref 10–35)
BASOPHILS # BLD AUTO: 68 CELLS/UL (ref 0–200)
BASOPHILS NFR BLD AUTO: 1.1 %
BILIRUB SERPL-MCNC: 1.5 MG/DL (ref 0.2–1.2)
BUN SERPL-MCNC: 14 MG/DL (ref 7–25)
BUN/CREAT SERPL: ABNORMAL (CALC) (ref 6–22)
CALCIUM SERPL-MCNC: 9.6 MG/DL (ref 8.6–10.3)
CHLORIDE SERPL-SCNC: 102 MMOL/L (ref 98–110)
CHOLEST SERPL-MCNC: 160 MG/DL
CHOLEST/HDLC SERPL: 3.6 (CALC)
CO2 SERPL-SCNC: 32 MMOL/L (ref 20–32)
CREAT SERPL-MCNC: 1.04 MG/DL (ref 0.7–1.22)
EOSINOPHIL # BLD AUTO: 719 CELLS/UL (ref 15–500)
EOSINOPHIL NFR BLD AUTO: 11.6 %
ERYTHROCYTE [DISTWIDTH] IN BLOOD BY AUTOMATED COUNT: 13.2 % (ref 11–15)
GFR/BSA.PRED SERPLBLD CYS-BASED-ARV: 73 ML/MIN/1.73M2
GLOBULIN SER CALC-MCNC: 2.4 G/DL (CALC) (ref 1.9–3.7)
GLUCOSE SERPL-MCNC: 115 MG/DL (ref 65–99)
HBA1C MFR BLD: 6.3 % OF TOTAL HGB
HCT VFR BLD AUTO: 43.3 % (ref 38.5–50)
HDLC SERPL-MCNC: 45 MG/DL
HGB BLD-MCNC: 14.5 G/DL (ref 13.2–17.1)
LDLC SERPL CALC-MCNC: 95 MG/DL (CALC)
LYMPHOCYTES # BLD AUTO: 1761 CELLS/UL (ref 850–3900)
LYMPHOCYTES NFR BLD AUTO: 28.4 %
MCH RBC QN AUTO: 28.2 PG (ref 27–33)
MCHC RBC AUTO-ENTMCNC: 33.5 G/DL (ref 32–36)
MCV RBC AUTO: 84.1 FL (ref 80–100)
MONOCYTES # BLD AUTO: 564 CELLS/UL (ref 200–950)
MONOCYTES NFR BLD AUTO: 9.1 %
NEUTROPHILS # BLD AUTO: 3088 CELLS/UL (ref 1500–7800)
NEUTROPHILS NFR BLD AUTO: 49.8 %
NONHDLC SERPL-MCNC: 115 MG/DL (CALC)
PLATELET # BLD AUTO: 227 THOUSAND/UL (ref 140–400)
PMV BLD REES-ECKER: 11.1 FL (ref 7.5–12.5)
POTASSIUM SERPL-SCNC: 4.3 MMOL/L (ref 3.5–5.3)
PROT SERPL-MCNC: 6.7 G/DL (ref 6.1–8.1)
RBC # BLD AUTO: 5.15 MILLION/UL (ref 4.2–5.8)
SODIUM SERPL-SCNC: 142 MMOL/L (ref 135–146)
TRIGL SERPL-MCNC: 107 MG/DL
TSH SERPL-ACNC: 1.12 MIU/L (ref 0.4–4.5)
WBC # BLD AUTO: 6.2 THOUSAND/UL (ref 3.8–10.8)

## 2023-12-04 ENCOUNTER — APPOINTMENT (OUTPATIENT)
Dept: RADIOLOGY | Facility: MEDICAL CENTER | Age: 78
End: 2023-12-04
Payer: COMMERCIAL

## 2023-12-04 DIAGNOSIS — L40.0 PSORIASIS VULGARIS: ICD-10-CM

## 2023-12-04 PROCEDURE — 71046 X-RAY EXAM CHEST 2 VIEWS: CPT

## 2023-12-07 DIAGNOSIS — L23.9 ALLERGIC DERMATITIS: ICD-10-CM

## 2023-12-07 DIAGNOSIS — L40.9 PSORIASIS: ICD-10-CM

## 2023-12-07 RX ORDER — HYDROXYZINE HYDROCHLORIDE 25 MG/1
25 TABLET, FILM COATED ORAL EVERY 8 HOURS PRN
Qty: 60 TABLET | Refills: 0 | Status: SHIPPED | OUTPATIENT
Start: 2023-12-07

## 2023-12-27 DIAGNOSIS — I10 HYPERTENSION, UNSPECIFIED TYPE: ICD-10-CM

## 2023-12-27 RX ORDER — AMLODIPINE BESYLATE 10 MG/1
10 TABLET ORAL DAILY
Qty: 90 TABLET | Refills: 0 | Status: SHIPPED | OUTPATIENT
Start: 2023-12-27

## 2023-12-28 DIAGNOSIS — N40.1 BENIGN PROSTATIC HYPERPLASIA WITH INCOMPLETE BLADDER EMPTYING: ICD-10-CM

## 2023-12-28 DIAGNOSIS — R39.14 BENIGN PROSTATIC HYPERPLASIA WITH INCOMPLETE BLADDER EMPTYING: ICD-10-CM

## 2023-12-28 RX ORDER — FINASTERIDE 5 MG/1
5 TABLET, FILM COATED ORAL DAILY
Qty: 30 TABLET | Refills: 0 | Status: SHIPPED | OUTPATIENT
Start: 2023-12-28

## 2023-12-28 RX ORDER — TAMSULOSIN HYDROCHLORIDE 0.4 MG/1
CAPSULE ORAL
Qty: 30 CAPSULE | Refills: 0 | Status: SHIPPED | OUTPATIENT
Start: 2023-12-28

## 2023-12-28 NOTE — TELEPHONE ENCOUNTER
LOV: 08.09.2022  Patient needs an appointment. Please contact the patient to schedule an appointment. [employer] refill provided.

## 2024-01-05 PROBLEM — A08.4 VIRAL GASTROENTERITIS: Status: RESOLVED | Noted: 2023-11-06 | Resolved: 2024-01-05

## 2024-01-26 DIAGNOSIS — E78.2 MIXED HYPERLIPIDEMIA: ICD-10-CM

## 2024-01-26 DIAGNOSIS — R39.14 BENIGN PROSTATIC HYPERPLASIA WITH INCOMPLETE BLADDER EMPTYING: ICD-10-CM

## 2024-01-26 DIAGNOSIS — N40.1 BENIGN PROSTATIC HYPERPLASIA WITH INCOMPLETE BLADDER EMPTYING: ICD-10-CM

## 2024-01-26 DIAGNOSIS — I10 HYPERTENSION, UNSPECIFIED TYPE: ICD-10-CM

## 2024-01-26 DIAGNOSIS — R73.9 HYPERGLYCEMIA: ICD-10-CM

## 2024-01-26 RX ORDER — LISINOPRIL AND HYDROCHLOROTHIAZIDE 20; 12.5 MG/1; MG/1
1 TABLET ORAL DAILY
Qty: 90 TABLET | Refills: 1 | Status: SHIPPED | OUTPATIENT
Start: 2024-01-26

## 2024-01-26 RX ORDER — FINASTERIDE 5 MG/1
5 TABLET, FILM COATED ORAL DAILY
Qty: 30 TABLET | Refills: 0 | Status: SHIPPED | OUTPATIENT
Start: 2024-01-26

## 2024-02-09 DIAGNOSIS — N40.1 BENIGN PROSTATIC HYPERPLASIA WITH INCOMPLETE BLADDER EMPTYING: ICD-10-CM

## 2024-02-09 DIAGNOSIS — R39.14 BENIGN PROSTATIC HYPERPLASIA WITH INCOMPLETE BLADDER EMPTYING: ICD-10-CM

## 2024-02-09 RX ORDER — FINASTERIDE 5 MG/1
5 TABLET, FILM COATED ORAL DAILY
Qty: 90 TABLET | Refills: 3 | Status: SHIPPED | OUTPATIENT
Start: 2024-02-09

## 2024-02-10 DIAGNOSIS — R39.14 BENIGN PROSTATIC HYPERPLASIA WITH INCOMPLETE BLADDER EMPTYING: ICD-10-CM

## 2024-02-10 DIAGNOSIS — N40.1 BENIGN PROSTATIC HYPERPLASIA WITH INCOMPLETE BLADDER EMPTYING: ICD-10-CM

## 2024-02-12 DIAGNOSIS — E78.5 HYPERLIPIDEMIA, UNSPECIFIED HYPERLIPIDEMIA TYPE: ICD-10-CM

## 2024-02-12 RX ORDER — TAMSULOSIN HYDROCHLORIDE 0.4 MG/1
0.4 CAPSULE ORAL
Qty: 90 CAPSULE | Refills: 3 | Status: SHIPPED | OUTPATIENT
Start: 2024-02-12

## 2024-02-12 RX ORDER — ATORVASTATIN CALCIUM 20 MG/1
20 TABLET, FILM COATED ORAL DAILY
Qty: 90 TABLET | Refills: 1 | Status: SHIPPED | OUTPATIENT
Start: 2024-02-12

## 2024-02-16 ENCOUNTER — OFFICE VISIT (OUTPATIENT)
Dept: FAMILY MEDICINE CLINIC | Facility: CLINIC | Age: 79
End: 2024-02-16
Payer: COMMERCIAL

## 2024-02-16 VITALS
WEIGHT: 199.4 LBS | OXYGEN SATURATION: 95 % | HEIGHT: 64 IN | TEMPERATURE: 98.3 F | HEART RATE: 73 BPM | DIASTOLIC BLOOD PRESSURE: 76 MMHG | SYSTOLIC BLOOD PRESSURE: 128 MMHG | BODY MASS INDEX: 34.04 KG/M2

## 2024-02-16 DIAGNOSIS — F32.4 MAJOR DEPRESSIVE DISORDER WITH SINGLE EPISODE, IN PARTIAL REMISSION (HCC): ICD-10-CM

## 2024-02-16 DIAGNOSIS — J01.10 ACUTE NON-RECURRENT FRONTAL SINUSITIS: Primary | ICD-10-CM

## 2024-02-16 DIAGNOSIS — N18.31 STAGE 3A CHRONIC KIDNEY DISEASE (HCC): ICD-10-CM

## 2024-02-16 PROCEDURE — 99214 OFFICE O/P EST MOD 30 MIN: CPT | Performed by: INTERNAL MEDICINE

## 2024-02-16 RX ORDER — DOXYCYCLINE HYCLATE 100 MG/1
100 CAPSULE ORAL EVERY 12 HOURS SCHEDULED
Qty: 14 CAPSULE | Refills: 0 | Status: SHIPPED | OUTPATIENT
Start: 2024-02-16 | End: 2024-02-23

## 2024-02-19 DIAGNOSIS — F41.9 ANXIETY: ICD-10-CM

## 2024-02-19 PROBLEM — J01.10 ACUTE NON-RECURRENT FRONTAL SINUSITIS: Status: ACTIVE | Noted: 2024-02-19

## 2024-02-19 NOTE — ASSESSMENT & PLAN NOTE
Lab Results   Component Value Date    EGFR 73 11/21/2023    EGFR 66 11/05/2023    EGFR 61 11/04/2023    CREATININE 1.04 11/21/2023    CREATININE 1.05 11/05/2023    CREATININE 1.13 11/04/2023   Stable.  Avoid NSAIDs.

## 2024-02-19 NOTE — PROGRESS NOTES
"Assessment/Plan:    CKD (chronic kidney disease) stage 3, GFR 30-59 ml/min (Formerly [facility])  Lab Results   Component Value Date    EGFR 73 11/21/2023    EGFR 66 11/05/2023    EGFR 61 11/04/2023    CREATININE 1.04 11/21/2023    CREATININE 1.05 11/05/2023    CREATININE 1.13 11/04/2023   Stable.  Avoid NSAIDs.    Major depressive disorder with single episode, in partial remission ([facility])  Mood is stable.  Continue current meds.    Acute non-recurrent frontal sinusitis  Due to prolonged symptoms are secondary bacterial infection.  Will start him on doxycycline.  Side effects discussed.  Update if no improvement within the next 3 to 5 days.       Diagnoses and all orders for this visit:    Acute non-recurrent frontal sinusitis  -     doxycycline hyclate (VIBRAMYCIN) 100 mg capsule; Take 1 capsule (100 mg total) by mouth every 12 (twelve) hours for 7 days    Major depressive disorder with single episode, in partial remission (HCC)    Stage 3a chronic kidney disease (Formerly [facility])          Subjective:      Patient ID: Matty Mcintyre is a 78 y.o. male.    Headache      The following portions of the patient's history were reviewed and updated as appropriate: allergies, current medications, past family history, past medical history, past social history, past surgical history, and problem list.    Review of Systems   Constitutional:  Negative for appetite change, chills, fatigue and fever.   HENT:  Positive for rhinorrhea, sinus pain and sore throat.    Respiratory:  Negative for cough, chest tightness and shortness of breath.    Cardiovascular:  Negative for chest pain.   Gastrointestinal:  Negative for diarrhea, nausea and vomiting.   Musculoskeletal:  Negative for arthralgias and myalgias.   Neurological:  Positive for headaches.         Objective:      /76 (BP Location: Left arm, Patient Position: Sitting, Cuff Size: Large)   Pulse 73   Temp 98.3 °F (36.8 °C) (Tympanic)   Ht 5' 4\" (1.626 m)   Wt 90.4 kg (199 lb 6.4 oz)   SpO2 95%   " BMI 34.23 kg/m²     Allergies   Allergen Reactions    Amoxicillin Rash    Amoxicillin-Pot Clavulanate Er  [Amoxicillin-Pot Clavulanate] Hives and Rash    Codeine Rash and Shortness Of Breath    Penicillin G Rash    Albumen, Egg - Food Allergy     Other      pork    Pork-Derived Products - Food Allergy Hives    Prochlorperazine Other (See Comments) and Itching     rash all over the body    Compazine  [Prochlorperazine Edisylate] Rash    Iodinated Contrast Media Rash    Latex Rash and Itching    Valdecoxib Itching, Rash and Headache     Category: HEADACHES;           Current Outpatient Medications:     acetaminophen (TYLENOL) 325 mg tablet, Take 2 tablets (650 mg total) by mouth every 6 (six) hours as needed for mild pain or fever, Disp: 30 tablet, Rfl: 3    albuterol (PROVENTIL HFA,VENTOLIN HFA) 90 mcg/act inhaler, Inhale 1 puff every 6 (six) hours as needed (Q6H PRN), Disp: 54 g, Rfl: 5    ALPRAZolam (XANAX) 0.5 mg tablet, Take 1 tablet up to twice daily as needed., Disp: 60 tablet, Rfl: 0    amLODIPine (NORVASC) 10 mg tablet, Take 1 tablet (10 mg total) by mouth daily, Disp: 90 tablet, Rfl: 0    Ascorbic Acid (vitamin C) 100 MG tablet, Take 100 mg by mouth daily, Disp: , Rfl:     atorvastatin (LIPITOR) 20 mg tablet, Take 1 tablet by mouth once daily, Disp: 90 tablet, Rfl: 1    betamethasone, augmented, (DIPROLENE) 0.05 % lotion, APPLY TO SCALP ONCE DAILY FOR 2 WEEKS AS NEEDED FOR ITCHING, Disp: , Rfl:     Carboxymethylcellul-Glycerin (REFRESH RELIEVA OP), Apply to eye, Disp: , Rfl:     citalopram (CeleXA) 20 mg tablet, Take 1 tablet (20 mg total) by mouth daily, Disp: 90 tablet, Rfl: 0    dextromethorphan-guaifenesin (MUCINEX DM)  MG per 12 hr tablet, Take 1 tablet by mouth every 12 (twelve) hours, Disp: 60 tablet, Rfl: 0    diclofenac sodium (VOLTAREN) 1 %, Apply 2 g topically 2 (two) times a day, Disp: , Rfl:     diphenhydrAMINE (BENADRYL) 12.5 mg/5 mL oral liquid, Take by mouth 3 (three) times a day as  needed for allergies, Disp: , Rfl:     doxycycline hyclate (VIBRAMYCIN) 100 mg capsule, Take 1 capsule (100 mg total) by mouth every 12 (twelve) hours for 7 days, Disp: 14 capsule, Rfl: 0    finasteride (PROSCAR) 5 mg tablet, Take 1 tablet (5 mg total) by mouth daily, Disp: 90 tablet, Rfl: 3    fluocinolone (SYNALAR) 0.01 % external solution, APPLY TOPICALLY TO SCALP ONCE A DAY AS NEEDED FOR PSORIASIS, Disp: , Rfl:     gabapentin (NEURONTIN) 300 mg capsule, Take 1 capsule (300 mg total) by mouth 3 (three) times a day, Disp: 90 capsule, Rfl: 0    hydrocortisone valerate (WEST-VITO) 0.2 % ointment, APPLY  OINTMENT EXTERNALLY DAILY, Disp: 45 g, Rfl: 0    hydrOXYzine HCL (ATARAX) 25 mg tablet, Take 1 tablet (25 mg total) by mouth every 8 (eight) hours as needed for itching, Disp: 60 tablet, Rfl: 0    lisinopril-hydrochlorothiazide (PRINZIDE,ZESTORETIC) 20-12.5 MG per tablet, Take 1 tablet by mouth once daily, Disp: 90 tablet, Rfl: 1    omeprazole (PriLOSEC) 20 mg delayed release capsule, Take 1 capsule (20 mg total) by mouth daily as needed (heartburn), Disp: 90 capsule, Rfl: 1    pantoprazole (PROTONIX) 40 mg tablet, Take 1 tablet (40 mg total) by mouth daily, Disp: 30 tablet, Rfl: 0    Risankizumab-rzaa (SKYRIZI PEN SC), Inject under the skin Q 4 weeks via Dermatology, Disp: , Rfl:     simethicone (MYLICON,GAS-X) 180 MG capsule, Take 1 capsule (180 mg total) by mouth every 6 (six) hours as needed for flatulence, Disp: 60 capsule, Rfl: 0    tacrolimus (PROTOPIC) 0.1 % ointment, APPLY TO EARS TWICE A DAY WHEN NEEDED, Disp: , Rfl:     tamsulosin (FLOMAX) 0.4 mg, Take 1 capsule (0.4 mg total) by mouth daily with dinner, Disp: 90 capsule, Rfl: 3    triamcinolone (KENALOG) 0.1 % cream, Apply up to 3 times daily on itchy skin lesions as needed., Disp: 45 g, Rfl: 5    vitamin B-12 (VITAMIN B-12) 1,000 mcg tablet, Take 1,000 mcg by mouth daily  , Disp: , Rfl:     VITAMIN D, ERGOCALCIFEROL, PO, Take 2,000 Units by mouth, Disp:  , Rfl:     Vitamins-Lipotropics (LIPO FLAVONOID PLUS PO), Take by mouth, Disp: , Rfl:      There are no Patient Instructions on file for this visit.        Physical Exam  Constitutional:       General: He is not in acute distress.     Appearance: He is not ill-appearing or toxic-appearing.   HENT:      Nose: Congestion and rhinorrhea present.      Mouth/Throat:      Pharynx: No oropharyngeal exudate or posterior oropharyngeal erythema.

## 2024-02-19 NOTE — ASSESSMENT & PLAN NOTE
Due to prolonged symptoms are secondary bacterial infection.  Will start him on doxycycline.  Side effects discussed.  Update if no improvement within the next 3 to 5 days.

## 2024-02-20 RX ORDER — ALPRAZOLAM 0.5 MG/1
TABLET ORAL
Qty: 60 TABLET | Refills: 0 | Status: SHIPPED | OUTPATIENT
Start: 2024-02-20

## 2024-02-21 PROBLEM — J01.10 ACUTE NON-RECURRENT FRONTAL SINUSITIS: Status: RESOLVED | Noted: 2024-02-19 | Resolved: 2024-02-21

## 2024-03-18 DIAGNOSIS — F41.9 ANXIETY: ICD-10-CM

## 2024-03-18 DIAGNOSIS — E78.5 HYPERLIPIDEMIA, UNSPECIFIED HYPERLIPIDEMIA TYPE: ICD-10-CM

## 2024-03-18 DIAGNOSIS — R39.14 BENIGN PROSTATIC HYPERPLASIA WITH INCOMPLETE BLADDER EMPTYING: ICD-10-CM

## 2024-03-18 DIAGNOSIS — N40.1 BENIGN PROSTATIC HYPERPLASIA WITH INCOMPLETE BLADDER EMPTYING: ICD-10-CM

## 2024-03-18 RX ORDER — FINASTERIDE 5 MG/1
5 TABLET, FILM COATED ORAL DAILY
Qty: 90 TABLET | Refills: 1 | Status: SHIPPED | OUTPATIENT
Start: 2024-03-18

## 2024-03-18 NOTE — TELEPHONE ENCOUNTER
Patient called and added Citalopram to Atorvastatin refill request.  Reason for call:   [x] Refill   [] Prior Auth  [] Other:     Office:   [x] PCP/Provider - Aaron Fuller Jr., MD   [] Specialty/Provider -     Medication:   citalopram (CeleXA) 20 mg tablet   atorvastatin (LIPITOR) 20 mg tablet     Dose/Frequency:     20 mg, Oral, Daily       20 mg, Oral, Daily       Quantity:   90  90    Pharmacy: [address]     Does the patient have enough for 3 days?   [x] Yes   [] No - Send as HP to POD

## 2024-03-19 RX ORDER — CITALOPRAM 20 MG/1
20 TABLET ORAL DAILY
Qty: 90 TABLET | Refills: 1 | Status: SHIPPED | OUTPATIENT
Start: 2024-03-19

## 2024-03-19 RX ORDER — ATORVASTATIN CALCIUM 20 MG/1
20 TABLET, FILM COATED ORAL DAILY
Qty: 90 TABLET | Refills: 1 | Status: SHIPPED | OUTPATIENT
Start: 2024-03-19

## 2024-03-21 DIAGNOSIS — I10 HYPERTENSION, UNSPECIFIED TYPE: ICD-10-CM

## 2024-03-21 RX ORDER — AMLODIPINE BESYLATE 10 MG/1
10 TABLET ORAL DAILY
Qty: 90 TABLET | Refills: 0 | Status: SHIPPED | OUTPATIENT
Start: 2024-03-21

## 2024-04-19 ENCOUNTER — OFFICE VISIT (OUTPATIENT)
Dept: FAMILY MEDICINE CLINIC | Facility: CLINIC | Age: 79
End: 2024-04-19
Payer: COMMERCIAL

## 2024-04-19 VITALS
OXYGEN SATURATION: 96 % | BODY MASS INDEX: 33.53 KG/M2 | HEIGHT: 64 IN | HEART RATE: 84 BPM | RESPIRATION RATE: 16 BRPM | WEIGHT: 196.4 LBS | TEMPERATURE: 97.3 F | SYSTOLIC BLOOD PRESSURE: 118 MMHG | DIASTOLIC BLOOD PRESSURE: 60 MMHG

## 2024-04-19 DIAGNOSIS — K21.9 GASTROESOPHAGEAL REFLUX DISEASE WITHOUT ESOPHAGITIS: ICD-10-CM

## 2024-04-19 DIAGNOSIS — R73.9 HYPERGLYCEMIA: ICD-10-CM

## 2024-04-19 DIAGNOSIS — G25.0 ESSENTIAL TREMOR: ICD-10-CM

## 2024-04-19 DIAGNOSIS — B35.6 TINEA CRURIS: ICD-10-CM

## 2024-04-19 DIAGNOSIS — G89.29 CHRONIC LLQ PAIN: ICD-10-CM

## 2024-04-19 DIAGNOSIS — R10.32 CHRONIC LLQ PAIN: ICD-10-CM

## 2024-04-19 DIAGNOSIS — R73.03 PREDIABETES: ICD-10-CM

## 2024-04-19 DIAGNOSIS — L40.9 PSORIASIS: ICD-10-CM

## 2024-04-19 DIAGNOSIS — G43.009 MIGRAINE WITHOUT AURA AND WITHOUT STATUS MIGRAINOSUS, NOT INTRACTABLE: ICD-10-CM

## 2024-04-19 DIAGNOSIS — R10.84 DIFFUSE ABDOMINAL PAIN: ICD-10-CM

## 2024-04-19 DIAGNOSIS — F41.9 ANXIETY: ICD-10-CM

## 2024-04-19 DIAGNOSIS — M48.062 SPINAL STENOSIS OF LUMBAR REGION WITH NEUROGENIC CLAUDICATION: ICD-10-CM

## 2024-04-19 DIAGNOSIS — I10 ESSENTIAL HYPERTENSION: ICD-10-CM

## 2024-04-19 DIAGNOSIS — E78.2 MIXED HYPERLIPIDEMIA: ICD-10-CM

## 2024-04-19 DIAGNOSIS — R10.9 ABDOMINAL PAIN: ICD-10-CM

## 2024-04-19 DIAGNOSIS — F32.4 MAJOR DEPRESSIVE DISORDER WITH SINGLE EPISODE, IN PARTIAL REMISSION (HCC): ICD-10-CM

## 2024-04-19 DIAGNOSIS — Z00.00 MEDICARE ANNUAL WELLNESS VISIT, SUBSEQUENT: Primary | ICD-10-CM

## 2024-04-19 DIAGNOSIS — M54.16 LUMBAR RADICULOPATHY: ICD-10-CM

## 2024-04-19 DIAGNOSIS — M19.90 INFLAMMATORY ARTHROPATHY: ICD-10-CM

## 2024-04-19 DIAGNOSIS — J32.4 CHRONIC PANSINUSITIS: ICD-10-CM

## 2024-04-19 PROCEDURE — G0439 PPPS, SUBSEQ VISIT: HCPCS | Performed by: FAMILY MEDICINE

## 2024-04-19 PROCEDURE — 99214 OFFICE O/P EST MOD 30 MIN: CPT | Performed by: FAMILY MEDICINE

## 2024-04-19 RX ORDER — CITALOPRAM 40 MG/1
20 TABLET ORAL DAILY
Qty: 90 TABLET | Refills: 3 | Status: SHIPPED | OUTPATIENT
Start: 2024-04-19

## 2024-04-19 RX ORDER — NYSTATIN 100000 U/G
CREAM TOPICAL 2 TIMES DAILY
Qty: 30 G | Refills: 5 | Status: SHIPPED | OUTPATIENT
Start: 2024-04-19

## 2024-04-19 RX ORDER — PANTOPRAZOLE SODIUM 40 MG/1
40 TABLET, DELAYED RELEASE ORAL AS NEEDED
Status: SHIPPED
Start: 2024-04-19 | End: 2024-04-19 | Stop reason: SDUPTHER

## 2024-04-19 RX ORDER — HYDROCORTISONE VALERATE 2 MG/G
OINTMENT TOPICAL
Qty: 45 G | Refills: 1 | Status: SHIPPED | OUTPATIENT
Start: 2024-04-19

## 2024-04-19 RX ORDER — PANTOPRAZOLE SODIUM 40 MG/1
40 TABLET, DELAYED RELEASE ORAL DAILY
Qty: 90 TABLET | Refills: 3 | Status: SHIPPED | OUTPATIENT
Start: 2024-04-19

## 2024-04-19 RX ORDER — METHYLPREDNISOLONE 4 MG/1
TABLET ORAL
Qty: 21 TABLET | Refills: 0 | Status: SHIPPED | OUTPATIENT
Start: 2024-04-19

## 2024-04-19 NOTE — ASSESSMENT & PLAN NOTE
He is having symptoms concerning for lumbar spinal stenosis and I am going to repeat an MRI of his lumbar spine and have him see pain management.

## 2024-04-19 NOTE — ASSESSMENT & PLAN NOTE
Unfortunate, his mood is gotten worse.  They recently lost his sister-in-law.  He also has multiple medical issues that contribute.  We decided to bump up his citalopram to 40 mg daily today.  I will see him back in 3 to 4 months.  Hopefully this will also help with his headaches.

## 2024-04-19 NOTE — ASSESSMENT & PLAN NOTE
He appears to have some tinea cruris of his rectal area today.  I placed him on nystatin twice daily.

## 2024-04-19 NOTE — PATIENT INSTRUCTIONS
Depression   AMBULATORY CARE:   Depression  is a mood disorder that causes feelings of sadness or hopelessness that do not go away. Depression may cause you to lose interest in things you used to enjoy. These feelings may interfere with your daily life.  Common signs and symptoms:   Appetite changes, or weight gain or loss    Trouble falling or staying asleep, or sleeping too much    Fatigue (being mentally and physically tired) or lack of energy    Feeling restless, irritable, or withdrawn    Feeling worthless, hopeless, discouraged, or guilty    Trouble concentrating, remembering things, doing daily tasks, or making decisions    Thoughts about hurting or killing yourself    Call your local emergency number (911 in the [place]) if:   You think about hurting yourself or someone else.    You have done something on purpose to hurt yourself.    Call your therapist or doctor if:   Your symptoms get worse or do not get better with treatment.    Your depression keeps you from doing your regular daily activities.    You have new symptoms since your last visit.    You have questions or concerns about your condition or care.    The following resources are available at any time to help you, if needed:   Contact a suicide prevention organization:        For the [employer] Suicide and Crisis Lifeline:     Call or text [employer]     Send a chat on [email or web address]     Call 0-041-844-6524 (1-800-273-TALK)    For the Suicide Hotline, call 3-819-929-1375 (9-069-ZLQQLFM)    For a list of international numbers: https://save.org/find-help/international-resources/  Treatment for depression  depends on how severe your symptoms are. You may need any of the following:  Cognitive behavioral therapy (CBT)  teaches you how to identify and change negative thought patterns.    Antidepressant medicine  may be given to decrease or manage symptoms. You may need to take this medicine for several weeks before they start working.    Self-care:   Talk to  someone about your depression.  Your healthcare provider may suggest counseling. You might feel more comfortable talking with a friend or family member about your depression. Choose someone you know will be supportive and encouraging.    Get regular physical activity.  Physical activity can lower your stress, improve your mood, and help you sleep better. Work with your healthcare provider to develop a plan that you enjoy.         Create a regular sleep schedule.  A routine can help you relax before bed. Listen to music, read, or do yoga. Try to go to bed and wake up at the same time every day. Sleep is important for emotional health.    Eat a variety of healthy foods.  Healthy foods include fruits, vegetables, whole-grain breads, low-fat dairy products, lean meats, fish, and cooked beans. A healthy meal plan is low in fat, salt, and added sugar.         Do not use alcohol, drugs, or nicotine products.  These can worsen depression or make it hard to manage. Talk to your therapist or healthcare provider if you use any of these products and need help to quit.    Follow up with your therapist or doctor as directed:  Your healthcare provider will monitor your progress at follow-up visits. Your provider will also monitor your medicine if you take antidepressants and ask if the medicine is helping. Tell your provider about any side effects or problems you have with your medicine. The type or amount of medicine may need to be changed. Write down your questions so you remember to ask them during your visits.  For more information or support:   [employer] [place] [employer]  [address]  [place] [address]  Phone: 7- 832 - 730-5783  Phone: 4- 112 - 151-4091  Web Address: http://www.k[employer].org  [address] [facility]  [address]  [place] [address]  Phone: 1- 723 - 455  Web Address: http://www.suicidepreventionlifeline.org OR https://[employer].org/chat/    © Copyright [employer] 2023  Information is for End User's use only and may not be sold, redistributed or otherwise used for commercial purposes.  The above information is an  only. It is not intended as medical advice for individual conditions or treatments. Talk to your doctor, nurse or pharmacist before following any medical regimen to see if it is safe and effective for you.

## 2024-04-19 NOTE — ASSESSMENT & PLAN NOTE
He is having some epigastric discomfort as well as left lower quadrant pain.  Check CT abdomen.  I also encouraged him to start taking his pantoprazole every day about 20 minutes prior to breakfast.

## 2024-04-19 NOTE — ASSESSMENT & PLAN NOTE
His tremor does seem mildly progressive.  I explained that essential tremor will progress with age.  We could always initiate something such as primidone but the tremor does not look too severe today and I would vote to hold off on that for now in light of his multiple other medications.

## 2024-04-19 NOTE — PROGRESS NOTES
Assessment and Plan:     Problem List Items Addressed This Visit        Cardiovascular and Mediastinum    Essential hypertension    Relevant Orders    Lipid Panel with Direct LDL reflex    Migraine    Relevant Medications    citalopram (CeleXA) 40 mg tablet       Respiratory    Chronic sinusitis     Continue follow-up with ENT.            Digestive    Acid reflux     He is having some epigastric discomfort as well as left lower quadrant pain.  Check CT abdomen.  I also encouraged him to start taking his pantoprazole every day about 20 minutes prior to breakfast.         Relevant Medications    pantoprazole (PROTONIX) 40 mg tablet       Nervous and Auditory    Lumbar radiculopathy      going to have him see our pain management team for ongoing chronic low back issues.         Relevant Medications    methylPREDNISolone 4 MG tablet therapy pack    Other Relevant Orders    MRI lumbar spine wo contrast    Ambulatory referral to Spine & Pain Management    Essential tremor     His tremor does seem mildly progressive.  I explained that essential tremor will progress with age.  We could always initiate something such as primidone but the tremor does not look too severe today and I would vote to hold off on that for now in light of his multiple other medications.         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  Check labs prior to follow-up.         Tinea cruris     He appears to have some tinea cruris of his rectal area today.  I placed him on nystatin twice daily.         Relevant  Medications    hydrocortisone valerate (WEST-VITO) 0.2 % ointment    nystatin (MYCOSTATIN) cream       Behavioral Health    Major depressive disorder with single episode, in partial remission (HCC)     Unfortunate, his mood is gotten worse.  They recently lost his sister-in-law.  He also has multiple medical issues that contribute.  We decided to bump up his citalopram to 40 mg daily today.  I will see him back in 3 to 4 months.  Hopefully this will also help with his headaches.         Relevant Medications    citalopram (CeleXA) 40 mg tablet    Other Relevant Orders    CBC and differential    TSH, 3rd generation with Free T4 reflex    Anxiety    Relevant Medications    citalopram (CeleXA) 40 mg tablet    Other Relevant Orders    CBC and differential       Surgery/Wound/Pain    Spinal stenosis of lumbar region with neurogenic claudication     He is having symptoms concerning for lumbar spinal stenosis and I am going to repeat an MRI of his lumbar spine and have him see pain management.         Relevant Medications    methylPREDNISolone 4 MG tablet therapy pack       Other    Prediabetes     Check A1c.         Hyperlipidemia    Relevant Orders    Lipid Panel with Direct LDL reflex   Other Visit Diagnoses     Abdominal pain        Relevant Medications    pantoprazole (PROTONIX) 40 mg tablet    Other Relevant Orders    TSH, 3rd generation with Free T4 reflex    Chronic LLQ pain        Relevant Orders    CT abdomen pelvis w contrast    CBC and differential    Comprehensive metabolic panel    Diffuse abdominal pain        Relevant Orders    CT abdomen pelvis w contrast    CBC and differential    Comprehensive metabolic panel    Hyperglycemia        Relevant Orders    Hemoglobin A1C          Depression Screening and Follow-up Plan: Patient's depression screening was positive with a PHQ-9 score of 13. Patient assessed for underlying major depression. Brief counseling provided and recommend additional  follow-up/re-evaluation next office visit.       Preventive health issues were discussed with patient, and age appropriate screening tests were ordered as noted in patient's After Visit Summary.  Personalized health advice and appropriate referrals for health education or preventive services given if needed, as noted in patient's After Visit Summary.     History of Present Illness:     Patient presents for a Medicare Wellness Visit    Patient presents today for follow-up of chronic health issues as well as Medicare wellness visit.  Unfortunate, he is feeling quite poorly.  His low back pain is much worse.  He is having difficulty even ambulating or even riding his bike without pain shooting down bilateral legs.  He has some chronic global headaches which seems much worse especially has been experiencing some increased stress with the recent loss of his sister-in-law.  Depression has gotten worse as well.  He has a lot of diffuse abdominal pain and epigastric pain.  He also points to his left lower quadrant as well.  He denies any nausea or vomiting.       Patient Care Team:  Aaron Fuller Jr., MD as PCP - General  Aaron Fuller Jr., MD as PCP - PCP-[facility]  MD Barry Rosenthal DO Kathleen McFarland, PA-C Sean Lacey, MD (Gastroenterology)     Review of Systems:     Review of Systems   Constitutional:  Positive for fatigue. Negative for appetite change, chills, fever and unexpected weight change.   HENT:  Negative for trouble swallowing.    Eyes:  Negative for visual disturbance.   Respiratory:  Negative for cough, chest tightness, shortness of breath and wheezing.    Cardiovascular:  Negative for chest pain, palpitations and leg swelling.   Gastrointestinal:  Positive for abdominal distention, abdominal pain and constipation. Negative for blood in stool, diarrhea and nausea.   Endocrine: Negative for polyuria.   Genitourinary:  Negative for difficulty  urinating and flank pain.   Musculoskeletal:  Positive for arthralgias and back pain. Negative for myalgias.   Skin:  Negative for rash.   Neurological:  Negative for dizziness and light-headedness.   Hematological:  Negative for adenopathy. Does not bruise/bleed easily.   Psychiatric/Behavioral:  Positive for dysphoric mood. Negative for sleep disturbance and suicidal ideas. The patient is not nervous/anxious.         Problem List:     Patient Active Problem List   Diagnosis   • Acid reflux   • Asthma   • Bilateral hearing loss   • BPH associated with nocturia   • Cervical spinal stenosis   • Fibromyalgia   • Chronic sinusitis   • Major depressive disorder with single episode, in partial remission ([facility])   • Prediabetes   • Hyperlipidemia   • Essential hypertension   • Incomplete emptying of bladder   • Lumbar radiculopathy   • Spinal stenosis of lumbar region with neurogenic claudication   • Migraine   • Peripheral neuropathy   • Psoriasis   • Rotator cuff tendinitis   • Seborrheic dermatitis of scalp   • TB lung, latent   • Thoracic degenerative disc disease   • Tinnitus   • Venous insufficiency   • Primary localized osteoarthrosis of ankle and foot   • Tarsal tunnel syndrome   • Plantar fascial fibromatosis   • Anxiety   • Insomnia   • CKD (chronic kidney disease) stage 3, GFR 30-59 ml/min ([facility])   • Osteoarthritis of multiple joints   • Trochanteric bursitis of left hip   • Perforation of right tympanic membrane w remote surgery both ears.   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 -  does have Psoriasis   • Chronic cough   • Lipoma of torso/right lateral flank   • Elevated PSA   • Abnormal EKG   • Essential tremor   • Non-seasonal allergic rhinitis   • Eosinophilia   • Generalized abdominal pain   • Tinea cruris      Past Medical and Surgical History:     Past Medical History:   Diagnosis Date   • Anxiety and depression    • Arthritis    • Asthma    • Back pain, chronic     Last assessed:  3/11/15   • BPH (benign prostatic hyperplasia)    • Cataract     Last assessed: 14   • Costochondral chest pain 2021   • Double vision 04/15/2022   • GERD (gastroesophageal reflux disease)    • Hyperlipidemia    • Hypertension    • Neuropathy    • Osteoporosis    • Panic disorder    • Right-sided Bell's palsy     Last assessed: 3/10/14   • Seasonal allergies    • Testicular hypogonadism 2012     Past Surgical History:   Procedure Laterality Date   • CARPAL TUNNEL RELEASE Right     Neuroplasty Decompression Median Nerve at Carpal Tunnel   • CATARACT EXTRACTION     • COLONOSCOPY  2015    2 polyps, diverticulosis by Dr. Brenner   • CYSTOSCOPY  2021   • EAR SURGERY       and , ear drum,  per [employer]   • ESOPHAGOGASTRODUODENOSCOPY  2015    2 cm sliding hiatal hernia, grade 1-2 esophagitis, duodenitis by Dr. Brenner   • FOOT SURGERY Left     Toe   • MASTOIDECTOMY Left    • OTHER SURGICAL HISTORY      Spinal Anesthesia Epidural, x3 , per [employer]   • POLYPECTOMY     • TYMPANOPLASTY Bilateral     Dr Nichols      Family History:     Family History   Problem Relation Age of Onset   • Cataracts Mother    • Hyperlipidemia Mother    • Colon polyps Father       Social History:     Social History     Socioeconomic History   • Marital status: /Civil Union     Spouse name: None   • Number of children: 3   • Years of education: None   • Highest education level: None   Occupational History   • Occupation:    Tobacco Use   • Smoking status: Former     Types: Cigars     Quit date:      Years since quittin.3   • Smokeless tobacco: Former   • Tobacco comments:     quit in , former cigar and cigarette, pipe smoker, per [employer], Past history of chewing tobacco use, active, per [employer]   Vaping Use   • Vaping status: Never Used   Substance and Sexual Activity   • Alcohol use: No     Comment: former drinker   • Drug use: No   • Sexual activity: Not  Currently   Other Topics Concern   • None   Social History Narrative    Functioning activity level, participates in light activities both inside and outside of the home (gardening, walking,  cycling, cooking)    High school or GED    Lives independently     Social Determinants of Health     Financial Resource Strain: High Risk (4/17/2023)    Overall Financial Resource Strain (CARDIA)    • Difficulty of Paying Living Expenses: Hard   Food Insecurity: No Food Insecurity (4/19/2024)    Hunger Vital Sign    • Worried About Running Out of Food in the Last Year: Never true    • Ran Out of Food in the Last Year: Never true   Transportation Needs: No Transportation Needs (4/19/2024)    PRAPARE - Transportation    • Lack of Transportation (Medical): No    • Lack of Transportation (Non-Medical): No   Physical Activity: Not on file   Stress: Not on file   Social Connections: Not on file   Intimate Partner Violence: Not on file   Housing Stability: Low Risk  (4/19/2024)    Housing Stability Vital Sign    • Unable to Pay for Housing in the Last Year: No    • Number of Places Lived in the Last Year: 1    • Unstable Housing in the Last Year: No      Medications and Allergies:     Current Outpatient Medications   Medication Sig Dispense Refill   • acetaminophen (TYLENOL) 325 mg tablet Take 2 tablets (650 mg total) by mouth every 6 (six) hours as needed for mild pain or fever 30 tablet 3   • albuterol (PROVENTIL HFA,VENTOLIN HFA) 90 mcg/act inhaler Inhale 1 puff every 6 (six) hours as needed (Q6H PRN) 54 g 5   • ALPRAZolam (XANAX) 0.5 mg tablet Take 1 tablet up to twice daily as needed. 60 tablet 0   • amLODIPine (NORVASC) 10 mg tablet Take 1 tablet by mouth once daily 90 tablet 0   • Ascorbic Acid (vitamin C) 100 MG tablet Take 100 mg by mouth daily     • atorvastatin (LIPITOR) 20 mg tablet Take 1 tablet (20 mg total) by mouth daily 90 tablet 1   • betamethasone, augmented, (DIPROLENE) 0.05 % lotion APPLY TO SCALP ONCE DAILY FOR 2  WEEKS AS NEEDED FOR ITCHING     • Carboxymethylcellul-Glycerin (REFRESH RELIEVA OP) Apply to eye     • citalopram (CeleXA) 40 mg tablet Take 0.5 tablets (20 mg total) by mouth daily 90 tablet 3   • diclofenac sodium (VOLTAREN) 1 % Apply 2 g topically 2 (two) times a day     • diphenhydrAMINE (BENADRYL) 12.5 mg/5 mL oral liquid Take by mouth 3 (three) times a day as needed for allergies     • finasteride (PROSCAR) 5 mg tablet Take 1 tablet (5 mg total) by mouth daily 90 tablet 1   • fluocinolone (SYNALAR) 0.01 % external solution APPLY TOPICALLY TO SCALP ONCE A DAY AS NEEDED FOR PSORIASIS     • gabapentin (NEURONTIN) 300 mg capsule Take 1 capsule (300 mg total) by mouth 3 (three) times a day 90 capsule 0   • hydrocortisone valerate (WEST-VITO) 0.2 % ointment Apply twice daily to itchy areas as needed. 45 g 1   • hydrOXYzine HCL (ATARAX) 25 mg tablet Take 1 tablet (25 mg total) by mouth every 8 (eight) hours as needed for itching 60 tablet 0   • lisinopril-hydrochlorothiazide (PRINZIDE,ZESTORETIC) 20-12.5 MG per tablet Take 1 tablet by mouth once daily 90 tablet 1   • methylPREDNISolone 4 MG tablet therapy pack Use as directed on package 21 tablet 0   • nystatin (MYCOSTATIN) cream Apply topically 2 (two) times a day Apply twice daily as needed for rectal itching. 30 g 5   • pantoprazole (PROTONIX) 40 mg tablet Take 1 tablet (40 mg total) by mouth daily 90 tablet 3   • Risankizumab-rzaa (SKYRIZI PEN SC) Inject under the skin Q 4 weeks via Dermatology     • simethicone (MYLICON,GAS-X) 180 MG capsule Take 1 capsule (180 mg total) by mouth every 6 (six) hours as needed for flatulence 60 capsule 0   • tacrolimus (PROTOPIC) 0.1 % ointment APPLY TO EARS TWICE A DAY WHEN NEEDED     • tamsulosin (FLOMAX) 0.4 mg Take 1 capsule (0.4 mg total) by mouth daily with dinner 90 capsule 3   • triamcinolone (KENALOG) 0.1 % cream Apply up to 3 times daily on itchy skin lesions as needed. 45 g 5   • vitamin B-12 (VITAMIN B-12) 1,000 mcg  tablet Take 1,000 mcg by mouth daily       • VITAMIN D, ERGOCALCIFEROL, PO Take 2,000 Units by mouth     • Vitamins-Lipotropics (LIPO FLAVONOID PLUS PO) Take by mouth     • dextromethorphan-guaifenesin (MUCINEX DM)  MG per 12 hr tablet Take 1 tablet by mouth every 12 (twelve) hours (Patient not taking: Reported on 2/29/2024) 60 tablet 0     No current facility-administered medications for this visit.     Allergies   Allergen Reactions   • Amoxicillin Rash   • Amoxicillin-Pot Clavulanate Er  [Amoxicillin-Pot Clavulanate] Hives and Rash   • Codeine Rash and Shortness Of Breath   • Penicillin G Rash   • Albumen, Egg - Food Allergy    • Other      pork   • Pork-Derived Products - Food Allergy Hives   • Prochlorperazine Other (See Comments) and Itching     rash all over the body   • Compazine  [Prochlorperazine Edisylate] Rash   • Iodinated Contrast Media Rash   • Latex Rash and Itching   • Valdecoxib Itching, Rash and Headache     Category: HEADACHES;       Immunizations:     Immunization History   Administered Date(s) Administered   • COVID-19 PFIZER VACCINE 0.3 ML IM 02/22/2021, 03/15/2021, 10/28/2021, 05/20/2022   • Hep A, adult 05/12/2009   • INFLUENZA 10/01/2015, 09/20/2016   • Influenza Split High Dose Preservative Free IM 11/12/2012, 10/01/2015, 09/20/2016, 10/05/2017   • Influenza, high dose seasonal 0.7 mL 11/27/2018, 10/04/2019, 10/07/2020, 10/05/2021, 09/28/2022, 10/23/2023   • Influenza, seasonal, injectable 10/05/2010   • Pneumococcal Conjugate 13-Valent 07/24/2015   • Pneumococcal Polysaccharide PPV23 1943, 01/27/2020   • Tdap 05/12/2009, 10/04/2019      Health Maintenance:         Topic Date Due   • Hepatitis C Screening  Completed   • Colorectal Cancer Screening  Discontinued         Topic Date Due   • COVID-19 Vaccine (5 - 2023-24 season) 09/01/2023      Medicare Screening Tests and Risk Assessments:     Matyt is here for his Subsequent Wellness visit.     Health Risk Assessment:   Patient  rates overall health as poor. Patient feels that their physical health rating is slightly worse. Patient is dissatisfied with their life. Eyesight was rated as much worse. Hearing was rated as much worse. Patient feels that their emotional and mental health rating is slightly worse. Patients states they are sometimes angry. Patient states they are sometimes unusually tired/fatigued. Pain experienced in the last 7 days has been some. Patient's pain rating has been 8/10. Patient states that he has experienced no weight loss or gain in last 6 months.     Depression Screening:   PHQ-9 Score: 13      Fall Risk Screening:   In the past year, patient has experienced: history of falling in past year    Number of falls: 1  Injured during fall?: No    Feels unsteady when standing or walking?: Yes    Worried about falling?: Yes      Home Safety:  Patient has trouble with stairs inside or outside of their home. Patient has working smoke alarms and has working carbon monoxide detector. Home safety hazards include: none.     Nutrition:   Current diet is Other (please comment) and Limited junk food.     Medications:   Patient is currently taking over-the-counter supplements. OTC medications include: see medication list. Patient is able to manage medications.     Activities of Daily Living (ADLs)/Instrumental Activities of Daily Living (IADLs):   Walk and transfer into and out of bed and chair?: Yes  Dress and groom yourself?: Yes    Bathe or shower yourself?: Yes    Feed yourself? Yes  Do your laundry/housekeeping?: Yes  Manage your money, pay your bills and track your expenses?: Yes  Make your own meals?: Yes    Do your own shopping?: Yes    Previous Hospitalizations:   Any hospitalizations or ED visits within the last 12 months?: Yes    How many hospitalizations have you had in the last year?: 1-2    Advance Care Planning:   Living will: Yes    Durable POA for healthcare: Yes    Advanced directive: Yes    ACP document given: Yes  "   End of Life Decisions reviewed with patient: Yes      Cognitive Screening:   Provider or family/friend/caregiver concerned regarding cognition?: No    PREVENTIVE SCREENINGS      Cardiovascular Screening:    General: Screening Not Indicated and History Lipid Disorder      Diabetes Screening:     General: Screening Current      Colorectal Cancer Screening:     General: Screening Current      Prostate Cancer Screening:    General: Screening Not Indicated      Osteoporosis Screening:    General: Screening Not Indicated      Abdominal Aortic Aneurysm (AAA) Screening:    Risk factors include: tobacco use        General: Screening Current      Lung Cancer Screening:     General: Screening Not Indicated      Hepatitis C Screening:    General: Screening Current    Screening, Brief Intervention, and Referral to Treatment (SBIRT)    Screening  Typical number of drinks in a day: 0  Typical number of drinks in a week: 0  Interpretation: Low risk drinking behavior.    AUDIT-C Screenin) How often did you have a drink containing alcohol in the past year? never  2) How many drinks did you have on a typical day when you were drinking in the past year? 0  3) How often did you have 6 or more drinks on one occasion in the past year? never    AUDIT-C Score: 0  Interpretation: Score 0-3 (male): Negative screen for alcohol misuse    Single Item Drug Screening:  How often have you used an illegal drug (including marijuana) or a prescription medication for non-medical reasons in the past year? never    Single Item Drug Screen Score: 0  Interpretation: Negative screen for possible drug use disorder    Brief Intervention  Alcohol & drug use screenings were reviewed. No concerns regarding substance use disorder identified.     No results found.     Physical Exam:     /60 (BP Location: Left arm, Patient Position: Sitting, Cuff Size: Large)   Pulse 84   Temp (!) 97.3 °F (36.3 °C) (Tympanic)   Resp 16   Ht 5' 4\" (1.626 m)   Wt " 89.1 kg (196 lb 6.4 oz)   SpO2 96%   BMI 33.71 kg/m²     Physical Exam  Constitutional:       General: He is not in acute distress.     Appearance: Normal appearance. He is well-developed. He is obese. He is not diaphoretic.   HENT:      Head: Normocephalic.      Right Ear: External ear normal.      Left Ear: External ear normal.      Nose: Nose normal.   Eyes:      General:         Right eye: No discharge.         Left eye: No discharge.      Conjunctiva/sclera: Conjunctivae normal.      Pupils: Pupils are equal, round, and reactive to light.   Neck:      Thyroid: No thyromegaly.      Trachea: No tracheal deviation.   Cardiovascular:      Rate and Rhythm: Normal rate and regular rhythm.      Heart sounds: Normal heart sounds. No murmur heard.     No friction rub.   Pulmonary:      Effort: Pulmonary effort is normal. No respiratory distress.      Breath sounds: Normal breath sounds. No wheezing.   Chest:      Chest wall: No tenderness.   Abdominal:      General: There is no distension.      Palpations: There is no mass.      Tenderness: There is abdominal tenderness (Pain to palpation over the left lower quadrant as well as epigastrium today.). There is no guarding or rebound.      Hernia: No hernia is present.   Musculoskeletal:         General: No swelling or deformity.      Cervical back: Normal range of motion.      Right lower leg: No edema.      Left lower leg: No edema.   Skin:     Findings: No erythema or rash.   Neurological:      General: No focal deficit present.      Mental Status: He is alert.      Cranial Nerves: No cranial nerve deficit.      Coordination: Coordination normal.   Psychiatric:         Thought Content: Thought content normal.

## 2024-04-20 ENCOUNTER — LAB (OUTPATIENT)
Dept: LAB | Facility: MEDICAL CENTER | Age: 79
End: 2024-04-20
Payer: COMMERCIAL

## 2024-04-20 DIAGNOSIS — R73.9 HYPERGLYCEMIA: ICD-10-CM

## 2024-04-20 LAB
ALBUMIN SERPL BCP-MCNC: 4.2 G/DL (ref 3.5–5)
ALP SERPL-CCNC: 44 U/L (ref 34–104)
ALT SERPL W P-5'-P-CCNC: 28 U/L (ref 7–52)
ANION GAP SERPL CALCULATED.3IONS-SCNC: 6 MMOL/L (ref 4–13)
AST SERPL W P-5'-P-CCNC: 22 U/L (ref 13–39)
BASOPHILS # BLD AUTO: 0.05 THOUSANDS/ÂΜL (ref 0–0.1)
BASOPHILS NFR BLD AUTO: 1 % (ref 0–1)
BILIRUB SERPL-MCNC: 1.31 MG/DL (ref 0.2–1)
BUN SERPL-MCNC: 19 MG/DL (ref 5–25)
CALCIUM SERPL-MCNC: 9.5 MG/DL (ref 8.4–10.2)
CHLORIDE SERPL-SCNC: 100 MMOL/L (ref 96–108)
CHOLEST SERPL-MCNC: 140 MG/DL
CO2 SERPL-SCNC: 33 MMOL/L (ref 21–32)
CREAT SERPL-MCNC: 1.23 MG/DL (ref 0.6–1.3)
EOSINOPHIL # BLD AUTO: 0.89 THOUSAND/ÂΜL (ref 0–0.61)
EOSINOPHIL NFR BLD AUTO: 14 % (ref 0–6)
ERYTHROCYTE [DISTWIDTH] IN BLOOD BY AUTOMATED COUNT: 13.2 % (ref 11.6–15.1)
EST. AVERAGE GLUCOSE BLD GHB EST-MCNC: 143 MG/DL
GFR SERPL CREATININE-BSD FRML MDRD: 55 ML/MIN/1.73SQ M
GLUCOSE P FAST SERPL-MCNC: 113 MG/DL (ref 65–99)
HBA1C MFR BLD: 6.6 %
HCT VFR BLD AUTO: 43.5 % (ref 36.5–49.3)
HDLC SERPL-MCNC: 42 MG/DL
HGB BLD-MCNC: 14.2 G/DL (ref 12–17)
IMM GRANULOCYTES # BLD AUTO: 0.02 THOUSAND/UL (ref 0–0.2)
IMM GRANULOCYTES NFR BLD AUTO: 0 % (ref 0–2)
LDLC SERPL CALC-MCNC: 80 MG/DL (ref 0–100)
LYMPHOCYTES # BLD AUTO: 1.55 THOUSANDS/ÂΜL (ref 0.6–4.47)
LYMPHOCYTES NFR BLD AUTO: 25 % (ref 14–44)
MCH RBC QN AUTO: 28 PG (ref 26.8–34.3)
MCHC RBC AUTO-ENTMCNC: 32.6 G/DL (ref 31.4–37.4)
MCV RBC AUTO: 86 FL (ref 82–98)
MONOCYTES # BLD AUTO: 0.67 THOUSAND/ÂΜL (ref 0.17–1.22)
MONOCYTES NFR BLD AUTO: 11 % (ref 4–12)
NEUTROPHILS # BLD AUTO: 3.14 THOUSANDS/ÂΜL (ref 1.85–7.62)
NEUTS SEG NFR BLD AUTO: 49 % (ref 43–75)
NRBC BLD AUTO-RTO: 0 /100 WBCS
PLATELET # BLD AUTO: 227 THOUSANDS/UL (ref 149–390)
PMV BLD AUTO: 10.6 FL (ref 8.9–12.7)
POTASSIUM SERPL-SCNC: 4.3 MMOL/L (ref 3.5–5.3)
PROT SERPL-MCNC: 6.9 G/DL (ref 6.4–8.4)
RBC # BLD AUTO: 5.08 MILLION/UL (ref 3.88–5.62)
SODIUM SERPL-SCNC: 139 MMOL/L (ref 135–147)
TRIGL SERPL-MCNC: 88 MG/DL
TSH SERPL DL<=0.05 MIU/L-ACNC: 1.47 UIU/ML (ref 0.45–4.5)
WBC # BLD AUTO: 6.32 THOUSAND/UL (ref 4.31–10.16)

## 2024-04-20 PROCEDURE — 80061 LIPID PANEL: CPT

## 2024-04-20 PROCEDURE — 84443 ASSAY THYROID STIM HORMONE: CPT

## 2024-04-20 PROCEDURE — 83036 HEMOGLOBIN GLYCOSYLATED A1C: CPT

## 2024-04-20 PROCEDURE — 80053 COMPREHEN METABOLIC PANEL: CPT

## 2024-04-20 PROCEDURE — 36415 COLL VENOUS BLD VENIPUNCTURE: CPT

## 2024-04-20 PROCEDURE — 85025 COMPLETE CBC W/AUTO DIFF WBC: CPT

## 2024-04-28 ENCOUNTER — HOSPITAL ENCOUNTER (OUTPATIENT)
Dept: MRI IMAGING | Facility: HOSPITAL | Age: 79
Discharge: HOME | End: 2024-04-28
Payer: COMMERCIAL

## 2024-04-28 ENCOUNTER — HOSPITAL ENCOUNTER (OUTPATIENT)
Dept: CT IMAGING | Facility: HOSPITAL | Age: 79
Discharge: HOME | End: 2024-04-28
Payer: COMMERCIAL

## 2024-04-28 DIAGNOSIS — G89.29 CHRONIC LLQ PAIN: ICD-10-CM

## 2024-04-28 DIAGNOSIS — M54.16 LUMBAR RADICULOPATHY: ICD-10-CM

## 2024-04-28 DIAGNOSIS — R10.84 DIFFUSE ABDOMINAL PAIN: ICD-10-CM

## 2024-04-28 DIAGNOSIS — R10.32 CHRONIC LLQ PAIN: ICD-10-CM

## 2024-04-28 PROCEDURE — 72148 MRI LUMBAR SPINE W/O DYE: CPT

## 2024-04-29 NOTE — DISCHARGE INSTRUCTIONS
Colonoscopy   WHAT YOU NEED TO KNOW:   A colonoscopy is a procedure to examine the inside of your colon (intestine) with a scope  Polyps or tissue growths may have been removed during your colonoscopy  It is normal to feel bloated and to have some abdominal discomfort  You should be passing gas  If you have hemorrhoids or you had polyps removed, you may have a small amount of bleeding  DISCHARGE INSTRUCTIONS:   Seek care immediately if:   · You have a large amount of bright red blood in your bowel movements  · Your abdomen is hard and firm and you have severe pain  · You have sudden trouble breathing  Call your doctor if:   · You develop a rash or hives  · You have a fever within 24 hours of your procedure  · You have nausea and vomiting  · You feel anesthesia effects greater than 24 hours  · You have not had a bowel movement for 3 days after your procedure  · You have questions or concerns about your condition or care  After your colonoscopy:   · Do not lift, strain, or run  until your healthcare provider says it is okay  · Rest as much as possible  You have been given medicine to relax you  Do not  drive or make important decisions for at least 24 hours  Return to your normal activity as directed  · Relieve gas and discomfort from bloating  by lying on your left side with a heating pad on your abdomen  You may need to take short walks to help the gas move out  Eat small meals until bloating is relieved  If you had polyps removed: For 7 days after your procedure:  · Do not  take aspirin  · Do not  go on long car rides  Help prevent constipation:   · Eat a variety of healthy foods  Healthy foods include fruit, vegetables, whole-grain breads, low-fat dairy products, beans, lean meat, and fish  Ask if you need to be on a special diet  Your healthcare provider may recommend that you eat high-fiber foods such as cooked beans   Fiber helps you have regular bowel movements  · Drink liquids as directed  Adults should drink between 9 and 13 eight-ounce cups of liquid every day  Ask what amount is best for you  For most people, good liquids to drink are water, juice, and milk  · Exercise as directed  Talk to your healthcare provider about the best exercise plan for you  Exercise can help prevent constipation, decrease your blood pressure and improve your health  Follow up with your doctor as directed:  Write down your questions so you remember to ask them during your visits  © Copyright [employer] 2021 Information is for End User's use only and may not be sold, redistributed or otherwise used for commercial purposes  All illustrations and images included in CareNotes® are the copyrighted property of [employer] , Inc  or [facility] [place] Amanda Garcia   The above information is an  only  It is not intended as medical advice for individual conditions or treatments  Talk to your doctor, nurse or pharmacist before following any medical regimen to see if it is safe and effective for you  I was running in traffic

## 2024-05-09 ENCOUNTER — HOSPITAL ENCOUNTER (OUTPATIENT)
Dept: CT IMAGING | Facility: HOSPITAL | Age: 79
End: 2024-05-09
Payer: COMMERCIAL

## 2024-05-09 DIAGNOSIS — R10.32 CHRONIC LLQ PAIN: ICD-10-CM

## 2024-05-09 DIAGNOSIS — G89.29 CHRONIC LLQ PAIN: ICD-10-CM

## 2024-05-09 DIAGNOSIS — R10.84 DIFFUSE ABDOMINAL PAIN: ICD-10-CM

## 2024-05-09 PROCEDURE — 74176 CT ABD & PELVIS W/O CONTRAST: CPT

## 2024-05-23 ENCOUNTER — CONSULT (OUTPATIENT)
Dept: PAIN MEDICINE | Facility: MEDICAL CENTER | Age: 79
End: 2024-05-23
Payer: COMMERCIAL

## 2024-05-23 VITALS
OXYGEN SATURATION: 96 % | WEIGHT: 191 LBS | DIASTOLIC BLOOD PRESSURE: 51 MMHG | HEIGHT: 64 IN | BODY MASS INDEX: 32.61 KG/M2 | HEART RATE: 81 BPM | SYSTOLIC BLOOD PRESSURE: 96 MMHG

## 2024-05-23 DIAGNOSIS — M54.16 LUMBAR RADICULITIS: ICD-10-CM

## 2024-05-23 DIAGNOSIS — M48.062 SPINAL STENOSIS OF LUMBAR REGION WITH NEUROGENIC CLAUDICATION: ICD-10-CM

## 2024-05-23 DIAGNOSIS — M70.62 GREATER TROCHANTERIC BURSITIS OF LEFT HIP: Primary | ICD-10-CM

## 2024-05-23 DIAGNOSIS — M54.16 LUMBAR RADICULOPATHY: ICD-10-CM

## 2024-05-23 PROCEDURE — 99204 OFFICE O/P NEW MOD 45 MIN: CPT | Performed by: PHYSICAL MEDICINE & REHABILITATION

## 2024-05-23 PROCEDURE — G2211 COMPLEX E/M VISIT ADD ON: HCPCS | Performed by: PHYSICAL MEDICINE & REHABILITATION

## 2024-05-23 NOTE — PROGRESS NOTES
Assessment:  1. Greater trochanteric bursitis of left hip    2. Lumbar radiculitis    3. Spinal stenosis of lumbar region with neurogenic claudication    4. Lumbar radiculopathy        Plan:  1.  At this time we will schedule patient for a left greater trochanteric bursa injection under ultrasound guidance.  The use of direct ultrasound visualization of the needle (rather than a non-guided injection) is required for this procedure to ensure accurate injection placement so there can be diagnostic specificity when evaluating effectiveness of the injection, and for safety purposes to minimize risk of bleeding or injury to surrounding structures.  #2  We'll schedule patient for bilateral L5-S1 transforaminal epidural steroid injection. This may need to be repeated.  Complete risks and benefits including bleeding, infection, tissue reaction, allergic reaction were discussed. Verbal consent obtained.    My impressions and treatment recommendations were discussed in detail with the patient who verbalized understanding and had no further questions.  Discharge instructions were provided. I personally saw and examined the patient and I agree with the above discussed plan of care.    No orders of the defined types were placed in this encounter.    No orders of the defined types were placed in this encounter.      History of Present Illness:  Matty Mcintyre is a 78 y.o. male who presents for a follow up office visit in regards to left lateral hip pain as well as back and bilateral lower extremity pain.  The patient has been experiencing the symptoms chronically without any inciting events or trauma.    Currently rating the pain as a 7-8/10 which is intermittent in nature throughout the entirety of the day described as burning cramping shooting sharp pins-and-needles and throbbing pain.    Aggravating factors are unknown.  Alleviating factors include standing bending and coughing.    Diagnostic studies include MRI of the lumbar  spine.  This does reveal significant foraminal narrowing throughout the lumbar spine likely accounting for his radicular pain into the lower extremities.    He has tried injections physical therapy and exercise in the past without relief.    Social history negative for tobacco marijuana and alcohol use.    Currently using acetaminophen and Voltaren gel but hopeful for further relief with interventional option.      I have personally reviewed and/or updated the patient's past medical history, past surgical history, family history, social history, current medications, allergies, and vital signs today.     Review of Systems   Constitutional:  Positive for unexpected weight change. Negative for fever.   HENT:  Positive for hearing loss and sore throat. Negative for trouble swallowing.    Eyes:  Positive for redness. Negative for visual disturbance.   Respiratory:  Positive for cough. Negative for shortness of breath and wheezing.    Cardiovascular:  Positive for leg swelling. Negative for chest pain and palpitations.   Gastrointestinal:  Negative for constipation, diarrhea, nausea and vomiting.   Endocrine: Negative for cold intolerance, heat intolerance and polydipsia.   Genitourinary:  Negative for difficulty urinating and frequency.   Musculoskeletal:  Positive for back pain, gait problem, joint swelling, myalgias and neck pain. Negative for arthralgias.   Skin:  Positive for rash.   Neurological:  Positive for headaches. Negative for dizziness, seizures, syncope and weakness.   Hematological:  Does not bruise/bleed easily.   Psychiatric/Behavioral:  Positive for decreased concentration and dysphoric mood. The patient is nervous/anxious.    All other systems reviewed and are negative.      Patient Active Problem List   Diagnosis    Acid reflux    Asthma    Bilateral hearing loss    BPH associated with nocturia    Cervical spinal stenosis    Fibromyalgia    Chronic sinusitis    Major depressive disorder with single  episode, in partial remission ([facility])    Prediabetes    Hyperlipidemia    Essential hypertension    Incomplete emptying of bladder    Lumbar radiculopathy    Spinal stenosis of lumbar region with neurogenic claudication    Migraine    Peripheral neuropathy    Psoriasis    Rotator cuff tendinitis    Seborrheic dermatitis of scalp    TB lung, latent    Thoracic degenerative disc disease    Tinnitus    Venous insufficiency    Primary localized osteoarthrosis of ankle and foot    Tarsal tunnel syndrome    Plantar fascial fibromatosis    Anxiety    Insomnia    CKD (chronic kidney disease) stage 3, GFR 30-59 ml/min ([facility])    Osteoarthritis of multiple joints    Trochanteric bursitis of left hip    Perforation of right tympanic membrane w remote surgery both ears.    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 Psoriasis    Chronic cough    Lipoma of torso/right lateral flank    Elevated PSA    Abnormal EKG    Essential tremor    Non-seasonal allergic rhinitis    Eosinophilia    Generalized abdominal pain    Tinea cruris       Past Medical History:   Diagnosis Date    Anxiety     Anxiety and depression     Arthritis     Asthma     Back pain, chronic     Last assessed: 3/11/15    BPH (benign prostatic hyperplasia)     Cataract     Last assessed: 7/16/14    Costochondral chest pain 11/22/2021    Depression     Double vision 04/15/2022    Fibromyalgia, primary     GERD (gastroesophageal reflux disease)     Hyperlipidemia     Hypertension     Neuropathy     Osteoporosis     Panic disorder     Right-sided Bell's palsy     Last assessed: 3/10/14    Seasonal allergies     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 Brenner    CYSTOSCOPY  08/05/2021    EAR SURGERY      1982 and  , ear drum,  per [employer]    ESOPHAGOGASTRODUODENOSCOPY  2015    2 cm sliding hiatal hernia, grade 1-2 esophagitis, duodenitis by Dr. Brenner    FOOT SURGERY Left     Toe    MASTOIDECTOMY Left     OTHER SURGICAL HISTORY      Spinal Anesthesia Epidural, x3 , per [employer]    POLYPECTOMY  2015    TYMPANOPLASTY Bilateral     Dr Nichols       Family History   Problem Relation Age of Onset    Cataracts Mother     Hyperlipidemia Mother     Colon polyps Father        Social History     Occupational History    Occupation:    Tobacco Use    Smoking status: Former     Types: Cigars     Quit date:      Years since quittin.4    Smokeless tobacco: Former    Tobacco comments:     quit in , former cigar and cigarette, pipe smoker, per [employer], Past history of chewing tobacco use, active, per [employer]   Vaping Use    Vaping status: Never Used   Substance and Sexual Activity    Alcohol use: No     Comment: former drinker    Drug use: No    Sexual activity: Not Currently       Current Outpatient Medications on File Prior to Visit   Medication Sig    acetaminophen (TYLENOL) 325 mg tablet Take 2 tablets (650 mg total) by mouth every 6 (six) hours as needed for mild pain or fever    albuterol (PROVENTIL HFA,VENTOLIN HFA) 90 mcg/act inhaler Inhale 1 puff every 6 (six) hours as needed (Q6H PRN)    ALPRAZolam (XANAX) 0.5 mg tablet Take 1 tablet up to twice daily as needed.    amLODIPine (NORVASC) 10 mg tablet Take 1 tablet by mouth once daily    Ascorbic Acid (vitamin C) 100 MG tablet Take 100 mg by mouth daily    atorvastatin (LIPITOR) 20 mg tablet Take 1 tablet (20 mg total) by mouth daily    betamethasone, augmented, (DIPROLENE) 0.05 % lotion APPLY TO SCALP ONCE DAILY FOR 2 WEEKS AS NEEDED FOR ITCHING    Carboxymethylcellul-Glycerin (REFRESH RELIEVA OP) Apply to eye    citalopram (CeleXA) 40 mg tablet Take 0.5 tablets (20 mg total) by mouth daily    diclofenac sodium (VOLTAREN) 1 % Apply 2 g  topically 2 (two) times a day    diphenhydrAMINE (BENADRYL) 12.5 mg/5 mL oral liquid Take by mouth 3 (three) times a day as needed for allergies    finasteride (PROSCAR) 5 mg tablet Take 1 tablet (5 mg total) by mouth daily    fluocinolone (SYNALAR) 0.01 % external solution APPLY TOPICALLY TO SCALP ONCE A DAY AS NEEDED FOR PSORIASIS    gabapentin (NEURONTIN) 300 mg capsule Take 1 capsule (300 mg total) by mouth 3 (three) times a day    hydrocortisone valerate (WEST-VITO) 0.2 % ointment Apply twice daily to itchy areas as needed.    hydrOXYzine HCL (ATARAX) 25 mg tablet Take 1 tablet (25 mg total) by mouth every 8 (eight) hours as needed for itching    lisinopril-hydrochlorothiazide (PRINZIDE,ZESTORETIC) 20-12.5 MG per tablet Take 1 tablet by mouth once daily    nystatin (MYCOSTATIN) cream Apply topically 2 (two) times a day Apply twice daily as needed for rectal itching.    pantoprazole (PROTONIX) 40 mg tablet Take 1 tablet (40 mg total) by mouth daily    Risankizumab-rzaa (SKYRIZI PEN SC) Inject under the skin Q 4 weeks via Dermatology    simethicone (MYLICON,GAS-X) 180 MG capsule Take 1 capsule (180 mg total) by mouth every 6 (six) hours as needed for flatulence    tacrolimus (PROTOPIC) 0.1 % ointment APPLY TO EARS TWICE A DAY WHEN NEEDED    tamsulosin (FLOMAX) 0.4 mg Take 1 capsule (0.4 mg total) by mouth daily with dinner    triamcinolone (KENALOG) 0.1 % cream Apply up to 3 times daily on itchy skin lesions as needed.    vitamin B-12 (VITAMIN B-12) 1,000 mcg tablet Take 1,000 mcg by mouth daily      VITAMIN D, ERGOCALCIFEROL, PO Take 2,000 Units by mouth    Vitamins-Lipotropics (LIPO FLAVONOID PLUS PO) Take by mouth    dextromethorphan-guaifenesin (MUCINEX DM)  MG per 12 hr tablet Take 1 tablet by mouth every 12 (twelve) hours (Patient not taking: Reported on 2/29/2024)    methylPREDNISolone 4 MG tablet therapy pack Use as directed on package     No current facility-administered medications on file prior  "to visit.       Allergies   Allergen Reactions    Amoxicillin Rash    Amoxicillin-Pot Clavulanate Er  [Amoxicillin-Pot Clavulanate] Hives and Rash    Codeine Rash and Shortness Of Breath    Penicillin G Rash    Albumen, Egg - Food Allergy     Other      pork    Pork-Derived Products - Food Allergy Hives    Prochlorperazine Other (See Comments) and Itching     rash all over the body    Compazine  [Prochlorperazine Edisylate] Rash    Iodinated Contrast Media Rash    Latex Rash and Itching    Valdecoxib Itching, Rash and Headache     Category: HEADACHES;        Physical Exam:    BP 96/51   Pulse 81   Ht 5' 4\" (1.626 m)   Wt 86.6 kg (191 lb)   SpO2 96%   BMI 32.79 kg/m²     Constitutional:normal, well developed, well nourished, alert, in no distress and non-toxic and no overt pain behavior.  Eyes:anicteric  HEENT:grossly intact  Neck:supple, symmetric, trachea midline and no masses   Pulmonary:even and unlabored  Cardiovascular:No edema or pitting edema present  Psychiatric:Mood and affect appropriate  Neurologic:Cranial Nerves II-XII grossly intact, bilateral lower extremity muscle stretch reflexes are absent at the knees and ankles  Musculoskeletal:normal, except for significant tenderness to palpation over the left greater trochanter reproducing pain complaint    Imaging    MRI lumbar spine wo contrast: Result Notes     Aaron Fuller Jr., MD  5/6/2024  1:01 PM EDT       MRI shows a lot of degenerative changes as well as a pinched nerve.  Follow with pain management as we discussed.  Appointment is already scheduled for 5/23.            Study Result    Narrative & Impression   MRI LUMBAR SPINE WITHOUT CONTRAST     INDICATION: M54.16: Radiculopathy, lumbar region.     COMPARISON: 4/8/2016     TECHNIQUE:  Multiplanar, multisequence imaging of the lumbar spine was performed. .        IMAGE QUALITY:  Diagnostic     FINDINGS:     VERTEBRAL BODIES:  There are 5 lumbar type vertebral bodies.  Normal alignment of the " lumbar spine.  No spondylolysis or spondylolisthesis. No scoliosis.  No compression fracture.    There is Modic type I endplate degenerative change at L5-S1. There is   no suspicious marrow signal normality identified.     SACRUM:  Normal signal within the sacrum. No evidence of insufficiency or stress fracture.     DISTAL CORD AND CONUS:  Normal size and signal within the distal cord and conus.     PARASPINAL SOFT TISSUES:  Paraspinal soft tissues are unremarkable.     LOWER THORACIC DISC SPACES:  Normal disc height and signal.  No disc herniation, canal stenosis or foraminal narrowing.     LUMBAR DISC SPACES: There is mild congenital canal narrowing     L1-L2: There is a mild bulge. There is no significant canal stenosis or foraminal narrowing.     L2-L3: There is a bulging annulus. There is mild mass effect on the thecal sac. There is mild to moderate right and mild left foraminal narrowing.     L3-L4: There is a bulging annulus. There is facet arthrosis. There is mild to moderate canal stenosis predominantly on the basis of underlying congenital canal stenosis. There is mild left and moderate right foraminal narrowing with near abutment of the   exiting nerve root.     L4-L5: There is a bulging annulus. There is facet arthrosis. There is moderate left foraminal narrowing. There is moderate to severe right foraminal narrowing with impingement of the exiting L4 nerve root. There is mild canal stenosis.     L5-S1: There is a bulging annulus. There is facet arthrosis. There is mild to moderate bilateral foraminal narrowing, right greater than left with near abutment of the undersurface of the exiting right L5 nerve root.     OTHER FINDINGS:  None.     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     Moderate to severe right foraminal narrowing with impingement of the exiting L4 nerve root is present at L4-L5. Moderate left foraminal  narrowing is also present at this level with near abutment of the exiting nerve root.        Workstation performed: [id number]

## 2024-05-30 ENCOUNTER — PROCEDURE VISIT (OUTPATIENT)
Dept: PAIN MEDICINE | Facility: MEDICAL CENTER | Age: 79
End: 2024-05-30
Payer: COMMERCIAL

## 2024-05-30 VITALS
HEART RATE: 65 BPM | SYSTOLIC BLOOD PRESSURE: 106 MMHG | DIASTOLIC BLOOD PRESSURE: 65 MMHG | BODY MASS INDEX: 32.79 KG/M2 | HEIGHT: 64 IN

## 2024-05-30 DIAGNOSIS — M70.62 GREATER TROCHANTERIC BURSITIS, LEFT: Primary | ICD-10-CM

## 2024-05-30 PROCEDURE — 20611 DRAIN/INJ JOINT/BURSA W/US: CPT | Performed by: PHYSICAL MEDICINE & REHABILITATION

## 2024-05-30 RX ORDER — METHYLPREDNISOLONE ACETATE 40 MG/ML
40 INJECTION, SUSPENSION INTRA-ARTICULAR; INTRALESIONAL; INTRAMUSCULAR; SOFT TISSUE ONCE
Status: COMPLETED | OUTPATIENT
Start: 2024-05-30 | End: 2024-05-30

## 2024-05-30 RX ORDER — BUPIVACAINE HYDROCHLORIDE 2.5 MG/ML
10 INJECTION, SOLUTION EPIDURAL; INFILTRATION; INTRACAUDAL ONCE
Status: COMPLETED | OUTPATIENT
Start: 2024-05-30 | End: 2024-05-30

## 2024-05-30 RX ADMIN — METHYLPREDNISOLONE ACETATE 40 MG: 40 INJECTION, SUSPENSION INTRA-ARTICULAR; INTRALESIONAL; INTRAMUSCULAR; SOFT TISSUE at 14:26

## 2024-05-30 RX ADMIN — BUPIVACAINE HYDROCHLORIDE 10 ML: 2.5 INJECTION, SOLUTION EPIDURAL; INFILTRATION; INTRACAUDAL at 14:27

## 2024-05-30 NOTE — PROGRESS NOTES
Indication: Lateral hip pain  Preprocedure diagnosis: 1. Trochanteric bursitis  2. Lateral hip pain  Postprocedure diagnosis: 1. Trochanteric bursitis  2. Lateral hip pain  Procedure: Ultrasound-guided left trochanteric bursa injection(s)  After discussing the risks, benefits, and alternatives to the procedure, the patient expressed understanding and wished to proceed. The patient was brought to the procedure suite and placed in the sidelying position. A procedural pause was conducted to verify: correct patient identity, procedure to be performed and as applicable, correct side and site, correct patient position, and availability of implants, special equipment or special requirements. A simple surgical tray was used. Prior to the procedure, the lateral hip was examined with a 3.75 MHz curvilinear transducer to visualize the greater trochanter, tendons, and bursa and to determine the optimal needle path. Following this, the lateral hip was prepared with a ChloraPrep scrub, then re-examined using the same transducer, a sterile ultrasound transducer cover, and sterile ultrasound transducer gel. Thereafter, using continuous ultrasound guidance, a 2.5 inch 25 gauge needle was advanced into the peritrochanteric bursa region. After visualization of the tip in the target area and negative aspiration for blood, a mixture of 40 mg Depo-Medrol in 3 mL of 0.25% bupivacaine was injected into the trochanteric bursa. Following the injection, the needle was withdrawn. The patient tolerated the procedure well and there were no apparent complications. After an appropriate amount of observation, the patient was dismissed from the clinic in good condition under their own power.

## 2024-06-06 ENCOUNTER — TELEPHONE (OUTPATIENT)
Dept: PAIN MEDICINE | Facility: CLINIC | Age: 79
End: 2024-06-06

## 2024-06-13 DIAGNOSIS — I10 HYPERTENSION, UNSPECIFIED TYPE: ICD-10-CM

## 2024-06-13 RX ORDER — AMLODIPINE BESYLATE 10 MG/1
10 TABLET ORAL DAILY
Qty: 90 TABLET | Refills: 1 | Status: SHIPPED | OUTPATIENT
Start: 2024-06-13

## 2024-06-14 ENCOUNTER — HOSPITAL ENCOUNTER (OUTPATIENT)
Dept: RADIOLOGY | Facility: MEDICAL CENTER | Age: 79
End: 2024-06-14
Payer: COMMERCIAL

## 2024-06-14 VITALS
DIASTOLIC BLOOD PRESSURE: 73 MMHG | RESPIRATION RATE: 18 BRPM | TEMPERATURE: 97.8 F | SYSTOLIC BLOOD PRESSURE: 135 MMHG | OXYGEN SATURATION: 95 % | HEART RATE: 67 BPM

## 2024-06-14 DIAGNOSIS — M54.16 LUMBAR RADICULITIS: ICD-10-CM

## 2024-06-14 DIAGNOSIS — M48.062 SPINAL STENOSIS OF LUMBAR REGION WITH NEUROGENIC CLAUDICATION: ICD-10-CM

## 2024-06-14 PROCEDURE — A9585 GADOBUTROL INJECTION: HCPCS | Performed by: PHYSICAL MEDICINE & REHABILITATION

## 2024-06-14 PROCEDURE — 64483 NJX AA&/STRD TFRM EPI L/S 1: CPT | Performed by: PHYSICAL MEDICINE & REHABILITATION

## 2024-06-14 RX ORDER — PAPAVERINE HCL 150 MG
10 CAPSULE, EXTENDED RELEASE ORAL ONCE
Status: COMPLETED | OUTPATIENT
Start: 2024-06-14 | End: 2024-06-14

## 2024-06-14 RX ORDER — GADOBUTROL 604.72 MG/ML
4 INJECTION INTRAVENOUS ONCE
Status: COMPLETED | OUTPATIENT
Start: 2024-06-14 | End: 2024-06-14

## 2024-06-14 RX ADMIN — Medication 10 MG: at 09:05

## 2024-06-14 RX ADMIN — GADOBUTROL 4 ML: 604.72 INJECTION INTRAVENOUS at 09:05

## 2024-06-14 NOTE — H&P
History of Present Illness: The patient is a 78 y.o. male who presents with complaints of bilateral back and leg pain    Past Medical History:   Diagnosis Date    Anxiety     Anxiety and depression     Arthritis     Asthma     Back pain, chronic     Last assessed: 3/11/15    BPH (benign prostatic hyperplasia)     Cataract     Last assessed: 7/16/14    Costochondral chest pain 11/22/2021    Depression     Double vision 04/15/2022    Fibromyalgia, primary     GERD (gastroesophageal reflux disease)     Hyperlipidemia     Hypertension     Neuropathy     Osteoporosis     Panic disorder     Right-sided Bell's palsy     Last assessed: 3/10/14    Seasonal allergies     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 Brenner    CYSTOSCOPY  08/05/2021    EAR SURGERY      1982 and 1990, ear drum,  per [employer]    ESOPHAGOGASTRODUODENOSCOPY  02/2015    2 cm sliding hiatal hernia, grade 1-2 esophagitis, duodenitis by Dr. Brenner    FOOT SURGERY Left 1997    Toe    MASTOIDECTOMY Left     OTHER SURGICAL HISTORY      Spinal Anesthesia Epidural, x3 2004, per [employer]    POLYPECTOMY  2015    TYMPANOPLASTY Bilateral 2000    Dr Nichols         Current Outpatient Medications:     acetaminophen (TYLENOL) 325 mg tablet, Take 2 tablets (650 mg total) by mouth every 6 (six) hours as needed for mild pain or fever, Disp: 30 tablet, Rfl: 3    albuterol (PROVENTIL HFA,VENTOLIN HFA) 90 mcg/act inhaler, Inhale 1 puff every 6 (six) hours as needed (Q6H PRN), Disp: 54 g, Rfl: 5    ALPRAZolam (XANAX) 0.5 mg tablet, Take 1 tablet up to twice daily as needed., Disp: 60 tablet, Rfl: 0    amLODIPine (NORVASC) 10 mg tablet, Take 1 tablet by mouth once daily, Disp: 90 tablet, Rfl: 1    Ascorbic Acid (vitamin C) 100 MG tablet, Take 100  mg by mouth daily, Disp: , Rfl:     atorvastatin (LIPITOR) 20 mg tablet, Take 1 tablet (20 mg total) by mouth daily, Disp: 90 tablet, Rfl: 1    betamethasone, augmented, (DIPROLENE) 0.05 % lotion, APPLY TO SCALP ONCE DAILY FOR 2 WEEKS AS NEEDED FOR ITCHING, Disp: , Rfl:     Carboxymethylcellul-Glycerin (REFRESH RELIEVA OP), Apply to eye, Disp: , Rfl:     citalopram (CeleXA) 40 mg tablet, Take 0.5 tablets (20 mg total) by mouth daily, Disp: 90 tablet, Rfl: 3    dextromethorphan-guaifenesin (MUCINEX DM)  MG per 12 hr tablet, Take 1 tablet by mouth every 12 (twelve) hours (Patient not taking: Reported on 2/29/2024), Disp: 60 tablet, Rfl: 0    diclofenac sodium (VOLTAREN) 1 %, Apply 2 g topically 2 (two) times a day, Disp: , Rfl:     diphenhydrAMINE (BENADRYL) 12.5 mg/5 mL oral liquid, Take by mouth 3 (three) times a day as needed for allergies, Disp: , Rfl:     finasteride (PROSCAR) 5 mg tablet, Take 1 tablet (5 mg total) by mouth daily, Disp: 90 tablet, Rfl: 1    fluocinolone (SYNALAR) 0.01 % external solution, APPLY TOPICALLY TO SCALP ONCE A DAY AS NEEDED FOR PSORIASIS, Disp: , Rfl:     gabapentin (NEURONTIN) 300 mg capsule, Take 1 capsule (300 mg total) by mouth 3 (three) times a day, Disp: 90 capsule, Rfl: 0    hydrocortisone valerate (WEST-VITO) 0.2 % ointment, Apply twice daily to itchy areas as needed., Disp: 45 g, Rfl: 1    hydrOXYzine HCL (ATARAX) 25 mg tablet, Take 1 tablet (25 mg total) by mouth every 8 (eight) hours as needed for itching, Disp: 60 tablet, Rfl: 0    lisinopril-hydrochlorothiazide (PRINZIDE,ZESTORETIC) 20-12.5 MG per tablet, Take 1 tablet by mouth once daily, Disp: 90 tablet, Rfl: 1    methylPREDNISolone 4 MG tablet therapy pack, Use as directed on package, Disp: 21 tablet, Rfl: 0    nystatin (MYCOSTATIN) cream, Apply topically 2 (two) times a day Apply twice daily as needed for rectal itching., Disp: 30 g, Rfl: 5    pantoprazole (PROTONIX) 40 mg tablet, Take 1 tablet (40 mg total) by  mouth daily, Disp: 90 tablet, Rfl: 3    Risankizumab-rzaa (SKYRIZI PEN SC), Inject under the skin Q 4 weeks via Dermatology, Disp: , Rfl:     simethicone (MYLICON,GAS-X) 180 MG capsule, Take 1 capsule (180 mg total) by mouth every 6 (six) hours as needed for flatulence, Disp: 60 capsule, Rfl: 0    tacrolimus (PROTOPIC) 0.1 % ointment, APPLY TO EARS TWICE A DAY WHEN NEEDED, Disp: , Rfl:     tamsulosin (FLOMAX) 0.4 mg, Take 1 capsule (0.4 mg total) by mouth daily with dinner, Disp: 90 capsule, Rfl: 3    triamcinolone (KENALOG) 0.1 % cream, Apply up to 3 times daily on itchy skin lesions as needed., Disp: 45 g, Rfl: 5    vitamin B-12 (VITAMIN B-12) 1,000 mcg tablet, Take 1,000 mcg by mouth daily  , Disp: , Rfl:     VITAMIN D, ERGOCALCIFEROL, PO, Take 2,000 Units by mouth, Disp: , Rfl:     Vitamins-Lipotropics (LIPO FLAVONOID PLUS PO), Take by mouth, Disp: , Rfl:     Allergies   Allergen Reactions    Amoxicillin Rash    Amoxicillin-Pot Clavulanate Er  [Amoxicillin-Pot Clavulanate] Hives and Rash    Codeine Rash and Shortness Of Breath    Penicillin G Rash    Albumen, Egg - Food Allergy     Other      pork    Pork-Derived Products - Food Allergy Hives    Prochlorperazine Other (See Comments) and Itching     rash all over the body    Compazine  [Prochlorperazine Edisylate] Rash    Iodinated Contrast Media Rash    Latex Rash and Itching    Valdecoxib Itching, Rash and Headache     Category: HEADACHES;        Physical Exam:   Vitals:    06/14/24 0833   BP: 126/76   Pulse: 62   Resp: 20   Temp: 97.8 °F (36.6 °C)   SpO2: 96%     General: Awake, Alert, Oriented x 3, Mood and affect appropriate  Respiratory: Respirations even and unlabored  Cardiovascular: Peripheral pulses intact; no edema  Musculoskeletal Exam: bilateral back and leg pain    ASA Score: 3    Patient/Chart Verification  Patient ID Verified: Verbal  ID Band Applied: No  Consents Confirmed: Procedural, To be obtained in the Pre-Procedure area  H&P( within 30  days) Verified: To be obtained in the Pre-Procedure area  Interval H&P(within 24 hr) Complete (required for Outpatients and Surgery Admit only): To be obtained in the Pre-Procedure area  Allergies Reviewed: Yes (allergy to Latex and Contrast)  Anticoag/NSAID held?: NA  Currently on antibiotics?: No    Assessment:   1. Lumbar radiculitis    2. Spinal stenosis of lumbar region with neurogenic claudication        Plan: (B) L5-S1 TFESI

## 2024-06-14 NOTE — DISCHARGE INSTRUCTIONS
Epidural Steroid Injection   WHAT YOU NEED TO KNOW:   An epidural steroid injection (SAI) is a procedure to inject steroid medicine into the epidural space. The epidural space is between your spinal cord and vertebrae. Steroids reduce inflammation and fluid buildup in your spine that may be causing pain. You may be given pain medicine along with the steroids.          ACTIVITY  Do not drive or operate machinery today.  No strenuous activity today - bending, lifting, etc.  You may resume normal activites starting tomorrow - start slowly and as tolerated.  You may shower today, but no tub baths or hot tubs.  You may have numbness for several hours from the local anesthetic. Please use caution and common sense, especially with weight-bearing activities.    CARE OF THE INJECTION SITE  If you have soreness or pain, apply ice to the area today (20 minutes on/20 minutes off).  Starting tomorrow, you may use warm, moist heat or ice if needed.  You may have an increase or change in your discomfort for 36-48 hours after your treatment.  Apply ice and continue with any pain medication you have been prescribed.  Notify the [facility] if you have any of the following: redness, drainage, swelling, headache, stiff neck or fever above 100°F.    SPECIAL INSTRUCTIONS  Our office will contact you in approximately 14 days for a progress report.    MEDICATIONS  Continue to take all routine medications.  Our office may have instructed you to hold some medications.    As no general anesthesia was used in today's procedure, you should not experience any side effects related to anesthesia.     If you are diabetic, the steroids used in today's injection may temporarily increase your blood sugar levels after the first few days after your injection. Please keep a close eye on your sugars and alert the doctor who manages your diabetes if your sugars are significantly high from your baseline or you are symptomatic.     If you have a  problem specifically related to your procedure, please call our office at [phone].  Problems not related to your procedure should be directed to your primary care physician.

## 2024-06-28 ENCOUNTER — TELEPHONE (OUTPATIENT)
Dept: PAIN MEDICINE | Facility: CLINIC | Age: 79
End: 2024-06-28

## 2024-07-10 ENCOUNTER — OFFICE VISIT (OUTPATIENT)
Dept: FAMILY MEDICINE CLINIC | Facility: CLINIC | Age: 79
End: 2024-07-10
Payer: COMMERCIAL

## 2024-07-10 VITALS
HEIGHT: 64 IN | TEMPERATURE: 98.3 F | BODY MASS INDEX: 32.78 KG/M2 | WEIGHT: 192 LBS | OXYGEN SATURATION: 97 % | HEART RATE: 100 BPM | DIASTOLIC BLOOD PRESSURE: 66 MMHG | RESPIRATION RATE: 16 BRPM | SYSTOLIC BLOOD PRESSURE: 128 MMHG

## 2024-07-10 DIAGNOSIS — R73.03 PRE-DIABETES: Primary | ICD-10-CM

## 2024-07-10 DIAGNOSIS — E78.5 HYPERLIPIDEMIA, UNSPECIFIED HYPERLIPIDEMIA TYPE: ICD-10-CM

## 2024-07-10 DIAGNOSIS — J01.40 ACUTE NON-RECURRENT PANSINUSITIS: ICD-10-CM

## 2024-07-10 LAB — SL AMB POCT HEMOGLOBIN AIC: 6.3 (ref ?–6.5)

## 2024-07-10 PROCEDURE — 99214 OFFICE O/P EST MOD 30 MIN: CPT | Performed by: INTERNAL MEDICINE

## 2024-07-10 PROCEDURE — 83036 HEMOGLOBIN GLYCOSYLATED A1C: CPT | Performed by: INTERNAL MEDICINE

## 2024-07-10 RX ORDER — PREDNISONE 10 MG/1
TABLET ORAL
Qty: 21 TABLET | Refills: 0 | Status: SHIPPED | OUTPATIENT
Start: 2024-07-10

## 2024-07-10 RX ORDER — AZITHROMYCIN 250 MG/1
TABLET, FILM COATED ORAL
Qty: 6 TABLET | Refills: 0 | Status: SHIPPED | OUTPATIENT
Start: 2024-07-10 | End: 2024-07-14

## 2024-07-10 RX ORDER — ATORVASTATIN CALCIUM 20 MG/1
20 TABLET, FILM COATED ORAL DAILY
Qty: 90 TABLET | Refills: 1 | Status: SHIPPED | OUTPATIENT
Start: 2024-07-10

## 2024-07-10 NOTE — ASSESSMENT & PLAN NOTE
Will start him on azithromycin, will add prednisone taper.  Side effects discussed.  Update if no better in 3 days.

## 2024-07-10 NOTE — PROGRESS NOTES
Assessment/Plan:    Acute non-recurrent pansinusitis  Will start him on azithromycin, will add prednisone taper.  Side effects discussed.  Update if no better in 3 days.    Hyperlipidemia  Current dose of atorvastatin, recheck lipid panel with the next blood work.    Pre-diabetes  Hemoglobin A1c is in prediabetic range.  Continue with low-carb diet.       Diagnoses and all orders for this visit:    Pre-diabetes  -     POCT hemoglobin A1c    Acute non-recurrent pansinusitis  -     azithromycin (Zithromax) 250 mg tablet; Take 2 tablets (500 mg total) by mouth daily for 1 day, THEN 1 tablet (250 mg total) daily for 4 days.  -     predniSONE 10 mg tablet; Take 4 pills on day 1 and 2, 3 pills on days 3, 4 and 5, 2 pills on day 6,  7 then stop.    Hyperlipidemia, unspecified hyperlipidemia type  -     atorvastatin (LIPITOR) 20 mg tablet; Take 1 tablet (20 mg total) by mouth daily          Subjective:      Patient ID: Matty Mcintyre is a 78 y.o. male.    Patient came today for follow-up on his chronic medical problems and with concern of discomfort over his sinuses that he has for few weeks.        The following portions of the patient's history were reviewed and updated as appropriate: allergies, current medications, past family history, past medical history, past social history, past surgical history, and problem list.    Review of Systems   Constitutional:  Negative for appetite change, chills, fatigue and fever.   HENT:  Positive for sinus pain and sore throat. Negative for rhinorrhea.    Respiratory:  Negative for cough, chest tightness, shortness of breath and wheezing.    Cardiovascular:  Negative for chest pain, palpitations and leg swelling.   Gastrointestinal:  Negative for diarrhea, nausea and vomiting.   Musculoskeletal:  Negative for arthralgias and myalgias.         Objective:      /66 (BP Location: Left arm, Patient Position: Sitting, Cuff Size: Large)   Pulse 100   Temp 98.3 °F (36.8 °C) (Tympanic)   " Resp 16   Ht 5' 4\" (1.626 m)   Wt 87.1 kg (192 lb)   SpO2 97%   BMI 32.96 kg/m²     Allergies   Allergen Reactions    Amoxicillin Rash    Amoxicillin-Pot Clavulanate Er  [Amoxicillin-Pot Clavulanate] Hives and Rash    Codeine Rash and Shortness Of Breath    Penicillin G Rash    Albumen, Egg - Food Allergy     Other      pork    Pork-Derived Products - Food Allergy Hives    Prochlorperazine Other (See Comments) and Itching     rash all over the body    Compazine  [Prochlorperazine Edisylate] Rash    Iodinated Contrast Media Rash    Latex Rash and Itching    Valdecoxib Itching, Rash and Headache     Category: HEADACHES;           Current Outpatient Medications:     acetaminophen (TYLENOL) 325 mg tablet, Take 2 tablets (650 mg total) by mouth every 6 (six) hours as needed for mild pain or fever, Disp: 30 tablet, Rfl: 3    albuterol (PROVENTIL HFA,VENTOLIN HFA) 90 mcg/act inhaler, Inhale 1 puff every 6 (six) hours as needed (Q6H PRN), Disp: 54 g, Rfl: 5    ALPRAZolam (XANAX) 0.5 mg tablet, Take 1 tablet up to twice daily as needed., Disp: 60 tablet, Rfl: 0    amLODIPine (NORVASC) 10 mg tablet, Take 1 tablet by mouth once daily, Disp: 90 tablet, Rfl: 1    Ascorbic Acid (vitamin C) 100 MG tablet, Take 100 mg by mouth daily, Disp: , Rfl:     atorvastatin (LIPITOR) 20 mg tablet, Take 1 tablet (20 mg total) by mouth daily, Disp: 90 tablet, Rfl: 1    azithromycin (Zithromax) 250 mg tablet, Take 2 tablets (500 mg total) by mouth daily for 1 day, THEN 1 tablet (250 mg total) daily for 4 days., Disp: 6 tablet, Rfl: 0    betamethasone, augmented, (DIPROLENE) 0.05 % lotion, APPLY TO SCALP ONCE DAILY FOR 2 WEEKS AS NEEDED FOR ITCHING, Disp: , Rfl:     Carboxymethylcellul-Glycerin (REFRESH RELIEVA OP), Apply to eye, Disp: , Rfl:     citalopram (CeleXA) 40 mg tablet, Take 0.5 tablets (20 mg total) by mouth daily, Disp: 90 tablet, Rfl: 3    diclofenac sodium (VOLTAREN) 1 %, Apply 2 g topically 2 (two) times a day, Disp: , Rfl: "     diphenhydrAMINE (BENADRYL) 12.5 mg/5 mL oral liquid, Take by mouth 3 (three) times a day as needed for allergies, Disp: , Rfl:     finasteride (PROSCAR) 5 mg tablet, Take 1 tablet (5 mg total) by mouth daily, Disp: 90 tablet, Rfl: 1    fluocinolone (SYNALAR) 0.01 % external solution, APPLY TOPICALLY TO SCALP ONCE A DAY AS NEEDED FOR PSORIASIS, Disp: , Rfl:     gabapentin (NEURONTIN) 300 mg capsule, Take 1 capsule (300 mg total) by mouth 3 (three) times a day, Disp: 90 capsule, Rfl: 0    hydrocortisone valerate (WEST-VITO) 0.2 % ointment, Apply twice daily to itchy areas as needed., Disp: 45 g, Rfl: 1    hydrOXYzine HCL (ATARAX) 25 mg tablet, Take 1 tablet (25 mg total) by mouth every 8 (eight) hours as needed for itching, Disp: 60 tablet, Rfl: 0    lisinopril-hydrochlorothiazide (PRINZIDE,ZESTORETIC) 20-12.5 MG per tablet, Take 1 tablet by mouth once daily, Disp: 90 tablet, Rfl: 1    nystatin (MYCOSTATIN) cream, Apply topically 2 (two) times a day Apply twice daily as needed for rectal itching., Disp: 30 g, Rfl: 5    pantoprazole (PROTONIX) 40 mg tablet, Take 1 tablet (40 mg total) by mouth daily, Disp: 90 tablet, Rfl: 3    predniSONE 10 mg tablet, Take 4 pills on day 1 and 2, 3 pills on days 3, 4 and 5, 2 pills on day 6,  7 then stop., Disp: 21 tablet, Rfl: 0    Risankizumab-rzaa (SKYRIZI PEN SC), Inject under the skin Q 4 weeks via Dermatology, Disp: , Rfl:     simethicone (MYLICON,GAS-X) 180 MG capsule, Take 1 capsule (180 mg total) by mouth every 6 (six) hours as needed for flatulence, Disp: 60 capsule, Rfl: 0    tacrolimus (PROTOPIC) 0.1 % ointment, APPLY TO EARS TWICE A DAY WHEN NEEDED, Disp: , Rfl:     tamsulosin (FLOMAX) 0.4 mg, Take 1 capsule (0.4 mg total) by mouth daily with dinner, Disp: 90 capsule, Rfl: 3    triamcinolone (KENALOG) 0.1 % cream, Apply up to 3 times daily on itchy skin lesions as needed., Disp: 45 g, Rfl: 5    vitamin B-12 (VITAMIN B-12) 1,000 mcg tablet, Take 1,000 mcg by mouth daily   , Disp: , Rfl:     VITAMIN D, ERGOCALCIFEROL, PO, Take 2,000 Units by mouth, Disp: , Rfl:     Vitamins-Lipotropics (LIPO FLAVONOID PLUS PO), Take by mouth, Disp: , Rfl:     dextromethorphan-guaifenesin (MUCINEX DM)  MG per 12 hr tablet, Take 1 tablet by mouth every 12 (twelve) hours (Patient not taking: Reported on 2/29/2024), Disp: 60 tablet, Rfl: 0     There are no Patient Instructions on file for this visit.        Physical Exam  Constitutional:       General: He is not in acute distress.     Appearance: He is not ill-appearing or toxic-appearing.   HENT:      Nose: Congestion and rhinorrhea present.      Mouth/Throat:      Pharynx: No oropharyngeal exudate or posterior oropharyngeal erythema.   Pulmonary:      Effort: No respiratory distress.      Breath sounds: No wheezing or rales.

## 2024-07-20 DIAGNOSIS — R73.9 HYPERGLYCEMIA: ICD-10-CM

## 2024-07-20 DIAGNOSIS — E78.2 MIXED HYPERLIPIDEMIA: ICD-10-CM

## 2024-07-20 DIAGNOSIS — I10 HYPERTENSION, UNSPECIFIED TYPE: ICD-10-CM

## 2024-07-20 RX ORDER — LISINOPRIL AND HYDROCHLOROTHIAZIDE 20; 12.5 MG/1; MG/1
1 TABLET ORAL DAILY
Qty: 90 TABLET | Refills: 1 | Status: SHIPPED | OUTPATIENT
Start: 2024-07-20

## 2024-08-06 DIAGNOSIS — I10 HYPERTENSION, UNSPECIFIED TYPE: ICD-10-CM

## 2024-08-06 DIAGNOSIS — E78.2 MIXED HYPERLIPIDEMIA: ICD-10-CM

## 2024-08-06 DIAGNOSIS — R73.9 HYPERGLYCEMIA: ICD-10-CM

## 2024-08-06 DIAGNOSIS — N40.1 BENIGN PROSTATIC HYPERPLASIA WITH INCOMPLETE BLADDER EMPTYING: ICD-10-CM

## 2024-08-06 DIAGNOSIS — R39.14 BENIGN PROSTATIC HYPERPLASIA WITH INCOMPLETE BLADDER EMPTYING: ICD-10-CM

## 2024-08-06 RX ORDER — FINASTERIDE 5 MG/1
5 TABLET, FILM COATED ORAL DAILY
Qty: 90 TABLET | Refills: 0 | Status: SHIPPED | OUTPATIENT
Start: 2024-08-06

## 2024-08-06 RX ORDER — LISINOPRIL AND HYDROCHLOROTHIAZIDE 20; 12.5 MG/1; MG/1
1 TABLET ORAL DAILY
Qty: 100 TABLET | Refills: 1 | Status: SHIPPED | OUTPATIENT
Start: 2024-08-06

## 2024-08-09 PROBLEM — J01.40 ACUTE NON-RECURRENT PANSINUSITIS: Status: RESOLVED | Noted: 2024-07-10 | Resolved: 2024-08-09

## 2024-09-17 ENCOUNTER — IMMUNIZATIONS (OUTPATIENT)
Dept: FAMILY MEDICINE CLINIC | Facility: CLINIC | Age: 79
End: 2024-09-17
Payer: COMMERCIAL

## 2024-09-17 DIAGNOSIS — F41.9 ANXIETY: ICD-10-CM

## 2024-09-17 DIAGNOSIS — Z23 ENCOUNTER FOR IMMUNIZATION: Primary | ICD-10-CM

## 2024-09-17 PROCEDURE — G0008 ADMIN INFLUENZA VIRUS VAC: HCPCS

## 2024-09-17 PROCEDURE — 90662 IIV NO PRSV INCREASED AG IM: CPT

## 2024-09-17 RX ORDER — ALPRAZOLAM 0.5 MG
TABLET ORAL
Qty: 60 TABLET | Refills: 0 | Status: SHIPPED | OUTPATIENT
Start: 2024-09-17

## 2024-10-04 ENCOUNTER — APPOINTMENT (OUTPATIENT)
Dept: LAB | Facility: MEDICAL CENTER | Age: 79
End: 2024-10-04
Payer: COMMERCIAL

## 2024-10-04 ENCOUNTER — OFFICE VISIT (OUTPATIENT)
Dept: FAMILY MEDICINE CLINIC | Facility: CLINIC | Age: 79
End: 2024-10-04
Payer: COMMERCIAL

## 2024-10-04 VITALS
RESPIRATION RATE: 16 BRPM | SYSTOLIC BLOOD PRESSURE: 130 MMHG | WEIGHT: 186.8 LBS | OXYGEN SATURATION: 97 % | DIASTOLIC BLOOD PRESSURE: 84 MMHG | BODY MASS INDEX: 31.89 KG/M2 | TEMPERATURE: 97.8 F | HEIGHT: 64 IN | HEART RATE: 75 BPM

## 2024-10-04 DIAGNOSIS — Z22.7 TB LUNG, LATENT: ICD-10-CM

## 2024-10-04 DIAGNOSIS — G25.0 ESSENTIAL TREMOR: ICD-10-CM

## 2024-10-04 DIAGNOSIS — E78.2 MIXED HYPERLIPIDEMIA: ICD-10-CM

## 2024-10-04 DIAGNOSIS — K21.9 GASTROESOPHAGEAL REFLUX DISEASE WITHOUT ESOPHAGITIS: ICD-10-CM

## 2024-10-04 DIAGNOSIS — J45.20 MILD INTERMITTENT ASTHMA WITHOUT COMPLICATION: ICD-10-CM

## 2024-10-04 DIAGNOSIS — R73.03 PRE-DIABETES: ICD-10-CM

## 2024-10-04 DIAGNOSIS — F32.4 MAJOR DEPRESSIVE DISORDER WITH SINGLE EPISODE, IN PARTIAL REMISSION (HCC): ICD-10-CM

## 2024-10-04 DIAGNOSIS — R35.1 BPH ASSOCIATED WITH NOCTURIA: ICD-10-CM

## 2024-10-04 DIAGNOSIS — M54.17 LUMBOSACRAL RADICULITIS: ICD-10-CM

## 2024-10-04 DIAGNOSIS — L40.9 PSORIASIS: ICD-10-CM

## 2024-10-04 DIAGNOSIS — I10 ESSENTIAL HYPERTENSION: Primary | ICD-10-CM

## 2024-10-04 DIAGNOSIS — E55.9 VITAMIN D DEFICIENCY: ICD-10-CM

## 2024-10-04 DIAGNOSIS — H72.91 PERFORATION OF RIGHT TYMPANIC MEMBRANE: ICD-10-CM

## 2024-10-04 DIAGNOSIS — I10 ESSENTIAL HYPERTENSION: ICD-10-CM

## 2024-10-04 DIAGNOSIS — N40.1 BPH ASSOCIATED WITH NOCTURIA: ICD-10-CM

## 2024-10-04 LAB
ALBUMIN SERPL BCG-MCNC: 4.3 G/DL (ref 3.5–5)
ALP SERPL-CCNC: 44 U/L (ref 34–104)
ALT SERPL W P-5'-P-CCNC: 19 U/L (ref 7–52)
ANION GAP SERPL CALCULATED.3IONS-SCNC: 9 MMOL/L (ref 4–13)
AST SERPL W P-5'-P-CCNC: 16 U/L (ref 13–39)
BASOPHILS # BLD AUTO: 0.05 THOUSANDS/ΜL (ref 0–0.1)
BASOPHILS NFR BLD AUTO: 1 % (ref 0–1)
BILIRUB SERPL-MCNC: 1.16 MG/DL (ref 0.2–1)
BUN SERPL-MCNC: 14 MG/DL (ref 5–25)
CALCIUM SERPL-MCNC: 9.7 MG/DL (ref 8.4–10.2)
CHLORIDE SERPL-SCNC: 100 MMOL/L (ref 96–108)
CHOLEST SERPL-MCNC: 117 MG/DL
CO2 SERPL-SCNC: 32 MMOL/L (ref 21–32)
CREAT SERPL-MCNC: 1.29 MG/DL (ref 0.6–1.3)
EOSINOPHIL # BLD AUTO: 0.7 THOUSAND/ΜL (ref 0–0.61)
EOSINOPHIL NFR BLD AUTO: 11 % (ref 0–6)
ERYTHROCYTE [DISTWIDTH] IN BLOOD BY AUTOMATED COUNT: 12.9 % (ref 11.6–15.1)
EST. AVERAGE GLUCOSE BLD GHB EST-MCNC: 131 MG/DL
GFR SERPL CREATININE-BSD FRML MDRD: 51 ML/MIN/1.73SQ M
GLUCOSE P FAST SERPL-MCNC: 119 MG/DL (ref 65–99)
HBA1C MFR BLD: 6.2 %
HCT VFR BLD AUTO: 43.6 % (ref 36.5–49.3)
HDLC SERPL-MCNC: 37 MG/DL
HGB BLD-MCNC: 14.7 G/DL (ref 12–17)
IMM GRANULOCYTES # BLD AUTO: 0.02 THOUSAND/UL (ref 0–0.2)
IMM GRANULOCYTES NFR BLD AUTO: 0 % (ref 0–2)
LDLC SERPL CALC-MCNC: 66 MG/DL (ref 0–100)
LYMPHOCYTES # BLD AUTO: 1.75 THOUSANDS/ΜL (ref 0.6–4.47)
LYMPHOCYTES NFR BLD AUTO: 27 % (ref 14–44)
MCH RBC QN AUTO: 29.4 PG (ref 26.8–34.3)
MCHC RBC AUTO-ENTMCNC: 33.7 G/DL (ref 31.4–37.4)
MCV RBC AUTO: 87 FL (ref 82–98)
MONOCYTES # BLD AUTO: 0.67 THOUSAND/ΜL (ref 0.17–1.22)
MONOCYTES NFR BLD AUTO: 11 % (ref 4–12)
NEUTROPHILS # BLD AUTO: 3.22 THOUSANDS/ΜL (ref 1.85–7.62)
NEUTS SEG NFR BLD AUTO: 50 % (ref 43–75)
NRBC BLD AUTO-RTO: 0 /100 WBCS
PLATELET # BLD AUTO: 223 THOUSANDS/UL (ref 149–390)
PMV BLD AUTO: 11.1 FL (ref 8.9–12.7)
POTASSIUM SERPL-SCNC: 4.6 MMOL/L (ref 3.5–5.3)
PROT SERPL-MCNC: 6.8 G/DL (ref 6.4–8.4)
RBC # BLD AUTO: 5 MILLION/UL (ref 3.88–5.62)
SODIUM SERPL-SCNC: 141 MMOL/L (ref 135–147)
TRIGL SERPL-MCNC: 68 MG/DL
TSH SERPL DL<=0.05 MIU/L-ACNC: 1.78 UIU/ML (ref 0.45–4.5)
WBC # BLD AUTO: 6.41 THOUSAND/UL (ref 4.31–10.16)

## 2024-10-04 PROCEDURE — 99214 OFFICE O/P EST MOD 30 MIN: CPT | Performed by: FAMILY MEDICINE

## 2024-10-04 PROCEDURE — 83036 HEMOGLOBIN GLYCOSYLATED A1C: CPT

## 2024-10-04 PROCEDURE — G2211 COMPLEX E/M VISIT ADD ON: HCPCS | Performed by: FAMILY MEDICINE

## 2024-10-04 PROCEDURE — 80061 LIPID PANEL: CPT

## 2024-10-04 PROCEDURE — 85025 COMPLETE CBC W/AUTO DIFF WBC: CPT

## 2024-10-04 PROCEDURE — 80053 COMPREHEN METABOLIC PANEL: CPT

## 2024-10-04 PROCEDURE — 84443 ASSAY THYROID STIM HORMONE: CPT

## 2024-10-04 PROCEDURE — 36415 COLL VENOUS BLD VENIPUNCTURE: CPT

## 2024-10-04 RX ORDER — GABAPENTIN 300 MG/1
300 CAPSULE ORAL 3 TIMES DAILY
Qty: 270 CAPSULE | Refills: 3 | Status: SHIPPED | OUTPATIENT
Start: 2024-10-04

## 2024-10-04 NOTE — ASSESSMENT & PLAN NOTE
Check labs now and then in 6 months  Orders:    Vitamin D 25 hydroxy; Future    Vitamin D 25 hydroxy

## 2024-10-04 NOTE — ASSESSMENT & PLAN NOTE
Tremors minimal.  Orders:    Comprehensive metabolic panel; Future    TSH, 3rd generation with Free T4 reflex; Future    Comprehensive metabolic panel    TSH, 3rd generation with Free T4 reflex    CBC and differential; Future    TSH, 3rd generation with Free T4 reflex; Future    CBC and differential    TSH, 3rd generation with Free T4 reflex

## 2024-10-04 NOTE — ASSESSMENT & PLAN NOTE
Continue Derm follow-up.  He is quite stable.  Orders:    TSH, 3rd generation with Free T4 reflex; Future    TSH, 3rd generation with Free T4 reflex

## 2024-10-04 NOTE — PROGRESS NOTES
Ambulatory Visit  Name: Matty Mcintyre      : 1945      MRN: [id number]  Encounter Provider: Aaron Meadows Jr, MD  Encounter Date: 10/4/2024   Encounter department: [facility]    Assessment & Plan  Essential hypertension  Blood pressure is very well-controlled today.  Action repeat it was on the lower side at 100/60.  He is going to continue to monitor his blood pressure at home, where it has been mostly in the 120s to 130s systolic and well-controlled diastolic.  If he is running below 120/80, I am going to decrease his amlodipine to 5 mg daily.  He also should contact me if he is having any lightheadedness.  Labs will be checked as outlined.  Orders:    Comprehensive metabolic panel; Future    Lipid Panel with Direct LDL reflex; Future    CBC and differential; Future    Comprehensive metabolic panel    Lipid Panel with Direct LDL reflex    CBC and differential    CBC and differential; Future    Comprehensive metabolic panel; Future    CBC and differential    Comprehensive metabolic panel    Mild intermittent asthma without complication  Asthma is quite stable despite the time of year       TB lung, latent  Status post treatment       Mixed hyperlipidemia  Check labs as outlined.  He will get blood work today and then prior to his next visit  Orders:    Comprehensive metabolic panel; Future    Lipid Panel with Direct LDL reflex; Future    Comprehensive metabolic panel    Lipid Panel with Direct LDL reflex    CBC and differential; Future    Comprehensive metabolic panel; Future    Lipid Panel with Direct LDL reflex; Future    CBC and differential    Comprehensive metabolic panel    Lipid Panel with Direct LDL reflex    BPH associated with nocturia  Symptoms are stable.  Continue current regimen.       Major depressive disorder with single episode, in partial remission (HCC)  Mood is quite stable with citalopram         Vitamin D deficiency  Check labs now and then in 6  months  Orders:    Vitamin D 25 hydroxy; Future    Vitamin D 25 hydroxy    Pre-diabetes    Orders:    Comprehensive metabolic panel; Future    Hemoglobin A1C; Future    Lipid Panel with Direct LDL reflex; Future    CBC and differential; Future    Comprehensive metabolic panel    Lipid Panel with Direct LDL reflex    CBC and differential    Hemoglobin A1C; Future    Perforation of right tympanic membrane w remote surgery both ears.  Check labs now and then in 6 months       Psoriasis  Continue Derm follow-up.  He is quite stable.  Orders:    TSH, 3rd generation with Free T4 reflex; Future    TSH, 3rd generation with Free T4 reflex    Essential tremor  Tremors minimal.  Orders:    Comprehensive metabolic panel; Future    TSH, 3rd generation with Free T4 reflex; Future    Comprehensive metabolic panel    TSH, 3rd generation with Free T4 reflex    CBC and differential; Future    TSH, 3rd generation with Free T4 reflex; Future    CBC and differential    TSH, 3rd generation with Free T4 reflex    Gastroesophageal reflux disease without esophagitis  Reflux stable with pantoprazole  Orders:    Magnesium; Future    Magnesium    Lumbosacral radiculitis  He is status post lumbar epidural in June which was not helpful unfortunately.  Gabapentin is refilled today.  Orders:    gabapentin (NEURONTIN) 300 mg capsule; Take 1 capsule (300 mg total) by mouth 3 (three) times a day       History of Present Illness     HPI  The patient presents today for follow-up for chronic health issues.  Active feels quite well.  Is been on a diet and is lost 10 pounds.  He has occasional lightheadedness.  Blood pressure has been quite excellent at home but not low.  He denies any probs of chest pain, shortness of breath or palpitations.  He has some chronic back pain and epidurals unfortunately not effective.    Review of Systems   Constitutional:  Negative for appetite change, chills, fatigue, fever and unexpected weight change.   HENT:  Negative  "for trouble swallowing.    Eyes:  Negative for visual disturbance.   Respiratory:  Negative for cough, chest tightness, shortness of breath and wheezing.    Cardiovascular:  Negative for chest pain, palpitations and leg swelling.   Gastrointestinal:  Negative for abdominal distention, abdominal pain, blood in stool, constipation and diarrhea.   Endocrine: Negative for polyuria.   Genitourinary:  Negative for difficulty urinating and flank pain.   Musculoskeletal:  Positive for back pain. Negative for arthralgias, gait problem and myalgias.   Skin:  Negative for rash.   Neurological:  Negative for dizziness and light-headedness.   Hematological:  Negative for adenopathy. Does not bruise/bleed easily.   Psychiatric/Behavioral:  Negative for dysphoric mood and sleep disturbance. The patient is not nervous/anxious.            Objective     /84 (BP Location: Left arm, Patient Position: Sitting, Cuff Size: Large)   Pulse 75   Temp 97.8 °F (36.6 °C) (Temporal)   Resp 16   Ht 5' 4\" (1.626 m)   Wt 84.7 kg (186 lb 12.8 oz)   SpO2 97%   BMI 32.06 kg/m²     Physical Exam  Constitutional:       General: He is not in acute distress.     Appearance: Normal appearance. He is well-developed. He is obese. He is not diaphoretic.   HENT:      Head: Normocephalic.      Right Ear: External ear normal.      Left Ear: External ear normal.      Nose: Nose normal.   Eyes:      General:         Right eye: No discharge.         Left eye: No discharge.      Conjunctiva/sclera: Conjunctivae normal.      Pupils: Pupils are equal, round, and reactive to light.   Neck:      Thyroid: No thyromegaly.      Trachea: No tracheal deviation.   Cardiovascular:      Rate and Rhythm: Normal rate and regular rhythm.      Heart sounds: Normal heart sounds. No murmur heard.     No friction rub.   Pulmonary:      Effort: Pulmonary effort is normal. No respiratory distress.      Breath sounds: Normal breath sounds. No wheezing.   Chest:      Chest " wall: No tenderness.   Abdominal:      General: There is no distension.      Palpations: There is no mass.      Tenderness: There is no abdominal tenderness. There is no guarding or rebound.      Hernia: No hernia is present.   Musculoskeletal:         General: No swelling or deformity.      Cervical back: Normal range of motion.      Right lower leg: No edema.      Left lower leg: No edema.   Skin:     Findings: No erythema or rash.   Neurological:      General: No focal deficit present.      Mental Status: He is alert.      Cranial Nerves: No cranial nerve deficit.      Coordination: Coordination normal.   Psychiatric:         Thought Content: Thought content normal.

## 2024-10-04 NOTE — ASSESSMENT & PLAN NOTE
Orders:    Comprehensive metabolic panel; Future    Hemoglobin A1C; Future    Lipid Panel with Direct LDL reflex; Future    CBC and differential; Future    Comprehensive metabolic panel    Lipid Panel with Direct LDL reflex    CBC and differential    Hemoglobin A1C; Future

## 2024-10-04 NOTE — ASSESSMENT & PLAN NOTE
Blood pressure is very well-controlled today.  Action repeat it was on the lower side at 100/60.  He is going to continue to monitor his blood pressure at home, where it has been mostly in the 120s to 130s systolic and well-controlled diastolic.  If he is running below 120/80, I am going to decrease his amlodipine to 5 mg daily.  He also should contact me if he is having any lightheadedness.  Labs will be checked as outlined.  Orders:    Comprehensive metabolic panel; Future    Lipid Panel with Direct LDL reflex; Future    CBC and differential; Future    Comprehensive metabolic panel    Lipid Panel with Direct LDL reflex    CBC and differential    CBC and differential; Future    Comprehensive metabolic panel; Future    CBC and differential    Comprehensive metabolic panel

## 2024-10-04 NOTE — ASSESSMENT & PLAN NOTE
Check labs as outlined.  He will get blood work today and then prior to his next visit  Orders:    Comprehensive metabolic panel; Future    Lipid Panel with Direct LDL reflex; Future    Comprehensive metabolic panel    Lipid Panel with Direct LDL reflex    CBC and differential; Future    Comprehensive metabolic panel; Future    Lipid Panel with Direct LDL reflex; Future    CBC and differential    Comprehensive metabolic panel    Lipid Panel with Direct LDL reflex

## 2024-11-05 ENCOUNTER — TELEPHONE (OUTPATIENT)
Age: 79
End: 2024-11-05

## 2024-11-11 DIAGNOSIS — F41.9 ANXIETY: ICD-10-CM

## 2024-11-11 DIAGNOSIS — R39.14 BENIGN PROSTATIC HYPERPLASIA WITH INCOMPLETE BLADDER EMPTYING: ICD-10-CM

## 2024-11-11 DIAGNOSIS — N40.1 BENIGN PROSTATIC HYPERPLASIA WITH INCOMPLETE BLADDER EMPTYING: ICD-10-CM

## 2024-11-11 RX ORDER — ALPRAZOLAM 0.5 MG
TABLET ORAL
Qty: 60 TABLET | Refills: 0 | Status: SHIPPED | OUTPATIENT
Start: 2024-11-11

## 2024-11-12 NOTE — TELEPHONE ENCOUNTER
Refill must be reviewed and completed by the office or provider. The refill is unable to be approved or denied by the medication management team.    Patient not seen > 2 years

## 2024-11-13 RX ORDER — FINASTERIDE 5 MG/1
5 TABLET, FILM COATED ORAL DAILY
Qty: 90 TABLET | Refills: 0 | Status: SHIPPED | OUTPATIENT
Start: 2024-11-13

## 2024-12-06 DIAGNOSIS — F41.9 ANXIETY: ICD-10-CM

## 2024-12-06 RX ORDER — CITALOPRAM HYDROBROMIDE 40 MG/1
20 TABLET ORAL DAILY
Qty: 90 TABLET | Refills: 0 | Status: SHIPPED | OUTPATIENT
Start: 2024-12-06 | End: 2024-12-11

## 2024-12-09 DIAGNOSIS — I10 HYPERTENSION, UNSPECIFIED TYPE: ICD-10-CM

## 2024-12-10 DIAGNOSIS — F41.9 ANXIETY: ICD-10-CM

## 2024-12-10 RX ORDER — AMLODIPINE BESYLATE 10 MG/1
10 TABLET ORAL DAILY
Qty: 90 TABLET | Refills: 1 | Status: SHIPPED | OUTPATIENT
Start: 2024-12-10

## 2024-12-11 RX ORDER — CITALOPRAM HYDROBROMIDE 40 MG/1
40 TABLET ORAL DAILY
Qty: 100 TABLET | Refills: 3 | Status: SHIPPED | OUTPATIENT
Start: 2024-12-11 | End: 2024-12-11 | Stop reason: SDUPTHER

## 2024-12-12 ENCOUNTER — TELEPHONE (OUTPATIENT)
Age: 79
End: 2024-12-12

## 2024-12-12 RX ORDER — CITALOPRAM HYDROBROMIDE 40 MG/1
40 TABLET ORAL DAILY
Qty: 100 TABLET | Refills: 3 | Status: SHIPPED | OUTPATIENT
Start: 2024-12-12

## 2024-12-12 NOTE — TELEPHONE ENCOUNTER
Please call [facility]. There is a discrepancy with his citalopram. He is supposed to take 1 40mg tab a day but on their instructions is says take 1/2 a tab a day. They said the office must clarify. The instructions on our end are correct but the pharmacy has different instructions. Please call.

## 2024-12-12 NOTE — TELEPHONE ENCOUNTER
Script addendum was sent to pharmacy. They are currently working on the fill. Pt ran out as he was taking full tablet whereas the script was only sent for 1/2 tab daily.

## 2025-01-02 DIAGNOSIS — E78.5 HYPERLIPIDEMIA, UNSPECIFIED HYPERLIPIDEMIA TYPE: ICD-10-CM

## 2025-01-03 RX ORDER — ATORVASTATIN CALCIUM 20 MG/1
20 TABLET, FILM COATED ORAL DAILY
Qty: 90 TABLET | Refills: 1 | Status: SHIPPED | OUTPATIENT
Start: 2025-01-03

## 2025-01-22 DIAGNOSIS — F41.9 ANXIETY: ICD-10-CM

## 2025-01-23 RX ORDER — ALPRAZOLAM 0.5 MG
TABLET ORAL
Qty: 60 TABLET | Refills: 0 | Status: SHIPPED | OUTPATIENT
Start: 2025-01-23

## 2025-01-29 ENCOUNTER — APPOINTMENT (OUTPATIENT)
Dept: RADIOLOGY | Facility: MEDICAL CENTER | Age: 80
End: 2025-01-29
Payer: COMMERCIAL

## 2025-01-29 DIAGNOSIS — L40.0 PSORIASIS VULGARIS: ICD-10-CM

## 2025-01-29 PROCEDURE — 71046 X-RAY EXAM CHEST 2 VIEWS: CPT

## 2025-02-10 DIAGNOSIS — R39.14 BENIGN PROSTATIC HYPERPLASIA WITH INCOMPLETE BLADDER EMPTYING: ICD-10-CM

## 2025-02-10 DIAGNOSIS — N40.1 BENIGN PROSTATIC HYPERPLASIA WITH INCOMPLETE BLADDER EMPTYING: ICD-10-CM

## 2025-02-11 RX ORDER — FINASTERIDE 5 MG/1
5 TABLET, FILM COATED ORAL DAILY
Qty: 90 TABLET | Refills: 3 | Status: SHIPPED | OUTPATIENT
Start: 2025-02-11

## 2025-02-25 DIAGNOSIS — R39.14 BENIGN PROSTATIC HYPERPLASIA WITH INCOMPLETE BLADDER EMPTYING: ICD-10-CM

## 2025-02-25 DIAGNOSIS — N40.1 BENIGN PROSTATIC HYPERPLASIA WITH INCOMPLETE BLADDER EMPTYING: ICD-10-CM

## 2025-02-25 RX ORDER — TAMSULOSIN HYDROCHLORIDE 0.4 MG/1
0.4 CAPSULE ORAL
Qty: 90 CAPSULE | Refills: 1 | Status: SHIPPED | OUTPATIENT
Start: 2025-02-25

## 2025-02-25 NOTE — TELEPHONE ENCOUNTER
Patient following up on refill request. Made aware refill sent to pharmacy this afternoon. Patient to follow up with pharmacy

## 2025-03-13 DIAGNOSIS — F41.9 ANXIETY: ICD-10-CM

## 2025-03-14 RX ORDER — ALPRAZOLAM 0.5 MG
TABLET ORAL
Qty: 60 TABLET | Refills: 0 | Status: SHIPPED | OUTPATIENT
Start: 2025-03-14

## 2025-03-14 NOTE — TELEPHONE ENCOUNTER
Patient following up on refill request. Reporting that he has called 4 times over several days to try and get his alprazolam filled with no success. States he is very disappointed and would like for refill to be sent to pharmacy before the weekend as he is out of medication.

## 2025-03-14 NOTE — TELEPHONE ENCOUNTER
Patient calling for update  on prescription for ALPRAZolam (XANAX) 0.5 mg tablet. Patient states he does not have any left and is at the pharmacy, [facility] [address] [place], [place] [id number]. Warm transferred to office clinical to further assist.

## 2025-04-04 DIAGNOSIS — F41.9 ANXIETY: ICD-10-CM

## 2025-04-04 RX ORDER — CITALOPRAM HYDROBROMIDE 40 MG/1
40 TABLET ORAL DAILY
Qty: 100 TABLET | Refills: 1 | Status: SHIPPED | OUTPATIENT
Start: 2025-04-04

## 2025-04-04 NOTE — TELEPHONE ENCOUNTER
Pharmacy did not dispense the refills for patient. Please send the refill    Reason for call:   [x] Refill   [] Prior Auth  [] Other:     Office:   [x] PCP/Provider - [facility]  Authorized By: Aaron Fuller Jr., MD    [] Specialty/Provider -     Medication: citalopram (CeleXA) 40 mg tablet    Dose/Frequency: Take 1 tablet (40 mg total) by mouth daily,     Quantity: 100    Pharmacy: [facility]   Does the patient have enough for 3 days?   [] Yes   [x] No - Send as HP to POD    Mail Away Pharmacy   Does the patient have enough for 10 days?   [] Yes   [] No - Send as HP to POD

## 2025-04-23 ENCOUNTER — OFFICE VISIT (OUTPATIENT)
Dept: FAMILY MEDICINE CLINIC | Facility: CLINIC | Age: 80
End: 2025-04-23
Payer: COMMERCIAL

## 2025-04-23 DIAGNOSIS — N18.31 STAGE 3A CHRONIC KIDNEY DISEASE (HCC): ICD-10-CM

## 2025-04-23 DIAGNOSIS — K00.9 DENTAL ANOMALY: ICD-10-CM

## 2025-04-23 DIAGNOSIS — E78.2 MIXED HYPERLIPIDEMIA: ICD-10-CM

## 2025-04-23 DIAGNOSIS — M48.062 SPINAL STENOSIS OF LUMBAR REGION WITH NEUROGENIC CLAUDICATION: ICD-10-CM

## 2025-04-23 DIAGNOSIS — F32.4 MAJOR DEPRESSIVE DISORDER WITH SINGLE EPISODE, IN PARTIAL REMISSION (HCC): ICD-10-CM

## 2025-04-23 DIAGNOSIS — R97.20 ELEVATED PSA: ICD-10-CM

## 2025-04-23 DIAGNOSIS — L23.9 ALLERGIC DERMATITIS: ICD-10-CM

## 2025-04-23 DIAGNOSIS — Z00.00 MEDICARE ANNUAL WELLNESS VISIT, SUBSEQUENT: Primary | ICD-10-CM

## 2025-04-23 DIAGNOSIS — M19.90 INFLAMMATORY ARTHROPATHY: ICD-10-CM

## 2025-04-23 DIAGNOSIS — I10 ESSENTIAL HYPERTENSION: ICD-10-CM

## 2025-04-23 DIAGNOSIS — L40.9 PSORIASIS: ICD-10-CM

## 2025-04-23 DIAGNOSIS — R73.03 PRE-DIABETES: ICD-10-CM

## 2025-04-23 DIAGNOSIS — L30.9 DERMATITIS: ICD-10-CM

## 2025-04-23 DIAGNOSIS — J30.89 NON-SEASONAL ALLERGIC RHINITIS, UNSPECIFIED TRIGGER: ICD-10-CM

## 2025-04-23 DIAGNOSIS — E55.9 VITAMIN D DEFICIENCY: ICD-10-CM

## 2025-04-23 PROBLEM — M72.2 PLANTAR FASCIAL FIBROMATOSIS: Status: RESOLVED | Noted: 2018-07-17 | Resolved: 2025-04-23

## 2025-04-23 PROCEDURE — G2211 COMPLEX E/M VISIT ADD ON: HCPCS | Performed by: FAMILY MEDICINE

## 2025-04-23 PROCEDURE — 99214 OFFICE O/P EST MOD 30 MIN: CPT | Performed by: FAMILY MEDICINE

## 2025-04-23 PROCEDURE — G0439 PPPS, SUBSEQ VISIT: HCPCS | Performed by: FAMILY MEDICINE

## 2025-04-23 RX ORDER — METHYLPREDNISOLONE 4 MG/1
TABLET ORAL
Qty: 21 TABLET | Refills: 0 | Status: SHIPPED | OUTPATIENT
Start: 2025-04-23

## 2025-04-23 RX ORDER — HYDROXYZINE HYDROCHLORIDE 25 MG/1
25 TABLET, FILM COATED ORAL EVERY 8 HOURS PRN
Qty: 60 TABLET | Refills: 0 | Status: SHIPPED | OUTPATIENT
Start: 2025-04-23

## 2025-04-23 RX ORDER — CETIRIZINE HYDROCHLORIDE 10 MG/1
10 TABLET ORAL DAILY
Start: 2025-04-23

## 2025-04-23 NOTE — ASSESSMENT & PLAN NOTE
He has a history of dermatitis with what appears to be an active allergic dermatitis.  See below.  Orders:    CBC and differential; Future    TSH, 3rd generation with Free T4 reflex; Future

## 2025-04-23 NOTE — ASSESSMENT & PLAN NOTE
he has a history of some inflammatory arthropathy which is stable at this time.  She is not following with rheumatology or taking medicines for this.  Orders:    TSH, 3rd generation with Free T4 reflex; Future

## 2025-04-23 NOTE — ASSESSMENT & PLAN NOTE
Depression seems to be stable and he is doing quite well.    Orders:    TSH, 3rd generation with Free T4 reflex; Future

## 2025-04-23 NOTE — ASSESSMENT & PLAN NOTE
He would like to continue to monitor PSA in light of his history of elevated PSA.  He is not currently following with urology.  Orders:    PSA Total, Diagnostic; Future

## 2025-04-23 NOTE — ASSESSMENT & PLAN NOTE
Lab Results   Component Value Date    EGFR 51 10/04/2024    EGFR 55 04/20/2024    EGFR 73 11/21/2023    CREATININE 1.29 10/04/2024    CREATININE 1.23 04/20/2024    CREATININE 1.04 11/21/2023   Check labs as outlined.  This does remain quite stable.    Orders:    CBC and differential; Future    Comprehensive metabolic panel; Future    Hemoglobin A1C; Future    Lipid Panel with Direct LDL reflex; Future    TSH, 3rd generation with Free T4 reflex; Future    Vitamin D 25 hydroxy; Future    PTH, intact; Future

## 2025-04-23 NOTE — ASSESSMENT & PLAN NOTE
He has chronic symptoms of spinal stenosis but is working through it and trying to remain active.

## 2025-04-23 NOTE — ASSESSMENT & PLAN NOTE
Blood pressure is controlled at this time.  Continue routine monitoring.  Orders:    CBC and differential; Future    Comprehensive metabolic panel; Future    Lipid Panel with Direct LDL reflex; Future

## 2025-04-23 NOTE — PROGRESS NOTES
Name: Matty Mcintyre      : 1945      MRN: [id number]  Encounter Provider: Aaron Meadows Jr, MD  Encounter Date: 2025   Encounter department: [facility]  :  Assessment & Plan  Medicare annual wellness visit, subsequent  he declines COVID-vaccine.  She is up-to-date on other vaccines except for RSV and she will think about getting that at the pharmacy       Stage 3a chronic kidney disease (HCC)  Lab Results   Component Value Date    EGFR 51 10/04/2024    EGFR 55 2024    EGFR 73 2023    CREATININE 1.29 10/04/2024    CREATININE 1.23 2024    CREATININE 1.04 2023   Check labs as outlined.  This does remain quite stable.    Orders:    CBC and differential; Future    Comprehensive metabolic panel; Future    Hemoglobin A1C; Future    Lipid Panel with Direct LDL reflex; Future    TSH, 3rd generation with Free T4 reflex; Future    Vitamin D 25 hydroxy; Future    PTH, intact; Future    Essential hypertension  Blood pressure is controlled at this time.  Continue routine monitoring.  Orders:    CBC and differential; Future    Comprehensive metabolic panel; Future    Lipid Panel with Direct LDL reflex; Future    Non-seasonal allergic rhinitis, unspecified trigger  She seems to pretty stable with her allergies despite the season.       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  She is not following with rheumatology or taking medicines for this.  Orders:    TSH, 3rd generation with Free T4 reflex; Future    Psoriasis  She does have occasional itching from a history of psoriasis.  I did refill Atarax for her.  Orders:    hydrOXYzine HCL (ATARAX) 25 mg tablet; Take 1 tablet (25 mg total) by mouth every 8 (eight) hours as needed for itching    Major depressive disorder with single episode, in partial remission (HCC)  Depression seems to be stable and he is doing  quite well.    Orders:    TSH, 3rd generation with Free T4 reflex; Future    Spinal stenosis of lumbar region with neurogenic claudication  He has chronic symptoms of spinal stenosis but is working through it and trying to remain active.       Dermatitis  He has a history of dermatitis with what appears to be an active allergic dermatitis.  See below.  Orders:    CBC and differential; Future    TSH, 3rd generation with Free T4 reflex; Future    Allergic dermatitis w itching  He has what appears to be an allergic dermatitis.  He has a lot of scratch marks across his shoulders and some erythema with perhaps a hive on his right arm.  Try Medrol Dosepak.  Refill hydroxyzine for as needed use as well as Zyrtec nightly.  Orders:    methylPREDNISolone 4 MG tablet therapy pack; Use as directed on package    hydrOXYzine HCL (ATARAX) 25 mg tablet; Take 1 tablet (25 mg total) by mouth every 8 (eight) hours as needed for itching    cetirizine (ZyrTEC) 10 mg tablet; Take 1 tablet (10 mg total) by mouth daily    Elevated PSA  He would like to continue to monitor PSA in light of his history of elevated PSA.  He is not currently following with urology.  Orders:    PSA Total, Diagnostic; Future    Pre-diabetes  Check A1c as outlined  Orders:    Hemoglobin A1C; Future    Mixed hyperlipidemia    Orders:    Lipid Panel with Direct LDL reflex; Future    Vitamin D deficiency    Orders:    Vitamin D 25 hydroxy; Future    PTH, intact; Future    Dental anomaly  He appears to have lost a tooth and I am sending him to our dental team as he has been having some trouble with his current dental situation.  Orders:    Ambulatory Referral to Dentistry; Future       Preventive health issues were discussed with patient, and age appropriate screening tests were ordered as noted in patient's After Visit Summary. Personalized health advice and appropriate referrals for health education or preventive services given if needed, as noted in patient's After  Visit Summary.    History of Present Illness     HPI patient presents for an AWV as well as a follow-up of chronic health issues.  He has an acute dental issue where he feels he cracked and lost a tooth on his lower gumline.  This is somewhat painful.  He is seeing his dentist tomorrow but would like to get a new dentist, perhaps within our system.  He has a history of depression but this seems to be quite stable.  He denies any current issues chest pain, shortness of breath or palpitations.  Low back issues are stable.  Patient Care Team:  Aaron Fuller Jr., MD as PCP - General  Aaron Fuller Jr., MD as PCP - PCP-[facility]  MD Barry Rosenthal DO Kathleen McFarland, PA-C Sean Lacey, MD (Gastroenterology)    Review of Systems   Constitutional:  Negative for appetite change, chills, fatigue, fever and unexpected weight change.   HENT:  Negative for trouble swallowing.    Eyes:  Negative for visual disturbance.   Respiratory:  Negative for cough, chest tightness, shortness of breath and wheezing.    Cardiovascular:  Negative for chest pain, palpitations and leg swelling.   Gastrointestinal:  Negative for abdominal distention, abdominal pain, blood in stool, constipation and diarrhea.   Endocrine: Negative for polyuria.   Genitourinary:  Negative for difficulty urinating and flank pain.   Musculoskeletal:  Negative for arthralgias and myalgias.   Skin:  Positive for rash.   Neurological:  Negative for dizziness and light-headedness.   Hematological:  Negative for adenopathy. Does not bruise/bleed easily.   Psychiatric/Behavioral:  Negative for dysphoric mood and sleep disturbance. The patient is not nervous/anxious.      Medical History Reviewed by provider this encounter:       Annual Wellness Visit Questionnaire   Matty is here for his Subsequent Wellness visit.     Health Risk Assessment:   Patient rates overall health as fair. Patient feels that their  physical health rating is same. Patient is dissatisfied with their life. Eyesight was rated as same. Hearing was rated as same. Patient feels that their emotional and mental health rating is same. Patients states they are sometimes angry. Patient states they are sometimes unusually tired/fatigued. Pain experienced in the last 7 days has been a lot. Patient's pain rating has been 7/10. Patient states that he has experienced no weight loss or gain in last 6 months.     Depression Screening:   PHQ-9 Score: 0      Fall Risk Screening:   In the past year, patient has experienced: no history of falling in past year      Home Safety:  Patient has trouble with stairs inside or outside of their home. Patient has working smoke alarms and has working carbon monoxide detector. Home safety hazards include: none.     Nutrition:   Current diet is Low Cholesterol and No Added Salt.     Medications:   Patient is currently taking over-the-counter supplements. OTC medications include: see medication list. Patient is able to manage medications.     Activities of Daily Living (ADLs)/Instrumental Activities of Daily Living (IADLs):   Walk and transfer into and out of bed and chair?: Yes  Dress and groom yourself?: Yes    Bathe or shower yourself?: Yes    Feed yourself? Yes  Do your laundry/housekeeping?: Yes  Manage your money, pay your bills and track your expenses?: Yes  Make your own meals?: Yes    Do your own shopping?: Yes    Previous Hospitalizations:   Any hospitalizations or ED visits within the last 12 months?: No      Advance Care Planning:   Living will: Yes    Advanced directive: Yes    End of Life Decisions reviewed with patient: Yes      Cognitive Screening:   Provider or family/friend/caregiver concerned regarding cognition?: No    Preventive Screenings      Cardiovascular Screening:    General: Screening Not Indicated and History Lipid Disorder      Diabetes Screening:     General: Screening Current      Colorectal Cancer  Screening:     General: Screening Current      Prostate Cancer Screening:    General: Screening Not Indicated      Abdominal Aortic Aneurysm (AAA) Screening:    Risk factors include: tobacco use        Lung Cancer Screening:     General: Screening Not Indicated      Hepatitis C Screening:    General: Screening Current    Screening, Brief Intervention, and Referral to Treatment (SBIRT)     Screening  Typical number of drinks in a day: 0  Typical number of drinks in a week: 0  Interpretation: Low risk drinking behavior.    Single Item Drug Screening:  How often have you used an illegal drug (including marijuana) or a prescription medication for non-medical reasons in the past year? never    Single Item Drug Screen Score: 0  Interpretation: Negative screen for possible drug use disorder    Social Drivers of Health     Financial Resource Strain: High Risk (4/17/2023)    Overall Financial Resource Strain (CARDIA)     Difficulty of Paying Living Expenses: Hard   Food Insecurity: No Food Insecurity (4/23/2025)    Hunger Vital Sign     Worried About Running Out of Food in the Last Year: Never true     Ran Out of Food in the Last Year: Never true   Transportation Needs: No Transportation Needs (4/23/2025)    PRAPARE - Transportation     Lack of Transportation (Medical): No     Lack of Transportation (Non-Medical): No   Housing Stability: Low Risk  (4/23/2025)    Housing Stability Vital Sign     Unable to Pay for Housing in the Last Year: No     Number of Times Moved in the Last Year: 0     Homeless in the Last Year: No   Utilities: Not At Risk (4/23/2025)    [facility] Utilities     Threatened with loss of utilities: No     No results found.    Objective   There were no vitals taken for this visit.    Physical Exam  Constitutional:       General: He is not in acute distress.     Appearance: Normal appearance. He is well-developed. He is obese. He is not diaphoretic.   HENT:      Head: Normocephalic.      Right Ear: External ear  normal.      Left Ear: External ear normal.      Nose: Nose normal.   Eyes:      General:         Right eye: No discharge.         Left eye: No discharge.      Conjunctiva/sclera: Conjunctivae normal.      Pupils: Pupils are equal, round, and reactive to light.   Neck:      Thyroid: No thyromegaly.      Trachea: No tracheal deviation.   Cardiovascular:      Rate and Rhythm: Normal rate and regular rhythm.      Heart sounds: Normal heart sounds. No murmur heard.     No friction rub.   Pulmonary:      Effort: Pulmonary effort is normal. No respiratory distress.      Breath sounds: Normal breath sounds. No wheezing.   Chest:      Chest wall: No tenderness.   Abdominal:      General: There is no distension.      Palpations: There is no mass.      Tenderness: There is no abdominal tenderness. There is no guarding or rebound.      Hernia: No hernia is present.   Musculoskeletal:         General: No swelling or deformity.      Cervical back: Normal range of motion.      Right lower leg: No edema.      Left lower leg: No edema.   Skin:     Findings: No erythema or rash.   Neurological:      General: No focal deficit present.      Mental Status: He is alert.      Cranial Nerves: No cranial nerve deficit.      Coordination: Coordination normal.   Psychiatric:         Thought Content: Thought content normal.

## 2025-04-23 NOTE — ASSESSMENT & PLAN NOTE
She does have occasional itching from a history of psoriasis.  I did refill Atarax for her.  Orders:    hydrOXYzine HCL (ATARAX) 25 mg tablet; Take 1 tablet (25 mg total) by mouth every 8 (eight) hours as needed for itching

## 2025-05-08 DIAGNOSIS — F41.9 ANXIETY: ICD-10-CM

## 2025-05-12 NOTE — TELEPHONE ENCOUNTER
Patient called to request a refill for their alprazolam advised a refill was requested on 05/08/2025 and is pending approval. Patient verbalized understanding and is in agreement.     Does the patient have enough for 3 days?   [] Yes   [x] No - Send as HP to POD       Patient also called requesting refill for .finasteride.  Patient made aware medication was refilled on 02/11/2025 for 90 with 3 refills to [facility] pharmacy. Patient instructed to contact the pharmacy and speak with someone directly to obtain refills of medication. Patient advised to call back for refill if their pharmacy is unable to assist them. Patient verbalized understanding.

## 2025-05-13 DIAGNOSIS — F41.9 ANXIETY: ICD-10-CM

## 2025-05-13 RX ORDER — ALPRAZOLAM 0.5 MG
TABLET ORAL
Qty: 60 TABLET | Refills: 0 | Status: SHIPPED | OUTPATIENT
Start: 2025-05-13

## 2025-05-13 RX ORDER — ALPRAZOLAM 0.5 MG
TABLET ORAL
Qty: 60 TABLET | Refills: 0 | Status: CANCELLED | OUTPATIENT
Start: 2025-05-13

## 2025-06-07 DIAGNOSIS — I10 HYPERTENSION, UNSPECIFIED TYPE: ICD-10-CM

## 2025-06-08 RX ORDER — AMLODIPINE BESYLATE 10 MG/1
10 TABLET ORAL DAILY
Qty: 90 TABLET | Refills: 1 | Status: SHIPPED | OUTPATIENT
Start: 2025-06-08

## 2025-06-24 DIAGNOSIS — L40.9 PSORIASIS: ICD-10-CM

## 2025-06-24 DIAGNOSIS — L23.9 ALLERGIC DERMATITIS: ICD-10-CM

## 2025-06-24 RX ORDER — HYDROXYZINE HYDROCHLORIDE 25 MG/1
25 TABLET, FILM COATED ORAL EVERY 8 HOURS PRN
Qty: 60 TABLET | Refills: 0 | Status: SHIPPED | OUTPATIENT
Start: 2025-06-24

## 2025-06-25 ENCOUNTER — TELEPHONE (OUTPATIENT)
Age: 80
End: 2025-06-25

## 2025-06-26 NOTE — TELEPHONE ENCOUNTER
PA for hydrOXYzine HCL 25mg tablet APPROVED     Date(s) approved 03/27/2025-06/25/2026    Patient advised by          [x]Whodinihart Message  []Phone call   []LMOM  []L/M to call office as no active Communication consent on file  [x]Unable to leave detailed message as VM not approved on Communication consent       Pharmacy advised by    [x]Fax  []Phone call    Approval letter scanned into Media Yes

## 2025-06-30 ENCOUNTER — APPOINTMENT (OUTPATIENT)
Dept: LAB | Facility: MEDICAL CENTER | Age: 80
End: 2025-06-30
Payer: COMMERCIAL

## 2025-06-30 ENCOUNTER — OFFICE VISIT (OUTPATIENT)
Dept: FAMILY MEDICINE CLINIC | Facility: CLINIC | Age: 80
End: 2025-06-30
Payer: COMMERCIAL

## 2025-06-30 VITALS
BODY MASS INDEX: 32.41 KG/M2 | DIASTOLIC BLOOD PRESSURE: 98 MMHG | WEIGHT: 188.8 LBS | OXYGEN SATURATION: 97 % | SYSTOLIC BLOOD PRESSURE: 158 MMHG | TEMPERATURE: 99 F | HEART RATE: 95 BPM

## 2025-06-30 DIAGNOSIS — L60.0 INGROWN NAIL: ICD-10-CM

## 2025-06-30 DIAGNOSIS — K21.00 GASTROESOPHAGEAL REFLUX DISEASE WITH ESOPHAGITIS, UNSPECIFIED WHETHER HEMORRHAGE: ICD-10-CM

## 2025-06-30 DIAGNOSIS — G25.0 ESSENTIAL TREMOR: ICD-10-CM

## 2025-06-30 DIAGNOSIS — I10 ESSENTIAL HYPERTENSION: ICD-10-CM

## 2025-06-30 DIAGNOSIS — E55.9 VITAMIN D DEFICIENCY: ICD-10-CM

## 2025-06-30 DIAGNOSIS — E78.2 MIXED HYPERLIPIDEMIA: ICD-10-CM

## 2025-06-30 DIAGNOSIS — J40 BRONCHITIS: Primary | ICD-10-CM

## 2025-06-30 LAB
25(OH)D3 SERPL-MCNC: 32.9 NG/ML (ref 30–100)
ALBUMIN SERPL BCG-MCNC: 4.5 G/DL (ref 3.5–5)
ALP SERPL-CCNC: 52 U/L (ref 34–104)
ALT SERPL W P-5'-P-CCNC: 26 U/L (ref 7–52)
ANION GAP SERPL CALCULATED.3IONS-SCNC: 9 MMOL/L (ref 4–13)
AST SERPL W P-5'-P-CCNC: 26 U/L (ref 13–39)
BASOPHILS # BLD AUTO: 0.05 THOUSANDS/ÂΜL (ref 0–0.1)
BASOPHILS NFR BLD AUTO: 1 % (ref 0–1)
BILIRUB SERPL-MCNC: 1.31 MG/DL (ref 0.2–1)
BUN SERPL-MCNC: 9 MG/DL (ref 5–25)
CALCIUM SERPL-MCNC: 9.3 MG/DL (ref 8.4–10.2)
CHLORIDE SERPL-SCNC: 100 MMOL/L (ref 96–108)
CHOLEST SERPL-MCNC: 157 MG/DL (ref ?–200)
CO2 SERPL-SCNC: 34 MMOL/L (ref 21–32)
CREAT SERPL-MCNC: 1.06 MG/DL (ref 0.6–1.3)
EOSINOPHIL # BLD AUTO: 0.92 THOUSAND/ÂΜL (ref 0–0.61)
EOSINOPHIL NFR BLD AUTO: 12 % (ref 0–6)
ERYTHROCYTE [DISTWIDTH] IN BLOOD BY AUTOMATED COUNT: 13.2 % (ref 11.6–15.1)
GFR SERPL CREATININE-BSD FRML MDRD: 65 ML/MIN/1.73SQ M
GLUCOSE P FAST SERPL-MCNC: 104 MG/DL (ref 65–99)
HCT VFR BLD AUTO: 45.7 % (ref 36.5–49.3)
HDLC SERPL-MCNC: 44 MG/DL
HGB BLD-MCNC: 15 G/DL (ref 12–17)
IMM GRANULOCYTES # BLD AUTO: 0.01 THOUSAND/UL (ref 0–0.2)
IMM GRANULOCYTES NFR BLD AUTO: 0 % (ref 0–2)
LDLC SERPL CALC-MCNC: 93 MG/DL (ref 0–100)
LYMPHOCYTES # BLD AUTO: 1.37 THOUSANDS/ÂΜL (ref 0.6–4.47)
LYMPHOCYTES NFR BLD AUTO: 18 % (ref 14–44)
MAGNESIUM SERPL-MCNC: 1.9 MG/DL (ref 1.9–2.7)
MCH RBC QN AUTO: 29 PG (ref 26.8–34.3)
MCHC RBC AUTO-ENTMCNC: 32.8 G/DL (ref 31.4–37.4)
MCV RBC AUTO: 88 FL (ref 82–98)
MONOCYTES # BLD AUTO: 0.9 THOUSAND/ÂΜL (ref 0.17–1.22)
MONOCYTES NFR BLD AUTO: 12 % (ref 4–12)
NEUTROPHILS # BLD AUTO: 4.35 THOUSANDS/ÂΜL (ref 1.85–7.62)
NEUTS SEG NFR BLD AUTO: 57 % (ref 43–75)
NRBC BLD AUTO-RTO: 0 /100 WBCS
PLATELET # BLD AUTO: 201 THOUSANDS/UL (ref 149–390)
PMV BLD AUTO: 10.6 FL (ref 8.9–12.7)
POTASSIUM SERPL-SCNC: 4.4 MMOL/L (ref 3.5–5.3)
PROT SERPL-MCNC: 7.2 G/DL (ref 6.4–8.4)
RBC # BLD AUTO: 5.18 MILLION/UL (ref 3.88–5.62)
SODIUM SERPL-SCNC: 143 MMOL/L (ref 135–147)
TRIGL SERPL-MCNC: 99 MG/DL (ref ?–150)
TSH SERPL DL<=0.05 MIU/L-ACNC: 1.22 UIU/ML (ref 0.45–4.5)
WBC # BLD AUTO: 7.6 THOUSAND/UL (ref 4.31–10.16)

## 2025-06-30 PROCEDURE — 80061 LIPID PANEL: CPT

## 2025-06-30 PROCEDURE — 84443 ASSAY THYROID STIM HORMONE: CPT

## 2025-06-30 PROCEDURE — 99213 OFFICE O/P EST LOW 20 MIN: CPT | Performed by: FAMILY MEDICINE

## 2025-06-30 PROCEDURE — 85025 COMPLETE CBC W/AUTO DIFF WBC: CPT

## 2025-06-30 PROCEDURE — 36415 COLL VENOUS BLD VENIPUNCTURE: CPT

## 2025-06-30 PROCEDURE — 80053 COMPREHEN METABOLIC PANEL: CPT

## 2025-06-30 PROCEDURE — 82306 VITAMIN D 25 HYDROXY: CPT

## 2025-06-30 PROCEDURE — G2211 COMPLEX E/M VISIT ADD ON: HCPCS | Performed by: FAMILY MEDICINE

## 2025-06-30 PROCEDURE — 83735 ASSAY OF MAGNESIUM: CPT

## 2025-06-30 RX ORDER — AZITHROMYCIN 250 MG/1
TABLET, FILM COATED ORAL
Qty: 6 TABLET | Refills: 0 | Status: SHIPPED | OUTPATIENT
Start: 2025-06-30 | End: 2025-07-04

## 2025-06-30 RX ORDER — PIMECROLIMUS 10 MG/G
CREAM TOPICAL
COMMUNITY
Start: 2025-06-03

## 2025-06-30 NOTE — PATIENT INSTRUCTIONS
1. Bronchitis  -     azithromycin (ZITHROMAX) 250 mg tablet; Take 2 tablets today then 1 tablet daily x 4 days

## 2025-06-30 NOTE — PROGRESS NOTES
Name: Matty Mcintyre      : 1945      MRN: [id number]  Encounter Provider: Phu Anderson MD  Encounter Date: 2025   Encounter department: [facility]  :  Assessment & Plan  Bronchitis    Start azithromycin, call if worsening and will consider cxr         Essential hypertension    Elevated today, recheck with same BP, patient has not taken his medication today, recommend taking when he returns home, appears BP is controlled when in medication.       Ingrown nail    Recommend warm soaks, call if worsening has had for many years              History of Present Illness   HPI    79 year old presenting for cough for last 3 days, couhg remains about the same.    Subjective fever (sweats last night)    Cough described as productive  Review of Systems   Constitutional:  Positive for chills. Negative for activity change and appetite change.   Respiratory:  Positive for cough. Negative for apnea, choking, chest tightness, shortness of breath and wheezing.    Cardiovascular:  Negative for chest pain and palpitations.   Gastrointestinal:  Negative for abdominal distention and abdominal pain.       Objective   /98 (BP Location: Left arm, Patient Position: Sitting, Cuff Size: Standard)   Pulse 95   Temp 99 °F (37.2 °C) (Tympanic)   Wt 85.6 kg (188 lb 12.8 oz)   SpO2 97%   BMI 32.41 kg/m²      Physical Exam  Constitutional:       Appearance: Normal appearance.     Cardiovascular:      Rate and Rhythm: Normal rate and regular rhythm.      Heart sounds: Normal heart sounds.   Pulmonary:      Effort: Pulmonary effort is normal.      Breath sounds: Normal breath sounds.     Musculoskeletal:         General: Normal range of motion.      Comments: Mild ingrown nail left big toe   Lymphadenopathy:      Cervical: Cervical adenopathy present.     Neurological:      Mental Status: He is alert.

## 2025-06-30 NOTE — ASSESSMENT & PLAN NOTE
Elevated today, recheck with same BP, patient has not taken his medication today, recommend taking when he returns home, appears BP is controlled when in medication.

## 2025-07-01 DIAGNOSIS — E78.5 HYPERLIPIDEMIA, UNSPECIFIED HYPERLIPIDEMIA TYPE: ICD-10-CM

## 2025-07-02 RX ORDER — ATORVASTATIN CALCIUM 20 MG/1
20 TABLET, FILM COATED ORAL DAILY
Qty: 90 TABLET | Refills: 1 | Status: SHIPPED | OUTPATIENT
Start: 2025-07-02

## 2025-07-03 ENCOUNTER — TELEPHONE (OUTPATIENT)
Age: 80
End: 2025-07-03

## 2025-07-03 ENCOUNTER — TELEPHONE (OUTPATIENT)
Dept: FAMILY MEDICINE CLINIC | Facility: CLINIC | Age: 80
End: 2025-07-03

## 2025-07-03 NOTE — TELEPHONE ENCOUNTER
Patient called and said he is still sick and has a lot of coughing, sneezing and watery eyes and doesn't feel like the antibiotics are working. Patient asked if there are other treatments Dr. Anderson can sent to his [facility] pharmacy. Please advise [phone] thank you.

## 2025-07-03 NOTE — TELEPHONE ENCOUNTER
Patient called to check if Dr Anderson had sent the medication to the pharmacy. I relayed the information about Mucinex as per provider message. He then asked if the script was sent to the pharmacy and I let the patient know that this is bought over the counter at the pharmacy or at the grocery store. Patient stated he will go pick it up today.

## 2025-07-05 ENCOUNTER — APPOINTMENT (OUTPATIENT)
Dept: RADIOLOGY | Facility: MEDICAL CENTER | Age: 80
End: 2025-07-05
Payer: COMMERCIAL

## 2025-07-05 ENCOUNTER — NURSE TRIAGE (OUTPATIENT)
Dept: OTHER | Facility: OTHER | Age: 80
End: 2025-07-05

## 2025-07-05 ENCOUNTER — OFFICE VISIT (OUTPATIENT)
Dept: URGENT CARE | Facility: MEDICAL CENTER | Age: 80
End: 2025-07-05
Payer: COMMERCIAL

## 2025-07-05 VITALS
TEMPERATURE: 98.4 F | DIASTOLIC BLOOD PRESSURE: 63 MMHG | RESPIRATION RATE: 18 BRPM | HEART RATE: 92 BPM | SYSTOLIC BLOOD PRESSURE: 119 MMHG | OXYGEN SATURATION: 96 %

## 2025-07-05 DIAGNOSIS — R05.1 ACUTE COUGH: ICD-10-CM

## 2025-07-05 DIAGNOSIS — J20.9 ACUTE BRONCHITIS, UNSPECIFIED ORGANISM: Primary | ICD-10-CM

## 2025-07-05 PROCEDURE — 71046 X-RAY EXAM CHEST 2 VIEWS: CPT

## 2025-07-05 PROCEDURE — 99204 OFFICE O/P NEW MOD 45 MIN: CPT

## 2025-07-05 RX ORDER — BENZONATATE 200 MG/1
200 CAPSULE ORAL 3 TIMES DAILY PRN
Qty: 20 CAPSULE | Refills: 0 | Status: SHIPPED | OUTPATIENT
Start: 2025-07-05

## 2025-07-05 RX ORDER — PREDNISONE 20 MG/1
40 TABLET ORAL DAILY
Qty: 10 TABLET | Refills: 0 | Status: SHIPPED | OUTPATIENT
Start: 2025-07-05 | End: 2025-07-10

## 2025-07-05 NOTE — TELEPHONE ENCOUNTER
"Regarding: Chest pain, Stomach pain, Congestion, Constant Cough  ----- Message from Mohinder PHILIPPE sent at 7/5/2025 12:45 PM EDT -----  \"I had seen my doctor because of a cough I have and called back because the medication he prescribed wasn't working. He told me to take an over the counter, but that isn't working either. I don't know what to do. They mentioned maybe getting an x-ray done, but I am having a constant cough, there is pain in my chest and stomach and my nose is runny. I need help.\"    "

## 2025-07-05 NOTE — PROGRESS NOTES
[facility]  Name: Matty Mcintyre      : 1945      MRN: [id number]  Encounter Provider: Aurelia Jefferson PA-C  Encounter Date: 2025   Encounter department: [facility] [place]  :  Assessment & Plan  Acute bronchitis, unspecified organism    Orders:    benzonatate (TESSALON) 200 MG capsule; Take 1 capsule (200 mg total) by mouth 3 (three) times a day as needed for cough    predniSONE 20 mg tablet; Take 2 tablets (40 mg total) by mouth daily for 5 days  PCP note from visit on 2025 reviewed.  Patient diagnosed with bronchitis and prescribed course of azithromycin, advised to call if worsening and will consider CXR.    PCP phone call note from 7/3/2025 reviewed.  Patient was advised to take Mucinex over-the-counter.    Presentation consistent with bronchitis.  Patient was already treated with antibiotic, no signs of bacterial infection today.  Advised symptomatic treatments.  Prescribed course of Tessalon, prednisone.  Discussed PCP follow-up if no improvement in symptoms.  Acute cough    Orders:    XR chest pa and lateral; Future  CXR: No acute cardiopulmonary disease per preliminary read.  Radiology to determine final read.      Patient Instructions  Prescribed course of prednisone, take as directed.  Avoid use of NSAID medications, such as ibuprofen products or Aleve, with taking this medication.  Prescribed Tessalon, take as directed.  Avoid other cough suppressants when taking this medicine.  May continue with cough expectorant such as plain Mucinex or guaifenesin.  Fever/Pain: Over the counter Tylenol and/or Motrin as directed on packaging.  Cough: Mucinex can help to thin mucus, making it easier to cough up. Utilizing a vaporizer/humidifier may help, as well as increasing fluids.  Sore Throat: Salt water gargles with 1 teaspoon of salt dissolved in 6-8 oz of water, honey, drinking plenty of liquids, eating soft foods. If severe, can utilize OTC chloraseptic  spray.  Nasal Congestion: Over the counter allergy medication like Claritin, Allegra or Zyrtec can help with nasal congestion and post nasal drip.  Over the counter saline or steroid nasal sprays like Flonase can help with nasal congestion and post nasal drip as well. Over the counter decongestants such as Sudafed may also help.  Upset Stomach: Drink plenty of fluids. May utilize Gatorade, Pedialyte, or other electrolyte solutions to supplement. Eat bland foods (ex: BRAT - bananas, rice, applesauce, toast) and slowly advance to other foods as tolerated.  Please note, if you have heart issues or high blood pressure, the cough/cold medication of choice is Coricidin. Talk to your doctor before trying any new medications.    Follow up with PCP in 3-5 days.  Proceed to  ER if symptoms worsen.    If tests are performed, our office will contact you with results only if changes need to made to the care plan discussed with you at the visit. You can review your full results on [facility] MyChart.    Chief Complaint:   Chief Complaint   Patient presents with    Cough     Patient c/o a cough with chest congestion, pain from coughing and body aches x 2 weeks . Noted he was seen by PCP completed an antibiotic without improvement.       History of Present Illness   Patient presents for evaluation of cough, upper respiratory symptoms.  Notes symptoms started 2 weeks ago and the cough has been worsening.  He was seen by his PCP and diagnosed with bronchitis on 6/30/2025, prescribed azithromycin which he took as directed.  Notes no improvement.  He started Mucinex on 7/3/2025 after talking to his PCP.  Notes a history of possible asthma, but does not need to use his inhaler.    Cough  This is a new problem. The current episode started 1 to 4 weeks ago. The problem has been gradually worsening. The cough is Productive of sputum. Associated symptoms include chest pain, chills, ear pain, eye redness, a fever (subjective), myalgias,  postnasal drip, rhinorrhea, a sore throat, shortness of breath and wheezing. Pertinent negatives include no rash. Treatments tried: azithromycin, daytime and nighttime Mucinex. The treatment provided no relief.         Review of Systems   Constitutional:  Positive for chills, fatigue and fever (subjective). Negative for appetite change (still hungry, but notes pain with eating).   HENT:  Positive for congestion, ear pain, postnasal drip, rhinorrhea, sinus pressure, sinus pain and sore throat. Negative for ear discharge.    Eyes:  Positive for redness and itching. Negative for pain and discharge.   Respiratory:  Positive for cough, chest tightness, shortness of breath and wheezing. Negative for stridor.    Cardiovascular:  Positive for chest pain.   Gastrointestinal:  Positive for abdominal pain (from coughing), diarrhea (this morning, one episode) and vomiting (vomiting phlegm, from cough). Negative for nausea.   Musculoskeletal:  Positive for myalgias.   Skin:  Negative for rash.     Past Medical History   Past Medical History[1]  Past Surgical History[2]  Family History[3]  he reports that he quit smoking about 25 years ago. His smoking use included cigars. He has quit using smokeless tobacco. He reports that he does not drink alcohol and does not use drugs.  Current Outpatient Medications   Medication Instructions    acetaminophen (TYLENOL) 650 mg, Oral, Every 6 hours PRN    albuterol (PROVENTIL HFA,VENTOLIN HFA) 90 mcg/act inhaler 1 puff, Inhalation, Every 6 hours PRN    ALPRAZolam (XANAX) 0.5 mg tablet TAKE 1 TABLET BY MOUTH UP TO TWICE DAILY AS NEEDED    amLODIPine (NORVASC) 10 mg, Oral, Daily    atorvastatin (LIPITOR) 20 mg, Oral, Daily    betamethasone, augmented, (DIPROLENE) 0.05 % lotion     Carboxymethylcellul-Glycerin (REFRESH RELIEVA OP) Apply to eye    cetirizine (ZYRTEC) 10 mg, Oral, Daily    citalopram (CELEXA) 40 mg, Oral, Daily    dextromethorphan-guaifenesin (MUCINEX DM)  MG per 12 hr tablet  1 tablet, Oral, Every 12 hours    diclofenac sodium (VOLTAREN) 2 g, 2 times daily    diphenhydrAMINE (BENADRYL) 12.5 mg/5 mL oral liquid 3 times daily PRN    finasteride (PROSCAR) 5 mg, Oral, Daily    fluocinolone (SYNALAR) 0.01 % external solution     gabapentin (NEURONTIN) 300 mg, Oral, 3 times daily    hydrocortisone valerate (WEST-VITO) 0.2 % ointment Apply twice daily to itchy areas as needed.    hydrOXYzine HCL (ATARAX) 25 mg, Oral, Every 8 hours PRN    lisinopril-hydrochlorothiazide (PRINZIDE,ZESTORETIC) 20-12.5 MG per tablet 1 tablet, Oral, Daily    methylPREDNISolone 4 MG tablet therapy pack Use as directed on package    nystatin (MYCOSTATIN) cream Topical, 2 times daily, Apply twice daily as needed for rectal itching.    pantoprazole (PROTONIX) 40 mg, Oral, Daily    pimecrolimus (ELIDEL) 1 % cream Apply topically    Risankizumab-rzaa (SKYRIZI PEN SC) Inject under the skin Q 4 weeks via Dermatology    simethicone (MYLICON,GAS-X) 180 mg, Oral, Every 6 hours PRN    tacrolimus (PROTOPIC) 0.1 % ointment     tamsulosin (FLOMAX) 0.4 mg, Oral, Daily with dinner    triamcinolone (KENALOG) 0.1 % cream Apply up to 3 times daily on itchy skin lesions as needed.    vitamin B-12 (VITAMIN B-12) 1,000 mcg, Daily    vitamin C 100 mg, Daily    VITAMIN D, ERGOCALCIFEROL, PO 2,000 Units    Vitamins-Lipotropics (LIPO FLAVONOID PLUS PO) Take by mouth   Allergies[4]     Objective   /63   Pulse 92   Temp 98.4 °F (36.9 °C)   Resp 18   SpO2 96%      Physical Exam  Vitals and nursing note reviewed.   Constitutional:       General: He is not in acute distress.     Appearance: Normal appearance. He is well-developed. He is not ill-appearing.   HENT:      Head: Normocephalic and atraumatic.      Right Ear: Tympanic membrane, ear canal and external ear normal.      Left Ear: Tympanic membrane, ear canal and external ear normal.      Nose: Nose normal.      Mouth/Throat:      Mouth: Mucous membranes are moist.      Pharynx:  "Oropharynx is clear. Posterior oropharyngeal erythema present. No oropharyngeal exudate.     Eyes:      General:         Right eye: No discharge.         Left eye: No discharge.      Conjunctiva/sclera: Conjunctivae normal.       Cardiovascular:      Rate and Rhythm: Normal rate and regular rhythm.      Pulses: Normal pulses.      Heart sounds: Normal heart sounds. No murmur heard.  Pulmonary:      Effort: Pulmonary effort is normal. No respiratory distress.      Breath sounds: Normal breath sounds. No stridor. No wheezing, rhonchi or rales.   Abdominal:      General: There is no distension.      Palpations: Abdomen is soft.      Tenderness: There is no abdominal tenderness. There is no guarding.     Musculoskeletal:      Cervical back: Neck supple.   Lymphadenopathy:      Cervical: No cervical adenopathy.     Skin:     General: Skin is warm and dry.     Neurological:      Mental Status: He is alert.     Psychiatric:         Mood and Affect: Mood normal.       Portions of the record may have been created with voice recognition software.  Occasional wrong word or \"sound a like\" substitutions may have occurred due to the inherent limitations of voice recognition software.  Read the chart carefully and recognize, using context, where substitutions have occurred.         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 Brenner    CYSTOSCOPY  08/05/2021    EAR SURGERY      1982 and 1990, ear drum,  per [employer]    ESOPHAGOGASTRODUODENOSCOPY  02/2015    2 cm sliding hiatal hernia, grade 1-2 esophagitis, duodenitis by Dr. Brenner    FOOT SURGERY Left 1997    Toe    MASTOIDECTOMY Left     OTHER SURGICAL HISTORY      Spinal Anesthesia Epidural, x3 2004, per [employer]    POLYPECTOMY  2015    TYMPANOPLASTY Bilateral 2000    Dr Nichols   [3]   Family History  Problem Relation Name Age of Onset    Cataracts Mother      Hyperlipidemia Mother      Colon polyps Father     [4]   Allergies  Allergen Reactions    Amoxicillin Rash    Amoxicillin-Pot Clavulanate Er  [Amoxicillin-Pot Clavulanate] Hives and Rash    Codeine Rash and Shortness Of Breath    Penicillin G Rash    Albumen, Egg - Food Allergy     Albumin Human Other (See Comments)    Clavulanic Acid Other (See Comments)    Iodine Strong Other (See Comments)    Lisinopril Other (See Comments)    Other      pork    Penicillins Other (See Comments)    Pork-Derived Products - Food Allergy Hives    Prochlorperazine Itching and Other (See Comments)     rash all over the body    Compazine  [Prochlorperazine Edisylate] Rash    Iodinated Contrast Media Rash    Latex Rash and Itching    Valdecoxib Headache, Itching, Rash and Other (See Comments)     Category: HEADACHES;

## 2025-07-05 NOTE — TELEPHONE ENCOUNTER
"REASON FOR CONVERSATION: Cough    SYMPTOMS: Cough worsening after Z-radha and Mucinex    OTHER HEALTH INFORMATION: None    PROTOCOL DISPOSITION: Go to ED Now (or PCP Triage)    CARE ADVICE PROVIDED: Per on-call provider, please go to Urgent Care    PRACTICE FOLLOW-UP: None      Reason for Disposition   Patient sounds very sick or weak to the triager    Answer Assessment - Initial Assessment Questions  1. ONSET: \"When did the cough begin?\"     Two weeks ago; was seen and given antibiotics. Then started taking Mucinex    2. SEVERITY: \"How bad is the cough today?\"     Cough is worsening    3. SPUTUM: \"Describe the color of your sputum\" (e.g., none, dry cough; clear, white, yellow, green)        Clear    4. HEMOPTYSIS: \"Are you coughing up any blood?\" If Yes, ask: \"How much?\" (e.g., flecks, streaks, tablespoons, etc.)        Coughing up some blood    5. DIFFICULTY BREATHING: \"Are you having difficulty breathing?\" If Yes, ask: \"How bad is it?\" (e.g., mild, moderate, severe)         Not too much, but has a constant cough; has chest pain with coughing    6. FEVER: \"Do you have a fever?\" If Yes, ask: \"What is your temperature, how was it measured, and when did it start?\"        Subjective fever    7. CARDIAC HISTORY: \"Do you have any history of heart disease?\" (e.g., heart attack, congestive heart failure)         Denies    8. LUNG HISTORY: \"Do you have any history of lung disease?\"  (e.g., pulmonary embolus, asthma, emphysema)        Denies    9. PE RISK FACTORS: \"Do you have a history of blood clots?\" (or: recent major surgery, recent prolonged travel, bedridden)        Denies    10. OTHER SYMPTOMS: \"Do you have any other symptoms?\" (e.g., runny nose, wheezing, chest pain)          Runny nose, chest discomfort with coughing, clear drainage from the eyes    11. TRAVEL: \"Have you traveled out of the country in the last month?\" (e.g., travel history, exposures)          Denies    Protocols used: Cough - Acute " Productive-Adult-AH

## 2025-07-05 NOTE — TELEPHONE ENCOUNTER
Regarding: Chest pain, Stomach pain, Congestion, Constant Cough  ----- Message from Ansley DEJESUS sent at 7/5/2025  3:02 PM EDT -----  Patient called back to talk to the nurse.

## 2025-07-05 NOTE — PATIENT INSTRUCTIONS
Prescribed course of prednisone, take as directed.  Avoid use of NSAID medications, such as ibuprofen products or Aleve, with taking this medication.  Prescribed Tessalon, take as directed.  Avoid other cough suppressants when taking this medicine.  May continue with cough expectorant such as plain Mucinex or guaifenesin.  Fever/Pain: Over the counter Tylenol and/or Motrin as directed on packaging.  Cough: Mucinex can help to thin mucus, making it easier to cough up. Utilizing a vaporizer/humidifier may help, as well as increasing fluids.  Sore Throat: Salt water gargles with 1 teaspoon of salt dissolved in 6-8 oz of water, honey, drinking plenty of liquids, eating soft foods. If severe, can utilize OTC chloraseptic spray.  Nasal Congestion: Over the counter allergy medication like Claritin, Allegra or Zyrtec can help with nasal congestion and post nasal drip.  Over the counter saline or steroid nasal sprays like Flonase can help with nasal congestion and post nasal drip as well. Over the counter decongestants such as Sudafed may also help.  Upset Stomach: Drink plenty of fluids. May utilize Gatorade, Pedialyte, or other electrolyte solutions to supplement. Eat bland foods (ex: BRAT - bananas, rice, applesauce, toast) and slowly advance to other foods as tolerated.  Please note, if you have heart issues or high blood pressure, the cough/cold medication of choice is Coricidin. Talk to your doctor before trying any new medications.    Follow up with PCP in 3-5 days.  Proceed to  ER if symptoms worsen.    If tests are performed, our office will contact you with results only if changes need to made to the care plan discussed with you at the visit. You can review your full results on [facility] MyChart.    Patient Education     Acute bronchitis in adults   The Basics   Written by the doctors and editors at [facility]   What is bronchitis? -- This is an infection that causes a cough. It happens when the tubes that carry air  "into the lungs, called the \"bronchi,\" get infected (figure 1).  Usually, bronchitis happens after a person gets a cold or the flu. The viruses that cause the cold or flu infect the bronchi and irritate them. Antibiotics do not help bronchitis.  Bronchitis can also happen when a person gets an infection called \"whooping cough,\" but this is much less common. Whooping cough is caused by bacteria that can infect the bronchi. Most people get vaccines to prevent whooping cough, but the vaccine doesn't always work. Your doctor will be able to tell if you have whooping cough by doing an exam and listening to your cough.  This article is about \"acute\" bronchitis. This is different from \"chronic\" bronchitis, which is a lung disease that most often affects people who smoke.  What are the symptoms of bronchitis? -- The most common symptoms are:   A cough that can last up to a few weeks   Coughing up mucus that is clear, yellow, or green - Green mucus does not always mean that you have a bacterial infection.  You might also have other cold or flu symptoms, like a stuffy nose, sore throat, or headache. People with bronchitis do not usually get a fever.  Will I need tests? -- Most people with bronchitis do not need a test. But if your doctor or nurse is not sure what is causing your cough, they might do tests. For example, they might order a chest X-ray. Or if they think that you might have COVID-19, they will test you for the virus that causes the infection.  How is bronchitis treated? -- Bronchitis almost always goes away on its own. But the cough can take up to 3 weeks to get better, and sometimes even longer.  Doctors do not usually treat bronchitis with antibiotic medicines. That's because bronchitis is usually caused by a virus, and antibiotics kill bacteria, not viruses. Also, antibiotics can actually cause other problems.  To feel better, you can treat your cold and flu symptoms. You can:   Get lots of rest, and drink " "plenty of liquids.   Drink hot tea.   Suck on cough drops or hard candy.   Take over-the-counter cough and cold medicines.   Breath in warm, moist air, such as in the shower, over a kettle, or from a humidifier.   Take a pain-relieving medicine if you have cold or flu symptoms like headache, muscle aches, or joint pain.  Avoid smoking or being around others who smoke. This can make your cough worse.  How can I keep from getting bronchitis again? -- You can reduce your chance of getting bronchitis again by keeping the germs that cause bronchitis out of your body. One of the best ways to do this is to wash your hands often with soap and water. If there is no sink nearby, you can use a hand gel with alcohol in it to clean your hands.  How can I keep from spreading germs? -- In addition to washing your hands often, cover your mouth with your elbow when you sneeze or cough. Using your elbow keeps you from getting germs on your hands. If you use a tissue, throw the tissue away and wash your hands.  When should I call the doctor? -- Call for advice if you have:   A fever higher than 100.4°F (38°C), or chills   Chest pain when you cough, trouble breathing, or coughing up blood   A barking cough that makes it hard to talk   Cough and weight loss that you cannot explain   Symptoms that are not getting better after 3 weeks  All topics are updated as new evidence becomes available and our peer review process is complete.  This topic retrieved from [employer] on: May 16, 2024.  Topic 00698 Version 17.0  Release: 32.4.3 - C32.135  © 2024 UpToDate, Inc. and/or its affiliates. All rights reserved.  figure 1: Normal lungs     The lungs sit in the chest, inside the ribcage. They are covered with a thin membrane called the \"pleura.\" The windpipe, or trachea, branches into 2 smaller airways called the left and right \"bronchi.\" The space between the lungs is called the \"mediastinum.\" Lymph nodes are located within and around the lungs and " [facility].  Graphic 55003 Version 14.0  Consumer Information Use and Disclaimer   Disclaimer: This generalized information is a limited summary of diagnosis, treatment, and/or medication information. It is not meant to be comprehensive and should be used as a tool to help the user understand and/or assess potential diagnostic and treatment options. It does NOT include all information about conditions, treatments, medications, side effects, or risks that may apply to a specific patient. It is not intended to be medical advice or a substitute for the medical advice, diagnosis, or treatment of a health care provider based on the health care provider's examination and assessment of a patient's specific and unique circumstances. Patients must speak with a health care provider for complete information about their health, medical questions, and treatment options, including any risks or benefits regarding use of medications. This information does not endorse any treatments or medications as safe, effective, or approved for treating a specific patient. UpToDate, Inc. and its affiliates disclaim any warranty or liability relating to this information or the use thereof.The use of this information is governed by the Terms of Use, available at https://www.woltersTradeshiftuwer.com/en/know/clinical-effectiveness-terms. 2024© UpToDate, Inc. and its affiliates and/or licensors. All rights reserved.  Copyright   © 2024 UpToDate, Inc. and/or its affiliates. All rights reserved.

## 2025-07-22 DIAGNOSIS — F41.9 ANXIETY: ICD-10-CM

## 2025-07-22 DIAGNOSIS — R39.14 BENIGN PROSTATIC HYPERPLASIA WITH INCOMPLETE BLADDER EMPTYING: ICD-10-CM

## 2025-07-22 DIAGNOSIS — N40.1 BENIGN PROSTATIC HYPERPLASIA WITH INCOMPLETE BLADDER EMPTYING: ICD-10-CM

## 2025-07-23 RX ORDER — ALPRAZOLAM 0.5 MG
TABLET ORAL
Qty: 60 TABLET | Refills: 0 | Status: SHIPPED | OUTPATIENT
Start: 2025-07-23

## 2025-07-23 RX ORDER — FINASTERIDE 5 MG/1
5 TABLET, FILM COATED ORAL DAILY
Qty: 90 TABLET | Refills: 1 | Status: SHIPPED | OUTPATIENT
Start: 2025-07-23